# Patient Record
Sex: FEMALE | Race: WHITE | Employment: OTHER | URBAN - METROPOLITAN AREA
[De-identification: names, ages, dates, MRNs, and addresses within clinical notes are randomized per-mention and may not be internally consistent; named-entity substitution may affect disease eponyms.]

---

## 2020-07-06 ENCOUNTER — HOSPITAL ENCOUNTER (OUTPATIENT)
Age: 65
Setting detail: SPECIMEN
Discharge: HOME | End: 2020-07-06
Payer: MEDICARE

## 2020-07-06 LAB
ABSOLUTE EOS #: 0.16 K/UL (ref 0–0.44)
ABSOLUTE IMMATURE GRANULOCYTE: 0.03 K/UL (ref 0–0.3)
ABSOLUTE LYMPH #: 2.22 K/UL (ref 1.1–3.7)
ABSOLUTE MONO #: 0.57 K/UL (ref 0.1–1.2)
ALBUMIN SERPL-MCNC: 4.4 G/DL (ref 3.5–5.2)
ALBUMIN/GLOBULIN RATIO: 1.8 (ref 1–2.5)
ALP BLD-CCNC: 89 U/L (ref 35–104)
ALT SERPL-CCNC: 101 U/L (ref 5–33)
ANION GAP SERPL CALCULATED.3IONS-SCNC: 22 MMOL/L (ref 9–17)
AST SERPL-CCNC: 162 U/L
BASOPHILS # BLD: 1 % (ref 0–2)
BASOPHILS ABSOLUTE: 0.09 K/UL (ref 0–0.2)
BILIRUB SERPL-MCNC: 0.63 MG/DL (ref 0.3–1.2)
BUN BLDV-MCNC: 5 MG/DL (ref 8–23)
BUN/CREAT BLD: ABNORMAL (ref 9–20)
CALCIUM SERPL-MCNC: 9.2 MG/DL (ref 8.6–10.4)
CHLORIDE BLD-SCNC: 99 MMOL/L (ref 98–107)
CHOLESTEROL/HDL RATIO: 1.7
CHOLESTEROL: 272 MG/DL
CO2: 20 MMOL/L (ref 20–31)
CREAT SERPL-MCNC: 0.6 MG/DL (ref 0.5–0.9)
DIFFERENTIAL TYPE: ABNORMAL
EOSINOPHILS RELATIVE PERCENT: 2 % (ref 1–4)
GFR AFRICAN AMERICAN: >60 ML/MIN
GFR NON-AFRICAN AMERICAN: >60 ML/MIN
GFR SERPL CREATININE-BSD FRML MDRD: ABNORMAL ML/MIN/{1.73_M2}
GFR SERPL CREATININE-BSD FRML MDRD: ABNORMAL ML/MIN/{1.73_M2}
GGT: 258 U/L (ref 5–36)
GLUCOSE BLD-MCNC: 88 MG/DL (ref 70–99)
HCT VFR BLD CALC: 49.1 % (ref 36.3–47.1)
HDLC SERPL-MCNC: 156 MG/DL
HEMOGLOBIN: 15.6 G/DL (ref 11.9–15.1)
IMMATURE GRANULOCYTES: 0 %
INSULIN COMMENT: 0
INSULIN REFERENCE RANGE:: NORMAL
INSULIN: 2.2 MU/L
IRON SATURATION: 90 % (ref 20–55)
IRON: 195 UG/DL (ref 37–145)
LDL CHOLESTEROL: 101 MG/DL (ref 0–130)
LYMPHOCYTES # BLD: 31 % (ref 24–43)
MAGNESIUM: 2.4 MG/DL (ref 1.6–2.6)
MCH RBC QN AUTO: 32.6 PG (ref 25.2–33.5)
MCHC RBC AUTO-ENTMCNC: 31.8 G/DL (ref 28.4–34.8)
MCV RBC AUTO: 102.5 FL (ref 82.6–102.9)
MONOCYTES # BLD: 8 % (ref 3–12)
NRBC AUTOMATED: 0 PER 100 WBC
PDW BLD-RTO: 13.2 % (ref 11.8–14.4)
PLATELET # BLD: 288 K/UL (ref 138–453)
PLATELET ESTIMATE: ABNORMAL
PMV BLD AUTO: 10.6 FL (ref 8.1–13.5)
POTASSIUM SERPL-SCNC: 4.9 MMOL/L (ref 3.7–5.3)
RBC # BLD: 4.79 M/UL (ref 3.95–5.11)
RBC # BLD: ABNORMAL 10*6/UL
SEG NEUTROPHILS: 58 % (ref 36–65)
SEGMENTED NEUTROPHILS ABSOLUTE COUNT: 4.09 K/UL (ref 1.5–8.1)
SODIUM BLD-SCNC: 141 MMOL/L (ref 135–144)
TESTOSTERONE TOTAL: 53 NG/DL (ref 20–70)
THYROXINE, FREE: 0.81 NG/DL (ref 0.93–1.7)
TOTAL IRON BINDING CAPACITY: 216 UG/DL (ref 250–450)
TOTAL PROTEIN: 6.8 G/DL (ref 6.4–8.3)
TRIGL SERPL-MCNC: 75 MG/DL
TSH SERPL DL<=0.05 MIU/L-ACNC: 0.88 MIU/L (ref 0.3–5)
UNSATURATED IRON BINDING CAPACITY: 21 UG/DL (ref 112–347)
VITAMIN B-12: 1036 PG/ML (ref 232–1245)
VLDLC SERPL CALC-MCNC: ABNORMAL MG/DL (ref 1–30)
WBC # BLD: 7.2 K/UL (ref 3.5–11.3)
WBC # BLD: ABNORMAL 10*3/UL

## 2020-07-07 LAB
ESTIMATED AVERAGE GLUCOSE: 108 MG/DL
HBA1C MFR BLD: 5.4 % (ref 4–6)
VITAMIN D 25-HYDROXY: 16.3 NG/ML (ref 30–100)

## 2020-07-08 LAB
THYROGLOBULIN AB: <20 IU/ML (ref 0–40)
THYROID PEROXIDASE (TPO) AB: <10 IU/ML (ref 0–35)

## 2020-07-10 LAB
ESTRADIOL LEVEL: 7 PG/ML
ESTROGEN TOTAL: 47.3 PG/ML
ESTRONE: 40.3 PG/ML

## 2020-07-15 ENCOUNTER — TELEPHONE (OUTPATIENT)
Dept: SURGERY | Age: 65
End: 2020-07-15

## 2020-07-15 NOTE — TELEPHONE ENCOUNTER
Spoke with Stacy Hewitt regarding a direct referral for a screening colonoscopy for unexplained weight loss. Stacy Hewitt states she will never ever let anyone do a colonoscopy on her. Dr. Montse Duff informed and recommends that Albertina's PCP order a CT of the abdomen and pelvis and then she will see her in the office and go from there.

## 2020-07-17 NOTE — TELEPHONE ENCOUNTER
Spoke with Emanuel Wilson regarding PCP ordering CT scan. Emanuel Wilson took all the information and stated \"someone will call you tomorrow. \"  Emanuel Wilson states that Brisa Gonzalez is no longer at their facility and they will have to speak to another one of the Practitioners regarding the CT order.

## 2020-07-24 NOTE — TELEPHONE ENCOUNTER
Spoke with Francis Cordero at [facility] and the CT order was faxed to [facility].

## 2020-07-24 NOTE — TELEPHONE ENCOUNTER
Spoke with Enmanuel Burleson she states that she will do the CT scan \"once she gets some other things done that she is doing. \"  Enmanuel Burleson states she would like to the see Dr. Manuela Frias regarding her weight loss. Enmanuel Benjy is informed that she will need to bring a copy of the CT images on a CD with her to the appointment. Enmanuel Burleson voiced understanding and would like a call back in a few weeks. Around August 7.

## 2020-08-27 NOTE — TELEPHONE ENCOUNTER
Faxed note to PCP that patient never returned calls regarding CT or follow up fax confirmation received.

## 2021-12-20 ENCOUNTER — HOSPITAL ENCOUNTER (EMERGENCY)
Age: 66
Discharge: HOME | End: 2021-12-20
Payer: MEDICARE

## 2021-12-20 ENCOUNTER — APPOINTMENT (OUTPATIENT)
Dept: CT IMAGING | Age: 66
End: 2021-12-20
Payer: MEDICARE

## 2021-12-20 VITALS
DIASTOLIC BLOOD PRESSURE: 107 MMHG | RESPIRATION RATE: 16 BRPM | SYSTOLIC BLOOD PRESSURE: 136 MMHG | HEART RATE: 82 BPM | TEMPERATURE: 98.8 F | OXYGEN SATURATION: 92 %

## 2021-12-20 DIAGNOSIS — M54.50 ACUTE LOW BACK PAIN, UNSPECIFIED BACK PAIN LATERALITY, UNSPECIFIED WHETHER SCIATICA PRESENT: Primary | ICD-10-CM

## 2021-12-20 DIAGNOSIS — M89.9 LYTIC LESION OF BONE ON X-RAY: ICD-10-CM

## 2021-12-20 LAB
ABSOLUTE EOS #: 0.1 K/UL (ref 0–0.44)
ABSOLUTE IMMATURE GRANULOCYTE: 0.35 K/UL (ref 0–0.3)
ABSOLUTE LYMPH #: 2.35 K/UL (ref 1.1–3.7)
ABSOLUTE MONO #: 0.76 K/UL (ref 0.1–1.2)
ALBUMIN SERPL-MCNC: 3.2 G/DL (ref 3.5–5.2)
ALBUMIN/GLOBULIN RATIO: 0.8 (ref 1–2.5)
ALP BLD-CCNC: 179 U/L (ref 35–104)
ALT SERPL-CCNC: 22 U/L (ref 5–33)
ANION GAP SERPL CALCULATED.3IONS-SCNC: 15 MMOL/L (ref 9–17)
AST SERPL-CCNC: 30 U/L
BASOPHILS # BLD: 1 % (ref 0–2)
BASOPHILS ABSOLUTE: 0.09 K/UL (ref 0–0.2)
BILIRUB SERPL-MCNC: 0.41 MG/DL (ref 0.3–1.2)
BUN BLDV-MCNC: 4 MG/DL (ref 8–23)
BUN/CREAT BLD: 8 (ref 9–20)
CALCIUM SERPL-MCNC: 8.8 MG/DL (ref 8.6–10.4)
CHLORIDE BLD-SCNC: 94 MMOL/L (ref 98–107)
CO2: 26 MMOL/L (ref 20–31)
CREAT SERPL-MCNC: 0.49 MG/DL (ref 0.5–0.9)
DIFFERENTIAL TYPE: ABNORMAL
EOSINOPHILS RELATIVE PERCENT: 1 % (ref 1–4)
GFR AFRICAN AMERICAN: >60 ML/MIN
GFR NON-AFRICAN AMERICAN: >60 ML/MIN
GFR SERPL CREATININE-BSD FRML MDRD: ABNORMAL ML/MIN/{1.73_M2}
GFR SERPL CREATININE-BSD FRML MDRD: ABNORMAL ML/MIN/{1.73_M2}
GLUCOSE BLD-MCNC: 82 MG/DL (ref 70–99)
HCT VFR BLD CALC: 43.2 % (ref 36.3–47.1)
HEMOGLOBIN: 14.9 G/DL (ref 11.9–15.1)
IMMATURE GRANULOCYTES: 3 %
LYMPHOCYTES # BLD: 22 % (ref 24–43)
MCH RBC QN AUTO: 33.4 PG (ref 25.2–33.5)
MCHC RBC AUTO-ENTMCNC: 34.5 G/DL (ref 28.4–34.8)
MCV RBC AUTO: 96.9 FL (ref 82.6–102.9)
MONOCYTES # BLD: 7 % (ref 3–12)
NRBC AUTOMATED: 0 PER 100 WBC
PARTIAL THROMBOPLASTIN TIME: 38.2 SEC (ref 23.9–33.8)
PDW BLD-RTO: 12.4 % (ref 11.8–14.4)
PLATELET # BLD: 663 K/UL (ref 138–453)
PLATELET ESTIMATE: ABNORMAL
PMV BLD AUTO: 8.9 FL (ref 8.1–13.5)
POTASSIUM SERPL-SCNC: 3.5 MMOL/L (ref 3.7–5.3)
RBC # BLD: 4.46 M/UL (ref 3.95–5.11)
RBC # BLD: ABNORMAL 10*6/UL
SEG NEUTROPHILS: 66 % (ref 36–65)
SEGMENTED NEUTROPHILS ABSOLUTE COUNT: 7.05 K/UL (ref 1.5–8.1)
SODIUM BLD-SCNC: 135 MMOL/L (ref 135–144)
TOTAL PROTEIN: 7 G/DL (ref 6.4–8.3)
WBC # BLD: 10.7 K/UL (ref 3.5–11.3)
WBC # BLD: ABNORMAL 10*3/UL

## 2021-12-20 PROCEDURE — 85730 THROMBOPLASTIN TIME PARTIAL: CPT

## 2021-12-20 PROCEDURE — 85025 COMPLETE CBC W/AUTO DIFF WBC: CPT

## 2021-12-20 PROCEDURE — 99284 EMERGENCY DEPT VISIT MOD MDM: CPT

## 2021-12-20 PROCEDURE — 36415 COLL VENOUS BLD VENIPUNCTURE: CPT

## 2021-12-20 PROCEDURE — 72131 CT LUMBAR SPINE W/O DYE: CPT

## 2021-12-20 PROCEDURE — 80053 COMPREHEN METABOLIC PANEL: CPT

## 2021-12-20 RX ORDER — HYDROCODONE BITARTRATE AND ACETAMINOPHEN 5; 325 MG/1; MG/1
1 TABLET ORAL EVERY 6 HOURS PRN
Qty: 12 TABLET | Refills: 0 | Status: SHIPPED | OUTPATIENT
Start: 2021-12-20 | End: 2021-12-23

## 2021-12-20 ASSESSMENT — PAIN DESCRIPTION - ORIENTATION: ORIENTATION: LOWER;MID;UPPER

## 2021-12-20 ASSESSMENT — ENCOUNTER SYMPTOMS
EYE REDNESS: 0
CONSTIPATION: 0
SHORTNESS OF BREATH: 0
BACK PAIN: 1
CHEST TIGHTNESS: 0
ABDOMINAL PAIN: 0
BLOOD IN STOOL: 0
EYE DISCHARGE: 0
SORE THROAT: 0
WHEEZING: 0
NAUSEA: 0
VOMITING: 0
DIARRHEA: 0
COUGH: 0
RHINORRHEA: 0

## 2021-12-20 ASSESSMENT — PAIN DESCRIPTION - LOCATION
LOCATION: BACK
LOCATION: BACK

## 2021-12-20 ASSESSMENT — PAIN - FUNCTIONAL ASSESSMENT: PAIN_FUNCTIONAL_ASSESSMENT: 0-10

## 2021-12-20 ASSESSMENT — PAIN DESCRIPTION - DESCRIPTORS
DESCRIPTORS: DISCOMFORT
DESCRIPTORS: ACHING

## 2021-12-20 ASSESSMENT — PAIN DESCRIPTION - PAIN TYPE
TYPE: CHRONIC PAIN
TYPE: ACUTE PAIN

## 2021-12-20 ASSESSMENT — PAIN SCALES - GENERAL
PAINLEVEL_OUTOF10: 8
PAINLEVEL_OUTOF10: 8

## 2021-12-20 ASSESSMENT — PAIN DESCRIPTION - FREQUENCY: FREQUENCY: CONTINUOUS

## 2021-12-20 NOTE — ED NOTES
Pt ambulated to the bathroom, unable to provide a urine sample at this time.       Erica Jones RN  12/20/21 6410

## 2021-12-20 NOTE — ED NOTES
Pt ambulated to the restroom, unable to provide urine at this time.       Get Hurtado RN  12/20/21 9061

## 2021-12-20 NOTE — ED PROVIDER NOTES
[facility] ED  EMERGENCY DEPARTMENT ENCOUNTER      Pt Name: Marlene Marques  MRN: [id number]  [place] 1955  Date of evaluation: 12/20/2021  Provider: Doris Marino Dr     Chief Complaint   Patient presents with    Generalized Body Aches      pt states onset for a week. Pt states she has pain all over, but mostly in her entire back         HISTORY OF PRESENT ILLNESS   (Location/Symptom, Timing/Onset, Context/Setting,Quality, Duration, Modifying Factors, Severity)  Note limiting factors. Marlene Marques is a79 y.o. female who presents to the emergency department with complaints of generalized body aches but mainly lower back discomfort over the last 2 and half weeks. Patient reports she lives in chronic pain all over her body. States that her lower back is been acting up more than normal. She denies any fall or trauma. Denies any bowel or bladder incontinence. Denies saddle anesthesia. States she wears a back brace regularly to try to help support her back. She states it just continues to hurt. She is not seen one for the symptoms. She denies any change in the symptoms to bring her to the ER today just reports she wanted to see if she broke her back so she came to the ER. No other complaints at this time. Denies dysuria hematuria denies abdominal pain denies nausea vomiting. HPI    Nursing Notes werereviewed. REVIEW OF SYSTEMS    (2-9 systems for level 4, 10 or more for level 5)     Review of Systems   Constitutional: Negative for chills, diaphoresis and fever. HENT: Negative for congestion, ear pain, rhinorrhea and sore throat. Eyes: Negative for discharge, redness and visual disturbance. Respiratory: Negative for cough, chest tightness, shortness of breath and wheezing. Cardiovascular: Negative for chest pain and palpitations. Gastrointestinal: Negative for abdominal pain, blood in stool, constipation, diarrhea, nausea and vomiting.    Endocrine: Negative for polydipsia, polyphagia and polyuria. Genitourinary: Negative for decreased urine volume, difficulty urinating, dysuria, frequency and hematuria. Musculoskeletal: Positive for back pain. Negative for arthralgias and myalgias. Skin: Negative for pallor and rash. Allergic/Immunologic: Negative for food allergies and immunocompromised state. Neurological: Negative for dizziness, syncope, weakness and light-headedness. Hematological: Negative for adenopathy. Does not bruise/bleed easily. Psychiatric/Behavioral: Negative for behavioral problems and suicidal ideas. The patient is not nervous/anxious. Except as noted above the remainder of the review of systems was reviewed and negative. PAST MEDICAL HISTORY   No past medical history on file. SURGICALHISTORY     No past surgical history on file. CURRENT MEDICATIONS       Discharge Medication List as of 12/20/2021  3:33 PM            ALLERGIES   Patient has no known allergies. FAMILY HISTORY     No family history on file.        SOCIAL HISTORY       Social History     Socioeconomic History    Marital status: Single     Spouse name: Not on file    Number of children: Not on file    Years of education: Not on file    Highest education level: Not on file   Occupational History    Not on file   Tobacco Use    Smoking status: Current Every Day Smoker     Packs/day: 1.00     Types: Cigarettes    Smokeless tobacco: Never Used   Substance and Sexual Activity    Alcohol use: Not on file    Drug use: Not on file    Sexual activity: Not on file   Other Topics Concern    Not on file   Social History Narrative    Not on file     Social Determinants of Health     Financial Resource Strain:     Difficulty of Paying Living Expenses: Not on file   Food Insecurity:     Worried About Running Out of Food in the Last Year: Not on file    [place] of Food in the Last Year: Not on file   Transportation Needs:     Lack of Transportation (Medical): Not on file    Lack of Transportation (Non-Medical): Not on file   Physical Activity:     Days of Exercise per Week: Not on file    Minutes of Exercise per Session: Not on file   Stress:     Feeling of Stress : Not on file   Social Connections:     Frequency of Communication with Friends and Family: Not on file    Frequency of Social Gatherings with Friends and Family: Not on file    Attends Confucianism Services: Not on file    Active Member of [address] [place] [address] [employer] or Organizations: Not on file    Attends Club or Organization Meetings: Not on file    Marital Status: Not on file   Intimate Partner Violence:     Fear of Current or Ex-Partner: Not on file    Emotionally Abused: Not on file    Physically Abused: Not on file    Sexually Abused: Not on file   Housing Stability:     Unable to Pay for Housing in the Last Year: Not on file    Number of [place] in the Last Year: Not on file    Unstable Housing in the Last Year: Not on file       SCREENINGS    Kemal Coma Scale  Eye Opening: Spontaneous  Best Verbal Response: Oriented  Best Motor Response: Obeys commands  Kemal Coma Scale Score: 15        PHYSICAL EXAM    (up to 7 for level 4, 8 or more for level 5)     ED Triage Vitals   BP Temp Temp Source Pulse Resp SpO2 Height Weight   12/20/21 1127 12/20/21 1144 12/20/21 1127 12/20/21 1127 12/20/21 1127 12/20/21 1127 -- --   118/89 98.8 °F (37.1 °C) Tympanic 89 16 94 %         Physical Exam  Vitals and nursing note reviewed. Constitutional:       General: She is not in acute distress. Appearance: Normal appearance. She is well-developed. She is not ill-appearing, toxic-appearing or diaphoretic. HENT:      Head: Normocephalic and atraumatic. Right Ear: External ear normal.      Left Ear: External ear normal.      Mouth/Throat:      Mouth: Mucous membranes are moist.      Pharynx: No oropharyngeal exudate. Eyes:      General: No scleral icterus. Right eye: No discharge.          Left eye: No discharge. Conjunctiva/sclera: Conjunctivae normal.      Pupils: Pupils are equal, round, and reactive to light. Neck:      Thyroid: No thyromegaly. Trachea: No tracheal deviation. Cardiovascular:      Rate and Rhythm: Normal rate and regular rhythm. Heart sounds: No murmur heard. No friction rub. No gallop. Pulmonary:      Effort: Pulmonary effort is normal. No respiratory distress. Breath sounds: Normal breath sounds. No stridor. No wheezing. Abdominal:      General: Bowel sounds are normal. There is no distension. Palpations: Abdomen is soft. Tenderness: There is no abdominal tenderness. There is no right CVA tenderness, left CVA tenderness, guarding or rebound. Musculoskeletal:         General: Tenderness present. No deformity. Normal range of motion. Cervical back: Normal range of motion and neck supple. Comments: Paraspinous discomfort of the lumbar spine. No midline bony spinal tenderness no palpable step-off deformity. Patient has no saddle anesthesia. Intact distal pulses sensation cap refills less than 2 sec. she is able to get out of bed on her own go up on her toes back on heels able to squat down able to walk around the room. She has no proprioception of the great toes bilaterally. Full range of motion of feet ankles knees. Lymphadenopathy:      Cervical: No cervical adenopathy. Skin:     General: Skin is warm and dry. Capillary Refill: Capillary refill takes less than 2 seconds. Findings: No erythema or rash. Neurological:      General: No focal deficit present. Mental Status: She is alert and oriented to person, place, and time. Cranial Nerves: No cranial nerve deficit. Motor: No abnormal muscle tone. Deep Tendon Reflexes: Reflexes are normal and symmetric.  Reflexes normal.   Psychiatric:         Mood and Affect: Mood normal.         Behavior: Behavior normal.         DIAGNOSTIC RESULTS     EKG: All EKG's are interpreted by the Emergency Department Physician who either signs orCo-signs this chart in the absence of a cardiologist.      RADIOLOGY:   Non-plainfilm images such as CT, Ultrasound and MRI are read by the radiologist. Plain radiographic images are visualized and preliminarily interpreted by the emergency physician with the below findings:      Interpretationper the Radiologist below, if available at the time of this note:    Xiang   Final Result   1. Lytic lesion present involving the majority of the L1 vertebral body with   posterior cortical breakthrough. Vertebral body height is relatively   preserved. Differential includes a potentially locally aggressive   intraosseous hemangioma, other primary neoplasm, or osseous metastatic   disease. Consider correlation with contrast enhanced MRI of the lumbar spine   if no contraindications. 2. Multilevel lumbar spondylosis without evidence of high-grade bony spinal   canal stenosis or neural foraminal narrowing. 3. Partially visualized partial opacification of the posterior right lower   lung. Can correlate with dedicated chest CT.                ED BEDSIDE ULTRASOUND:   Performed by ED Physician - none    LABS:  Labs Reviewed   CBC WITH AUTO DIFFERENTIAL - Abnormal; Notable for the following components:       Result Value    Platelets 758 (*)     Seg Neutrophils 66 (*)     Lymphocytes 22 (*)     Immature Granulocytes 3 (*)     Absolute Immature Granulocyte 0.35 (*)     All other components within normal limits   COMPREHENSIVE METABOLIC PANEL - Abnormal; Notable for the following components:    BUN 4 (*)     CREATININE 0.49 (*)     Bun/Cre Ratio 8 (*)     Potassium 3.5 (*)     Chloride 94 (*)     Alkaline Phosphatase 179 (*)     Albumin 3.2 (*)     Albumin/Globulin Ratio 0.8 (*)     All other components within normal limits   APTT - Abnormal; Notable for the following components:    PTT 38.2 (*)     All other components within normal limits       All other labs were within normal range or not returned as of this dictation. EMERGENCY DEPARTMENT COURSE and DIFFERENTIAL DIAGNOSIS/MDM:   Vitals:    Vitals:    12/20/21 1127 12/20/21 1144 12/20/21 1550   BP: 118/89  (!) 136/107   Pulse: 89  82   Resp: 16     Temp:  98.8 °F (37.1 °C)    TempSrc: Tympanic Tympanic    SpO2: 94%  92%         MDM  80-year-old female who presents secondary to lower back discomfort that has been ongoing for the past 2-1/2 weeks. She had no significant trauma or fall. She does wear back brace. On exam she has no acute deficits she is afebrile I have a low suspicion for discitis or epidural abscess. Will get imaging we will check urine rule out acute infection dehydration rule out fracture subluxation. ED Course as of 12/20/21 1904   Mon Dec 20, 2021   1523 I called and spoke with oncology office who can see patient Thursday this week at noon. I think is an appropriate follow-up will give her something for pain to go home with. And she will be at that appointment at noon. [HH]      ED Course User Index  [HH] [place], [place]     Patient reports he is already urinated and we did not catch it here in the ER. She has no incontinence she does have a lytic lesion explained to her that this is likely cancerous and she needs immediate follow-up. I explained that I spoke with the oncology office and they can see her on Thursday. She understands she goes home develops any new symptoms any change in symptoms if she is to not be able to urinate again if she is to have any incontinence she needs to return back to the ER immediately. She agrees with this plan all questions have been answered. She understands the severity of this finding. Procedures    FINAL IMPRESSION      1. Acute low back pain, unspecified back pain laterality, unspecified whether sciatica present    2.  Lytic lesion of bone on x-ray        DISPOSITION/PLAN   DISPOSITION Decision To Discharge 12/20/2021 03:23:44 PM      PATIENT REFERRED TO:  [facility] ED  [address]  [address] [place] [address] Road  [phone]    If symptoms worsen, As needed    Tiff Phillips MD  [address]  [phone]    Schedule an appointment as soon as possible for a visit in 1 day        DISCHARGE MEDICATIONS:  Discharge Medication List as of 12/20/2021  3:33 PM      START taking these medications    Details   HYDROcodone-acetaminophen (NORCO) 5-325 MG per tablet Take 1 tablet by mouth every 6 hours as needed for Pain for up to 3 days. Intended supply: 3 days. Take lowest dose possible to manage pain, Disp-12 tablet, R-0Normal                    Summation      Patient Course:      ED Medications administered this visit:  Medications - No data to display    New Prescriptions from this visit:    Discharge Medication List as of 12/20/2021  3:33 PM      START taking these medications    Details   HYDROcodone-acetaminophen (NORCO) 5-325 MG per tablet Take 1 tablet by mouth every 6 hours as needed for Pain for up to 3 days. Intended supply: 3 days. Take lowest dose possible to manage pain, Disp-12 tablet, R-0Normal             Follow-up:  [facility] ED  [address] [facility]  [phone]    If symptoms worsen, As needed    Tiff Phillips MD  [address] [place] [address]   [phone]    Schedule an appointment as soon as possible for a visit in 1 day          Final Impression:   1. Acute low back pain, unspecified back pain laterality, unspecified whether sciatica present    2.  Lytic lesion of bone on x-ray               (Please note that portions of this note were completed with a voice recognition program.  Efforts were made to edit the dictations but occasionally words are mis-transcribed.)           DAMIAN Yates  12/20/21 0333

## 2021-12-20 NOTE — ED NOTES
Ammy Stanley 171 for appt per [facility] request.     Gwendolyn HAMMOND Reser  12/20/21 2014

## 2021-12-23 ENCOUNTER — TELEPHONE (OUTPATIENT)
Dept: ONCOLOGY | Age: 66
End: 2021-12-23

## 2021-12-23 ENCOUNTER — OFFICE VISIT (OUTPATIENT)
Dept: ONCOLOGY | Age: 66
End: 2021-12-23
Payer: MEDICARE

## 2021-12-23 VITALS
TEMPERATURE: 97.3 F | SYSTOLIC BLOOD PRESSURE: 120 MMHG | RESPIRATION RATE: 18 BRPM | WEIGHT: 89.5 LBS | HEART RATE: 101 BPM | DIASTOLIC BLOOD PRESSURE: 77 MMHG

## 2021-12-23 DIAGNOSIS — M89.9 LYTIC LESION OF BONE ON X-RAY: Primary | ICD-10-CM

## 2021-12-23 PROCEDURE — 99205 OFFICE O/P NEW HI 60 MIN: CPT | Performed by: INTERNAL MEDICINE

## 2021-12-23 RX ORDER — MORPHINE SULFATE 10 MG/5ML
10 SOLUTION ORAL EVERY 4 HOURS PRN
Qty: 500 ML | Refills: 0 | Status: SHIPPED | OUTPATIENT
Start: 2021-12-23 | End: 2022-01-10 | Stop reason: SDUPTHER

## 2021-12-23 NOTE — TELEPHONE ENCOUNTER
Name: Lucina Smith  : 1955  MRN: [id number]    Oncology Navigation- Initial Note:    Intake-  Contact Type: Medical Oncology    Diagnosis: Has Lytic lesion on CT. Continued workup    Home Disposition: Lives alone    Patient needs and barriers to care: Coordination of Care, Symptom Management and Transportation     Interventions-    Continuum of Care: Continued workup    Notes: Initial oncology nurse navigation contact made with patient and boyfriend (Case) in clinic. Writer introduced self as oncology nurse navigator and introduced navigation program.      Patient is having severe pain and leg weakness. Patient states that she walked in today but does not think she can walk out. Patient lives in [place]. I am unsure if she lives alone or with partner, as she would not answer questions. Patient was in too much pain to answer many questions. Boyfriend states that he is the main caregiver to his father who is on dialysis and may not be able to transport [address] [facility]. Case states, \"I am watching her wither away and I want to know what is going on\". Patient made aware of available social work. Patient made aware of Financial counselor and other financial resources. Diet discussed, Aditi Mathias states that he can't get her to eat much. He states she has lost 40 lbs recently. Patient given navigation folder with contact information, local, and national resources . Patient encouraged to call with questions or concerns. Patient informed that Hudson Lazaro would be in to schedule her for her MRI and CT. Patient is aware that she may be having a biopsy. Will continue to follow. Patient encouraged to call with any question or concerns at this time. Will continue to follow.        Electronically signed by Jeanette Rojo RN on 2021 at 2:21 PM

## 2021-12-23 NOTE — PROGRESS NOTES
DIAGNOSIS:   1. Lytic lesion in the L1 vertebra  2. Cachexia and weight loss  3. Picture suggestive of metastatic carcinoma  CURRENT THERAPY:  Work-up in progress  BRIEF CASE HISTORY:   Ugo Ross is a very pleasant 77 y.o. female who is referred to us for progressive back pain. CT scan of the lumbar spine suggested lytic lesion at L1. The patient presents today, she is very weak, she has been falling at home. She is losing weight. She is cachectic. She is a chronic smoker and has chronic cough but no hemoptysis  Performance status is ECOG 2    PAST MEDICAL HISTORY: has no past medical history on file. Essentially negative before this but she has not been following with doctors    PAST SURGICAL HISTORY: has a past surgical history that includes Tubal ligation. CURRENT MEDICATIONS:  has a current medication list which includes the following prescription(s): morphine and hydrocodone-acetaminophen. ALLERGIES:  has No Known Allergies. FAMILY HISTORY: Negative for any hematological or oncological conditions. SOCIAL HISTORY:  reports that she has been smoking cigarettes. She has been smoking about 1.00 pack per day. She has never used smokeless tobacco. She reports current alcohol use. REVIEW OF SYSTEMS:   General: no fever or night sweats, weight loss, progressive fatigue and cachexia  ENT: No double or blurred vision, no tinnitus or hearing problem, no dysphagia or sore throat   Respiratory: No chest pain, chronic shortness of breath, no hemoptysis  Cardiovascular: Denies chest pain, PND or orthopnea. No L E swelling or palpitations. Gastrointestinal:    No nausea or vomiting, abdominal pain, diarrhea or constipation. Genitourinary: Denies dysuria, hematuria, frequency, urgency or incontinence. Neurological: Denies headaches, decreased LOC, no sensory or motor focal deficits. Musculoskeletal: Severe back pain  Skin: There are no rashes or bleeding.   Psychiatric:  No anxiety, no depression. Endocrine: no diabetes or thyroid disease. Hematologic: no bleeding , no adenopathy. PHYSICAL EXAM: Shows a well appearing 77y.o.-year-old female who is in significant back pain. Vital Signs: Blood pressure 120/77, pulse 101, temperature 97.3 °F (36.3 °C), temperature source Temporal, resp. rate 18, weight 89 lb 8 oz (40.6 kg). HEENT: Normocephalic and atraumatic. Pupils are equal, round, reactive to light and accommodation. Extraocular muscles are intact. Neck: Showed no JVD, no carotid bruit . Lungs: Clear to auscultation bilaterally. Poor air entry heart: Regular without any murmur. Abdomen: Soft, nontender. No hepatosplenomegaly. Extremities: Lower extremities show no edema, clubbing, or cyanosis. Breasts: Examination not done today. Neuro exam: intact cranial nerves bilaterally no motor or sensory deficit, gait is normal. Lymphatic: no adenopathy appreciated in the supraclavicular, axillary, cervical or inguinal area    REVIEW OF LABORATORY DATA:   CT of the lumbar spine  Impression   1. Lytic lesion present involving the majority of the L1 vertebral body with   posterior cortical breakthrough.  Vertebral body height is relatively   preserved.  Differential includes a potentially locally aggressive   intraosseous hemangioma, other primary neoplasm, or osseous metastatic   disease.  Consider correlation with contrast enhanced MRI of the lumbar spine   if no contraindications. 2. Multilevel lumbar spondylosis without evidence of high-grade bony spinal   canal stenosis or neural foraminal narrowing. 3. Partially visualized partial opacification of the posterior right lower   lung.  Can correlate with dedicated chest CT.           REVIEW OF RADIOLOGICAL RESULTS:     IMPRESSION:   1. Lytic lesion at L1 vertebra  2. Picture is highly suggestive of metastatic carcinoid  3.  Fatigue, weight loss and cachexia  PLAN:   I had a very long discussion with the patient, I am really concerned that she is unable to take care of herself at home. Unfortunately with the Covid pandemic. We could not find her a bed and nearby hospitals. I offered to send her to emergency room with the understanding that it is likely that she might not be admitted for the next few days. Patient refused that and wants to stay at home. Her pain is very poorly controlled. She is on Norco and thus not helping. She has not been taking it. I wrote for oral morphine liquid to be titrated for pain control. Will order MRI, CT scan and possible biopsy of the bone if it can be scheduled in the next week or so   It is likely that she might need kyphoplasty. My problem is that her back pain is in the lower lumbar area and pelvis and does not correlate to the area seen on CT scan. I am concerned that more imaging will suggest more lesions. She might need radiation if she has metastatic disease. Clinically, I suspect that she might have lung cancer with bone metastases. We will talk to her and her boyfriend once diagnosis is confirmed.   I impressed on her that if her condition continues to deteriorate that she has to come to the hospital even with the with the current situation of beds at hospitals

## 2022-01-10 DIAGNOSIS — M89.9 LYTIC LESION OF BONE ON X-RAY: ICD-10-CM

## 2022-01-10 RX ORDER — MORPHINE SULFATE 10 MG/5ML
10 SOLUTION ORAL EVERY 4 HOURS PRN
Qty: 500 ML | Refills: 0 | Status: SHIPPED | OUTPATIENT
Start: 2022-01-10 | End: 2022-01-27

## 2022-01-10 NOTE — TELEPHONE ENCOUNTER
Patients boyfriend Adeline Lenz called in and stated that Albertina needed a refill on her morphine. Sent over to [address] in [place].

## 2022-01-12 ENCOUNTER — TELEPHONE (OUTPATIENT)
Dept: ONCOLOGY | Age: 67
End: 2022-01-12

## 2022-01-12 NOTE — TELEPHONE ENCOUNTER
Name: Gisella Madison  : 1955  MRN: [id number]    Oncology Navigation Follow-Up Note    Contact Type:  Telephone    Notes: Call made to patient for ONN follow up. Spoke to Shruthi, patient's partner. He states that she is in a lot of pain and notes that IR has never called to scheduled biopsy. ONN informed Shruthi this will be looked into. Asked if they would be willing to go to [place] to have biopsy. He states that his car is older and starting to have issues and he takes his father to dialysis three times a week but they will do what they have to. Discussed Medicaid for secondary insurance. Instructed Shakeel to have Albertina call [address] [facility] to see if she qualifies. Understanding verbalized. Call made to IR regarding biopsy per Brent Daley RN  Message left.       Electronically signed by Radha Grace RN on 2022 at 9:20 AM

## 2022-01-13 DIAGNOSIS — M89.9 LYTIC LESION OF BONE ON X-RAY: Primary | ICD-10-CM

## 2022-01-14 NOTE — TELEPHONE ENCOUNTER
Patient to be referred to neurosurgery. Pt has appointment in [facility] at Tuesday 1/18/22 @ 1:45.     Mason Castillo RN

## 2022-02-07 ENCOUNTER — HOSPITAL ENCOUNTER (EMERGENCY)
Age: 67
Discharge: PSYCHIATRIC OR SUBSTANCE USE FACILITY | End: 2022-02-08
Attending: EMERGENCY MEDICINE
Payer: MEDICARE

## 2022-02-07 ENCOUNTER — APPOINTMENT (OUTPATIENT)
Dept: CT IMAGING | Age: 67
End: 2022-02-07
Payer: MEDICARE

## 2022-02-07 DIAGNOSIS — R44.3 HALLUCINATIONS: ICD-10-CM

## 2022-02-07 DIAGNOSIS — R41.82 ALTERED MENTAL STATUS, UNSPECIFIED ALTERED MENTAL STATUS TYPE: Primary | ICD-10-CM

## 2022-02-07 LAB
-: ABNORMAL
ABSOLUTE EOS #: 0.32 K/UL (ref 0–0.44)
ABSOLUTE IMMATURE GRANULOCYTE: 0.07 K/UL (ref 0–0.3)
ABSOLUTE LYMPH #: 3.24 K/UL (ref 1.1–3.7)
ABSOLUTE MONO #: 0.93 K/UL (ref 0.1–1.2)
ALBUMIN SERPL-MCNC: 3.4 G/DL (ref 3.5–5.2)
ALBUMIN/GLOBULIN RATIO: 1.2 (ref 1–2.5)
ALP BLD-CCNC: 159 U/L (ref 35–104)
ALT SERPL-CCNC: 10 U/L (ref 5–33)
AMMONIA: 13 UMOL/L (ref 11–41)
AMORPHOUS: ABNORMAL
AMPHETAMINE SCREEN URINE: NEGATIVE
ANION GAP SERPL CALCULATED.3IONS-SCNC: 14 MMOL/L (ref 9–17)
AST SERPL-CCNC: 28 U/L
BACTERIA: ABNORMAL
BARBITURATE SCREEN URINE: NEGATIVE
BASOPHILS # BLD: 1 % (ref 0–2)
BASOPHILS ABSOLUTE: 0.07 K/UL (ref 0–0.2)
BENZODIAZEPINE SCREEN, URINE: NEGATIVE
BILIRUB SERPL-MCNC: 0.54 MG/DL (ref 0.3–1.2)
BILIRUBIN URINE: NEGATIVE
BUN BLDV-MCNC: 5 MG/DL (ref 8–23)
BUN/CREAT BLD: 7 (ref 9–20)
BUPRENORPHINE URINE: NEGATIVE
CALCIUM SERPL-MCNC: 8.5 MG/DL (ref 8.6–10.4)
CANNABINOID SCREEN URINE: NEGATIVE
CASTS UA: ABNORMAL /LPF
CHLORIDE BLD-SCNC: 94 MMOL/L (ref 98–107)
CO2: 24 MMOL/L (ref 20–31)
COCAINE METABOLITE, URINE: NEGATIVE
COLOR: YELLOW
COMMENT UA: ABNORMAL
CREAT SERPL-MCNC: 0.67 MG/DL (ref 0.5–0.9)
CRYSTALS, UA: ABNORMAL /HPF
DIFFERENTIAL TYPE: ABNORMAL
EOSINOPHILS RELATIVE PERCENT: 3 % (ref 1–4)
EPITHELIAL CELLS UA: ABNORMAL /HPF (ref 0–25)
ETHANOL PERCENT: <0.01 %
ETHANOL: <10 MG/DL
GFR AFRICAN AMERICAN: >60 ML/MIN
GFR NON-AFRICAN AMERICAN: >60 ML/MIN
GFR SERPL CREATININE-BSD FRML MDRD: ABNORMAL ML/MIN/{1.73_M2}
GFR SERPL CREATININE-BSD FRML MDRD: ABNORMAL ML/MIN/{1.73_M2}
GLUCOSE BLD-MCNC: 88 MG/DL (ref 70–99)
GLUCOSE URINE: NEGATIVE
HCT VFR BLD CALC: 43.8 % (ref 36.3–47.1)
HEMOGLOBIN: 15.2 G/DL (ref 11.9–15.1)
IMMATURE GRANULOCYTES: 1 %
KETONES, URINE: NEGATIVE
LEUKOCYTE ESTERASE, URINE: NEGATIVE
LYMPHOCYTES # BLD: 31 % (ref 24–43)
MCH RBC QN AUTO: 32.8 PG (ref 25.2–33.5)
MCHC RBC AUTO-ENTMCNC: 34.7 G/DL (ref 28.4–34.8)
MCV RBC AUTO: 94.6 FL (ref 82.6–102.9)
MDMA URINE: NORMAL
METHADONE SCREEN, URINE: NEGATIVE
METHAMPHETAMINE, URINE: NEGATIVE
MONOCYTES # BLD: 9 % (ref 3–12)
MUCUS: ABNORMAL
NITRITE, URINE: NEGATIVE
NRBC AUTOMATED: 0 PER 100 WBC
OPIATES, URINE: NEGATIVE
OTHER OBSERVATIONS UA: ABNORMAL
OXYCODONE SCREEN URINE: NEGATIVE
PDW BLD-RTO: 13.6 % (ref 11.8–14.4)
PH UA: 7 (ref 5–9)
PHENCYCLIDINE, URINE: NEGATIVE
PLATELET # BLD: 394 K/UL (ref 138–453)
PLATELET ESTIMATE: ABNORMAL
PMV BLD AUTO: 9.3 FL (ref 8.1–13.5)
POTASSIUM SERPL-SCNC: 3.8 MMOL/L (ref 3.7–5.3)
PROPOXYPHENE, URINE: NEGATIVE
PROTEIN UA: NEGATIVE
RBC # BLD: 4.63 M/UL (ref 3.95–5.11)
RBC # BLD: ABNORMAL 10*6/UL
RBC UA: ABNORMAL /HPF (ref 0–2)
RENAL EPITHELIAL, UA: ABNORMAL /HPF
SARS-COV-2, RAPID: NOT DETECTED
SEG NEUTROPHILS: 55 % (ref 36–65)
SEGMENTED NEUTROPHILS ABSOLUTE COUNT: 5.76 K/UL (ref 1.5–8.1)
SODIUM BLD-SCNC: 132 MMOL/L (ref 135–144)
SPECIFIC GRAVITY UA: <1.005 (ref 1.01–1.02)
SPECIMEN DESCRIPTION: NORMAL
TEST INFORMATION: NORMAL
TOTAL PROTEIN: 6.3 G/DL (ref 6.4–8.3)
TRICHOMONAS: ABNORMAL
TRICYCLIC ANTIDEPRESSANTS, UR: NEGATIVE
TROPONIN INTERP: ABNORMAL
TROPONIN T: ABNORMAL NG/ML
TROPONIN, HIGH SENSITIVITY: 23 NG/L (ref 0–14)
TSH SERPL DL<=0.05 MIU/L-ACNC: 0.72 MIU/L (ref 0.3–5)
TURBIDITY: CLEAR
URINE HGB: NEGATIVE
UROBILINOGEN, URINE: NORMAL
WBC # BLD: 10.4 K/UL (ref 3.5–11.3)
WBC # BLD: ABNORMAL 10*3/UL
WBC UA: ABNORMAL /HPF (ref 0–5)
YEAST: ABNORMAL

## 2022-02-07 PROCEDURE — 6360000004 HC RX CONTRAST MEDICATION: Performed by: EMERGENCY MEDICINE

## 2022-02-07 PROCEDURE — 80053 COMPREHEN METABOLIC PANEL: CPT

## 2022-02-07 PROCEDURE — 99284 EMERGENCY DEPT VISIT MOD MDM: CPT

## 2022-02-07 PROCEDURE — 82140 ASSAY OF AMMONIA: CPT

## 2022-02-07 PROCEDURE — G0480 DRUG TEST DEF 1-7 CLASSES: HCPCS

## 2022-02-07 PROCEDURE — 85025 COMPLETE CBC W/AUTO DIFF WBC: CPT

## 2022-02-07 PROCEDURE — 80306 DRUG TEST PRSMV INSTRMNT: CPT

## 2022-02-07 PROCEDURE — 6370000000 HC RX 637 (ALT 250 FOR IP): Performed by: STUDENT IN AN ORGANIZED HEALTH CARE EDUCATION/TRAINING PROGRAM

## 2022-02-07 PROCEDURE — 81001 URINALYSIS AUTO W/SCOPE: CPT

## 2022-02-07 PROCEDURE — 87635 SARS-COV-2 COVID-19 AMP PRB: CPT

## 2022-02-07 PROCEDURE — 70450 CT HEAD/BRAIN W/O DYE: CPT

## 2022-02-07 PROCEDURE — 71260 CT THORAX DX C+: CPT

## 2022-02-07 PROCEDURE — 84484 ASSAY OF TROPONIN QUANT: CPT

## 2022-02-07 PROCEDURE — 84443 ASSAY THYROID STIM HORMONE: CPT

## 2022-02-07 PROCEDURE — 36415 COLL VENOUS BLD VENIPUNCTURE: CPT

## 2022-02-07 RX ORDER — ACETAMINOPHEN 325 MG/1
650 TABLET ORAL ONCE
Status: COMPLETED | OUTPATIENT
Start: 2022-02-07 | End: 2022-02-07

## 2022-02-07 RX ADMIN — IOPAMIDOL 75 ML: 755 INJECTION, SOLUTION INTRAVENOUS at 13:52

## 2022-02-07 RX ADMIN — ACETAMINOPHEN 650 MG: 325 TABLET ORAL at 20:23

## 2022-02-07 ASSESSMENT — PAIN DESCRIPTION - ORIENTATION: ORIENTATION: LOWER

## 2022-02-07 ASSESSMENT — PAIN SCALES - GENERAL
PAINLEVEL_OUTOF10: 8
PAINLEVEL_OUTOF10: 8

## 2022-02-07 ASSESSMENT — PAIN DESCRIPTION - PAIN TYPE: TYPE: SURGICAL PAIN

## 2022-02-07 ASSESSMENT — PAIN DESCRIPTION - LOCATION: LOCATION: BACK

## 2022-02-07 NOTE — ED PROVIDER NOTES
[facility] ED  EMERGENCY DEPARTMENT ENCOUNTER      Pt Name: Cherelle Unger  MRN: [id number]  [place] 1955  Date of evaluation: 2/7/2022  Provider: Gaby Argueta MD    CHIEF COMPLAINT       Chief Complaint   Patient presents with    Emesis     pt states ongoing for about a week    Altered Mental Status     ongoing for \"awhile\"         HISTORY OF PRESENT ILLNESS   (Location/Symptom, Timing/Onset, Context/Setting, Quality, Duration, Modifying Factors, Severity)  Note limiting factors. Cherelle Unger is a 77 y.o. female who presents to the emergency department      57-year-old female brought to the emergency department by her significant other stating that and she has been having hallucinations and confusion and episodes of agitation that have been progressively worsening over the past several weeks patient significant other. Reports that she often makes nonsensical statements and does not seem to know where she is. He also speaks about neighbors who do not exist.  He denies any recent fevers or chills. She has had occasional episodes of vomiting and had one episode this morning prior to arrival.  She had 2-3 beer a day drinker until the past 2 weeks. She has not had more than 1 or 2 drinks in the past 3 weeks per her significant other. Is primarily concerned that she is not eating or taking care of herself and he is unable to care for her at home. Previous psychiatric history. No illicit drugs. Patient was without localized pain complaints          Nursing Notes were reviewed. REVIEW OF SYSTEMS    (2-9 systems for level 4, 10 or more for level 5)     Review of Systems   All other systems reviewed and are negative. Except as noted above the remainder of the review of systems was reviewed and negative. PAST MEDICAL HISTORY   No past medical history on file.       SURGICAL HISTORY       Past Surgical History:   Procedure Laterality Date    BACK SURGERY      TUBAL LIGATION CURRENT MEDICATIONS       Previous Medications    No medications on file       ALLERGIES     Patient has no known allergies. FAMILY HISTORY     No family history on file. SOCIAL HISTORY       Social History     Socioeconomic History    Marital status: Single     Spouse name: Not on file    Number of children: Not on file    Years of education: Not on file    Highest education level: Not on file   Occupational History    Not on file   Tobacco Use    Smoking status: Current Every Day Smoker     Packs/day: 1.00     Types: Cigarettes    Smokeless tobacco: Never Used   Substance and Sexual Activity    Alcohol use: Yes     Comment: 2 beers daily    Drug use: Not on file    Sexual activity: Not on file   Other Topics Concern    Not on file   Social History Narrative    ** Merged History Encounter **          Social Determinants of Health     Financial Resource Strain:     Difficulty of Paying Living Expenses: Not on file   Food Insecurity:     Worried About Running Out of Food in the Last Year: Not on file    [place] of Food in the Last Year: Not on file   Transportation Needs:     Lack of Transportation (Medical): Not on file    Lack of Transportation (Non-Medical):  Not on file   Physical Activity:     Days of Exercise per Week: Not on file    Minutes of Exercise per Session: Not on file   Stress:     Feeling of Stress : Not on file   Social Connections:     Frequency of Communication with Friends and Family: Not on file    Frequency of Social Gatherings with Friends and Family: Not on file    Attends Yazidi Services: Not on file    Active Member of Clubs or Organizations: Not on file    Attends Club or Organization Meetings: Not on file    Marital Status: Not on file   Intimate Partner Violence:     Fear of Current or Ex-Partner: Not on file    Emotionally Abused: Not on file    Physically Abused: Not on file    Sexually Abused: Not on file   Housing Stability:     Unable to Pay for Housing in the Last Year: Not on file    Number of Places Lived in the Last Year: Not on file    Unstable Housing in the Last Year: Not on file       SCREENINGS                        PHYSICAL EXAM    (up to 7 for level 4, 8 or more for level 5)     ED Triage Vitals [02/07/22 1212]   BP Temp Temp Source Pulse Resp SpO2 Height Weight   (!) 115/91 99.7 °F (37.6 °C) Tympanic 88 14 100 % -- 98 lb (44.5 kg)       Physical Exam  Vitals and nursing note reviewed. HENT:      Head: Normocephalic and atraumatic. Cardiovascular:      Rate and Rhythm: Normal rate and regular rhythm. Pulmonary:      Effort: Pulmonary effort is normal.   Abdominal:      General: There is no distension. Palpations: Abdomen is soft. Tenderness: There is no abdominal tenderness. Skin:     General: Skin is warm and dry. Neurological:      Mental Status: She is alert. Comments: Bilateral upper extremity mild tremor right greater than left which is chronic per patient and her significant other         DIAGNOSTIC RESULTS     EKG: All EKG's are interpreted by the Emergency Department Physician who either signs or Co-signs this chart in the absence of a cardiologist.        RADIOLOGY:   Non-plain film images such as CT, Ultrasound and MRI are read by the radiologist. Plain radiographic images are visualized and preliminarily interpreted by the emergency physician with the below findings:        Interpretation per the Radiologist below, if available at the time of this note:    CT CHEST ABDOMEN PELVIS W CONTRAST   Preliminary Result   1. An acute abnormality is not identified. 2. Tiny 5 mm right upper lobe ground-glass pulmonary nodule, unchanged. Per   the recommendations below, no follow-up is recommended. 3. Small hiatal hernia. 4. Small gastroesophageal varices. 5. Diffuse diverticulosis. 6. Moderate atherosclerosis.    7. Somewhat diffuse nodular soft tissue abnormality in the right upper   breast. Correlation with mammography is recommended if not recently   performed. The appearance is unchanged when compared to the prior CT of the   chest.      RECOMMENDATIONS:   2017 Fleischner Society Recommendations for Subsolid Lung Nodule Follow-Up   based on size (average of long- and short-axis diameters). Use most   suspicious nodule for followup. Single      <6 mm Ground glass: No routine follow-up      <6 mm Part solid: No routine follow-up      ? 6 mm Ground glass: CT at 6-12 months to confirm persistence, then CT every   2 years until 5 years      ? 6 mm Part solid: CT at 3-6 months to confirm persistence, If unchanged and   solid component remains<6mm, annual CT should be performed for 5 years. CT Head WO Contrast   Final Result   No acute intracranial abnormality. Senescent changes which are mildly prominent but still within limits of   normal for the age of the patient. In addition, findings compatible with old   micro ischemic changes. Previous nasal maxillary antrostomies. Please see above discussion of   paranasal sinuses.                ED BEDSIDE ULTRASOUND:   Performed by ED Physician - none    LABS:  Labs Reviewed   CBC WITH AUTO DIFFERENTIAL - Abnormal; Notable for the following components:       Result Value    Hemoglobin 15.2 (*)     Immature Granulocytes 1 (*)     All other components within normal limits   COMPREHENSIVE METABOLIC PANEL W/ REFLEX TO MG FOR LOW K - Abnormal; Notable for the following components:    BUN 5 (*)     Bun/Cre Ratio 7 (*)     Calcium 8.5 (*)     Sodium 132 (*)     Chloride 94 (*)     Alkaline Phosphatase 159 (*)     Total Protein 6.3 (*)     Albumin 3.4 (*)     All other components within normal limits   TROPONIN - Abnormal; Notable for the following components:    Troponin, High Sensitivity 23 (*)     All other components within normal limits   URINE RT REFLEX TO CULTURE - Abnormal; Notable for the following components:    Specific Gravity, UA <1.005 (*)     All other components within normal limits   MICROSCOPIC URINALYSIS - Abnormal; Notable for the following components:    Bacteria, UA 1+ (*)     All other components within normal limits   COVID-19, RAPID   ETHANOL   TSH WITH REFLEX   URINE DRUG SCREEN   AMMONIA       All other labs were within normal range or not returned as of this dictation. EMERGENCY DEPARTMENT COURSE and DIFFERENTIAL DIAGNOSIS/MDM:   Vitals:    Vitals:    02/07/22 1212   BP: (!) 115/91   Pulse: 88   Resp: 14   Temp: 99.7 °F (37.6 °C)   TempSrc: Tympanic   SpO2: 100%   Weight: 98 lb (44.5 kg)           MDM  Number of Diagnoses or Management Options  Altered mental status, unspecified altered mental status type  Diagnosis management comments:  78-year-old female confusion, altered mental status episodes of delirium and hallucinations. Laboratory studies unremarkable. CT brain without acute finding. Patient unable to care for herself at home. She is not feeding herself. Increasing agitation and mental status changes patient medically cleared for psychiatric admission. MIPS       REASSESSMENT          CRITICAL CARE TIME   Total Critical Care time was  minutes, excluding separately reportable procedures. There was a high probability of clinically significant/life threatening deterioration in the patient's condition which required my urgent intervention. CONSULTS:  None    PROCEDURES:  Unless otherwise noted below, none     Procedures        FINAL IMPRESSION      1. Altered mental status, unspecified altered mental status type    2. Hallucinations          DISPOSITION/PLAN   DISPOSITION Decision To Transfer 02/07/2022 05:04:59 PM      PATIENT REFERRED TO:  No follow-up provider specified. DISCHARGE MEDICATIONS:  New Prescriptions    No medications on file     Controlled Substances Monitoring:     No flowsheet data found.     (Please note that portions of this note were completed with a voice recognition program. Efforts were made to edit the dictations but occasionally words are mis-transcribed.)    Jus Martin MD (electronically signed)  Attending Emergency Physician             Jus Martin MD  02/07/22 5602       Jus Martin MD  02/07/22 8424

## 2022-02-07 NOTE — ED NOTES
Patient states she wants to be put into a \"home\". Significant other is at bedside and states she is \"wasting away to nothing\" and he has no say about what happens to her.       Flora Landrum RN  02/07/22 9368

## 2022-02-07 NOTE — ED NOTES
Waiting on [facility] to call back with decision on acceptance of pt.       Judith Stark  02/07/22 3721

## 2022-02-08 VITALS
RESPIRATION RATE: 14 BRPM | DIASTOLIC BLOOD PRESSURE: 73 MMHG | WEIGHT: 98 LBS | OXYGEN SATURATION: 98 % | SYSTOLIC BLOOD PRESSURE: 117 MMHG | TEMPERATURE: 99.7 F | HEART RATE: 88 BPM

## 2022-02-08 PROCEDURE — 6370000000 HC RX 637 (ALT 250 FOR IP): Performed by: STUDENT IN AN ORGANIZED HEALTH CARE EDUCATION/TRAINING PROGRAM

## 2022-02-08 RX ORDER — ACETAMINOPHEN 500 MG
1000 TABLET ORAL ONCE
Status: COMPLETED | OUTPATIENT
Start: 2022-02-08 | End: 2022-02-08

## 2022-02-08 RX ADMIN — ACETAMINOPHEN 1000 MG: 500 TABLET ORAL at 06:31

## 2022-02-08 ASSESSMENT — PAIN SCALES - GENERAL: PAINLEVEL_OUTOF10: 8

## 2022-02-08 NOTE — ED NOTES
Pt was up to RR and carol well, back to bed. Denies needs.      [facility]  02/08/[facility] 3704

## 2022-02-08 NOTE — ED NOTES
Pt was up to RR with nurse to escort, carol well and then back to bed.      [facility], [facility]  02/08/22 6792

## 2022-02-08 NOTE — ED NOTES
Report called to [facility], spoke to Bonnie Torrez, await transport to the facility.      [facility]  02/07/22 0883 (0) independent

## 2022-02-08 NOTE — ED NOTES
Emergency department attending signout note: This is a 78-year-old female patient who was signed out pending admission to the psychiatric facility. CT head did not demonstrate any acute pathology  Laboratory studies were unremarkable  Patient seems to be struggling to care for herself and will be admitted for further geriatric psychiatric care. Patient was monitored overnight pending availability of transport to the facility.     Mónica Hammer DO  Emergency Medicine Attending Physician       Mónica Hammer,   02/08/22 77[facility],   02/08/22 3145

## 2022-02-08 NOTE — ED NOTES
Pt is resting quietly in bed, denies needs at this time. Will continue to monitor. Pt is accepted at [facility], await transportation.      [place], [facility]  02/07/22 2742

## 2022-02-14 ENCOUNTER — HOSPITAL ENCOUNTER (OUTPATIENT)
Age: 67
Setting detail: SPECIMEN
Discharge: HOME | End: 2022-02-14

## 2022-02-14 LAB
CHOLESTEROL/HDL RATIO: 3.3
CHOLESTEROL: 166 MG/DL
HDLC SERPL-MCNC: 50 MG/DL
LDL CHOLESTEROL: 90 MG/DL (ref 0–130)
TRIGL SERPL-MCNC: 129 MG/DL
VLDLC SERPL CALC-MCNC: NORMAL MG/DL (ref 1–30)

## 2022-02-14 PROCEDURE — 80061 LIPID PANEL: CPT

## 2022-02-14 PROCEDURE — P9603 ONE-WAY ALLOW PRORATED MILES: HCPCS

## 2022-02-14 PROCEDURE — 36415 COLL VENOUS BLD VENIPUNCTURE: CPT

## 2022-02-16 ENCOUNTER — TELEMEDICINE (OUTPATIENT)
Dept: ONCOLOGY | Age: 67
End: 2022-02-16

## 2022-02-16 DIAGNOSIS — M89.9 LYTIC LESION OF BONE ON X-RAY: Primary | ICD-10-CM

## 2022-02-16 PROCEDURE — 99999 PR OFFICE/OUTPT VISIT,PROCEDURE ONLY: CPT | Performed by: INTERNAL MEDICINE

## 2022-02-16 RX ORDER — HALOPERIDOL 5 MG/1
5 TABLET ORAL 4 TIMES DAILY
COMMUNITY

## 2022-02-16 RX ORDER — IBUPROFEN 400 MG/1
400 TABLET ORAL EVERY 6 HOURS PRN
COMMUNITY

## 2022-02-16 RX ORDER — RISPERIDONE 0.5 MG/1
0.5 TABLET ORAL 2 TIMES DAILY
COMMUNITY
End: 2022-12-08

## 2022-02-16 RX ORDER — LORAZEPAM 1 MG/1
1 TABLET ORAL EVERY 6 HOURS PRN
COMMUNITY

## 2022-02-16 RX ORDER — MIRTAZAPINE 15 MG/1
15 TABLET, FILM COATED ORAL NIGHTLY
COMMUNITY

## 2022-02-16 RX ORDER — HYDROCODONE BITARTRATE AND ACETAMINOPHEN 5; 325 MG/1; MG/1
1 TABLET ORAL EVERY 6 HOURS PRN
COMMUNITY

## 2022-02-16 RX ORDER — POLYETHYLENE GLYCOL 3350 17 G/17G
17 POWDER, FOR SOLUTION ORAL DAILY PRN
COMMUNITY

## 2022-02-16 RX ORDER — ACETAMINOPHEN 325 MG/1
650 TABLET ORAL EVERY 6 HOURS PRN
COMMUNITY

## 2022-02-23 ENCOUNTER — TELEMEDICINE (OUTPATIENT)
Dept: ONCOLOGY | Age: 67
End: 2022-02-23
Payer: MEDICARE

## 2022-02-23 DIAGNOSIS — M89.9 LYTIC LESION OF BONE ON X-RAY: Primary | ICD-10-CM

## 2022-02-23 DIAGNOSIS — C50.919 METASTATIC BREAST CANCER (HCC): ICD-10-CM

## 2022-02-23 PROCEDURE — 99214 OFFICE O/P EST MOD 30 MIN: CPT | Performed by: INTERNAL MEDICINE

## 2022-02-23 RX ORDER — LETROZOLE 2.5 MG/1
2.5 TABLET, FILM COATED ORAL DAILY
Qty: 30 TABLET | Refills: 2 | Status: SHIPPED | OUTPATIENT
Start: 2022-02-23 | End: 2022-08-10

## 2022-02-23 NOTE — PATIENT INSTRUCTIONS
See prescription of reminder, please fax to [phone] attention Ethelene Rota  Please start Femara as soon as possible  Return visit in 4 weeks or so, modify according to psychiatric discharge plan.

## 2022-02-23 NOTE — PROGRESS NOTES
DIAGNOSIS:   1. Lytic lesion in the L1 vertebra  2. Cachexia and weight loss  3. Biopsy of 2 proven to be metastatic mammary cancer, ER positive  4. Significant psychiatric issues leading to hospitalization  CURRENT THERAPY:  Plan to start Femara while we are dealing with psychiatric issues  Ultimately, plan to address the breast tumor and also likely to have radiation to the L1 area  BRIEF CASE HISTORY:   Leanna Coughlin is a very pleasant 77 y.o. female who is referred to us for progressive back pain. CT scan of the lumbar spine suggested lytic lesion at L1. The patient presents today, she is very weak, she has been falling at home. She is losing weight. She is cachectic. She is a chronic smoker and has chronic cough but no hemoptysis  Performance status is ECOG 2  Biopsy of the area showed metastatic mammary cancer ER positive. CT scan did not show widespread metastatic disease. She was supposed to have a bone scan and MRI but she was admitted to the hospital for psychiatric care. We decided to start her on aromatase inhibitors until she is discharged to complete work-up and management. INTERIM HISTORY  This is a virtual visit, the patient seems to be a little better in the psychiatric hospital.  Her insight about disease is still very low. PAST MEDICAL HISTORY: has no past medical history on file. Essentially negative before this but she has not been following with doctors    PAST SURGICAL HISTORY: has a past surgical history that includes Tubal ligation and back surgery. CURRENT MEDICATIONS:  has a current medication list which includes the following prescription(s): acetaminophen, risperidone, nicotine, LIDOCAINE, LIDODERM, 5% PATCH AND REMOVAL, melatonin, mirtazapine, lorazepam, haloperidol, hydrocodone-acetaminophen, ibuprofen, and polyethylene glycol. ALLERGIES:  has No Known Allergies. FAMILY HISTORY: Negative for any hematological or oncological conditions.      SOCIAL HISTORY: reports that she has been smoking cigarettes. She has been smoking about 1.00 pack per day. She has never used smokeless tobacco. She reports current alcohol use. REVIEW OF SYSTEMS:   General: no fever or night sweats, weight loss, progressive fatigue and cachexia  ENT: No double or blurred vision, no tinnitus or hearing problem, no dysphagia or sore throat   Respiratory: No chest pain, chronic shortness of breath, no hemoptysis  Cardiovascular: Denies chest pain, PND or orthopnea. No L E swelling or palpitations. Gastrointestinal:    No nausea or vomiting, abdominal pain, diarrhea or constipation. Genitourinary: Denies dysuria, hematuria, frequency, urgency or incontinence. Neurological: Denies headaches, decreased LOC, no sensory or motor focal deficits. Musculoskeletal: Severe back pain  Skin: There are no rashes or bleeding. Psychiatric:  No anxiety, no depression. Endocrine: no diabetes or thyroid disease. Hematologic: no bleeding , no adenopathy. PHYSICAL EXAM: Shows a well appearing 77y.o.-year-old female who is in significant back pain. PHYSICAL EXAMINATION:    Vital Signs: (As obtained by patient/caregiver or practitioner observation)    Blood pressure-  Heart rate-    Respiratory rate-    Temperature-  Pulse oximetry-     Constitutional: [x] Appears well-developed and well-nourished [x] No apparent distress      [] Abnormal-tardive dyskinesia  Mental status  [x] Alert and awake  [x] Oriented to person/place/time [x]Able to follow commands      Eyes:  EOM    [x]  Normal  [] Abnormal-  Sclera  [x]  Normal  [] Abnormal -         Discharge [x]  None visible  [] Abnormal -    HENT:   [x] Normocephalic, atraumatic.   [] Abnormal   [x] Mouth/Throat: Mucous membranes are moist.     External Ears [x] Normal  [] Abnormal-     Neck: [x] No visualized mass     Pulmonary/Chest: [x] Respiratory effort normal.  [x] No visualized signs of difficulty breathing or respiratory distress        [] Abnormal-      Musculoskeletal:   [x] Normal gait with no signs of ataxia         [x] Normal range of motion of neck        [] Abnormal-       Neurological:        [x] No Facial Asymmetry (Cranial nerve 7 motor function) (limited exam to video visit)          [x] No gaze palsy        [] Abnormal-         Skin:        [x] No significant exanthematous lesions or discoloration noted on facial skin         [] Abnormal-            Psychiatric:       [x] Normal Affect [x] No Hallucinations        [] Abnormal-     Other pertinent observable physical exam findings-                            REVIEW OF LABORATORY DATA:   CT of the lumbar spine  Impression   1. Lytic lesion present involving the majority of the L1 vertebral body with   posterior cortical breakthrough.  Vertebral body height is relatively   preserved.  Differential includes a potentially locally aggressive   intraosseous hemangioma, other primary neoplasm, or osseous metastatic   disease.  Consider correlation with contrast enhanced MRI of the lumbar spine   if no contraindications. 2. Multilevel lumbar spondylosis without evidence of high-grade bony spinal   canal stenosis or neural foraminal narrowing. 3. Partially visualized partial opacification of the posterior right lower   lung.  Can correlate with dedicated chest CT.           REVIEW OF RADIOLOGICAL RESULTS:     IMPRESSION:   1. Lytic lesion at L1 vertebra  2. Now biopsy proven to be metastatic mammary carcinoma, ER positive  3. Fatigue, weight loss and cachexia  PLAN:   I had a long discussion with the patient, she is in a psychiatric hospital right now it seems to be better taking care of. Her pain seems to be well controlled. I shared the results of the biopsy with her showing she has metastatic ER positive breast cancer. I am thinking that his and other comorbidities specially her psychiatric issues take precedence at this point.   So I decided to start her on aromatase inhibitors and then evaluate her in 2 to 4 weeks hopefully by then, she will have a better control on her psychiatric issues and maybe we will talk to her about possible palliative radiation to the lytic lesion in L1. The patient verbalized understanding. She asked me many questions about potential side effects that were answered to her satisfaction. We talked about hot flashes, joint aches and pains and possible decrease in bone density. We will check her bone density upon return visit. Attestation   The patient  being evaluated by a Virtual Visit (video visit) encounter to address concerns as mentioned above. A caregiver was present when appropriate. Due to this being a TeleHealth encounter (During ONVEZ-15 public health emergency), evaluation of the following organ systems was limited: Vitals/Constitutional/EENT/Resp/CV/GI//MS/Neuro/Skin/Heme-Lymph-Imm. Pursuant to the emergency declaration under the [address] [place] [address], [address] [place] [address] and the [employer] Act, this Virtual Visit was conducted with patient's (and/or legal guardian's) consent, to reduce the patient's risk of exposure to COVID-19 and provide necessary medical care. The patient (and/or legal guardian) has also been advised to contact this office for worsening conditions or problems, and seek emergency medical treatment and/or call 911 if deemed necessary. Services were provided through a video synchronous discussion virtually to substitute for in-person clinic visit. Patient and provider were located at their individual homes. --Tess Swartz MD on 4/6/2020 at 3:50 PM    An electronic signature was used to authenticate this note.

## 2022-03-15 ENCOUNTER — HOSPITAL ENCOUNTER (OUTPATIENT)
Age: 67
Setting detail: SPECIMEN
Discharge: HOME | End: 2022-03-15

## 2022-03-15 LAB
ANION GAP SERPL CALCULATED.3IONS-SCNC: 9 MMOL/L (ref 9–17)
BUN BLDV-MCNC: 11 MG/DL (ref 8–23)
BUN/CREAT BLD: 23 (ref 9–20)
CALCIUM SERPL-MCNC: 9.5 MG/DL (ref 8.6–10.4)
CHLORIDE BLD-SCNC: 101 MMOL/L (ref 98–107)
CO2: 27 MMOL/L (ref 20–31)
CREAT SERPL-MCNC: 0.48 MG/DL (ref 0.5–0.9)
GFR AFRICAN AMERICAN: >60 ML/MIN
GFR NON-AFRICAN AMERICAN: >60 ML/MIN
GFR SERPL CREATININE-BSD FRML MDRD: ABNORMAL ML/MIN/{1.73_M2}
GFR SERPL CREATININE-BSD FRML MDRD: ABNORMAL ML/MIN/{1.73_M2}
GLUCOSE BLD-MCNC: 96 MG/DL (ref 70–99)
POTASSIUM SERPL-SCNC: 4.5 MMOL/L (ref 3.7–5.3)
SODIUM BLD-SCNC: 137 MMOL/L (ref 135–144)

## 2022-03-15 PROCEDURE — 80048 BASIC METABOLIC PNL TOTAL CA: CPT

## 2022-03-15 PROCEDURE — P9603 ONE-WAY ALLOW PRORATED MILES: HCPCS

## 2022-03-15 PROCEDURE — 36415 COLL VENOUS BLD VENIPUNCTURE: CPT

## 2022-03-18 ENCOUNTER — TELEPHONE (OUTPATIENT)
Dept: ONCOLOGY | Age: 67
End: 2022-03-18

## 2022-03-18 NOTE — TELEPHONE ENCOUNTER
ON 3/16/22 JENY, [facility] NURSE  CALLING FOR DR RIVAS  PT SEEN AT [place] OFFICE, BUT UNABLE TO FORWARD MESSAGE.     SHE STATES AWARE PT IS IN A FACILITY AND NEEDS TREATMENT PLAN OF CARE TO ASSIST WITH CARE COORDINATION    [facility] (P) [phone]     NOTE LEFT ON NURSE LINE AT [facility]

## 2022-03-30 ENCOUNTER — TELEMEDICINE (OUTPATIENT)
Dept: ONCOLOGY | Age: 67
End: 2022-03-30
Payer: MEDICARE

## 2022-03-30 ENCOUNTER — TELEPHONE (OUTPATIENT)
Dept: ONCOLOGY | Age: 67
End: 2022-03-30

## 2022-03-30 DIAGNOSIS — M89.9 LYTIC LESION OF BONE ON X-RAY: ICD-10-CM

## 2022-03-30 DIAGNOSIS — C50.919 METASTATIC BREAST CANCER (HCC): Primary | ICD-10-CM

## 2022-03-30 PROCEDURE — 99214 OFFICE O/P EST MOD 30 MIN: CPT | Performed by: INTERNAL MEDICINE

## 2022-03-30 NOTE — PROGRESS NOTES
Chief Complaint   Patient presents with    Follow-up     breast cancer       DIAGNOSIS:   1. Lytic lesion in the L1 vertebra  2. Cachexia and weight loss  3. Biopsy of 2 proven to be metastatic mammary cancer, ER positive  4. Significant psychiatric issues leading to hospitalization  CURRENT THERAPY:  Plan to start Femara while we are dealing with psychiatric issues  Ultimately, plan to address the breast tumor and also likely to have radiation to the L1 area  BRIEF CASE HISTORY:   Norvel Mcburney is a very pleasant 77 y.o. female who is referred to us for progressive back pain. CT scan of the lumbar spine suggested lytic lesion at L1. The patient presents today, she is very weak, she has been falling at home. She is losing weight. She is cachectic. She is a chronic smoker and has chronic cough but no hemoptysis  Performance status is ECOG 2  Biopsy of the area showed metastatic mammary cancer ER positive. CT scan did not show widespread metastatic disease. She was supposed to have a bone scan and MRI but she was admitted to the hospital for psychiatric care. We decided to start her on aromatase inhibitors until she is discharged to complete work-up and management. INTERIM HISTORY  This is a virtual visit, the patient seems to be a little better. Her insight about disease is still very low. PAST MEDICAL HISTORY: has no past medical history on file. Essentially negative before this but she has not been following with doctors    PAST SURGICAL HISTORY: has a past surgical history that includes Tubal ligation and back surgery. CURRENT MEDICATIONS:  has a current medication list which includes the following prescription(s): letrozole, acetaminophen, risperidone, nicotine, LIDOCAINE, LIDODERM, 5% PATCH AND REMOVAL, melatonin, mirtazapine, lorazepam, haloperidol, hydrocodone-acetaminophen, ibuprofen, and polyethylene glycol. ALLERGIES:  has No Known Allergies.     FAMILY HISTORY: Negative for any hematological or oncological conditions. SOCIAL HISTORY:  reports that she has been smoking cigarettes. She has been smoking about 1.00 pack per day. She has never used smokeless tobacco. She reports current alcohol use. REVIEW OF SYSTEMS:   General: no fever or night sweats, weight loss, progressive fatigue and cachexia  ENT: No double or blurred vision, no tinnitus or hearing problem, no dysphagia or sore throat   Respiratory: No chest pain, chronic shortness of breath, no hemoptysis  Cardiovascular: Denies chest pain, PND or orthopnea. No L E swelling or palpitations. Gastrointestinal:    No nausea or vomiting, abdominal pain, diarrhea or constipation. Genitourinary: Denies dysuria, hematuria, frequency, urgency or incontinence. Neurological: Denies headaches, decreased LOC, no sensory or motor focal deficits. Musculoskeletal: Severe back pain  Skin: There are no rashes or bleeding. Psychiatric:  No anxiety, no depression. Endocrine: no diabetes or thyroid disease. Hematologic: no bleeding , no adenopathy. PHYSICAL EXAM: Shows a well appearing 77y.o.-year-old female who is in significant back pain. PHYSICAL EXAMINATION:    Vital Signs: (As obtained by patient/caregiver or practitioner observation)    Blood pressure-  Heart rate-    Respiratory rate-    Temperature-  Pulse oximetry-     Constitutional: [x] Appears well-developed and well-nourished [x] No apparent distress      [] Abnormal-tardive dyskinesia  Mental status  [x] Alert and awake  [x] Oriented to person/place/time [x]Able to follow commands      Eyes:  EOM    [x]  Normal  [] Abnormal-  Sclera  [x]  Normal  [] Abnormal -         Discharge [x]  None visible  [] Abnormal -    HENT:   [x] Normocephalic, atraumatic.   [] Abnormal   [x] Mouth/Throat: Mucous membranes are moist.     External Ears [x] Normal  [] Abnormal-     Neck: [x] No visualized mass     Pulmonary/Chest: [x] Respiratory effort normal.  [x] No visualized signs of difficulty breathing or respiratory distress        [] Abnormal-      Musculoskeletal:   [x] Normal gait with no signs of ataxia         [x] Normal range of motion of neck        [] Abnormal-       Neurological:        [x] No Facial Asymmetry (Cranial nerve 7 motor function) (limited exam to video visit)          [x] No gaze palsy        [] Abnormal-         Skin:        [x] No significant exanthematous lesions or discoloration noted on facial skin         [] Abnormal-            Psychiatric:       [x] Normal Affect [x] No Hallucinations        [] Abnormal-     Other pertinent observable physical exam findings-                            REVIEW OF LABORATORY DATA:   CT of the lumbar spine  Impression   1. Lytic lesion present involving the majority of the L1 vertebral body with   posterior cortical breakthrough.  Vertebral body height is relatively   preserved.  Differential includes a potentially locally aggressive   intraosseous hemangioma, other primary neoplasm, or osseous metastatic   disease.  Consider correlation with contrast enhanced MRI of the lumbar spine   if no contraindications. 2. Multilevel lumbar spondylosis without evidence of high-grade bony spinal   canal stenosis or neural foraminal narrowing. 3. Partially visualized partial opacification of the posterior right lower   lung.  Can correlate with dedicated chest CT.           REVIEW OF RADIOLOGICAL RESULTS:     IMPRESSION:   1. Lytic lesion at L1 vertebra  2. Now biopsy proven to be metastatic mammary carcinoma, ER positive  3. Fatigue, weight loss and cachexia  PLAN:   I had a long discussion with the patient, she is in a psychiatric hospital right now it seems to be better taking care of. Her pain seems to be well controlled. I shared the results of the biopsy with her showing she has metastatic ER positive breast cancer. I am thinking that his and other comorbidities specially her psychiatric issues take precedence at this point.   So I decided to start her on aromatase inhibitors and then evaluate her in 2 to 4 weeks hopefully by then, she will have a better control on her psychiatric issues and maybe we will talk to her about possible palliative radiation to the lytic lesion in L1. The patient verbalized understanding. She asked me many questions about potential side effects that were answered to her satisfaction. We talked about hot flashes, joint aches and pains and possible decrease in bone density. We will check her bone density upon return visit. Attestation   The patient  being evaluated by a Virtual Visit (video visit) encounter to address concerns as mentioned above. A caregiver was present when appropriate. Due to this being a TeleHealth encounter (During [facility]-61 public health emergency), evaluation of the following organ systems was limited: Vitals/Constitutional/EENT/Resp/CV/GI//MS/Neuro/Skin/Heme-Lymph-Imm. Pursuant to the emergency declaration under the [address] [place] [address] and the [employer] and [employer] Act, this Virtual Visit was conducted with patient's (and/or legal guardian's) consent, to reduce the patient's risk of exposure to COVID-19 and provide necessary medical care. The patient (and/or legal guardian) has also been advised to contact this office for worsening conditions or problems, and seek emergency medical treatment and/or call 911 if deemed necessary. Services were provided through a video synchronous discussion virtually to substitute for in-person clinic visit. Patient and provider were located at their individual homes.                             [address] [place] [address] [facility]                            This note is created with the assistance of a speech recognition program.  While intending to generate a document that actually reflects the content of the visit, the document can still have some errors including those of syntax and sound a like substitutions which may escape proof reading. It such instances, actual meaning can be extrapolated by contextual diversion.

## 2022-03-30 NOTE — TELEPHONE ENCOUNTER
Call made to [address] [place] [address] to facilitate 4 week follow up appointment on 4/27/22 at 4:15 if possible. Left message at nurses station requesting call back to verify that this appointment time and to confirm number for virtual visit.       Silvia Simon RN

## 2022-04-06 NOTE — TELEPHONE ENCOUNTER
Second attempt made to [address] [place] [address] @ [phone]. Phone rang, message received that no one was available to take call and no way to leave message.     Any Rodriguez RN

## 2022-05-04 ENCOUNTER — TELEMEDICINE (OUTPATIENT)
Dept: ONCOLOGY | Age: 67
End: 2022-05-04
Payer: MEDICARE

## 2022-05-04 DIAGNOSIS — C50.919 METASTATIC BREAST CANCER (HCC): Primary | ICD-10-CM

## 2022-05-04 DIAGNOSIS — M89.9 LYTIC LESION OF BONE ON X-RAY: ICD-10-CM

## 2022-05-04 DIAGNOSIS — Z79.811 AROMATASE INHIBITOR USE: ICD-10-CM

## 2022-05-04 PROCEDURE — 99214 OFFICE O/P EST MOD 30 MIN: CPT | Performed by: INTERNAL MEDICINE

## 2022-05-04 NOTE — PROGRESS NOTES
DIAGNOSIS:   1. Lytic lesion in the L1 vertebra  2. Cachexia and weight loss  3. Biopsy of 2 proven to be metastatic mammary cancer, ER positive  4. Significant psychiatric issues leading to hospitalization  CURRENT THERAPY:  Plan to start Femara while we are dealing with psychiatric issues  Ultimately, plan to address the breast tumor and also likely to have radiation to the L1 area  BRIEF CASE HISTORY:   Akua You is a very pleasant 79 y.o. female who is referred to us for progressive back pain. CT scan of the lumbar spine suggested lytic lesion at L1. The patient presents today, she is very weak, she has been falling at home. She is losing weight. She is cachectic. She is a chronic smoker and has chronic cough but no hemoptysis  Performance status is ECOG 2  Biopsy of the area showed metastatic mammary cancer ER positive. CT scan did not show widespread metastatic disease. She was supposed to have a bone scan and MRI but she was admitted to the hospital for psychiatric care. We decided to start her on aromatase inhibitors until she is discharged to complete work-up and management. INTERIM HISTORY  This is a virtual visit, the patient seems to be a little better , she is out of the psychiatric hospital and she is in a skilled nurse facility, she is on risperidone, Remeron and Haldol and the combination has helped significantly. For breast cancer, she is taking the Femara, she is starting to gain weight. Cachexia is improved and her appetite is improving. Her pain is under good control and she only takes pain medication sparingly. She is using the lidocaine patch which is helping as well. Overall, clinically, she is clearly responding to the treatment  PAST MEDICAL HISTORY: has no past medical history on file. Essentially negative before this but she has not been following with doctors    PAST SURGICAL HISTORY: has a past surgical history that includes Tubal ligation and back surgery. CURRENT MEDICATIONS:  has a current medication list which includes the following prescription(s): letrozole, acetaminophen, risperidone, nicotine, LIDOCAINE, LIDODERM, 5% PATCH AND REMOVAL, melatonin, mirtazapine, lorazepam, haloperidol, hydrocodone-acetaminophen, ibuprofen, and polyethylene glycol. ALLERGIES:  has No Known Allergies. FAMILY HISTORY: Negative for any hematological or oncological conditions. SOCIAL HISTORY:  reports that she has been smoking cigarettes. She has been smoking about 1.00 pack per day. She has never used smokeless tobacco. She reports current alcohol use. REVIEW OF SYSTEMS:   General: no fever or night sweats, weight loss, fatigue and cachexia have improved significantly  ENT: No double or blurred vision, no tinnitus or hearing problem, no dysphagia or sore throat   Respiratory: No chest pain, chronic shortness of breath, no hemoptysis  Cardiovascular: Denies chest pain, PND or orthopnea. No L E swelling or palpitations. Gastrointestinal:    No nausea or vomiting, abdominal pain, diarrhea or constipation. Genitourinary: Denies dysuria, hematuria, frequency, urgency or incontinence. Neurological: Denies headaches, decreased LOC, no sensory or motor focal deficits. Musculoskeletal: Back pain under much better control  Skin: There are no rashes or bleeding. Psychiatric: Looks much better compared to last visit  Endocrine: no diabetes or thyroid disease. Hematologic: no bleeding , no adenopathy. PHYSICAL EXAM: Shows a well appearing 79y.o.-year-old female who is in significant back pain.     PHYSICAL EXAMINATION:    Vital Signs: (As obtained by patient/caregiver or practitioner observation)    Blood pressure-  Heart rate-    Respiratory rate-    Temperature-  Pulse oximetry-     Constitutional: [x] Appears well-developed and well-nourished [x] No apparent distress      [x] Abnormal-tardive dyskinesia  Mental status  [x] Alert and awake  [x] Oriented to person/place/time [x]Able to follow commands      Eyes:  EOM    [x]  Normal  [] Abnormal-  Sclera  [x]  Normal  [] Abnormal -         Discharge [x]  None visible  [] Abnormal -    HENT:   [x] Normocephalic, atraumatic. [] Abnormal   [x] Mouth/Throat: Mucous membranes are moist.     External Ears [x] Normal  [] Abnormal-     Neck: [x] No visualized mass     Pulmonary/Chest: [x] Respiratory effort normal.  [x] No visualized signs of difficulty breathing or respiratory distress        [] Abnormal-      Musculoskeletal:   [x] Normal gait with no signs of ataxia         [x] Normal range of motion of neck        [] Abnormal-       Neurological:        [x] No Facial Asymmetry (Cranial nerve 7 motor function) (limited exam to video visit)          [x] No gaze palsy        [] Abnormal-         Skin:        [x] No significant exanthematous lesions or discoloration noted on facial skin         [] Abnormal-            Psychiatric:       [x] Normal Affect [x] No Hallucinations        [] Abnormal-     Other pertinent observable physical exam findings-                            REVIEW OF LABORATORY DATA:   CT of the lumbar spine  Impression   1. Lytic lesion present involving the majority of the L1 vertebral body with   posterior cortical breakthrough.  Vertebral body height is relatively   preserved.  Differential includes a potentially locally aggressive   intraosseous hemangioma, other primary neoplasm, or osseous metastatic   disease.  Consider correlation with contrast enhanced MRI of the lumbar spine   if no contraindications. 2. Multilevel lumbar spondylosis without evidence of high-grade bony spinal   canal stenosis or neural foraminal narrowing. 3. Partially visualized partial opacification of the posterior right lower   lung.  Can correlate with dedicated chest CT.           REVIEW OF RADIOLOGICAL RESULTS:     IMPRESSION:   1. Lytic lesion at L1 vertebra  2.  Now biopsy proven to be metastatic mammary carcinoma, ER positive  3. Fatigue, weight loss and cachexia  4. Condition improved significantly with improvement of psychiatric condition as well as letrozole for the breast cancer  5. For pain control, she is on Norco as well as lidocaine patch which are helping  PLAN:   I had a long discussion with the patient, she is in a psychiatric hospital right now it seems to be better taking care of. Her pain seems to be well controlled. She is tolerating treatment extremely well. She was supposed to have a bone density check before this visit but this was not done. We will likely to do with in the next few months  And very happy with clinical improvement, she is definitely not losing weight anymore. Psychiatric issues seem to be under good control and her pain is well controlled. We will plan to continue current therapy without any changes. In the next few months, we will do evaluation for response as well as recheck her bone density. I will see her back after the scans  Continue with Femara and will continue with pain control as previously ordered without changes, overall I am very encouraged with her clinical improvement  Consider adding Xgeva down the line  Sommer Morrow MD  Hematologist/Medical Oncologist  [facility] hematology oncology physicians        Attestation   The patient  being evaluated by a Virtual Visit (video visit) encounter to address concerns as mentioned above. A caregiver was present when appropriate. Due to this being a TeleHealth encounter ([facility]), evaluation of the following organ systems was limited: Vitals/Constitutional/EENT/Resp/CV/GI//MS/Neuro/Skin/Heme-Lymph-Imm.   Pursuant to the emergency declaration under the [facility]note 2[facility], [facility] waiver authority and the [employer] and [employer] Act, this Virtual Visit was conducted with patient's (and/or legal guardian's) consent, to reduce the patient's risk of exposure to COVID-19 and provide necessary medical care. The patient (and/or legal guardian) has also been advised to contact this office for worsening conditions or problems, and seek emergency medical treatment and/or call 911 if deemed necessary. Services were provided through a video synchronous discussion virtually to substitute for in-person clinic visit. Patient and provider were located at their individual homes. --Jorge Dumont MD on 4/6/2020 at 3:50 PM    An electronic signature was used to authenticate this note.

## 2022-05-04 NOTE — PATIENT INSTRUCTIONS
Schedule return visit in 3 months, virtual visit is okay.   Need bone density scan and bone scan before next visit  Needs CBC, CMP and CA 27/29 before next visit

## 2022-08-03 ENCOUNTER — TELEPHONE (OUTPATIENT)
Dept: ONCOLOGY | Age: 67
End: 2022-08-03

## 2022-08-03 NOTE — TELEPHONE ENCOUNTER
Spoke with Renetta Patton at [address] cancelled due to labs and bone scan not completed. Order faxed to [facility].

## 2022-08-10 ENCOUNTER — TELEMEDICINE (OUTPATIENT)
Dept: ONCOLOGY | Age: 67
End: 2022-08-10
Payer: MEDICARE

## 2022-08-10 DIAGNOSIS — Z79.811 AROMATASE INHIBITOR USE: ICD-10-CM

## 2022-08-10 DIAGNOSIS — M89.9 LYTIC LESION OF BONE ON X-RAY: Primary | ICD-10-CM

## 2022-08-10 DIAGNOSIS — M85.89 OSTEOPENIA OF MULTIPLE SITES: ICD-10-CM

## 2022-08-10 DIAGNOSIS — C50.919 METASTATIC BREAST CANCER (HCC): ICD-10-CM

## 2022-08-10 PROCEDURE — 1123F ACP DISCUSS/DSCN MKR DOCD: CPT | Performed by: INTERNAL MEDICINE

## 2022-08-10 PROCEDURE — 99214 OFFICE O/P EST MOD 30 MIN: CPT | Performed by: INTERNAL MEDICINE

## 2022-08-10 RX ORDER — ALENDRONATE SODIUM 70 MG/1
70 TABLET ORAL
Qty: 4 TABLET | Refills: 3 | Status: SHIPPED | OUTPATIENT
Start: 2022-08-10

## 2022-08-10 NOTE — PATIENT INSTRUCTIONS
See orders for fosamax  Continue letrozole.    Schedule follow up (VV) in 3 months, need CT and bone scan prior to RV

## 2022-08-10 NOTE — PROGRESS NOTES
ligation and back surgery. CURRENT MEDICATIONS:  has a current medication list which includes the following prescription(s): letrozole, acetaminophen, risperidone, nicotine, LIDOCAINE, LIDODERM, 5% PATCH AND REMOVAL, melatonin, mirtazapine, lorazepam, haloperidol, hydrocodone-acetaminophen, ibuprofen, and polyethylene glycol. ALLERGIES:  has No Known Allergies. FAMILY HISTORY: Negative for any hematological or oncological conditions. SOCIAL HISTORY:  reports that she has been smoking cigarettes. She has been smoking an average of 1 pack per day. She has never used smokeless tobacco. She reports current alcohol use. REVIEW OF SYSTEMS:   General: no fever or night sweats, weight loss, fatigue and cachexia have improved significantly  ENT: No double or blurred vision, no tinnitus or hearing problem, no dysphagia or sore throat   Respiratory: No chest pain, chronic shortness of breath, no hemoptysis  Cardiovascular: Denies chest pain, PND or orthopnea. No L E swelling or palpitations. Gastrointestinal:    No nausea or vomiting, abdominal pain, diarrhea or constipation. Genitourinary: Denies dysuria, hematuria, frequency, urgency or incontinence. Neurological: Denies headaches, decreased LOC, no sensory or motor focal deficits. Musculoskeletal: Back pain under much better control  Skin: There are no rashes or bleeding. Psychiatric: Looks much better compared to last visit  Endocrine: no diabetes or thyroid disease. Hematologic: no bleeding , no adenopathy. PHYSICAL EXAM: Shows a well appearing 79y.o.-year-old female who is in significant back pain.     PHYSICAL EXAMINATION:    Vital Signs: (As obtained by patient/caregiver or practitioner observation)    Blood pressure-  Heart rate-    Respiratory rate-    Temperature-  Pulse oximetry-     Constitutional: [x] Appears well-developed and well-nourished [x] No apparent distress      [x] Abnormal-tardive dyskinesia  Mental status  [x] Alert and awake  [x] Oriented to person/place/time [x]Able to follow commands      Eyes:  EOM    [x]  Normal  [] Abnormal-  Sclera  [x]  Normal  [] Abnormal -         Discharge [x]  None visible  [] Abnormal -    HENT:   [x] Normocephalic, atraumatic. [] Abnormal   [x] Mouth/Throat: Mucous membranes are moist.     External Ears [x] Normal  [] Abnormal-     Neck: [x] No visualized mass     Pulmonary/Chest: [x] Respiratory effort normal.  [x] No visualized signs of difficulty breathing or respiratory distress        [] Abnormal-      Musculoskeletal:   [x] Normal gait with no signs of ataxia         [x] Normal range of motion of neck        [] Abnormal-       Neurological:        [x] No Facial Asymmetry (Cranial nerve 7 motor function) (limited exam to video visit)          [x] No gaze palsy        [] Abnormal-         Skin:        [x] No significant exanthematous lesions or discoloration noted on facial skin         [] Abnormal-            Psychiatric:       [x] Normal Affect [x] No Hallucinations        [] Abnormal-     Other pertinent observable physical exam findings-                            REVIEW OF LABORATORY DATA:   CT of the lumbar spine  Impression   1. Lytic lesion present involving the majority of the L1 vertebral body with   posterior cortical breakthrough. Vertebral body height is relatively   preserved. Differential includes a potentially locally aggressive   intraosseous hemangioma, other primary neoplasm, or osseous metastatic   disease. Consider correlation with contrast enhanced MRI of the lumbar spine   if no contraindications. 2. Multilevel lumbar spondylosis without evidence of high-grade bony spinal   canal stenosis or neural foraminal narrowing. 3. Partially visualized partial opacification of the posterior right lower   lung. Can correlate with dedicated chest CT.            REVIEW OF RADIOLOGICAL RESULTS:     IMPRESSION:   Lytic lesion at L1 vertebra  Now biopsy proven to be metastatic mammary carcinoma, ER positive  Fatigue, weight loss and cachexia  Condition improved significantly with improvement of psychiatric condition as well as letrozole for the breast cancer  For pain control, she is on Norco as well as lidocaine patch which are helping  Severe osteopenia is suggested by the DEXA scan  PLAN:   The patient seems to be doing better at least medically and psychologically. Her symptoms are controlled with letrozole and pain medication  I do not think she is able to transport to the office to get Prolia so I will start her on Fosamax 70 mg weekly  We will continue current treatment with letrozole. We will restage her disease with staging studies in 3 months  Clinically, she is responding to treatment and tolerating treatment fairly well and it is very encouraging  Will evaluate her back in 3 months  Jasmina Morrow MD  Hematologist/Medical [address] [place] [address] hematology oncology physicians        Attestation   The patient  being evaluated by a Virtual Visit (video visit) encounter to address concerns as mentioned above. A caregiver was present when appropriate. Due to this being a TeleHealth encounter (During [place] [address] public [facility] emergency), evaluation of the following organ systems was limited: Vitals/Constitutional/EENT/Resp/CV/GI//MS/Neuro/Skin/Heme-Lymph-Imm. Pursuant to the emergency declaration under the [facility], [address] [place] [address] authority and the [employer] and [employer] Act, this Virtual Visit was conducted with patient's (and/or legal guardian's) consent, to reduce the patient's risk of exposure to COVID-19 and provide necessary medical care. The patient (and/or legal guardian) has also been advised to contact this office for worsening conditions or problems, and seek emergency medical treatment and/or call 911 if deemed necessary.      Services were provided through a video synchronous discussion virtually to substitute for in-person clinic visit. Patient and provider were located at their individual homes. --Evelin Funes MD on 4/6/2020 at 3:50 PM    An electronic signature was used to authenticate this note.

## 2022-08-11 DIAGNOSIS — M89.9 LYTIC LESION OF BONE ON X-RAY: ICD-10-CM

## 2022-08-11 DIAGNOSIS — Z79.811 AROMATASE INHIBITOR USE: ICD-10-CM

## 2022-08-11 DIAGNOSIS — C50.919 METASTATIC BREAST CANCER (HCC): Primary | ICD-10-CM

## 2022-08-11 DIAGNOSIS — M85.89 OSTEOPENIA OF MULTIPLE SITES: ICD-10-CM

## 2022-11-23 DIAGNOSIS — C50.919 METASTATIC BREAST CANCER (HCC): ICD-10-CM

## 2022-11-23 DIAGNOSIS — M89.9 LYTIC LESION OF BONE ON X-RAY: ICD-10-CM

## 2022-11-23 DIAGNOSIS — Z79.811 AROMATASE INHIBITOR USE: ICD-10-CM

## 2022-12-08 ENCOUNTER — TELEPHONE (OUTPATIENT)
Dept: ONCOLOGY | Age: 67
End: 2022-12-08

## 2022-12-08 ENCOUNTER — OFFICE VISIT (OUTPATIENT)
Dept: ONCOLOGY | Age: 67
End: 2022-12-08
Payer: MEDICARE

## 2022-12-08 VITALS — WEIGHT: 165 LBS | TEMPERATURE: 97.5 F | HEIGHT: 60 IN | RESPIRATION RATE: 18 BRPM | BODY MASS INDEX: 32.39 KG/M2

## 2022-12-08 DIAGNOSIS — C79.51 BONE METASTASIS (HCC): ICD-10-CM

## 2022-12-08 DIAGNOSIS — C50.919 METASTATIC BREAST CANCER (HCC): ICD-10-CM

## 2022-12-08 DIAGNOSIS — N63.10 MASS OF RIGHT BREAST, UNSPECIFIED QUADRANT: Primary | ICD-10-CM

## 2022-12-08 DIAGNOSIS — C50.911 MALIGNANT NEOPLASM OF RIGHT FEMALE BREAST, UNSPECIFIED ESTROGEN RECEPTOR STATUS, UNSPECIFIED SITE OF BREAST (HCC): ICD-10-CM

## 2022-12-08 DIAGNOSIS — C50.919 METASTATIC BREAST CANCER (HCC): Primary | ICD-10-CM

## 2022-12-08 PROCEDURE — 1123F ACP DISCUSS/DSCN MKR DOCD: CPT | Performed by: INTERNAL MEDICINE

## 2022-12-08 PROCEDURE — 99215 OFFICE O/P EST HI 40 MIN: CPT | Performed by: INTERNAL MEDICINE

## 2022-12-08 RX ORDER — FENTANYL 12 UG/H
PATCH TRANSDERMAL
COMMUNITY
Start: 2022-11-19

## 2022-12-08 RX ORDER — PROPRANOLOL HYDROCHLORIDE 40 MG/1
TABLET ORAL
COMMUNITY
Start: 2022-11-17

## 2022-12-08 RX ORDER — FUROSEMIDE 20 MG/1
TABLET ORAL
COMMUNITY
Start: 2022-11-29

## 2022-12-08 RX ORDER — OMEPRAZOLE 20 MG/1
20 CAPSULE, DELAYED RELEASE ORAL DAILY
COMMUNITY

## 2022-12-08 RX ORDER — DULOXETIN HYDROCHLORIDE 30 MG/1
CAPSULE, DELAYED RELEASE ORAL
COMMUNITY
Start: 2022-11-15

## 2022-12-08 NOTE — PROGRESS NOTES
Patient ID: Sandhya Cruz, 1955, [id number], 79 y.o. Referred by :  No ref. provider found   Reason for consultation: Metastatic breast cancer            DIAGNOSIS:   Lytic lesion in the L1 vertebra  Cachexia and weight loss  Biopsy of 2 proven to be metastatic mammary cancer, ER positive  Significant psychiatric issues leading to hospitalization  Right breast cancer      CURRENT THERAPY:  Plan to start Femara while we are dealing with psychiatric issues  Ultimately, plan to address the breast tumor and also likely to have radiation to the L1 area      BRIEF CASE HISTORY:  Gloria Qureshi is a very pleasant 79 y.o. female who is referred to us for progressive back pain. CT scan of the lumbar spine suggested lytic lesion at L1. The patient when she presented initially she was very weak, she has been falling at home. She is losing weight. She is cachectic. She is a chronic smoker and has chronic cough but no hemoptysis  Performance status is ECOG 2  Biopsy of the area showed metastatic mammary cancer ER positive. CT scan did not show widespread metastatic disease. She was supposed to have a bone scan and MRI but she was admitted to the hospital for psychiatric care. We decided to start her on aromatase inhibitors until she is discharged to complete work-up and management.       INTERIM HISTORY  Patient did not show up since summer  She did have a recent PCP chest abdomen pelvis with there is no metastatic disease but at L1 previously biopsied status post kyphoplasty  Breast exam did show the right lesion however there is no mammogram in the system    Past Surgical History:   Procedure Laterality Date    BACK SURGERY      TUBAL LIGATION         Allergies   Allergen Reactions    Risperdal [Risperidone] Other (See Comments)     Patient cannot remember       Current Outpatient Medications   Medication Sig Dispense Refill    DULoxetine (CYMBALTA) 30 MG extended release capsule       fentaNYL (DURAGESIC) 12 MCG/HR       furosemide (LASIX) 20 MG tablet       propranolol (INDERAL) 40 MG tablet       omeprazole (PRILOSEC) 20 MG delayed release capsule Take 20 mg by mouth daily      alendronate (FOSAMAX) 70 MG tablet Take 1 tablet by mouth every 7 days Take with a full glass of water upon arising for the day. Consume on an empty stomach at least 30 minutes before the first food, beverage, or medication. Stay upright (do not lie down) for at least 30 minutes. Do not crush or break. 4 tablet 3    acetaminophen (TYLENOL) 325 MG tablet Take 650 mg by mouth every 6 hours as needed for Pain      LIDOCAINE, LIDODERM, 5% PATCH AND REMOVAL Apply 1 patch topically daily      Melatonin 3 MG CAPS Take 3 mg by mouth nightly      mirtazapine (REMERON) 15 MG tablet Take 15 mg by mouth nightly      HYDROcodone-acetaminophen (NORCO) 5-325 MG per tablet Take 1 tablet by mouth every 6 hours as needed for Pain. ibuprofen (ADVIL;MOTRIN) 400 MG tablet Take 400 mg by mouth every 6 hours as needed for Pain      letrozole (FEMARA) 2.5 MG tablet Take 1 tablet by mouth daily 30 tablet 2    nicotine (NICODERM CQ) Place 1 patch onto the skin every 24 hours (Patient not taking: Reported on 12/8/2022)      LORazepam (ATIVAN) 1 MG tablet Take 1 mg by mouth every 6 hours as needed for Anxiety. (Patient not taking: Reported on 12/8/2022)      haloperidol (HALDOL) 5 MG tablet Take 5 mg by mouth 4 times daily (Patient not taking: Reported on 12/8/2022)      polyethylene glycol (GLYCOLAX) 17 g packet Take 17 g by mouth daily as needed for Constipation (Patient not taking: Reported on 12/8/2022)       No current facility-administered medications for this visit.        Social History     Socioeconomic History    Marital status: Single     Spouse name: Not on file    Number of children: Not on file    Years of education: Not on file    Highest education level: Not on file   Occupational History    Not on file   Tobacco Use    Smoking status: Every Day Packs/day: 1.00     Types: Cigarettes    Smokeless tobacco: Never   Substance and Sexual Activity    Alcohol use: Yes     Comment: 2 beers daily    Drug use: Not on file    Sexual activity: Not on file   Other Topics Concern    Not on file   Social History Narrative    ** Merged History Encounter **          Social Determinants of Health     Financial Resource Strain: Not on file   Food Insecurity: Not on file   Transportation Needs: Not on file   Physical Activity: Not on file   Stress: Not on file   Social Connections: Not on file   Intimate Partner Violence: Not on file   Housing Stability: Not on file       No family history on file. REVIEW OF SYSTEM:     Constitutional: No fever or chills. No night sweats, no weight loss   Eyes: No eye discharge, double vision, or eye pain   HEENT: negative for sore mouth, sore throat, hoarseness and voice change   Respiratory: negative for cough , sputum, dyspnea, wheezing, hemoptysis, chest pain   Cardiovascular: negative for chest pain, dyspnea, palpitations, orthopnea, PND   Gastrointestinal: negative for nausea, vomiting, diarrhea, constipation, abdominal pain, Dysphagia, hematemesis and hematochezia   Genitourinary: negative for frequency, dysuria, nocturia, urinary incontinence, and hematuria   Integument: negative for rash, skin lesions, bruises.    Hematologic/Lymphatic: negative for easy bruising, bleeding, lymphadenopathy, petechiae and swelling/edema   Endocrine: negative for heat or cold intolerance, tremor, weight changes, change in bowel habits and hair loss   Musculoskeletal: negative for myalgias, arthralgias, pain, joint swelling,and bone pain   Neurological: negative for headaches, dizziness, seizures, weakness, numbness       OBJECTIVE:         Vitals:    12/08/22 1046   Resp: 18   Temp: 97.5 °F (36.4 °C)       PHYSICAL EXAM:   General appearance - well appearing, no in pain or distress   Mental status - alert and cooperative   Eyes - pupils equal and reactive, extraocular eye movements intact   Ears - bilateral TM's and external ear canals normal   Mouth - mucous membranes moist, pharynx normal without lesions   Neck - supple, no significant adenopathy   Lymphatics - no palpable lymphadenopathy, no hepatosplenomegaly   Chest - clear to auscultation, no wheezes, rales or rhonchi, symmetric air entry   Heart - normal rate, regular rhythm, normal S1, S2, no murmurs, rubs, clicks or gallops   Abdomen - soft, nontender, nondistended, no masses or organomegaly   Neurological - alert, oriented, normal speech, no focal findings or movement disorder noted   Musculoskeletal - no joint tenderness, deformity or swelling   Extremities - peripheral pulses normal, no pedal edema, no clubbing or cyanosis   Skin - normal coloration and turgor, no rashes, no suspicious skin lesions noted ,  Breast on the right 3 x 4 cm nontender firm mass no axillary lymphadenopathy      LABORATORY DATA:     Lab Results   Component Value Date    WBC 10.4 02/07/2022    HGB 15.2 (H) 02/07/2022    HCT 43.8 02/07/2022    MCV 94.6 02/07/2022     02/07/2022    LYMPHOPCT 31 02/07/2022    RBC 4.63 02/07/2022    MCH 32.8 02/07/2022    MCHC 34.7 02/07/2022    RDW 13.6 02/07/2022    MONOPCT 9 02/07/2022    BASOPCT 1 02/07/2022    NEUTROABS 5.76 02/07/2022    LYMPHSABS 3.24 02/07/2022    MONOSABS 0.93 02/07/2022    EOSABS 0.32 02/07/2022    BASOSABS 0.07 02/07/2022         Chemistry        Component Value Date/Time     03/15/2022 0642    K 4.5 03/15/2022 0642     03/15/2022 0642    CO2 27 03/15/2022 0642    BUN 11 03/15/2022 0642    CREATININE 0.48 (L) 03/15/2022 0642        Component Value Date/Time    CALCIUM 9.5 03/15/2022 0642    ALKPHOS 159 (H) 02/07/2022 1300    AST 28 02/07/2022 1300    ALT 10 02/07/2022 1300    BILITOT 0.54 02/07/2022 1300            PATHOLOGY DATA:         IMAGING DATA:      CT CHEST ABDOMEN PELVIS W CONTRAST  Narrative: EXAMINATION:  CT OF THE CHEST, ABDOMEN, AND PELVIS WITH CONTRAST 2/7/2022 1:46 pm    TECHNIQUE:  CT of the chest, abdomen and pelvis was performed with the administration of  intravenous contrast. Multiplanar reformatted images are provided for review. Dose modulation, iterative reconstruction, and/or weight based adjustment of  the mA/kV was utilized to reduce the radiation dose to as low as reasonably  achievable. COMPARISON:  01/10/2022    HISTORY:  ORDERING SYSTEM PROVIDED HISTORY: cachexia  TECHNOLOGIST PROVIDED HISTORY:  cachexia    Decision Support Exception - unselect if not a suspected or confirmed  emergency medical condition->Emergency Medical Condition (MA)    FINDINGS:    Chest:    Mediastinum: The great vessel origins are patent. There is atherosclerotic  disease of the thoracic aorta. Pulmonary arteries are of normal caliber. The heart size is within normal limits. There is mild coronary arterial  calcification. There is no pericardial effusion. There is a small hiatal  hernia. Lungs/pleura: There is no pneumothorax, edema, pleural effusion or focal  consolidation. There is mild atelectasis or scarring at the lung bases. There is a small ground-glass nodule in the right upper lobe on axial image  22 series 3. This measures 5 mm and is unchanged since the 01/10/2022 exam.    There is mild biapical scarring. Soft Tissues/Bones: There are nodular foci of soft tissue within the  subcutaneous tissues of the right upper chest, unchanged. An acute osseous  abnormality is not identified. Abdomen/Pelvis:    Organs: A left lobe hepatic cyst is noted. Gallbladder is mildly contracted. A focal splenic abnormality is not identified. A focal pancreatic  abnormality is not identified. The adrenal glands are unremarkable. Streak artifact from the patient's arms limits the exam.  The kidneys are not  obstructed. Small right hepatic cysts are noted. GI/Bowel: Diverticulosis is noted diffusely.   The appendix is not identified. There is no pericecal inflammatory change. Pelvis: The urinary bladder is mildly distended. There is no free fluid in  the pelvis. Peritoneum/Retroperitoneum: There is calcification of the abdominal aorta. Small gastroesophageal varices are noted. There is no free intraperitoneal  air. Bones/Soft Tissues: There is a compression deformity at L1 with evidence of  prior vertebroplasty. An acute osseous abnormality is not identified. There  is dextrocurvature of the thoracolumbar spine. Impression: 1. An acute abnormality is not identified. 2. Tiny 5 mm right upper lobe ground-glass pulmonary nodule, unchanged. Per  the recommendations below, no follow-up is recommended. 3. Small hiatal hernia. 4. Small gastroesophageal varices. 5. Diffuse diverticulosis. 6. Moderate atherosclerosis. 7. Somewhat diffuse nodular soft tissue abnormality in the right upper  breast.  Correlation with mammography is recommended if not recently  performed. The appearance is unchanged when compared to the prior CT of the  chest.    RECOMMENDATIONS:  2017 Fleischner Society Recommendations for Subsolid Lung Nodule Follow-Up  based on size (average of long- and short-axis diameters). Use most  suspicious nodule for followup. Single    <6 mm Ground glass: No routine follow-up    <6 mm Part solid: No routine follow-up    ? 6 mm Ground glass: CT at 6-12 months to confirm persistence, then CT every  2 years until 5 years    ? 6 mm Part solid: CT at 3-6 months to confirm persistence, If unchanged and  solid component remains<6mm, annual CT should be performed for 5 years. ASSESSMENT:       Diagnosis Orders   1. Metastatic breast cancer ([address]        2.  Bone metastasis ([facility] [address]                 PLAN:     I reviewed labs, imaging studies, related electronic medical records including outside records and discussed the diagnosis, prognosis and treatment recommendations   I reviewed the surgical pathology results, discuss goals of care and NCCN guidelines with patient and family    Patient had right breast cancer with oligometastasis to the bone and proven by biopsy from L1 to be metastatic and currently on letrozole, need to have full work-up with mammogram and image guided biopsy of the breast also obtain a PET/CT and will refer to the surgery after  Will try to contact with court appointed guardianship, patient initially she declined having history of cancer or if she was told before and then she recalled that I think she is incompetent to make a decision  Cancer navigator consult  Continue Fosamax and letrozole    RTC after above         I spent a total of 40 minutes on the date of the service which included preparing to see the patient, face-to-face patient care, completing clinical documentation, obtaining and/or reviewing separately obtained history, performing a medically appropriate examination, counseling and educating the patient/family/caregiver and ordering medications, tests, or procedures. [facility]                            This note is created with the assistance of a speech recognition program.  While intending to generate a document that actually reflects the content of the visit, the document can still have some errors including those of syntax and sound a like substitutions which may escape proof reading. It such instances, actual meaning can be extrapolated by contextual diversion.

## 2022-12-08 NOTE — TELEPHONE ENCOUNTER
Name: Khadar Khan  : 1955  MRN: [id number]    Oncology Navigation Follow-Up Note    Contact Type:  Medical Oncology    Notes: Met with Flora Piña and aid in clinic. Introduced self to Flora Piña as it has been a long time since pt has been in office. Pt has been set for PET scan on 22 at [facility]. Pt's insurance is out of network at [address] in [place]. Pt currently lives in [facility] in [facility]. Confirmed with Dr. Janeen Palacio, he does want her to have a biopsy of the breast.  Melonie Briggs RN informed. Mammogram also ordered but not scheduled at this time. Will continue to follow.        Electronically signed by Adore Hurley RN on 2022 at 12:31 PM

## 2022-12-08 NOTE — PATIENT INSTRUCTIONS
Get the biopsy report  Mammogram and ultrasound of the right breast  PET/CT  Cancer navigator  Need to speak with the guardianship

## 2022-12-09 DIAGNOSIS — C79.51 BONE METASTASIS (HCC): ICD-10-CM

## 2022-12-09 DIAGNOSIS — C50.919 METASTATIC BREAST CANCER (HCC): Primary | ICD-10-CM

## 2022-12-09 DIAGNOSIS — C50.911 MALIGNANT NEOPLASM OF RIGHT FEMALE BREAST, UNSPECIFIED ESTROGEN RECEPTOR STATUS, UNSPECIFIED SITE OF BREAST (HCC): ICD-10-CM

## 2023-01-05 ENCOUNTER — TELEPHONE (OUTPATIENT)
Dept: ONCOLOGY | Age: 68
End: 2023-01-05

## 2023-01-05 NOTE — TELEPHONE ENCOUNTER
[facility] and spoke to nurse Rufus Senters regarding patient's missed appointments for mammogram, ultrasound, and PET/CT. She states patient refused to go. Rufus Senters transferred call to a different nursing station that knew patient better so this could be discussed. Call transfer went to voice mail. Message left asking nurse to call cancer center.

## 2023-01-19 ENCOUNTER — TELEPHONE (OUTPATIENT)
Dept: ONCOLOGY | Age: 68
End: 2023-01-19

## 2023-01-19 NOTE — TELEPHONE ENCOUNTER
Message left on VM of Nely Director of Nursing at [address] [place] [address] requesting call back as multiple message have been left regarding patient and no call back have been received. Shadia Lafleur, pt's guardian has been notified and apprised of situation. Shadia Lafleur states he is going to look into this further. Writer's and [employer] given to Shadia Lafleur for updates.      Lisa Pringle RN

## 2023-01-25 ENCOUNTER — TELEPHONE (OUTPATIENT)
Dept: ONCOLOGY | Age: 68
End: 2023-01-25

## 2023-01-25 NOTE — TELEPHONE ENCOUNTER
Call received from [facility] at [facility] wanting to reschedule Albertina's PET scan, Diagnostic Mamm and Ultrasound. Appointment for PET scheduled on 2/2/23 @ 21  arrival with 1045 test.  PET instructions given to [facility] by phone. Instructions read back and verified [phone] number given and requested if pt was not going to make appointment to cancel at least 24 hours prior to scan. Also requested that [facility] if pt is unable to make appointment. Mammogram on 2/10/23 at 2pm and [address] at 2:30. Instructions given for no deodorant, lotions or powder and to arrive at side building by women's center. Ayah verbalized understanding. [facility] states that she will email Red Ca, pt's POA with appointment dates and times and will set up transportation through [facility]. [facility] states that she will take Kelsy Sober herself she is unable to obtain transportation. Will continue to follow. Kelsy Sobperla also needs biopsy after the completion on Mammogram and ultrasound.      Hoang Lockhart RN

## 2023-02-02 ENCOUNTER — TELEPHONE (OUTPATIENT)
Dept: ONCOLOGY | Age: 68
End: 2023-02-02

## 2023-02-02 NOTE — TELEPHONE ENCOUNTER
Name: Karely Garcia  : 1955  MRN: [id number]    Call received from Vielka at [facility] stating that Willie Ramírez refused to go to her PET scan this morning. Ricco states that her nurse attempted to call centralized scheduling and was informed that pt was not scheduled for a PET scan today. Writer made call to [facility] and spoke to Rose who states that PET scan was canceled on 23 due to insurance. Office was not notified of scan being canceled, nor was care facility. Ricco was notified that scan was canceled. Ayah notified that Arabella Palacio did not want to reschedule scan until it was sure that patient was going to show up for scan. Ricco states that she is going to contact POA and discuss his involvement in this process. Awaiting call back.    Electronically signed by Beth Crowder RN on 2023 at 11:17 AM

## 2023-02-10 ENCOUNTER — HOSPITAL ENCOUNTER (OUTPATIENT)
Dept: ULTRASOUND IMAGING | Age: 68
End: 2023-02-10
Payer: MEDICARE

## 2023-02-10 ENCOUNTER — HOSPITAL ENCOUNTER (OUTPATIENT)
Dept: WOMENS IMAGING | Age: 68
End: 2023-02-10
Payer: MEDICARE

## 2023-02-10 DIAGNOSIS — C50.919 METASTATIC BREAST CANCER (HCC): ICD-10-CM

## 2023-02-10 DIAGNOSIS — N63.10 MASS OF RIGHT BREAST, UNSPECIFIED QUADRANT: ICD-10-CM

## 2023-02-10 DIAGNOSIS — C50.911 MALIGNANT NEOPLASM OF RIGHT FEMALE BREAST, UNSPECIFIED ESTROGEN RECEPTOR STATUS, UNSPECIFIED SITE OF BREAST (HCC): ICD-10-CM

## 2023-02-10 DIAGNOSIS — C79.51 BONE METASTASIS (HCC): ICD-10-CM

## 2023-02-10 PROCEDURE — 76642 ULTRASOUND BREAST LIMITED: CPT

## 2023-02-10 PROCEDURE — 77066 DX MAMMO INCL CAD BI: CPT

## 2023-02-27 ENCOUNTER — HOSPITAL ENCOUNTER (OUTPATIENT)
Dept: WOMENS IMAGING | Age: 68
Discharge: HOME | End: 2023-03-01
Payer: MEDICARE

## 2023-02-27 ENCOUNTER — HOSPITAL ENCOUNTER (OUTPATIENT)
Dept: ULTRASOUND IMAGING | Age: 68
Discharge: HOME | End: 2023-03-01
Payer: MEDICARE

## 2023-02-27 VITALS
RESPIRATION RATE: 16 BRPM | HEART RATE: 73 BPM | OXYGEN SATURATION: 95 % | DIASTOLIC BLOOD PRESSURE: 35 MMHG | SYSTOLIC BLOOD PRESSURE: 128 MMHG

## 2023-02-27 DIAGNOSIS — C50.919 METASTATIC BREAST CANCER (HCC): ICD-10-CM

## 2023-02-27 DIAGNOSIS — Z98.890 S/P BREAST BIOPSY, RIGHT: ICD-10-CM

## 2023-02-27 DIAGNOSIS — C79.51 BONE METASTASIS (HCC): ICD-10-CM

## 2023-02-27 DIAGNOSIS — C50.911 MALIGNANT NEOPLASM OF RIGHT FEMALE BREAST, UNSPECIFIED ESTROGEN RECEPTOR STATUS, UNSPECIFIED SITE OF BREAST (HCC): ICD-10-CM

## 2023-02-27 PROCEDURE — 88377 M/PHMTRC ALYS ISHQUANT/SEMIQ: CPT

## 2023-02-27 PROCEDURE — 2709999900 US BREAST BIOPSY W LOC DEVICE 1ST LESION RIGHT

## 2023-02-27 PROCEDURE — 77065 DX MAMMO INCL CAD UNI: CPT

## 2023-02-27 PROCEDURE — 88342 IMHCHEM/IMCYTCHM 1ST ANTB: CPT

## 2023-02-27 PROCEDURE — 88305 TISSUE EXAM BY PATHOLOGIST: CPT

## 2023-02-27 PROCEDURE — 2500000003 HC RX 250 WO HCPCS: Performed by: RADIOLOGY

## 2023-02-27 PROCEDURE — 88341 IMHCHEM/IMCYTCHM EA ADD ANTB: CPT

## 2023-02-27 PROCEDURE — 88360 TUMOR IMMUNOHISTOCHEM/MANUAL: CPT

## 2023-02-27 RX ORDER — LIDOCAINE HYDROCHLORIDE 10 MG/ML
INJECTION, SOLUTION EPIDURAL; INFILTRATION; INTRACAUDAL; PERINEURAL
Status: COMPLETED | OUTPATIENT
Start: 2023-02-27 | End: 2023-02-27

## 2023-02-27 RX ADMIN — LIDOCAINE HYDROCHLORIDE 5 ML: 10 INJECTION, SOLUTION EPIDURAL; INFILTRATION; INTRACAUDAL; PERINEURAL at 13:32

## 2023-02-27 NOTE — DISCHARGE INSTRUCTIONS
DISCHARGE INSTRUCTIONS FOR BREAST BIOPSY  You have just had a breast biopsy. This procedure is to remove tissue in order to make a diagnosis. Here is some  information and instructions to follow once you leave the Hospital.  1. When the anesthetic wears off you may experience mild discomfort or mild pain at the biopsy site. This is  common and can be relieved with an over-the-counter (non-aspirin) analgesic such as Tylenol. Please follow  the dose prescribed on the bottle. This discomfort will gradually subside in 6-24 hours. Do not use an aspirin  containing product or anti-inflammatory medication (Advil, Motrin) for  48 hours after the biopsy. If you do it  may cause bleeding from the biopsy site. You may resume these medications 48 hours after the biopsy (2  days). 2. Ice the biopsy area this evening on and off throughout the evening until you go to bed. (Example: on 20 min. and off 20 min.) This will help decrease bleeding, swelling, and pain and discomfort at the biopsy site. 3. The biopsy site has a small bandage on the site. Keep the clear bandage on the incision. The clear bandage will  protect the incision from getting wet in the shower. The clear bandage can get wet; just dry it off gently when  you get out of the shower. The clear bandage can be removed in 1-2 days after the biopsy. 4. Sleep in a soft bra the first evening. This will help reduce discomfort and pain at the biopsy site and decrease  the chance of bleeding. 5. Avoid unnecessary activity for the next 12 hour. If you are active, that may cause some bleeding from the  incision site. Do not be alarmed as this happens occasionally. If you notice blood pooling under the clear  bandage put a fresh ice pack to the site with some pressure (as you would with any cut). Hold constant  pressure to the site for 10-15 minutes and rest quietly. That usually stops the bleeding.  If there is a large  amount of blood under the clear bandage and it leaks out, wipe up the drainage and continue to put pressure  at the site if it is still bleeding. If the bleeding doesnt stop please call your physician. 6. If your skin begins to blister or itch around the clear bandage you may be experiencing tape sensitivity or tape  allergy. Gently remove the bandage and call your physician. 7. Biopsy results will be available 48-72 hours after the biopsy. Your doctor will receive the results directly from the  Pathologist. Please call your doctor for the biopsy results or set up an appointment to obtain the results.

## 2023-02-27 NOTE — SEDATION DOCUMENTATION
Waiting for post-procedure mammogram. Discharge instructions reviewed with patient with caregiver present.

## 2023-02-27 NOTE — SEDATION DOCUMENTATION
Patient returned from mammogram; awaiting doctor's review of imaging. Biopsy site remains clean and dry.

## 2023-02-27 NOTE — SEDATION DOCUMENTATION
Doctor reviews images and ok's patient to be discharged home. Patient dressed self. Discharged to home with caregiver. Ambulatory.

## 2023-03-01 ENCOUNTER — TELEPHONE (OUTPATIENT)
Dept: ONCOLOGY | Age: 68
End: 2023-03-01

## 2023-03-01 NOTE — TELEPHONE ENCOUNTER
Call made to [place] DON at [facility]. No answer. Left message requesting call back regarding patient. Will await call back.     Anne Bella, RN

## 2023-03-02 LAB — SURGICAL PATHOLOGY REPORT: NORMAL

## 2023-03-06 NOTE — TELEPHONE ENCOUNTER
Call made to [facility], [address] [facility]. Rose Silver given appointments for PET CT at [place] [address] on 3/16/2023 arrival time 0945 for 10 am scan. Pet scan instructions given as follows:  Nothing to eat or drink other than water  6 hour prior. 24 oz water 2 hours prior, does not have to hold bladder. No exercise 24 hours prior. No diabetic medication morning of. No metal on clothing and remove all jewelery. Instruction read back. Follow up appointment with Dr. Yvette Stearns given for 3/20 /23 at 1330. Rose Silver verbalized understanding stating she will pass this along to her staff.     Carson Stevenson RN

## 2023-03-21 ENCOUNTER — TELEPHONE (OUTPATIENT)
Dept: ONCOLOGY | Age: 68
End: 2023-03-21

## 2023-03-21 NOTE — TELEPHONE ENCOUNTER
Spoke to [place] at [facility] in regards to her appt scheduled for us today at the [facility]. She stated she did not have her down on there schedule informed her that I spoke to the Admissions lady on Friday or Monday and she did not advise anyone at the nursing home about the appt. She is rescheduled to come back in Mar. 23rd at 10:15 Theron Mcmullen agreed and stated she does not see anything scheduled for the facility [facility] so that should not be a problem to get her there.

## 2023-03-23 ENCOUNTER — OFFICE VISIT (OUTPATIENT)
Dept: ONCOLOGY | Age: 68
End: 2023-03-23
Payer: MEDICARE

## 2023-03-23 VITALS
RESPIRATION RATE: 20 BRPM | DIASTOLIC BLOOD PRESSURE: 64 MMHG | TEMPERATURE: 98.2 F | BODY MASS INDEX: 34.76 KG/M2 | HEART RATE: 85 BPM | SYSTOLIC BLOOD PRESSURE: 120 MMHG | WEIGHT: 178 LBS

## 2023-03-23 DIAGNOSIS — C79.51 BONE METASTASIS: ICD-10-CM

## 2023-03-23 DIAGNOSIS — C50.919 MALIGNANT NEOPLASM OF FEMALE BREAST, UNSPECIFIED ESTROGEN RECEPTOR STATUS, UNSPECIFIED LATERALITY, UNSPECIFIED SITE OF BREAST (HCC): Primary | ICD-10-CM

## 2023-03-23 PROCEDURE — 99215 OFFICE O/P EST HI 40 MIN: CPT | Performed by: INTERNAL MEDICINE

## 2023-03-23 PROCEDURE — 1123F ACP DISCUSS/DSCN MKR DOCD: CPT | Performed by: INTERNAL MEDICINE

## 2023-03-23 NOTE — PATIENT INSTRUCTIONS
Obtain L1 biopsy report which was done prior  Refer to Dr. Kayleigh Mcgrath  PET/CT  RTC in 2 to 3 weeks

## 2023-03-23 NOTE — PROGRESS NOTES
[facility]       Allergies   Allergen Reactions    Risperdal [Risperidone] Other (See Comments)     Patient cannot remember       Current Outpatient Medications   Medication Sig Dispense Refill    diclofenac sodium (VOLTAREN) 1 % GEL       DULoxetine (CYMBALTA) 30 MG extended release capsule       fentaNYL (DURAGESIC) 12 MCG/HR       furosemide (LASIX) 20 MG tablet       propranolol (INDERAL) 40 MG tablet       omeprazole (PRILOSEC) 20 MG delayed release capsule Take 20 mg by mouth daily      alendronate (FOSAMAX) 70 MG tablet Take 1 tablet by mouth every 7 days Take with a full glass of water upon arising for the day. Consume on an empty stomach at least 30 minutes before the first food, beverage, or medication. Stay upright (do not lie down) for at least 30 minutes. Do not crush or break. 4 tablet 3    letrozole (FEMARA) 2.5 MG tablet Take 1 tablet by mouth daily 30 tablet 2    acetaminophen (TYLENOL) 325 MG tablet Take 650 mg by mouth every 6 hours as needed for Pain      LIDOCAINE, LIDODERM, 5% PATCH AND REMOVAL Apply 1 patch topically daily      Melatonin 3 MG CAPS Take 3 mg by mouth nightly      mirtazapine (REMERON) 15 MG tablet Take 15 mg by mouth nightly      HYDROcodone-acetaminophen (NORCO) 5-325 MG per tablet Take 1 tablet by mouth every 6 hours as needed for Pain. ibuprofen (ADVIL;MOTRIN) 400 MG tablet Take 400 mg by mouth every 6 hours as needed for Pain      polyethylene glycol (GLYCOLAX) 17 g packet Take 17 g by mouth daily as needed for Constipation      nicotine (NICODERM CQ) Place 1 patch onto the skin every 24 hours (Patient not taking: No sig reported)      LORazepam (ATIVAN) 1 MG tablet Take 1 mg by mouth every 6 hours as needed for Anxiety. (Patient not taking: No sig reported)      haloperidol (HALDOL) 5 MG tablet Take 5 mg by mouth 4 times daily (Patient not taking: No sig reported)       No current facility-administered medications for this visit.

## 2023-03-29 ENCOUNTER — TELEPHONE (OUTPATIENT)
Dept: ONCOLOGY | Age: 68
End: 2023-03-29

## 2023-03-29 NOTE — TELEPHONE ENCOUNTER
Call made to Dr. Pinzon Ast office to follow up on appointment. Spoke to Naun Haney who states she been unable to reach anyone at [facility] and left a voicemail but no one has returned call. Number to ABHILASH Torres at [facility] given to Naun Haney as contact.       Britney Kohler RN

## 2023-04-24 ENCOUNTER — TELEPHONE (OUTPATIENT)
Dept: ONCOLOGY | Age: 68
End: 2023-04-24

## 2023-04-24 NOTE — TELEPHONE ENCOUNTER
Call received from Esther Preston at Dr. Romy Pavon office stating that Pauly's appointment was canceled because her \"ride was unavailable\" from the nursing home. Taylor Paz has been rescheduled for 5/8/23 @ 1:30. Pt's guardian, Rashel Wallace notified. Lokesh Rivas states he showed up to take patient to appointment and was notified that she was rescheduled. Date and time for appointment confirmed. Lokesh Rivas states he will make himself available that day so he can take her to appointment. Lokesh Rivas also states that he discussed this appointment with patient today and she is in agreement to go.      Nicholas Painter RN

## 2023-05-08 ENCOUNTER — TELEPHONE (OUTPATIENT)
Dept: ONCOLOGY | Age: 68
End: 2023-05-08

## 2023-05-08 NOTE — TELEPHONE ENCOUNTER
Call received from Adia Chaudhari at Dr. George Mirza office stating that she got a message from [facility] stating César Pedraza does not want any type of treatment. Adia Chaudhari states she would leave her on the schedule in case she shows up. Call received from Adia Chaudhari stating pt did not show up. Adia Chaudhari informed that writer would call pt's POA. Call made to Polo Rg @ [phone] and informed him that César Pedraza was not taken to her appointment at Dr. George Mirza office today. Angel Hairston states that the staff was told that she was to go to the appointment to hear what the physician had to offer even if she didn't want to have any treatment. Angel Hairston inquired about rescheduling appointment. Contact information to Dr. George Mirza office given to Angel Hairston. Angel Hairston thanked writer for her assistance in this matter. Call received from Adia Chaudhari stating Angel Hairston called and rescheduled César Pedraza for 6/5/23 noting that he was going to bring her himself. Juan Jose Hsu Marta has no intention on making her go through treatment if she does not want it but does want her to hear what the surgeon has to say.      Alyssa Aranda RN

## 2023-06-05 ENCOUNTER — TELEPHONE (OUTPATIENT)
Dept: ONCOLOGY | Age: 68
End: 2023-06-05

## 2023-06-05 NOTE — TELEPHONE ENCOUNTER
Fax received from pt's guarding, Francisco Malloy requesting call regarding Kimberley Guerin. Call made to [facility]. [facility] states he no longer needed assistance but thanked writer for getting  back to him.       Esha Carpenter RN

## 2023-06-06 PROBLEM — J44.89 OBSTRUCTIVE CHRONIC BRONCHITIS WITHOUT EXACERBATION: Status: ACTIVE | Noted: 2022-12-26

## 2023-06-06 PROBLEM — I25.10 ATHEROSCLEROSIS OF NATIVE CORONARY ARTERY OF NATIVE HEART WITHOUT ANGINA PECTORIS: Status: ACTIVE | Noted: 2022-12-26

## 2023-06-06 PROBLEM — C50.911 BREAST CANCER, RIGHT BREAST (HCC): Status: ACTIVE | Noted: 2022-12-26

## 2023-06-06 PROBLEM — G47.00 INSOMNIA: Status: ACTIVE | Noted: 2023-02-25

## 2023-06-06 PROBLEM — R73.9 HYPERGLYCEMIA: Status: ACTIVE | Noted: 2023-04-30

## 2023-06-06 PROBLEM — C79.51 SECONDARY MALIGNANT NEOPLASM OF BONE (HCC): Status: ACTIVE | Noted: 2022-12-26

## 2023-06-06 PROBLEM — F23 BRIEF PSYCHOTIC DISORDER (HCC): Status: ACTIVE | Noted: 2022-12-26

## 2023-06-06 PROBLEM — M25.511 RIGHT SHOULDER PAIN: Status: ACTIVE | Noted: 2023-02-25

## 2023-06-06 PROBLEM — J44.9 OBSTRUCTIVE CHRONIC BRONCHITIS WITHOUT EXACERBATION (HCC): Status: ACTIVE | Noted: 2022-12-26

## 2023-06-06 PROBLEM — R79.89 ELEVATED LIVER FUNCTION TESTS: Status: ACTIVE | Noted: 2023-03-31

## 2023-06-06 PROBLEM — F17.200 NICOTINE DEPENDENCE: Status: ACTIVE | Noted: 2020-07-01

## 2023-06-06 PROBLEM — F41.8 OTHER SPECIFIED ANXIETY DISORDERS: Status: ACTIVE | Noted: 2022-12-26

## 2023-06-06 PROBLEM — M62.81 MUSCLE WEAKNESS (GENERALIZED): Status: ACTIVE | Noted: 2023-04-12

## 2023-06-06 PROBLEM — F32.4 MAJOR DEPRESSIVE DISORDER WITH SINGLE EPISODE, IN PARTIAL REMISSION (HCC): Status: ACTIVE | Noted: 2022-12-26

## 2023-06-06 PROBLEM — R63.4 ABNORMAL WEIGHT LOSS: Status: ACTIVE | Noted: 2020-07-01

## 2023-06-06 PROBLEM — R60.0 PEDAL EDEMA: Status: ACTIVE | Noted: 2023-01-11

## 2023-06-06 PROBLEM — R25.1 TREMOR: Status: ACTIVE | Noted: 2023-01-11

## 2023-06-06 PROBLEM — F17.210 TOBACCO DEPENDENCE DUE TO CIGARETTES: Status: ACTIVE | Noted: 2020-07-01

## 2023-06-06 PROBLEM — F25.9 SCHIZOAFFECTIVE DISORDER, CHRONIC CONDITION (HCC): Status: ACTIVE | Noted: 2023-01-29

## 2023-07-17 ENCOUNTER — TELEPHONE (OUTPATIENT)
Dept: ONCOLOGY | Age: 68
End: 2023-07-17

## 2023-07-17 ENCOUNTER — OFFICE VISIT (OUTPATIENT)
Dept: ONCOLOGY | Age: 68
End: 2023-07-17
Payer: MEDICARE

## 2023-07-17 VITALS
HEART RATE: 84 BPM | DIASTOLIC BLOOD PRESSURE: 68 MMHG | HEIGHT: 60 IN | TEMPERATURE: 98.1 F | WEIGHT: 187 LBS | RESPIRATION RATE: 18 BRPM | SYSTOLIC BLOOD PRESSURE: 121 MMHG | BODY MASS INDEX: 36.71 KG/M2

## 2023-07-17 DIAGNOSIS — C50.911 MALIGNANT NEOPLASM OF RIGHT BREAST IN FEMALE, ESTROGEN RECEPTOR POSITIVE, UNSPECIFIED SITE OF BREAST (HCC): ICD-10-CM

## 2023-07-17 DIAGNOSIS — Z17.0 MALIGNANT NEOPLASM OF RIGHT BREAST IN FEMALE, ESTROGEN RECEPTOR POSITIVE, UNSPECIFIED SITE OF BREAST (HCC): ICD-10-CM

## 2023-07-17 DIAGNOSIS — F17.219 CIGARETTE NICOTINE DEPENDENCE WITH NICOTINE-INDUCED DISORDER: ICD-10-CM

## 2023-07-17 DIAGNOSIS — Z17.0 MALIGNANT NEOPLASM OF CENTRAL PORTION OF RIGHT BREAST IN FEMALE, ESTROGEN RECEPTOR POSITIVE (HCC): ICD-10-CM

## 2023-07-17 DIAGNOSIS — C50.111 MALIGNANT NEOPLASM OF CENTRAL PORTION OF RIGHT BREAST IN FEMALE, ESTROGEN RECEPTOR POSITIVE (HCC): ICD-10-CM

## 2023-07-17 DIAGNOSIS — C79.51 SECONDARY MALIGNANT NEOPLASM OF BONE (HCC): Primary | ICD-10-CM

## 2023-07-17 DIAGNOSIS — C79.51 SECONDARY MALIGNANT NEOPLASM OF BONE (HCC): ICD-10-CM

## 2023-07-17 DIAGNOSIS — F25.9 SCHIZOAFFECTIVE DISORDER, CHRONIC CONDITION (HCC): ICD-10-CM

## 2023-07-17 PROCEDURE — 1123F ACP DISCUSS/DSCN MKR DOCD: CPT | Performed by: INTERNAL MEDICINE

## 2023-07-17 PROCEDURE — 99214 OFFICE O/P EST MOD 30 MIN: CPT | Performed by: INTERNAL MEDICINE

## 2023-07-17 NOTE — TELEPHONE ENCOUNTER
Name: Lana Camarena  : 1955  MRN: [id number]    Oncology Navigation Follow-Up Note    Contact Type:  Medical Oncology    Notes: Writer met with patient and guardian in clinic. Viridiana Both states she plans on having surgery in August.  Discussed the importance of having PET scan prior to surgery. Pt verbalized understanding. Viridiana Both denies any needs at this time. Guardian thanked writer for assistance with appointments and keeping him informed.      Electronically signed by Leilani Sinclair RN on 2023 at 2:44 PM

## 2023-07-17 NOTE — PROGRESS NOTES
Patient ID: Colette Porter, 1955, [id number], 76 y.o. Referred by :  No ref. provider found   Reason for consultation: Metastatic breast cancer            DIAGNOSIS:     Lytic lesion in the L1 vertebra  Cachexia and weight loss  Biopsy of 1 proven to be metastatic mammary cancer, ER positive>T1c N0 M1  Significant psychiatric issues leading to hospitalization  Right breast cancer      CURRENT THERAPY:  Plan to start Femara while we are dealing with psychiatric issues  Ultimately, plan to address the breast tumor and also likely to have radiation to the L1 area      BRIEF CASE HISTORY:  Trang Augustine is a very pleasant 79 y.o. female who is referred to us for progressive back pain. CT scan of the lumbar spine suggested lytic lesion at L1. The patient when she presented initially she was very weak, she has been falling at home. She is losing weight. She is cachectic. She is a chronic smoker and has chronic cough but no hemoptysis  Performance status is ECOG 2  Biopsy of the area showed metastatic mammary cancer ER positive. CT scan did not show widespread metastatic disease. She was supposed to have a bone scan and MRI but she was admitted to the hospital for psychiatric care. We decided to start her on aromatase inhibitors until she is discharged to complete work-up and management.       INTERIM HISTORY  Patient did not show up since summer  She did have a recent PCP chest abdomen pelvis with there is no metastatic disease but at L1 previously biopsied status post kyphoplasty  Breast exam did show the right lesion and right breast ultrasound did show solid mass at 12:00 6 cm from the nipple 1.5 x 1.19 cm biopsy: From strongly ER positive CO 5% HER2/sue negative invasive ductal carcinoma grade 1  PET scan was not done was seen by surgery and recommended right breast lumpectomy with sentinel lymph node biopsy  Guardian in the room spoke with him and the patient about the PET/CT and the need to have it

## 2023-07-27 ENCOUNTER — HOSPITAL ENCOUNTER (OUTPATIENT)
Dept: NUCLEAR MEDICINE | Age: 68
Discharge: HOME | End: 2023-07-29
Attending: INTERNAL MEDICINE
Payer: MEDICARE

## 2023-07-27 DIAGNOSIS — C50.911 MALIGNANT NEOPLASM OF RIGHT BREAST IN FEMALE, ESTROGEN RECEPTOR POSITIVE, UNSPECIFIED SITE OF BREAST (HCC): ICD-10-CM

## 2023-07-27 DIAGNOSIS — Z17.0 MALIGNANT NEOPLASM OF RIGHT BREAST IN FEMALE, ESTROGEN RECEPTOR POSITIVE, UNSPECIFIED SITE OF BREAST (HCC): ICD-10-CM

## 2023-07-27 DIAGNOSIS — C50.111 MALIGNANT NEOPLASM OF CENTRAL PORTION OF RIGHT BREAST IN FEMALE, ESTROGEN RECEPTOR POSITIVE (HCC): ICD-10-CM

## 2023-07-27 DIAGNOSIS — Z17.0 MALIGNANT NEOPLASM OF CENTRAL PORTION OF RIGHT BREAST IN FEMALE, ESTROGEN RECEPTOR POSITIVE (HCC): ICD-10-CM

## 2023-07-27 DIAGNOSIS — C79.51 SECONDARY MALIGNANT NEOPLASM OF BONE (HCC): ICD-10-CM

## 2023-07-27 LAB — METER GLUCOSE: 104 MG/DL (ref 65–105)

## 2023-07-27 PROCEDURE — 3430000000 HC RX DIAGNOSTIC RADIOPHARMACEUTICAL: Performed by: INTERNAL MEDICINE

## 2023-07-27 PROCEDURE — 78815 PET IMAGE W/CT SKULL-THIGH: CPT

## 2023-07-27 PROCEDURE — 2580000003 HC RX 258: Performed by: INTERNAL MEDICINE

## 2023-07-27 PROCEDURE — 82947 ASSAY GLUCOSE BLOOD QUANT: CPT

## 2023-07-27 PROCEDURE — A9552 F18 FDG: HCPCS | Performed by: INTERNAL MEDICINE

## 2023-07-27 RX ORDER — SODIUM CHLORIDE 0.9 % (FLUSH) 0.9 %
10 SYRINGE (ML) INJECTION PRN
Status: DISCONTINUED | OUTPATIENT
Start: 2023-07-27 | End: 2023-07-30 | Stop reason: HOSPADM

## 2023-07-27 RX ORDER — FLUDEOXYGLUCOSE F 18 200 MCI/ML
10 INJECTION, SOLUTION INTRAVENOUS
Status: COMPLETED | OUTPATIENT
Start: 2023-07-27 | End: 2023-07-27

## 2023-07-27 RX ADMIN — FLUDEOXYGLUCOSE F 18 12.49 MILLICURIE: 200 INJECTION, SOLUTION INTRAVENOUS at 15:18

## 2023-07-27 RX ADMIN — SODIUM CHLORIDE, PRESERVATIVE FREE 10 ML: 5 INJECTION INTRAVENOUS at 15:18

## 2023-08-07 ENCOUNTER — OFFICE VISIT (OUTPATIENT)
Dept: ONCOLOGY | Age: 68
End: 2023-08-07
Payer: MEDICARE

## 2023-08-07 VITALS — BODY MASS INDEX: 35.93 KG/M2 | RESPIRATION RATE: 20 BRPM | TEMPERATURE: 97.6 F | HEIGHT: 60 IN | WEIGHT: 183 LBS

## 2023-08-07 DIAGNOSIS — Z17.0 MALIGNANT NEOPLASM OF RIGHT BREAST IN FEMALE, ESTROGEN RECEPTOR POSITIVE, UNSPECIFIED SITE OF BREAST (HCC): Primary | ICD-10-CM

## 2023-08-07 DIAGNOSIS — C50.911 MALIGNANT NEOPLASM OF RIGHT BREAST IN FEMALE, ESTROGEN RECEPTOR POSITIVE, UNSPECIFIED SITE OF BREAST (HCC): Primary | ICD-10-CM

## 2023-08-07 DIAGNOSIS — C79.51 SECONDARY MALIGNANT NEOPLASM OF BONE (HCC): ICD-10-CM

## 2023-08-07 PROCEDURE — G8427 DOCREV CUR MEDS BY ELIG CLIN: HCPCS | Performed by: INTERNAL MEDICINE

## 2023-08-07 PROCEDURE — 3017F COLORECTAL CA SCREEN DOC REV: CPT | Performed by: INTERNAL MEDICINE

## 2023-08-07 PROCEDURE — 1123F ACP DISCUSS/DSCN MKR DOCD: CPT | Performed by: INTERNAL MEDICINE

## 2023-08-07 PROCEDURE — 4004F PT TOBACCO SCREEN RCVD TLK: CPT | Performed by: INTERNAL MEDICINE

## 2023-08-07 PROCEDURE — G8400 PT W/DXA NO RESULTS DOC: HCPCS | Performed by: INTERNAL MEDICINE

## 2023-08-07 PROCEDURE — G8417 CALC BMI ABV UP PARAM F/U: HCPCS | Performed by: INTERNAL MEDICINE

## 2023-08-07 PROCEDURE — 1090F PRES/ABSN URINE INCON ASSESS: CPT | Performed by: INTERNAL MEDICINE

## 2023-08-07 PROCEDURE — 99214 OFFICE O/P EST MOD 30 MIN: CPT | Performed by: INTERNAL MEDICINE

## 2023-08-07 NOTE — PROGRESS NOTES
tomorrow  Follow up with addition oncology for L1 and possible radiation after surgery  Will discuss adjuvant treatment after surgery  RTC in 2 weeks                 I spoke with the guardian and he agreed to proceed with the treatment indicated for the patient's                                    [facility]                            This note is created with the assistance of a speech recognition program.  While intending to generate a document that actually reflects the content of the visit, the document can still have some errors including those of syntax and sound a like substitutions which may escape proof reading. It such instances, actual meaning can be extrapolated by contextual diversion.

## 2023-08-08 ENCOUNTER — APPOINTMENT (OUTPATIENT)
Dept: MAMMOGRAPHY | Age: 68
End: 2023-08-08
Attending: SURGERY
Payer: MEDICARE

## 2023-08-08 ENCOUNTER — ANESTHESIA EVENT (OUTPATIENT)
Dept: OPERATING ROOM | Age: 68
End: 2023-08-08
Payer: MEDICARE

## 2023-08-08 ENCOUNTER — HOSPITAL ENCOUNTER (OUTPATIENT)
Dept: NUCLEAR MEDICINE | Age: 68
Discharge: HOME | End: 2023-08-10
Payer: MEDICARE

## 2023-08-08 ENCOUNTER — ANESTHESIA (OUTPATIENT)
Dept: OPERATING ROOM | Age: 68
End: 2023-08-08
Payer: MEDICARE

## 2023-08-08 ENCOUNTER — HOSPITAL ENCOUNTER (OUTPATIENT)
Age: 68
Setting detail: OUTPATIENT SURGERY
Discharge: HOME | End: 2023-08-08
Attending: SURGERY | Admitting: SURGERY
Payer: MEDICARE

## 2023-08-08 ENCOUNTER — HOSPITAL ENCOUNTER (OUTPATIENT)
Dept: ULTRASOUND IMAGING | Age: 68
Discharge: HOME | End: 2023-08-10
Payer: MEDICARE

## 2023-08-08 VITALS
DIASTOLIC BLOOD PRESSURE: 86 MMHG | SYSTOLIC BLOOD PRESSURE: 145 MMHG | HEIGHT: 60 IN | TEMPERATURE: 97.7 F | OXYGEN SATURATION: 95 % | WEIGHT: 182.98 LBS | BODY MASS INDEX: 35.92 KG/M2 | RESPIRATION RATE: 14 BRPM | HEART RATE: 80 BPM

## 2023-08-08 DIAGNOSIS — Z17.0 MALIGNANT NEOPLASM OF RIGHT BREAST IN FEMALE, ESTROGEN RECEPTOR POSITIVE, UNSPECIFIED SITE OF BREAST (HCC): ICD-10-CM

## 2023-08-08 DIAGNOSIS — Z98.890 STATUS POST BREAST LUMPECTOMY: ICD-10-CM

## 2023-08-08 DIAGNOSIS — Z85.3 HISTORY OF RIGHT BREAST CANCER: ICD-10-CM

## 2023-08-08 DIAGNOSIS — Z98.890 STATUS POST BREAST BIOPSY: ICD-10-CM

## 2023-08-08 DIAGNOSIS — C50.911 MALIGNANT NEOPLASM OF RIGHT BREAST IN FEMALE, ESTROGEN RECEPTOR POSITIVE, UNSPECIFIED SITE OF BREAST (HCC): ICD-10-CM

## 2023-08-08 LAB
ANION GAP SERPL CALCULATED.3IONS-SCNC: 12 MMOL/L (ref 9–17)
BUN SERPL-MCNC: 15 MG/DL (ref 8–23)
BUN/CREAT SERPL: 17 (ref 9–20)
CALCIUM SERPL-MCNC: 8.4 MG/DL (ref 8.6–10.4)
CHLORIDE SERPL-SCNC: 107 MMOL/L (ref 98–107)
CO2 SERPL-SCNC: 23 MMOL/L (ref 20–31)
CREAT SERPL-MCNC: 0.9 MG/DL (ref 0.5–0.9)
ERYTHROCYTE [DISTWIDTH] IN BLOOD BY AUTOMATED COUNT: 14.6 % (ref 11.8–14.4)
GFR SERPL CREATININE-BSD FRML MDRD: >60 ML/MIN/1.73M2
GLUCOSE SERPL-MCNC: 105 MG/DL (ref 70–99)
HCT VFR BLD AUTO: 39.7 % (ref 36.3–47.1)
HGB BLD-MCNC: 12.5 G/DL (ref 11.9–15.1)
MCH RBC QN AUTO: 28 PG (ref 25.2–33.5)
MCHC RBC AUTO-ENTMCNC: 31.5 G/DL (ref 28.4–34.8)
MCV RBC AUTO: 88.8 FL (ref 82.6–102.9)
NRBC BLD-RTO: 0 PER 100 WBC
PLATELET # BLD AUTO: 385 K/UL (ref 138–453)
PMV BLD AUTO: 10.4 FL (ref 8.1–13.5)
POTASSIUM SERPL-SCNC: 4 MMOL/L (ref 3.7–5.3)
RBC # BLD AUTO: 4.47 M/UL (ref 3.95–5.11)
SODIUM SERPL-SCNC: 142 MMOL/L (ref 135–144)
WBC OTHER # BLD: 9.8 K/UL (ref 3.5–11.3)

## 2023-08-08 PROCEDURE — 7100000010 HC PHASE II RECOVERY - FIRST 15 MIN: Performed by: SURGERY

## 2023-08-08 PROCEDURE — 19285 PERQ DEV BREAST 1ST US IMAG: CPT

## 2023-08-08 PROCEDURE — 6370000000 HC RX 637 (ALT 250 FOR IP): Performed by: SURGERY

## 2023-08-08 PROCEDURE — 6370000000 HC RX 637 (ALT 250 FOR IP): Performed by: ANESTHESIOLOGY

## 2023-08-08 PROCEDURE — 85027 COMPLETE CBC AUTOMATED: CPT

## 2023-08-08 PROCEDURE — A9520 TC99 TILMANOCEPT DIAG 0.5MCI: HCPCS

## 2023-08-08 PROCEDURE — 7100000001 HC PACU RECOVERY - ADDTL 15 MIN: Performed by: SURGERY

## 2023-08-08 PROCEDURE — 7100000011 HC PHASE II RECOVERY - ADDTL 15 MIN: Performed by: SURGERY

## 2023-08-08 PROCEDURE — 6360000002 HC RX W HCPCS: Performed by: SURGERY

## 2023-08-08 PROCEDURE — 3700000001 HC ADD 15 MINUTES (ANESTHESIA): Performed by: SURGERY

## 2023-08-08 PROCEDURE — 2500000003 HC RX 250 WO HCPCS: Performed by: SURGERY

## 2023-08-08 PROCEDURE — 7100000000 HC PACU RECOVERY - FIRST 15 MIN: Performed by: SURGERY

## 2023-08-08 PROCEDURE — 2500000003 HC RX 250 WO HCPCS: Performed by: NURSE ANESTHETIST, CERTIFIED REGISTERED

## 2023-08-08 PROCEDURE — 88307 TISSUE EXAM BY PATHOLOGIST: CPT

## 2023-08-08 PROCEDURE — 6360000002 HC RX W HCPCS: Performed by: NURSE ANESTHETIST, CERTIFIED REGISTERED

## 2023-08-08 PROCEDURE — 3700000000 HC ANESTHESIA ATTENDED CARE: Performed by: SURGERY

## 2023-08-08 PROCEDURE — 3600000002 HC SURGERY LEVEL 2 BASE: Performed by: SURGERY

## 2023-08-08 PROCEDURE — 2580000003 HC RX 258: Performed by: ANESTHESIOLOGY

## 2023-08-08 PROCEDURE — C1819 TISSUE LOCALIZATION-EXCISION: HCPCS

## 2023-08-08 PROCEDURE — 76098 X-RAY EXAM SURGICAL SPECIMEN: CPT

## 2023-08-08 PROCEDURE — 88342 IMHCHEM/IMCYTCHM 1ST ANTB: CPT

## 2023-08-08 PROCEDURE — 78195 LYMPH SYSTEM IMAGING: CPT

## 2023-08-08 PROCEDURE — 3430000000 HC RX DIAGNOSTIC RADIOPHARMACEUTICAL: Performed by: SURGERY

## 2023-08-08 PROCEDURE — 80048 BASIC METABOLIC PNL TOTAL CA: CPT

## 2023-08-08 PROCEDURE — 3600000012 HC SURGERY LEVEL 2 ADDTL 15MIN: Performed by: SURGERY

## 2023-08-08 PROCEDURE — 2709999900 HC NON-CHARGEABLE SUPPLY: Performed by: SURGERY

## 2023-08-08 PROCEDURE — 2500000003 HC RX 250 WO HCPCS

## 2023-08-08 PROCEDURE — A9520 TC99 TILMANOCEPT DIAG 0.5MCI: HCPCS | Performed by: SURGERY

## 2023-08-08 PROCEDURE — 3430000000 HC RX DIAGNOSTIC RADIOPHARMACEUTICAL

## 2023-08-08 RX ORDER — LIDOCAINE 40 MG/G
CREAM TOPICAL
Status: CANCELLED | OUTPATIENT
Start: 2023-08-08

## 2023-08-08 RX ORDER — MIDAZOLAM HYDROCHLORIDE 1 MG/ML
INJECTION INTRAMUSCULAR; INTRAVENOUS PRN
Status: DISCONTINUED | OUTPATIENT
Start: 2023-08-08 | End: 2023-08-08 | Stop reason: SDUPTHER

## 2023-08-08 RX ORDER — SODIUM CHLORIDE, SODIUM LACTATE, POTASSIUM CHLORIDE, CALCIUM CHLORIDE 600; 310; 30; 20 MG/100ML; MG/100ML; MG/100ML; MG/100ML
INJECTION, SOLUTION INTRAVENOUS CONTINUOUS
Status: DISCONTINUED | OUTPATIENT
Start: 2023-08-08 | End: 2023-08-08 | Stop reason: HOSPADM

## 2023-08-08 RX ORDER — SODIUM CHLORIDE 9 MG/ML
INJECTION, SOLUTION INTRAVENOUS CONTINUOUS
Status: DISCONTINUED | OUTPATIENT
Start: 2023-08-08 | End: 2023-08-08 | Stop reason: HOSPADM

## 2023-08-08 RX ORDER — DEXAMETHASONE SODIUM PHOSPHATE 10 MG/ML
INJECTION, SOLUTION INTRAMUSCULAR; INTRAVENOUS PRN
Status: DISCONTINUED | OUTPATIENT
Start: 2023-08-08 | End: 2023-08-08 | Stop reason: SDUPTHER

## 2023-08-08 RX ORDER — KETOROLAC TROMETHAMINE 30 MG/ML
INJECTION, SOLUTION INTRAMUSCULAR; INTRAVENOUS PRN
Status: DISCONTINUED | OUTPATIENT
Start: 2023-08-08 | End: 2023-08-08 | Stop reason: SDUPTHER

## 2023-08-08 RX ORDER — LIDOCAINE HYDROCHLORIDE 20 MG/ML
INJECTION, SOLUTION EPIDURAL; INFILTRATION; INTRACAUDAL; PERINEURAL PRN
Status: DISCONTINUED | OUTPATIENT
Start: 2023-08-08 | End: 2023-08-08 | Stop reason: SDUPTHER

## 2023-08-08 RX ORDER — PROPOFOL 10 MG/ML
INJECTION, EMULSION INTRAVENOUS PRN
Status: DISCONTINUED | OUTPATIENT
Start: 2023-08-08 | End: 2023-08-08 | Stop reason: SDUPTHER

## 2023-08-08 RX ORDER — ONDANSETRON 2 MG/ML
INJECTION INTRAMUSCULAR; INTRAVENOUS PRN
Status: DISCONTINUED | OUTPATIENT
Start: 2023-08-08 | End: 2023-08-08 | Stop reason: SDUPTHER

## 2023-08-08 RX ORDER — PHENYLEPHRINE HCL IN 0.9% NACL 1 MG/10 ML
SYRINGE (ML) INTRAVENOUS PRN
Status: DISCONTINUED | OUTPATIENT
Start: 2023-08-08 | End: 2023-08-08 | Stop reason: SDUPTHER

## 2023-08-08 RX ORDER — LIDOCAINE 40 MG/G
CREAM TOPICAL
Status: COMPLETED | OUTPATIENT
Start: 2023-08-08 | End: 2023-08-08

## 2023-08-08 RX ORDER — HYDROMORPHONE HYDROCHLORIDE 1 MG/ML
0.5 INJECTION, SOLUTION INTRAMUSCULAR; INTRAVENOUS; SUBCUTANEOUS EVERY 5 MIN PRN
Status: DISCONTINUED | OUTPATIENT
Start: 2023-08-08 | End: 2023-08-08 | Stop reason: HOSPADM

## 2023-08-08 RX ORDER — FENTANYL CITRATE 50 UG/ML
INJECTION, SOLUTION INTRAMUSCULAR; INTRAVENOUS PRN
Status: DISCONTINUED | OUTPATIENT
Start: 2023-08-08 | End: 2023-08-08 | Stop reason: SDUPTHER

## 2023-08-08 RX ORDER — KETAMINE HCL IN NACL, ISO-OSM 100MG/10ML
SYRINGE (ML) INJECTION PRN
Status: DISCONTINUED | OUTPATIENT
Start: 2023-08-08 | End: 2023-08-08 | Stop reason: SDUPTHER

## 2023-08-08 RX ORDER — SODIUM CHLORIDE 0.9 % (FLUSH) 0.9 %
5-40 SYRINGE (ML) INJECTION PRN
Status: DISCONTINUED | OUTPATIENT
Start: 2023-08-08 | End: 2023-08-08 | Stop reason: HOSPADM

## 2023-08-08 RX ORDER — SODIUM CHLORIDE 9 MG/ML
INJECTION, SOLUTION INTRAVENOUS PRN
Status: DISCONTINUED | OUTPATIENT
Start: 2023-08-08 | End: 2023-08-08 | Stop reason: HOSPADM

## 2023-08-08 RX ORDER — FENTANYL CITRATE 50 UG/ML
25 INJECTION, SOLUTION INTRAMUSCULAR; INTRAVENOUS EVERY 5 MIN PRN
Status: DISCONTINUED | OUTPATIENT
Start: 2023-08-08 | End: 2023-08-08 | Stop reason: HOSPADM

## 2023-08-08 RX ORDER — BUPIVACAINE HYDROCHLORIDE AND EPINEPHRINE 5; 5 MG/ML; UG/ML
INJECTION, SOLUTION EPIDURAL; INTRACAUDAL; PERINEURAL PRN
Status: DISCONTINUED | OUTPATIENT
Start: 2023-08-08 | End: 2023-08-08 | Stop reason: ALTCHOICE

## 2023-08-08 RX ORDER — LIDOCAINE HYDROCHLORIDE 10 MG/ML
1 INJECTION, SOLUTION EPIDURAL; INFILTRATION; INTRACAUDAL; PERINEURAL
Status: DISCONTINUED | OUTPATIENT
Start: 2023-08-09 | End: 2023-08-08 | Stop reason: HOSPADM

## 2023-08-08 RX ORDER — SODIUM CHLORIDE 0.9 % (FLUSH) 0.9 %
5-40 SYRINGE (ML) INJECTION EVERY 12 HOURS SCHEDULED
Status: DISCONTINUED | OUTPATIENT
Start: 2023-08-08 | End: 2023-08-08 | Stop reason: HOSPADM

## 2023-08-08 RX ORDER — ONDANSETRON 2 MG/ML
4 INJECTION INTRAMUSCULAR; INTRAVENOUS
Status: DISCONTINUED | OUTPATIENT
Start: 2023-08-08 | End: 2023-08-08 | Stop reason: HOSPADM

## 2023-08-08 RX ORDER — IPRATROPIUM BROMIDE AND ALBUTEROL SULFATE 2.5; .5 MG/3ML; MG/3ML
1 SOLUTION RESPIRATORY (INHALATION) ONCE
Status: COMPLETED | OUTPATIENT
Start: 2023-08-08 | End: 2023-08-08

## 2023-08-08 RX ADMIN — MIDAZOLAM 2 MG: 1 INJECTION INTRAMUSCULAR; INTRAVENOUS at 13:36

## 2023-08-08 RX ADMIN — FENTANYL CITRATE 25 MCG: 50 INJECTION INTRAMUSCULAR; INTRAVENOUS at 15:12

## 2023-08-08 RX ADMIN — LIDOCAINE HYDROCHLORIDE 100 MG: 20 INJECTION, SOLUTION EPIDURAL; INFILTRATION; INTRACAUDAL; PERINEURAL at 13:37

## 2023-08-08 RX ADMIN — SODIUM CHLORIDE, POTASSIUM CHLORIDE, SODIUM LACTATE AND CALCIUM CHLORIDE: 600; 310; 30; 20 INJECTION, SOLUTION INTRAVENOUS at 13:36

## 2023-08-08 RX ADMIN — PROPOFOL 200 MG: 10 INJECTION, EMULSION INTRAVENOUS at 13:37

## 2023-08-08 RX ADMIN — Medication 20 MG: at 13:58

## 2023-08-08 RX ADMIN — FENTANYL CITRATE 75 MCG: 50 INJECTION INTRAMUSCULAR; INTRAVENOUS at 13:54

## 2023-08-08 RX ADMIN — KETOROLAC TROMETHAMINE 30 MG: 30 INJECTION, SOLUTION INTRAMUSCULAR; INTRAVENOUS at 15:27

## 2023-08-08 RX ADMIN — DEXAMETHASONE SODIUM PHOSPHATE 10 MG: 10 INJECTION, SOLUTION INTRAMUSCULAR; INTRAVENOUS at 13:54

## 2023-08-08 RX ADMIN — SODIUM CHLORIDE, POTASSIUM CHLORIDE, SODIUM LACTATE AND CALCIUM CHLORIDE: 600; 310; 30; 20 INJECTION, SOLUTION INTRAVENOUS at 15:00

## 2023-08-08 RX ADMIN — Medication 30 MG: at 14:24

## 2023-08-08 RX ADMIN — TILMANOCEPT 600 MICRO CURIE: KIT at 11:20

## 2023-08-08 RX ADMIN — SODIUM CHLORIDE, POTASSIUM CHLORIDE, SODIUM LACTATE AND CALCIUM CHLORIDE: 600; 310; 30; 20 INJECTION, SOLUTION INTRAVENOUS at 10:23

## 2023-08-08 RX ADMIN — FENTANYL CITRATE 50 MCG: 50 INJECTION INTRAMUSCULAR; INTRAVENOUS at 15:44

## 2023-08-08 RX ADMIN — Medication 200 MCG: at 13:55

## 2023-08-08 RX ADMIN — LIDOCAINE 4%: 4 CREAM TOPICAL at 10:13

## 2023-08-08 RX ADMIN — Medication 2000 MG: at 13:44

## 2023-08-08 RX ADMIN — FENTANYL CITRATE 25 MCG: 50 INJECTION INTRAMUSCULAR; INTRAVENOUS at 14:26

## 2023-08-08 RX ADMIN — ONDANSETRON 4 MG: 2 INJECTION INTRAMUSCULAR; INTRAVENOUS at 13:54

## 2023-08-08 RX ADMIN — IPRATROPIUM BROMIDE AND ALBUTEROL SULFATE 1 DOSE: .5; 3 SOLUTION RESPIRATORY (INHALATION) at 12:58

## 2023-08-08 RX ADMIN — IPRATROPIUM BROMIDE AND ALBUTEROL SULFATE 1 DOSE: .5; 3 SOLUTION RESPIRATORY (INHALATION) at 15:58

## 2023-08-08 RX ADMIN — FENTANYL CITRATE 25 MCG: 50 INJECTION INTRAMUSCULAR; INTRAVENOUS at 13:37

## 2023-08-08 ASSESSMENT — PAIN - FUNCTIONAL ASSESSMENT: PAIN_FUNCTIONAL_ASSESSMENT: 0-10

## 2023-08-08 ASSESSMENT — LIFESTYLE VARIABLES: SMOKING_STATUS: 1

## 2023-08-08 NOTE — ANESTHESIA PRE PROCEDURE
Department of Anesthesiology  Preprocedure Note       Name:  Shiv Shay   Age:  76 y.o.  :  1955                                          MRN:  [id number]         Date:  2023      Surgeon: Yaw Heart):  Azul Lobato DO    Procedure: Procedure(s):  NEEDLE DIRECTED (21 [phone]) RIGHT BREAST LUMPECTOMY, SENTINEL LYMPH NODE BIOPSY  (11)  AXILLARY LYMPH SENTINEL NODE BIOPSY DISSECTION    Medications prior to admission:   Prior to Admission medications    Medication Sig Start Date End Date Taking? Authorizing Provider   traZODone (DESYREL) 50 MG tablet  23   Historical Provider, MD   diclofenac sodium (VOLTAREN) 1 % GEL  23   Historical Provider, MD   DULoxetine (CYMBALTA) 30 MG extended release capsule  11/15/22   Historical Provider, MD   fentaNYL ([address]) 12 MCG/HR  22   Historical Provider, MD   furosemide (LASIX) 20 MG tablet  22   Historical Provider, MD   propranolol (INDERAL) 40 MG tablet  22   Historical Provider, MD   omeprazole (PRILOSEC) 20 MG delayed release capsule Take 1 capsule by mouth daily    Historical Provider, MD   alendronate (FOSAMAX) 70 MG tablet Take 1 tablet by mouth every 7 days Take with a full glass of water upon arising for the day. Consume on an empty stomach at least 30 minutes before the first food, beverage, or medication. Stay upright (do not lie down) for at least 30 minutes. Do not crush or break.  8/10/22   Elie Morrow MD   letrozole Count includes the [facility]) 2.5 MG tablet Take 1 tablet by mouth daily 22  Wilson Eisenmenger Al-Nsour, MD   acetaminophen (TYLENOL) 325 MG tablet Take 2 tablets by mouth every 6 hours as needed for Pain    Historical Provider, MD   nicotine (NICODERM CQ) Place 1 patch onto the skin every 24 hours    Historical Provider, MD Parker Devin, 5% PATCH AND REMOVAL Apply 1 patch topically daily    Historical Provider, MD   Melatonin 3 MG CAPS Take 10 mg by mouth nightly    Historical Provider, MD   mirtazapine

## 2023-08-08 NOTE — DISCHARGE INSTRUCTIONS
General Surgery Patient Discharge Instructions  Discharge Date:  8/8/2023    Discharged To:  home (facility where she lives)    RESUME ACTIVITY:     WOUND CARE:   Do not remove top dressing for 24 hrs. Skin glue used, do not peel off, allow to fall off naturally. BATHING:  Ok to shower tomorrow    DRIVING: No driving while on pain medication        WALKING:    Yes    LIFTING: No lifting nava than 2 lbs with right hand for 7-10 days. DIET:   Ok to resume regular diet. SPECIAL INSTRUCTIONS:     Call [facility] at [phone] if you have a fever > 100 F, or if your incision becomes red, tender, or draining    Keep appt next Monday at [facility] office. Use ibuprofen and tylenol for mild pain and hydrocodone for moderate pain, use ice to incisions as desired. Use surgical bra or supportive/sports bra  during the day and night for 2-3 days and then during the day but at night as desired to help avoid discomfort and give support.

## 2023-08-08 NOTE — BRIEF OP NOTE
Brief Postoperative Note      Patient: Smith Otero  YOB: 1955  MRN: [id number]    Date of Procedure: 8/8/2023    Pre-Op Diagnosis Codes:     * Malignant neoplasm of right breast in female, estrogen receptor positive, unspecified site of breast ([address]) [C50.911, Z17.0]    Post-Op Diagnosis: Same       Procedure(s):  NEEDLE DIRECTED (1030) RIGHT BREAST LUMPECTOMY, SENTINEL LYMPH NODE BIOPSY  (11)  AXILLARY LYMPH SENTINEL NODE BIOPSY DISSECTION    Surgeon(s):  Pricila Sommer DO    Assistant:  * No surgical staff found *    Anesthesia: General    Estimated Blood Loss (mL): less than 50     Complications: None    Specimens:   ID Type Source Tests Collected by Time Destination   A : RIGHT AXILLARY SENTINEL NODES Tissue Lymph Node [address], DO 8/8/2023 1438    B : RIGHT BREAST MASS. 12OCLOCK. DOUBLE SHORT BLACK SUTURE- SUPERIOR MARGIN. SINGLE LONG [address] Tissue Breast SURGICAL PATHOLOGY Pricila Sommer, DO 8/8/2023 1456    C : ADDITIONAL ANTERIOR MARGIN- SUTURE MARKS NEW MARGIN Tissue Breast SURGICAL PATHOLOGY Pricila Sommer, DO 8/8/2023 1506    D : ADDITIONAL MEDIAL MARGIN. SUTURE MARKS NEW MARGIN Tissue Breast SURGICAL PATHOLOGY [facility], DO 8/8/2023 1508    E : ADDITIONAL INFERIOR MARGIN. SUTURE MARKS NEW MARGIN Tissue Breast SURGICAL PATHOLOGY Pricila Sommer, DO 8/8/2023 1510    F : ADDITIONAL SUPERIOR MARGIN. [facility], DO 8/8/2023 1512        Implants:  * No implants in log *      Drains: * No LDAs found *    Findings: as above  [place] Node Biopsy for Breast Cancer - Right  Operation performed with curative intent. Yes   Tracer(s) used to identify sentinel nodes in the upfront surgery (non-neoadjuvant) setting (select all that apply).  Radioactive tracer   Tracer(s) used to identify sentinel nodes in the neoadjuvant setting (select all that apply). N/A   All nodes (colored or non-colored) present at the end of a dye-filled lymphatic channel were removed. N/A   All significantly radioactive nodes were removed. Yes   All palpably suspicious nodes were removed. Yes   Biopsy-proven positive nodes marked with clips prior to chemotherapy were identified and removed.  N/A         Electronically signed by Betsy Hollins DO on 8/8/2023 at 4:15 PM

## 2023-08-08 NOTE — ANESTHESIA POSTPROCEDURE EVALUATION
Department of Anesthesiology  Postprocedure Note    Patient: Tereso Lilly  MRN: [id number]  YOB: 1955  Date of evaluation: 8/8/2023      Procedure Summary     Date: 08/08/23 Room / Location: [place] [address] - INPATIENT    Anesthesia Start: 1329 Anesthesia Stop: 1558    Procedures:       NEEDLE DIRECTED (21 ) RIGHT BREAST LUMPECTOMY, SENTINEL LYMPH NODE BIOPSY  (11) (Right: Breast)      AXILLARY LYMPH SENTINEL NODE BIOPSY DISSECTION (Right: Breast) Diagnosis:       Malignant neoplasm of right breast in female, estrogen receptor positive, unspecified site of breast ([address])      (Malignant neoplasm of right breast in female, estrogen receptor positive, unspecified site of breast ([address]) [C50.911, Z17.0])    Surgeons: Carmen Montes DO Responsible Provider: Yaritza Ventura MD    Anesthesia Type: General ASA Status: 3          Anesthesia Type: General    Azeb Phase I: Azeb Score: 9    Azeb Phase II: Azeb Score: 10      Anesthesia Post Evaluation    Patient location during evaluation: PACU  Patient participation: complete - patient participated  Level of consciousness: awake  Airway patency: patent  Nausea & Vomiting: no nausea  Complications: no  Cardiovascular status: blood pressure returned to baseline  Respiratory status: acceptable  Hydration status: euvolemic  Comments: Multimodal analgesia pain management as indicated by procedure  Multimodal analgesia pain management approach  Pain management: adequate

## 2023-08-11 NOTE — OP NOTE
[address] [place] [address] Dr                [address], [address] [facility]                                OPERATIVE REPORT    PATIENT NAME: Santiago Saxena                    :        1955  MED REC NO:   [id number]                             ROOM:  ACCOUNT NO:   [de-identified]                           ADMIT DATE: 2023  PROVIDER:     Dez Landis    DATE OF PROCEDURE:  2023    PREOPERATIVE DIAGNOSIS:  Malignant neoplasm of the right breast,  estrogen positive at 12 o'clock position. POSTOPERATIVE DIAGNOSIS:  Malignant neoplasm of the right breast,  estrogen positive at 12 o'clock position. PROCEDURES:  Needle-directed right breast lumpectomy, sentinel lymph  node biopsy and frozen section. SURGEON:  Dez Landis DO    ASSISTANT:  No assistant. ESTIMATED BLOOD LOSS:  Less than 50 mL. INDICATIONS:  This is a 70-year-old female who was found to have a  metastatic site when she was evaluated for back pain and subsequently  found to have a small tumor at the 12 o'clock position of the right  breast and she is here for surgery. OPERATIVE PROCEDURE:  The patient is placed in a supine position,  receives general anesthesia. The right breast, axilla, and upper arm  are sterilely prepped and draped. The Neoprobe was selected and applied  to the axillary area to guide placement of an incision in the inferior  axillary fold. Dissection is carried down to incise the clavipectoral  fascia and enter the axillary space where intermittent application of  the probe does identify the sentinel node which is carefully excised  with cautery. Bleeding vessels are controlled with electrocautery and  ligatures with 3-0 Vicryl. The probe is reapplied until the background  radioactivity is less than 10% of the primary sentinel node. At least  two nodes are obtained and sent to the pathologist for frozen section.    The subcutaneous tissue was closed and the

## 2023-08-14 LAB — SURGICAL PATHOLOGY REPORT: NORMAL

## 2023-08-21 ENCOUNTER — OFFICE VISIT (OUTPATIENT)
Dept: ONCOLOGY | Age: 68
End: 2023-08-21
Payer: MEDICARE

## 2023-08-21 VITALS
SYSTOLIC BLOOD PRESSURE: 124 MMHG | HEART RATE: 75 BPM | BODY MASS INDEX: 35.74 KG/M2 | TEMPERATURE: 98 F | DIASTOLIC BLOOD PRESSURE: 75 MMHG | RESPIRATION RATE: 18 BRPM | WEIGHT: 183 LBS

## 2023-08-21 DIAGNOSIS — C50.911 MALIGNANT NEOPLASM OF RIGHT BREAST IN FEMALE, ESTROGEN RECEPTOR POSITIVE, UNSPECIFIED SITE OF BREAST (HCC): Primary | ICD-10-CM

## 2023-08-21 DIAGNOSIS — C79.51 SECONDARY MALIGNANT NEOPLASM OF BONE (HCC): ICD-10-CM

## 2023-08-21 DIAGNOSIS — Z17.0 MALIGNANT NEOPLASM OF RIGHT BREAST IN FEMALE, ESTROGEN RECEPTOR POSITIVE, UNSPECIFIED SITE OF BREAST (HCC): Primary | ICD-10-CM

## 2023-08-21 PROCEDURE — 3017F COLORECTAL CA SCREEN DOC REV: CPT | Performed by: INTERNAL MEDICINE

## 2023-08-21 PROCEDURE — 1090F PRES/ABSN URINE INCON ASSESS: CPT | Performed by: INTERNAL MEDICINE

## 2023-08-21 PROCEDURE — 4004F PT TOBACCO SCREEN RCVD TLK: CPT | Performed by: INTERNAL MEDICINE

## 2023-08-21 PROCEDURE — 1123F ACP DISCUSS/DSCN MKR DOCD: CPT | Performed by: INTERNAL MEDICINE

## 2023-08-21 PROCEDURE — 99215 OFFICE O/P EST HI 40 MIN: CPT | Performed by: INTERNAL MEDICINE

## 2023-08-21 PROCEDURE — G9899 SCRN MAM PERF RSLTS DOC: HCPCS | Performed by: INTERNAL MEDICINE

## 2023-08-21 PROCEDURE — G8417 CALC BMI ABV UP PARAM F/U: HCPCS | Performed by: INTERNAL MEDICINE

## 2023-08-21 PROCEDURE — G8400 PT W/DXA NO RESULTS DOC: HCPCS | Performed by: INTERNAL MEDICINE

## 2023-08-21 PROCEDURE — G8427 DOCREV CUR MEDS BY ELIG CLIN: HCPCS | Performed by: INTERNAL MEDICINE

## 2023-08-21 RX ORDER — LETROZOLE 2.5 MG/1
2.5 TABLET, FILM COATED ORAL DAILY
Qty: 30 TABLET | Refills: 2 | Status: SHIPPED | OUTPATIENT
Start: 2023-08-21 | End: 2023-08-22 | Stop reason: SDUPTHER

## 2023-08-21 NOTE — PROGRESS NOTES
and educating the patient/family/caregiver and ordering medications, tests, or procedures. I spoke with the guardian and he agreed to proceed with the treatment indicated for the patient's                                    [facility]                            This note is created with the assistance of a speech recognition program.  While intending to generate a document that actually reflects the content of the visit, the document can still have some errors including those of syntax and sound a like substitutions which may escape proof reading. It such instances, actual meaning can be extrapolated by contextual diversion.

## 2023-08-22 ENCOUNTER — TELEPHONE (OUTPATIENT)
Dept: ONCOLOGY | Age: 68
End: 2023-08-22

## 2023-08-22 RX ORDER — LETROZOLE 2.5 MG/1
2.5 TABLET, FILM COATED ORAL DAILY
Qty: 30 TABLET | Refills: 2 | Status: SHIPPED | OUTPATIENT
Start: 2023-08-22 | End: 2023-11-20

## 2023-08-22 NOTE — TELEPHONE ENCOUNTER
Patient's daughter calls to request letrozole rx that was sent to [facility] yesterday be cancelled and [facility]

## 2023-08-22 NOTE — TELEPHONE ENCOUNTER
Name: Filemon Vegas  : 1955  MRN: [id number]    Oncology Navigation Follow-Up Note    Notes: Chart reviewed. Patient referred to Dr Antonio Nayak. Appointment for 23. PT placed on Breast cancer conference for discussion. Will continue to follow.     Electronically signed by Rigo Tan RN on 2023 at 10:41 AM

## 2023-08-28 ENCOUNTER — HOSPITAL ENCOUNTER (OUTPATIENT)
Dept: RADIATION ONCOLOGY | Age: 68
Discharge: HOME | End: 2023-08-28
Payer: MEDICARE

## 2023-08-28 VITALS
DIASTOLIC BLOOD PRESSURE: 88 MMHG | HEART RATE: 67 BPM | BODY MASS INDEX: 36.01 KG/M2 | OXYGEN SATURATION: 98 % | RESPIRATION RATE: 18 BRPM | TEMPERATURE: 98.2 F | SYSTOLIC BLOOD PRESSURE: 143 MMHG | WEIGHT: 184.4 LBS

## 2023-08-28 DIAGNOSIS — C50.411 MALIGNANT NEOPLASM OF UPPER-OUTER QUADRANT OF RIGHT BREAST IN FEMALE, ESTROGEN RECEPTOR POSITIVE (HCC): ICD-10-CM

## 2023-08-28 DIAGNOSIS — C79.51 SECONDARY MALIGNANT NEOPLASM OF BONE (HCC): Primary | ICD-10-CM

## 2023-08-28 DIAGNOSIS — Z17.0 MALIGNANT NEOPLASM OF UPPER-OUTER QUADRANT OF RIGHT BREAST IN FEMALE, ESTROGEN RECEPTOR POSITIVE (HCC): ICD-10-CM

## 2023-08-28 DIAGNOSIS — Z17.0 MALIGNANT NEOPLASM OF CENTRAL PORTION OF RIGHT BREAST IN FEMALE, ESTROGEN RECEPTOR POSITIVE (HCC): ICD-10-CM

## 2023-08-28 DIAGNOSIS — C50.111 MALIGNANT NEOPLASM OF CENTRAL PORTION OF RIGHT BREAST IN FEMALE, ESTROGEN RECEPTOR POSITIVE (HCC): ICD-10-CM

## 2023-08-28 PROCEDURE — 99213 OFFICE O/P EST LOW 20 MIN: CPT | Performed by: RADIOLOGY

## 2023-08-28 ASSESSMENT — PATIENT HEALTH QUESTIONNAIRE - PHQ9
SUM OF ALL RESPONSES TO PHQ9 QUESTIONS 1 & 2: 0
SUM OF ALL RESPONSES TO PHQ QUESTIONS 1-9: 0
1. LITTLE INTEREST OR PLEASURE IN DOING THINGS: 0
SUM OF ALL RESPONSES TO PHQ QUESTIONS 1-9: 0
2. FEELING DOWN, DEPRESSED OR HOPELESS: 0

## 2023-08-28 NOTE — ACP (ADVANCE CARE PLANNING)
Advance Care Planning     Hospice Services: Patient is not currently receiving hospice services/has not received hospice care within the performance year. Advance Care Planning was discussed with patient, and the patient refused to provide an ACP or surrogate decision maker because: not ready at this time. Sister of patient provided with copy of document.

## 2023-08-29 PROBLEM — C50.111 MALIGNANT NEOPLASM OF CENTRAL PORTION OF RIGHT BREAST IN FEMALE, ESTROGEN RECEPTOR POSITIVE (HCC): Status: ACTIVE | Noted: 2022-12-26

## 2023-08-29 PROBLEM — Z17.0 MALIGNANT NEOPLASM OF CENTRAL PORTION OF RIGHT BREAST IN FEMALE, ESTROGEN RECEPTOR POSITIVE (HCC): Status: ACTIVE | Noted: 2022-12-26

## 2023-08-29 NOTE — CONSULTS
lymphedema clinic for evaluation and treatment. Patient was in agreement with my recommendations. All questions were answered to their satisfaction. Patient was advised to contact us anytime should they have any questions or concerns. Electronically signed by Casi Dolan MD on 8/28/2023 at 11:28 AM      Drugs Prescribed:  New Prescriptions    No medications on file       Orders Placed:     Orders Placed This Encounter   Procedures    [address]         CC:  Patient Care Team:  Kai Fish as PCP - General (Family Medicine)  Rodolfo Yuan MD as Consulting Physician (Hematology and Oncology)  Jim Duran RN as Nurse Navigator (Oncology)         Total time spent on this case on the day of encounter is 60 minutes. This time includes combination of one or more of the following - review of necessary tests, review of pertinent medical records from the EMR, performing medically appropriate examination and evaluation, counseling and educating the patient/family/caregiver, ordering necessary medical tests, procedures etc., documenting the clinical information in the electronic medical record, care coordination, referring and communicating with other health care providers and interpretation of results independently. This note is created with the assistance of a speech recognition program.  While intending to generate a document that actually reflects the content of the visit, the document can still have some errors including those of syntax and sound a like substitutions which may escape proof reading. It such instances, actual meaning can be extrapolated by contextual diversion.

## 2023-09-07 ENCOUNTER — HOSPITAL ENCOUNTER (OUTPATIENT)
Dept: RADIATION ONCOLOGY | Age: 68
Discharge: HOME | End: 2023-09-07
Payer: MEDICARE

## 2023-09-07 PROCEDURE — 77334 RADIATION TREATMENT AID(S): CPT | Performed by: RADIOLOGY

## 2023-09-07 NOTE — DISCHARGE INSTRUCTIONS
Breast Side Effects  Fatigue  Hair loss  Skin changes  Tenderness  Swelling     Fatigue  How long it lasts  When you will first feel fatigue depends on a few factors, such as your age, health, how active you are, and how you felt before radiation therapy started. Fatigue can last from 6 weeks to a year after your last radiation therapy session. Some people may always feel fatigue and not have as much energy as they did before radiation therapy. Ways to Manage Fatigue    Try to sleep at least 8 hours each night. This may be more sleep than you needed before radiation therapy. One way to sleep better at night is to be active during the day. Another way is to relax right before going to bed. Do calming activities before bedtime, such as reading, working on a [employer] puzzle, or listening to music. Plan time to rest. Take short naps or rest breaks between activities. Try not to do too much. With fatigue, you ay not have enough energy to do all the things you want to do. Stay active, but choose the activities that are most important to you. Try to let go of things that don't matter as much now. For example, you might go to work but not do housework. You might watch your children's sprots events but not cook dinner. Exercise. Research shows that most people feel better when they get some exercise each day. Go for a short walk, ride a bike, or do yoga. Talk with your doctor or nurse about types of exercise you can do while having radiation therapy. Relax. Mediatation, prayer, gentle yoga, guided imagery, and visualization are ways you can learn to relax and decrease stress. For relaxation exercises:  Visit Learning to Relax on the National Cancer [employer]'s website at : www.cancer.gov/about-cancer/copingfeelings/relaxation  See Facing Forward: Life After Cancer Treatment at: www.cancer.gov/publications/patient-education/facing-forward  Eat and drink well.  It can be easier to eat if you have 5 or 6 small meals each

## 2023-09-07 NOTE — PROGRESS NOTES
Pt here today for teach and simulation scan. Radiation and You information provided along with additional education regarding radiation therapy and what to expect. Pt verbalizes understanding and all questions answered to the best of my knowledge. Pt escorted to CT room without any difficulty. Reviewed treatment plan of 5 SBRT treatments to her spine first followed by 15 radiation treatments to right breast per Dr. Marshall Lemus. Patient resides at [address] [place] [address] ([phone]). Information was reviewed with pt and her guardian, Queenie Pinto, who accompanied her for her appt today. Lexii Vasques request assistance with transportation being coordinate with [employer] transportation. Spoke with  who is aware and gave his contact information to Lexii Vasques. Lexii Vasques also reviewed and signed patient's consents for treatments today.

## 2023-09-08 ENCOUNTER — TELEPHONE (OUTPATIENT)
Dept: ONCOLOGY | Age: 68
End: 2023-09-08

## 2023-09-08 NOTE — TELEPHONE ENCOUNTER
received referral from Radiation stating transportation concerns for patient to radiation treatment.  called patient's guardian and left a message.

## 2023-09-18 ENCOUNTER — OFFICE VISIT (OUTPATIENT)
Dept: ONCOLOGY | Age: 68
End: 2023-09-18
Payer: MEDICARE

## 2023-09-18 VITALS
WEIGHT: 184 LBS | DIASTOLIC BLOOD PRESSURE: 77 MMHG | HEART RATE: 86 BPM | TEMPERATURE: 98.4 F | BODY MASS INDEX: 35.94 KG/M2 | SYSTOLIC BLOOD PRESSURE: 136 MMHG | RESPIRATION RATE: 18 BRPM

## 2023-09-18 DIAGNOSIS — C79.51 SECONDARY MALIGNANT NEOPLASM OF BONE (HCC): ICD-10-CM

## 2023-09-18 DIAGNOSIS — C50.111 MALIGNANT NEOPLASM OF CENTRAL PORTION OF RIGHT BREAST IN FEMALE, ESTROGEN RECEPTOR POSITIVE (HCC): Primary | ICD-10-CM

## 2023-09-18 DIAGNOSIS — Z17.0 MALIGNANT NEOPLASM OF CENTRAL PORTION OF RIGHT BREAST IN FEMALE, ESTROGEN RECEPTOR POSITIVE (HCC): Primary | ICD-10-CM

## 2023-09-18 PROCEDURE — 1123F ACP DISCUSS/DSCN MKR DOCD: CPT | Performed by: INTERNAL MEDICINE

## 2023-09-18 PROCEDURE — 99214 OFFICE O/P EST MOD 30 MIN: CPT | Performed by: INTERNAL MEDICINE

## 2023-09-18 PROCEDURE — G8417 CALC BMI ABV UP PARAM F/U: HCPCS | Performed by: INTERNAL MEDICINE

## 2023-09-18 PROCEDURE — G8400 PT W/DXA NO RESULTS DOC: HCPCS | Performed by: INTERNAL MEDICINE

## 2023-09-18 PROCEDURE — 3017F COLORECTAL CA SCREEN DOC REV: CPT | Performed by: INTERNAL MEDICINE

## 2023-09-18 PROCEDURE — 1090F PRES/ABSN URINE INCON ASSESS: CPT | Performed by: INTERNAL MEDICINE

## 2023-09-18 PROCEDURE — 4004F PT TOBACCO SCREEN RCVD TLK: CPT | Performed by: INTERNAL MEDICINE

## 2023-09-18 PROCEDURE — G9899 SCRN MAM PERF RSLTS DOC: HCPCS | Performed by: INTERNAL MEDICINE

## 2023-09-18 PROCEDURE — G8427 DOCREV CUR MEDS BY ELIG CLIN: HCPCS | Performed by: INTERNAL MEDICINE

## 2023-09-18 RX ORDER — ONDANSETRON 4 MG/1
TABLET, FILM COATED ORAL
COMMUNITY
Start: 2023-09-08

## 2023-09-18 RX ORDER — ANASTROZOLE 1 MG/1
TABLET ORAL
Qty: 30 TABLET | Refills: 1 | Status: SHIPPED | OUTPATIENT
Start: 2023-09-18

## 2023-09-18 NOTE — PROGRESS NOTES
[facility]                            This note is created with the assistance of a speech recognition program.  While intending to generate a document that actually reflects the content of the visit, the document can still have some errors including those of syntax and sound a like substitutions which may escape proof reading. It such instances, actual meaning can be extrapolated by contextual diversion.

## 2023-09-19 ENCOUNTER — TELEPHONE (OUTPATIENT)
Dept: ONCOLOGY | Age: 68
End: 2023-09-19

## 2023-09-19 DIAGNOSIS — C79.51 SECONDARY MALIGNANT NEOPLASM OF BONE (HCC): ICD-10-CM

## 2023-09-19 DIAGNOSIS — Z17.0 MALIGNANT NEOPLASM OF CENTRAL PORTION OF RIGHT BREAST IN FEMALE, ESTROGEN RECEPTOR POSITIVE (HCC): Primary | ICD-10-CM

## 2023-09-19 DIAGNOSIS — C50.911 MALIGNANT NEOPLASM OF RIGHT BREAST IN FEMALE, ESTROGEN RECEPTOR POSITIVE, UNSPECIFIED SITE OF BREAST (HCC): ICD-10-CM

## 2023-09-19 DIAGNOSIS — Z17.0 MALIGNANT NEOPLASM OF RIGHT BREAST IN FEMALE, ESTROGEN RECEPTOR POSITIVE, UNSPECIFIED SITE OF BREAST (HCC): ICD-10-CM

## 2023-09-19 DIAGNOSIS — C50.111 MALIGNANT NEOPLASM OF CENTRAL PORTION OF RIGHT BREAST IN FEMALE, ESTROGEN RECEPTOR POSITIVE (HCC): Primary | ICD-10-CM

## 2023-09-19 NOTE — TELEPHONE ENCOUNTER
Clarification/dosing added to new prescription per Dr Chayito Estrada; resent with additional direction.

## 2023-09-19 NOTE — TELEPHONE ENCOUNTER
James Hernandez from [facility] called and stated that there was no direction on the Arimidex that was prescribed to [facility]. I had contacted [facility] on Sept. 19th and spoke to Georgette Arreaga a nurse to inform that we would let them know when to start giving the arimidex to [facility] and at that time we will provide instructions then.  She agreed

## 2023-09-21 ENCOUNTER — HOSPITAL ENCOUNTER (OUTPATIENT)
Dept: RADIATION ONCOLOGY | Age: 68
Discharge: HOME | End: 2023-09-21
Payer: MEDICARE

## 2023-09-22 ENCOUNTER — TELEPHONE (OUTPATIENT)
Dept: ONCOLOGY | Age: 68
End: 2023-09-22

## 2023-09-22 NOTE — TELEPHONE ENCOUNTER
contacted by patient's guardian asking about when radiation would be starting. No plan/schedule in place at this time.  encouraged him to reach out to Radiation for further information.  able to assist with transportation if patient's current facility does not schedule rides.

## 2023-09-26 ENCOUNTER — TELEPHONE (OUTPATIENT)
Dept: ONCOLOGY | Age: 68
End: 2023-09-26

## 2023-09-26 PROCEDURE — 77336 RADIATION PHYSICS CONSULT: CPT | Performed by: RADIOLOGY

## 2023-09-26 NOTE — TELEPHONE ENCOUNTER
Called patient daughter Lo Elias to explain difficulty in getting oral medication (Abemaciclib) delivery set up at facility. She placed Nata Gallegos LPN with [facility] on phone who then took Aren Dust contact information to reach out for delivery set up. Further, she is aware to not have medication started until radiation is completed. Provided radiation oncology phone number as well should the facility need this; daughter states guardian Peng Contreras is to be setting up radiation.

## 2023-09-28 ENCOUNTER — TELEPHONE (OUTPATIENT)
Dept: ONCOLOGY | Age: 68
End: 2023-09-28

## 2023-09-28 NOTE — TELEPHONE ENCOUNTER
received updated schedule for radiation treatment.  called [facility] to discuss transportation scheduling and left a message.

## 2023-09-29 ENCOUNTER — TELEPHONE (OUTPATIENT)
Dept: ONCOLOGY | Age: 68
End: 2023-09-29

## 2023-09-29 NOTE — TELEPHONE ENCOUNTER
Radiation Nurse called stating she spoke with patient's guardian, Gio Carpenter, who is requesting radiation schedule be sent to him.  sent schedule to Gio Carpenter. Radiation Nurse and  have left multiple messages with patient's facility requesting call back on schedule/transportation. Gio Carpenter states he is reaching out to facility to have them call [address]

## 2023-10-02 ENCOUNTER — TELEPHONE (OUTPATIENT)
Dept: RADIATION ONCOLOGY | Age: 68
End: 2023-10-02

## 2023-10-02 ENCOUNTER — TELEPHONE (OUTPATIENT)
Dept: ONCOLOGY | Age: 68
End: 2023-10-02

## 2023-10-02 NOTE — TELEPHONE ENCOUNTER
Several messages left at [facility] with no return call. Called to update on pt's radiation treatment schedule. Patient's guardian, fara Orantes. Able to locate a fax number of [phone] and calendar faxed.

## 2023-10-02 NOTE — TELEPHONE ENCOUNTER
called patient's guardian, Payton Yañez, to see if he has been able to get in touch with patient's facility. Payton Yañez states he has not but will go over tomorrow.  states he will try to set up rides for patient since he has not received any communication back from facility.  called [facility]'s Medicare transportation line but was told patient does not have services through them.  called Romana Washington and again told she does not have a transportation account with them.  called [facility] [facility] who states patient is eligible for rides through [facility] but are unable to accommodate an out of town ride Wednesday. JFS recommended calling [facility].  called [facility] who verified patient's eligibility but state they do not have provider available for Wednesday appointment.

## 2023-10-03 ENCOUNTER — TELEPHONE (OUTPATIENT)
Dept: ONCOLOGY | Age: 68
End: 2023-10-03

## 2023-10-03 NOTE — TELEPHONE ENCOUNTER
called [facility] and left another message with  department.  received call from patient's guardian, but in error, and he reports no new information.

## 2023-10-04 ENCOUNTER — HOSPITAL ENCOUNTER (OUTPATIENT)
Dept: RADIATION ONCOLOGY | Age: 68
Discharge: HOME | End: 2023-10-04
Payer: MEDICARE

## 2023-10-04 ENCOUNTER — TELEPHONE (OUTPATIENT)
Dept: ONCOLOGY | Age: 68
End: 2023-10-04

## 2023-10-04 PROCEDURE — 77386 HC NTSTY MODUL RAD TX DLVR CPLX: CPT | Performed by: RADIOLOGY

## 2023-10-04 PROCEDURE — 77014 CHG CT GUIDANCE RADIATION THERAPY FLDS PLACEMENT: CPT | Performed by: RADIOLOGY

## 2023-10-04 NOTE — PROGRESS NOTES
Patient daughter, Montez Moore, brought pt to treatment today. She works at [employer] and will be trying to bring pt when she can to treatment. She is also pursing guardianship of patient. States ok to call Montez Moore if having trouble getting a return call from staff at [facility]. Updated  of this as well.

## 2023-10-04 NOTE — TELEPHONE ENCOUNTER
received call from Radiation Nurse stating patient made it to her appointment today. Patient's daughter, who works at facility where she is living, drove her. Daughter states she and patient's guardian will provide transportation when able.  called daughter and left message with NET information.

## 2023-10-05 ENCOUNTER — TELEPHONE (OUTPATIENT)
Dept: ONCOLOGY | Age: 68
End: 2023-10-05

## 2023-10-05 NOTE — TELEPHONE ENCOUNTER
Patient's daughter returned call.  reviewed [address] services and provided contact information for rides.  encouraged her to reach out if needed.

## 2023-10-06 ENCOUNTER — HOSPITAL ENCOUNTER (OUTPATIENT)
Dept: RADIATION ONCOLOGY | Age: 68
Discharge: HOME | End: 2023-10-06
Payer: MEDICARE

## 2023-10-06 PROCEDURE — 77386 HC NTSTY MODUL RAD TX DLVR CPLX: CPT | Performed by: RADIOLOGY

## 2023-10-09 ENCOUNTER — HOSPITAL ENCOUNTER (OUTPATIENT)
Dept: RADIATION ONCOLOGY | Age: 68
Discharge: HOME | End: 2023-10-09
Payer: MEDICARE

## 2023-10-09 PROCEDURE — 77386 HC NTSTY MODUL RAD TX DLVR CPLX: CPT | Performed by: RADIOLOGY

## 2023-10-11 ENCOUNTER — HOSPITAL ENCOUNTER (OUTPATIENT)
Dept: RADIATION ONCOLOGY | Age: 68
Discharge: HOME | End: 2023-10-11
Payer: MEDICARE

## 2023-10-11 VITALS
DIASTOLIC BLOOD PRESSURE: 80 MMHG | WEIGHT: 183.2 LBS | BODY MASS INDEX: 35.78 KG/M2 | TEMPERATURE: 98 F | HEART RATE: 73 BPM | RESPIRATION RATE: 16 BRPM | SYSTOLIC BLOOD PRESSURE: 142 MMHG | OXYGEN SATURATION: 98 %

## 2023-10-11 PROCEDURE — 77386 HC NTSTY MODUL RAD TX DLVR CPLX: CPT | Performed by: RADIOLOGY

## 2023-10-11 NOTE — PROGRESS NOTES
on an empty stomach at least 30 minutes before the first food, beverage, or medication. Stay upright (do not lie down) for at least 30 minutes. Do not crush or break., Disp: 4 tablet, Rfl: 3    acetaminophen (TYLENOL) 325 MG tablet, Take 2 tablets by mouth every 6 hours as needed for Pain, Disp: , Rfl:     nicotine (NICODERM CQ), Place 1 patch onto the skin every 24 hours, Disp: , Rfl:     LIDOCAINE, LIDODERM, 5% PATCH AND REMOVAL, Apply 1 patch topically daily, Disp: , Rfl:     Melatonin 3 MG CAPS, Take 10 mg by mouth nightly, Disp: , Rfl:     mirtazapine (REMERON) 15 MG tablet, Take 1 tablet by mouth nightly, Disp: , Rfl:     LORazepam (ATIVAN) 1 MG tablet, Take 1 tablet by mouth every 6 hours as needed for Anxiety. (Patient not taking: Reported on 9/18/2023), Disp: , Rfl:     haloperidol (HALDOL) 5 MG tablet, Take 1 tablet by mouth 4 times daily (Patient not taking: Reported on 9/18/2023), Disp: , Rfl:     HYDROcodone-acetaminophen (NORCO) 5-325 MG per tablet, Take 1 tablet by mouth every 6 hours as needed for Pain., Disp: , Rfl:     ibuprofen (ADVIL;MOTRIN) 400 MG tablet, Take 1 tablet by mouth every 6 hours as needed for Pain, Disp: , Rfl:     polyethylene glycol (GLYCOLAX) 17 g packet, Take 1 packet by mouth daily as needed for Constipation, Disp: , Rfl:       ASSESSMENT PLAN:   Treatment setup and plan reviewed. Port images/CBCT images reviewed. Appropriate laboratory work was reviewed. Treatment side effects and toxicities reviewed with the patient, and appropriate management was advised. Will continue radiation treatment as planned, and recommend patient contact us if they have any questions or concerns. She will complete her spine RT this week and then start her breast RT.     Electronically signed by Luna Giles MD on 10/11/2023 at 12:07 PM      Drugs Prescribed:  New Prescriptions    No medications on file       Other Orders Placed:  No orders of the defined types were placed in this

## 2023-10-13 ENCOUNTER — HOSPITAL ENCOUNTER (OUTPATIENT)
Dept: RADIATION ONCOLOGY | Age: 68
Discharge: HOME | End: 2023-10-13
Payer: MEDICARE

## 2023-10-13 PROCEDURE — 77386 HC NTSTY MODUL RAD TX DLVR CPLX: CPT | Performed by: RADIOLOGY

## 2023-10-16 ENCOUNTER — APPOINTMENT (OUTPATIENT)
Dept: RADIATION ONCOLOGY | Age: 68
End: 2023-10-16
Payer: MEDICARE

## 2023-10-16 PROCEDURE — 77336 RADIATION PHYSICS CONSULT: CPT | Performed by: RADIOLOGY

## 2023-10-17 ENCOUNTER — HOSPITAL ENCOUNTER (OUTPATIENT)
Dept: RADIATION ONCOLOGY | Age: 68
Discharge: HOME | End: 2023-10-17
Payer: MEDICARE

## 2023-10-17 PROCEDURE — 77412 RADIATION TX DELIVERY LVL 3: CPT | Performed by: RADIOLOGY

## 2023-10-17 PROCEDURE — 77280 THER RAD SIMULAJ FIELD SMPL: CPT | Performed by: RADIOLOGY

## 2023-10-18 ENCOUNTER — HOSPITAL ENCOUNTER (OUTPATIENT)
Dept: RADIATION ONCOLOGY | Age: 68
Discharge: HOME | End: 2023-10-18
Payer: MEDICARE

## 2023-10-18 VITALS
DIASTOLIC BLOOD PRESSURE: 85 MMHG | HEART RATE: 72 BPM | TEMPERATURE: 97.4 F | SYSTOLIC BLOOD PRESSURE: 130 MMHG | BODY MASS INDEX: 35.66 KG/M2 | RESPIRATION RATE: 16 BRPM | WEIGHT: 182.6 LBS

## 2023-10-18 PROCEDURE — 77336 RADIATION PHYSICS CONSULT: CPT | Performed by: RADIOLOGY

## 2023-10-18 PROCEDURE — 77387 GUIDANCE FOR RADJ TX DLVR: CPT | Performed by: RADIOLOGY

## 2023-10-18 PROCEDURE — 77412 RADIATION TX DELIVERY LVL 3: CPT | Performed by: RADIOLOGY

## 2023-10-18 ASSESSMENT — PAIN SCALES - GENERAL: PAINLEVEL_OUTOF10: 0

## 2023-10-19 ENCOUNTER — OFFICE VISIT (OUTPATIENT)
Dept: ONCOLOGY | Age: 68
End: 2023-10-19
Payer: MEDICARE

## 2023-10-19 ENCOUNTER — HOSPITAL ENCOUNTER (OUTPATIENT)
Dept: RADIATION ONCOLOGY | Age: 68
Discharge: HOME | End: 2023-10-19
Payer: MEDICARE

## 2023-10-19 VITALS — BODY MASS INDEX: 35.35 KG/M2 | TEMPERATURE: 97 F | RESPIRATION RATE: 18 BRPM | WEIGHT: 181 LBS

## 2023-10-19 DIAGNOSIS — M85.89 OSTEOPENIA OF MULTIPLE SITES: ICD-10-CM

## 2023-10-19 DIAGNOSIS — Z17.0 MALIGNANT NEOPLASM OF CENTRAL PORTION OF RIGHT BREAST IN FEMALE, ESTROGEN RECEPTOR POSITIVE (HCC): Primary | ICD-10-CM

## 2023-10-19 DIAGNOSIS — C50.111 MALIGNANT NEOPLASM OF CENTRAL PORTION OF RIGHT BREAST IN FEMALE, ESTROGEN RECEPTOR POSITIVE (HCC): Primary | ICD-10-CM

## 2023-10-19 PROCEDURE — 77412 RADIATION TX DELIVERY LVL 3: CPT | Performed by: RADIOLOGY

## 2023-10-19 PROCEDURE — 4004F PT TOBACCO SCREEN RCVD TLK: CPT | Performed by: INTERNAL MEDICINE

## 2023-10-19 PROCEDURE — G9899 SCRN MAM PERF RSLTS DOC: HCPCS | Performed by: INTERNAL MEDICINE

## 2023-10-19 PROCEDURE — 99214 OFFICE O/P EST MOD 30 MIN: CPT | Performed by: INTERNAL MEDICINE

## 2023-10-19 PROCEDURE — G8428 CUR MEDS NOT DOCUMENT: HCPCS | Performed by: INTERNAL MEDICINE

## 2023-10-19 PROCEDURE — G8400 PT W/DXA NO RESULTS DOC: HCPCS | Performed by: INTERNAL MEDICINE

## 2023-10-19 PROCEDURE — G8484 FLU IMMUNIZE NO ADMIN: HCPCS | Performed by: INTERNAL MEDICINE

## 2023-10-19 PROCEDURE — 3017F COLORECTAL CA SCREEN DOC REV: CPT | Performed by: INTERNAL MEDICINE

## 2023-10-19 PROCEDURE — 77387 GUIDANCE FOR RADJ TX DLVR: CPT | Performed by: RADIOLOGY

## 2023-10-19 PROCEDURE — G8417 CALC BMI ABV UP PARAM F/U: HCPCS | Performed by: INTERNAL MEDICINE

## 2023-10-19 PROCEDURE — 1123F ACP DISCUSS/DSCN MKR DOCD: CPT | Performed by: INTERNAL MEDICINE

## 2023-10-19 PROCEDURE — 1090F PRES/ABSN URINE INCON ASSESS: CPT | Performed by: INTERNAL MEDICINE

## 2023-10-19 NOTE — PROGRESS NOTES
Patient ID: Darien Zhao, 1955, [id number], 76 y.o. Referred by :  No ref. provider found   Reason for consultation: Metastatic breast cancer            DIAGNOSIS:     Lytic lesion in the L1 vertebra proven to be metastatic  Cachexia and weight loss  Biopsy of L1 proven to be metastatic mammary cancer, ER positive>T1c N0 M1  Significant psychiatric issues leading to hospitalization  Right breast cancer      CURRENT THERAPY:    Right lumpectomy with sentinel lymph node biopsy    Radiation to the life and based on my exam as below  Treatment Site: R Breast axilla  Actual Dose: 534cGy  Total Planned Dose: 4256cGy  Treatment Technique: 3D-CRT  Fraction Technique: Daily    Femara+ Verzenio          BRIEF CASE HISTORY:  Yonathan Aguilar is a very pleasant 79 y.o. female who is referred to us for progressive back pain. CT scan of the lumbar spine suggested lytic lesion at L1. The patient when she presented initially she was very weak, she has been falling at home. She is losing weight. She is cachectic. She is a chronic smoker and has chronic cough but no hemoptysis  Performance status is ECOG 2  Breast exam did show the right lesion and right breast ultrasound did show solid mass at 12:00 6 cm from the nipple 1.5 x 1.19 cm biopsy: From strongly ER positive KS 5% HER2/sue negative invasive ductal carcinoma grade 1  Biopsy of the area showed metastatic mammary cancer ER positive. CT scan did not show widespread metastatic disease.     on aromatase inhibitors and subsequently had a right lumpectomy with sentinel lymph node biopsy 1 out of 3 lymph nodes positive for malignancy  PET scan was done and she had vertebroplasty L1 no activity however in any place      INTERIM HISTORY  Patient did not show up since summer  She did have a recent scan of chest abdomen pelvis with there is no metastatic disease but at L1 previously biopsied status post kyphoplasty  Status post right lumpectomy with sentinel lymph node

## 2023-10-20 ENCOUNTER — HOSPITAL ENCOUNTER (OUTPATIENT)
Dept: RADIATION ONCOLOGY | Age: 68
Discharge: HOME | End: 2023-10-20
Payer: MEDICARE

## 2023-10-20 PROCEDURE — 77387 GUIDANCE FOR RADJ TX DLVR: CPT | Performed by: RADIOLOGY

## 2023-10-20 PROCEDURE — 77412 RADIATION TX DELIVERY LVL 3: CPT | Performed by: RADIOLOGY

## 2023-10-23 ENCOUNTER — HOSPITAL ENCOUNTER (OUTPATIENT)
Dept: RADIATION ONCOLOGY | Age: 68
Discharge: HOME | End: 2023-10-23
Payer: MEDICARE

## 2023-10-23 PROCEDURE — 77387 GUIDANCE FOR RADJ TX DLVR: CPT | Performed by: RADIOLOGY

## 2023-10-23 PROCEDURE — 77412 RADIATION TX DELIVERY LVL 3: CPT | Performed by: RADIOLOGY

## 2023-10-24 ENCOUNTER — HOSPITAL ENCOUNTER (OUTPATIENT)
Dept: RADIATION ONCOLOGY | Age: 68
Discharge: HOME | End: 2023-10-24
Payer: MEDICARE

## 2023-10-24 PROCEDURE — 77412 RADIATION TX DELIVERY LVL 3: CPT | Performed by: RADIOLOGY

## 2023-10-24 PROCEDURE — 77417 THER RADIOLOGY PORT IMAGE(S): CPT | Performed by: RADIOLOGY

## 2023-10-25 ENCOUNTER — HOSPITAL ENCOUNTER (OUTPATIENT)
Dept: RADIATION ONCOLOGY | Age: 68
Discharge: HOME | End: 2023-10-25
Payer: MEDICARE

## 2023-10-25 VITALS
SYSTOLIC BLOOD PRESSURE: 126 MMHG | RESPIRATION RATE: 16 BRPM | TEMPERATURE: 97.7 F | DIASTOLIC BLOOD PRESSURE: 80 MMHG | BODY MASS INDEX: 36.13 KG/M2 | WEIGHT: 185 LBS | HEART RATE: 70 BPM

## 2023-10-25 PROCEDURE — 77387 GUIDANCE FOR RADJ TX DLVR: CPT | Performed by: RADIOLOGY

## 2023-10-25 PROCEDURE — 77336 RADIATION PHYSICS CONSULT: CPT | Performed by: RADIOLOGY

## 2023-10-25 PROCEDURE — 77412 RADIATION TX DELIVERY LVL 3: CPT | Performed by: RADIOLOGY

## 2023-10-25 ASSESSMENT — PAIN SCALES - GENERAL: PAINLEVEL_OUTOF10: 0

## 2023-10-26 ENCOUNTER — HOSPITAL ENCOUNTER (OUTPATIENT)
Dept: RADIATION ONCOLOGY | Age: 68
Discharge: HOME | End: 2023-10-26
Payer: MEDICARE

## 2023-10-26 PROCEDURE — 77412 RADIATION TX DELIVERY LVL 3: CPT | Performed by: RADIOLOGY

## 2023-10-26 PROCEDURE — 77387 GUIDANCE FOR RADJ TX DLVR: CPT | Performed by: RADIOLOGY

## 2023-10-27 ENCOUNTER — HOSPITAL ENCOUNTER (OUTPATIENT)
Dept: RADIATION ONCOLOGY | Age: 68
Discharge: HOME | End: 2023-10-27
Payer: MEDICARE

## 2023-10-27 PROCEDURE — 77387 GUIDANCE FOR RADJ TX DLVR: CPT | Performed by: RADIOLOGY

## 2023-10-27 PROCEDURE — 77412 RADIATION TX DELIVERY LVL 3: CPT | Performed by: RADIOLOGY

## 2023-10-30 ENCOUNTER — HOSPITAL ENCOUNTER (OUTPATIENT)
Dept: RADIATION ONCOLOGY | Age: 68
Discharge: HOME | End: 2023-10-30
Payer: MEDICARE

## 2023-10-30 PROCEDURE — 77412 RADIATION TX DELIVERY LVL 3: CPT | Performed by: RADIOLOGY

## 2023-10-30 PROCEDURE — 77387 GUIDANCE FOR RADJ TX DLVR: CPT | Performed by: RADIOLOGY

## 2023-10-31 ENCOUNTER — HOSPITAL ENCOUNTER (OUTPATIENT)
Dept: RADIATION ONCOLOGY | Age: 68
Discharge: HOME | End: 2023-10-31
Payer: MEDICARE

## 2023-10-31 PROCEDURE — 77412 RADIATION TX DELIVERY LVL 3: CPT | Performed by: RADIOLOGY

## 2023-10-31 PROCEDURE — 77387 GUIDANCE FOR RADJ TX DLVR: CPT | Performed by: RADIOLOGY

## 2023-11-01 ENCOUNTER — HOSPITAL ENCOUNTER (OUTPATIENT)
Dept: RADIATION ONCOLOGY | Age: 68
Discharge: HOME | End: 2023-11-01
Payer: MEDICARE

## 2023-11-01 VITALS
OXYGEN SATURATION: 98 % | DIASTOLIC BLOOD PRESSURE: 88 MMHG | HEART RATE: 79 BPM | RESPIRATION RATE: 16 BRPM | BODY MASS INDEX: 35.08 KG/M2 | WEIGHT: 179.6 LBS | SYSTOLIC BLOOD PRESSURE: 123 MMHG | TEMPERATURE: 98 F

## 2023-11-01 PROCEDURE — 77412 RADIATION TX DELIVERY LVL 3: CPT | Performed by: RADIOLOGY

## 2023-11-01 PROCEDURE — 77387 GUIDANCE FOR RADJ TX DLVR: CPT | Performed by: RADIOLOGY

## 2023-11-02 ENCOUNTER — HOSPITAL ENCOUNTER (OUTPATIENT)
Dept: RADIATION ONCOLOGY | Age: 68
Discharge: HOME | End: 2023-11-02
Payer: MEDICARE

## 2023-11-02 PROCEDURE — 77412 RADIATION TX DELIVERY LVL 3: CPT | Performed by: RADIOLOGY

## 2023-11-02 PROCEDURE — 77387 GUIDANCE FOR RADJ TX DLVR: CPT | Performed by: RADIOLOGY

## 2023-11-03 ENCOUNTER — HOSPITAL ENCOUNTER (OUTPATIENT)
Dept: RADIATION ONCOLOGY | Age: 68
Discharge: HOME | End: 2023-11-03
Payer: MEDICARE

## 2023-11-03 ENCOUNTER — HOSPITAL ENCOUNTER (OUTPATIENT)
Dept: OCCUPATIONAL THERAPY | Age: 68
Setting detail: THERAPIES SERIES
Discharge: HOME | End: 2023-11-03
Payer: MEDICARE

## 2023-11-03 PROCEDURE — 77412 RADIATION TX DELIVERY LVL 3: CPT | Performed by: RADIOLOGY

## 2023-11-03 PROCEDURE — 97165 OT EVAL LOW COMPLEX 30 MIN: CPT

## 2023-11-03 PROCEDURE — 77387 GUIDANCE FOR RADJ TX DLVR: CPT | Performed by: RADIOLOGY

## 2023-11-03 NOTE — CONSULTS
info above should symptoms occur. Sleeve Do's and Don'ts    Don't:   Do NOT wear to bed. Do not apply lotion right before putting on your sleeve. It could break down the material.    Do wash your sleeve regularly. If you are a long term daily sleeve wearer, it is best to have at least 2 sleeves for wash and wear. Response to treatment: Pt verbalizes and is agreeable to the instructions/ POC established at today's evaluation. PLAN FOR NEXT VISIT: sleeve fit and train, f/u PRN if symptoms occur      Lymphedema Stage per ISL Guidelines  ***  [] 0: Subclinical with no evidence on physical exam, pt may be feeling subjective symptoms  [] 1:  Early onset, swelling/heaviness, pitting edema, subsides with limb elevation  [] 2:  More advanced, fibrosis resulting in non-pitting edema, does not respond to elevation, thickening of the tissues  [] 3: Elephantiasis, pitting absent, huge limbs with dry/scaly, papillomatosis, hyperkeratosis, fluid may be leaking with recurrent cellulitis. Acanthosis (deep body folds). Elevation &   diuretics ineffective. Short Term Goals to be met within 2 visits  Pt will demonstrate compliance of maintaining lymphedema precautions to reduce the risks of progression of lymphedema beyond stage 0/pre-symptomatic stage. .progressing 11/3/2023    Pt will be able to verbalize early signs/symptoms of lymphedema in order to effectively prevent onset of symptomatic lymphedema to ***UALBA Hendrickson progressing 11/3/2023    Pt will demo understanding of preventative compression sleeve wearing schedule along with independent donning/doffing to prevent onset of symptomatic lymphedema.  .progressing 11/3/2023        Long Term Goals to be met within 3 visits    Pt will be monitored for progression into lymphedema stages 1 and beyond as evidenced by measurable change in swelling, observable skin changes, and/or subjective pt reports at which time goals will be updated accordingly to reflect need for

## 2023-11-06 ENCOUNTER — HOSPITAL ENCOUNTER (OUTPATIENT)
Dept: RADIATION ONCOLOGY | Age: 68
Discharge: HOME | End: 2023-11-06
Payer: MEDICARE

## 2023-11-06 PROCEDURE — 77412 RADIATION TX DELIVERY LVL 3: CPT | Performed by: RADIOLOGY

## 2023-11-06 PROCEDURE — 77387 GUIDANCE FOR RADJ TX DLVR: CPT | Performed by: RADIOLOGY

## 2023-12-01 ENCOUNTER — HOSPITAL ENCOUNTER (OUTPATIENT)
Dept: OCCUPATIONAL THERAPY | Age: 68
Setting detail: THERAPIES SERIES
End: 2023-12-01
Payer: MEDICARE

## 2023-12-04 ENCOUNTER — HOSPITAL ENCOUNTER (OUTPATIENT)
Dept: RADIATION ONCOLOGY | Age: 68
Discharge: HOME | End: 2023-12-04
Payer: MEDICARE

## 2023-12-04 VITALS
RESPIRATION RATE: 16 BRPM | DIASTOLIC BLOOD PRESSURE: 79 MMHG | TEMPERATURE: 97.4 F | HEART RATE: 72 BPM | SYSTOLIC BLOOD PRESSURE: 121 MMHG

## 2023-12-04 DIAGNOSIS — C79.51 SECONDARY MALIGNANT NEOPLASM OF BONE (HCC): ICD-10-CM

## 2023-12-04 DIAGNOSIS — C50.111 MALIGNANT NEOPLASM OF CENTRAL PORTION OF RIGHT BREAST IN FEMALE, ESTROGEN RECEPTOR POSITIVE (HCC): Primary | ICD-10-CM

## 2023-12-04 DIAGNOSIS — Z17.0 MALIGNANT NEOPLASM OF CENTRAL PORTION OF RIGHT BREAST IN FEMALE, ESTROGEN RECEPTOR POSITIVE (HCC): Primary | ICD-10-CM

## 2023-12-04 PROCEDURE — 99212 OFFICE O/P EST SF 10 MIN: CPT | Performed by: RADIOLOGY

## 2023-12-04 ASSESSMENT — PAIN SCALES - GENERAL: PAINLEVEL_OUTOF10: 0

## 2023-12-08 ENCOUNTER — HOSPITAL ENCOUNTER (OUTPATIENT)
Dept: OCCUPATIONAL THERAPY | Age: 68
Setting detail: THERAPIES SERIES
Discharge: HOME | End: 2023-12-08
Payer: MEDICARE

## 2023-12-08 PROCEDURE — 97535 SELF CARE MNGMENT TRAINING: CPT

## 2023-12-08 NOTE — FLOWSHEET NOTE
TREATMENT LOCATION:   [] [facility]  Outpatient Rehabilitation &  Therapy  [address], [address]  P: [phone]  F: [phone] [x] [facility]   Outpatient Rehabilitation &  Therapy  [address]  P: [phone]  F: [phone] [] [place].   P: [phone]  F: [phone]        Lymphedema Services - Treatment Note  Upper Extremity/Breast - Lymphedema R BRCA       Visit# / total visits: 2/2 scheduled; Progress note due at visit 10 per POC.  (Certification Dates: 11/3/2023 - 2/3/2024)    Date:  2023  Patient: Jorge Kapadai  : 1955             MRN: [id number]  Referring Provider: Brenda Estrada MD       Phone: [phone]  Fax: [phone]  Insurance: [id number] [facility] (CU:[id number]) - [OT BOMN]  Secondary: [place]    [place] SECOND[place]ARE [place] (N)     Medical Diagnosis: Malignant neoplasm of upper-outer quadrant of right brest in female ER +, C50.411, Z17.0  Rehab Codes: I89.0, M25.61 - joint stiffness at shoulder       Onset Date: 2023       Info for garment VOB:   [address] [address]  [phone] [facility]  2023 Benefit check [employer] no coverage   2023 Benefit check [employer] ? Waiting for a facility agreement to move forward - patient is in a nursing home. Overview of Evaluation dated: 11/3/2023 Patient is a 76year old female referred to the Lymphedema Clinic with a diagnosis of right breast CA. Pt with active symptoms of R UE lymphedema. She is completing RT after lumpectomy and 1/3+ SLN. Pt would benefit from education regarding lymphedema, active treatment,  and use of a compression sleeve. She presents with her daughter Roge Loya who is the pt's legal guardian.      Malignant neoplasm of central portion of right breast

## 2023-12-27 DIAGNOSIS — C50.911 MALIGNANT NEOPLASM OF RIGHT BREAST IN FEMALE, ESTROGEN RECEPTOR POSITIVE, UNSPECIFIED SITE OF BREAST (HCC): ICD-10-CM

## 2023-12-27 DIAGNOSIS — C50.111 MALIGNANT NEOPLASM OF CENTRAL PORTION OF RIGHT BREAST IN FEMALE, ESTROGEN RECEPTOR POSITIVE (HCC): ICD-10-CM

## 2023-12-27 DIAGNOSIS — Z17.0 MALIGNANT NEOPLASM OF RIGHT BREAST IN FEMALE, ESTROGEN RECEPTOR POSITIVE, UNSPECIFIED SITE OF BREAST (HCC): ICD-10-CM

## 2023-12-27 DIAGNOSIS — C79.51 SECONDARY MALIGNANT NEOPLASM OF BONE (HCC): ICD-10-CM

## 2023-12-27 DIAGNOSIS — Z17.0 MALIGNANT NEOPLASM OF CENTRAL PORTION OF RIGHT BREAST IN FEMALE, ESTROGEN RECEPTOR POSITIVE (HCC): ICD-10-CM

## 2023-12-27 NOTE — TELEPHONE ENCOUNTER
Patient's representative calls to request refill of Verzenio. Patient currently has Covid and cannot come to office appointment tomorrow.

## 2024-01-10 DIAGNOSIS — C50.111 MALIGNANT NEOPLASM OF CENTRAL PORTION OF RIGHT BREAST IN FEMALE, ESTROGEN RECEPTOR POSITIVE (HCC): ICD-10-CM

## 2024-01-10 DIAGNOSIS — Z17.0 MALIGNANT NEOPLASM OF CENTRAL PORTION OF RIGHT BREAST IN FEMALE, ESTROGEN RECEPTOR POSITIVE (HCC): ICD-10-CM

## 2024-01-10 DIAGNOSIS — C79.51 SECONDARY MALIGNANT NEOPLASM OF BONE (HCC): ICD-10-CM

## 2024-01-15 ENCOUNTER — TELEPHONE (OUTPATIENT)
Dept: ONCOLOGY | Age: 69
End: 2024-01-15

## 2024-01-15 NOTE — TELEPHONE ENCOUNTER
Name: Albertina Manning  : 1955  MRN: [id number]    Oncology Navigation Follow-Up Note    Contact Type:  Chart review    Notes: Chart reviewed.  Pt had follow up with Dr. Patterson on 24. Pt to follow up on 24 with Dr. Mayorga.  Pt's last appointment was canceled do to covid but was rescheduled.  Recommended pt have Mammogram in February.  Not currently scheduled.       Electronically signed by Whit Pozo RN on 1/15/2024 at 3:51 PM

## 2024-01-16 DIAGNOSIS — Z17.0 MALIGNANT NEOPLASM OF CENTRAL PORTION OF RIGHT BREAST IN FEMALE, ESTROGEN RECEPTOR POSITIVE (HCC): ICD-10-CM

## 2024-01-16 DIAGNOSIS — C50.111 MALIGNANT NEOPLASM OF CENTRAL PORTION OF RIGHT BREAST IN FEMALE, ESTROGEN RECEPTOR POSITIVE (HCC): ICD-10-CM

## 2024-01-29 ENCOUNTER — OFFICE VISIT (OUTPATIENT)
Dept: ONCOLOGY | Age: 69
End: 2024-01-29
Payer: MEDICARE

## 2024-01-29 VITALS
SYSTOLIC BLOOD PRESSURE: 104 MMHG | WEIGHT: 153 LBS | DIASTOLIC BLOOD PRESSURE: 62 MMHG | TEMPERATURE: 97.9 F | HEART RATE: 76 BPM | RESPIRATION RATE: 18 BRPM | BODY MASS INDEX: 29.88 KG/M2

## 2024-01-29 DIAGNOSIS — F17.210 TOBACCO DEPENDENCE DUE TO CIGARETTES: ICD-10-CM

## 2024-01-29 DIAGNOSIS — C50.111 MALIGNANT NEOPLASM OF CENTRAL PORTION OF RIGHT BREAST IN FEMALE, ESTROGEN RECEPTOR POSITIVE (HCC): Primary | ICD-10-CM

## 2024-01-29 DIAGNOSIS — Z17.0 MALIGNANT NEOPLASM OF CENTRAL PORTION OF RIGHT BREAST IN FEMALE, ESTROGEN RECEPTOR POSITIVE (HCC): Primary | ICD-10-CM

## 2024-01-29 DIAGNOSIS — C79.51 SECONDARY MALIGNANT NEOPLASM OF BONE (HCC): ICD-10-CM

## 2024-01-29 PROCEDURE — 4004F PT TOBACCO SCREEN RCVD TLK: CPT | Performed by: INTERNAL MEDICINE

## 2024-01-29 PROCEDURE — G8484 FLU IMMUNIZE NO ADMIN: HCPCS | Performed by: INTERNAL MEDICINE

## 2024-01-29 PROCEDURE — G8400 PT W/DXA NO RESULTS DOC: HCPCS | Performed by: INTERNAL MEDICINE

## 2024-01-29 PROCEDURE — G8427 DOCREV CUR MEDS BY ELIG CLIN: HCPCS | Performed by: INTERNAL MEDICINE

## 2024-01-29 PROCEDURE — 1123F ACP DISCUSS/DSCN MKR DOCD: CPT | Performed by: INTERNAL MEDICINE

## 2024-01-29 PROCEDURE — G9899 SCRN MAM PERF RSLTS DOC: HCPCS | Performed by: INTERNAL MEDICINE

## 2024-01-29 PROCEDURE — 99214 OFFICE O/P EST MOD 30 MIN: CPT | Performed by: INTERNAL MEDICINE

## 2024-01-29 PROCEDURE — 3017F COLORECTAL CA SCREEN DOC REV: CPT | Performed by: INTERNAL MEDICINE

## 2024-01-29 PROCEDURE — G8417 CALC BMI ABV UP PARAM F/U: HCPCS | Performed by: INTERNAL MEDICINE

## 2024-01-29 PROCEDURE — 1090F PRES/ABSN URINE INCON ASSESS: CPT | Performed by: INTERNAL MEDICINE

## 2024-01-29 NOTE — PROGRESS NOTES
resected  tissue.  Findings communicated with Dr. Oden over the phone while still   in  the OR.  The radiographed specimen submitted to pathology.       CT abdomen November 14, 2022    IMPRESSION:       Known pathologic compression fracture of the L1 vertebral body has undergone kyphoplasty since the prior study.  There is very mild bony retropulsion with effacement of the anterior thecal sac at this level.     Benign hepatic cyst left lobe, unchanged.2 there are mild sigmoid colonic diverticulosis without CT evidence for diverticulitis.     No additional abnormalities identified.       All CT scans at this facility use dose modulation, iterative reconstruction, and/or weight based dosing when appropriate to reduce radiation dose to as low as reasonably achievable.      CT chest November 14, 2022    IMPRESSION:       The known L1 vertebral body pathologic compression fracture has undergone kyphoplasty since the prior surgery.  The vertebral body height has stabilized.     No acute bony abnormalities, or osseous lytic or blastic lesions are identified.     No acute cardiopulmonary pathology.     Previously described soft tissue density in the right breast are less prominent on today's study suggesting response to treatment.     Stable low-density liver lesion compatible with a cyst.     Large hiatal hernia.       All CT scans at this facility use dose modulation, iterative reconstruction, and/or weight based dosing when appropriate to reduce radiation dose to as low as reasonably achievable.    ASSESSMENT:       Diagnosis Orders   1. Malignant neoplasm of central portion of right breast in female, estrogen receptor positive (HCC)  CBC with Auto Differential    Comprehensive Metabolic Panel      2. Secondary malignant neoplasm of bone (HCC)        3. Tobacco dependence due to cigarettes                     PLAN:     I reviewed labs, imaging studies, related electronic medical records including outside records and

## 2024-02-05 DIAGNOSIS — Z17.0 MALIGNANT NEOPLASM OF CENTRAL PORTION OF RIGHT BREAST IN FEMALE, ESTROGEN RECEPTOR POSITIVE (HCC): ICD-10-CM

## 2024-02-05 DIAGNOSIS — Z17.0 MALIGNANT NEOPLASM OF RIGHT BREAST IN FEMALE, ESTROGEN RECEPTOR POSITIVE, UNSPECIFIED SITE OF BREAST (HCC): ICD-10-CM

## 2024-02-05 DIAGNOSIS — C50.111 MALIGNANT NEOPLASM OF CENTRAL PORTION OF RIGHT BREAST IN FEMALE, ESTROGEN RECEPTOR POSITIVE (HCC): ICD-10-CM

## 2024-02-05 DIAGNOSIS — C50.911 MALIGNANT NEOPLASM OF RIGHT BREAST IN FEMALE, ESTROGEN RECEPTOR POSITIVE, UNSPECIFIED SITE OF BREAST (HCC): ICD-10-CM

## 2024-02-05 DIAGNOSIS — C79.51 SECONDARY MALIGNANT NEOPLASM OF BONE (HCC): ICD-10-CM

## 2024-02-05 NOTE — TELEPHONE ENCOUNTER
Patient legal guardian called in and stated that Albertina needs a refill on her abemaciclib. Sent over to [facility].  
Paroxysmal atrial fibrillation

## 2024-02-26 ENCOUNTER — APPOINTMENT (OUTPATIENT)
Dept: RADIATION ONCOLOGY | Age: 69
End: 2024-02-26
Payer: MEDICARE

## 2024-02-26 ENCOUNTER — TELEPHONE (OUTPATIENT)
Dept: RADIATION ONCOLOGY | Age: 69
End: 2024-02-26

## 2024-02-26 NOTE — TELEPHONE ENCOUNTER
Pts guardian, Anjelica, called to cancel pts rad/onc follow up today, no reason given.  Appt rescheduled for 3/7/24.

## 2024-03-07 ENCOUNTER — HOSPITAL ENCOUNTER (OUTPATIENT)
Age: 69
Discharge: HOME | End: 2024-03-07
Payer: MEDICARE

## 2024-03-07 ENCOUNTER — HOSPITAL ENCOUNTER (OUTPATIENT)
Dept: RADIATION ONCOLOGY | Age: 69
Discharge: HOME | End: 2024-03-07
Payer: MEDICARE

## 2024-03-07 VITALS
BODY MASS INDEX: 28.94 KG/M2 | WEIGHT: 148.2 LBS | SYSTOLIC BLOOD PRESSURE: 110 MMHG | RESPIRATION RATE: 18 BRPM | HEART RATE: 72 BPM | OXYGEN SATURATION: 96 % | DIASTOLIC BLOOD PRESSURE: 66 MMHG | TEMPERATURE: 97.6 F

## 2024-03-07 DIAGNOSIS — Z17.0 MALIGNANT NEOPLASM OF CENTRAL PORTION OF RIGHT BREAST IN FEMALE, ESTROGEN RECEPTOR POSITIVE (HCC): ICD-10-CM

## 2024-03-07 DIAGNOSIS — Z17.0 MALIGNANT NEOPLASM OF CENTRAL PORTION OF RIGHT BREAST IN FEMALE, ESTROGEN RECEPTOR POSITIVE (HCC): Primary | ICD-10-CM

## 2024-03-07 DIAGNOSIS — C50.111 MALIGNANT NEOPLASM OF CENTRAL PORTION OF RIGHT BREAST IN FEMALE, ESTROGEN RECEPTOR POSITIVE (HCC): Primary | ICD-10-CM

## 2024-03-07 DIAGNOSIS — C50.111 MALIGNANT NEOPLASM OF CENTRAL PORTION OF RIGHT BREAST IN FEMALE, ESTROGEN RECEPTOR POSITIVE (HCC): ICD-10-CM

## 2024-03-07 LAB
ALBUMIN SERPL-MCNC: 3.8 G/DL (ref 3.5–5.2)
ALBUMIN/GLOB SERPL: 1.3 {RATIO} (ref 1–2.5)
ALP SERPL-CCNC: 97 U/L (ref 35–104)
ALT SERPL-CCNC: 5 U/L (ref 5–33)
ANION GAP SERPL CALCULATED.3IONS-SCNC: 9 MMOL/L (ref 9–17)
AST SERPL-CCNC: 16 U/L
BASOPHILS # BLD: 0.1 K/UL (ref 0–0.2)
BASOPHILS NFR BLD: 2 % (ref 0–2)
BILIRUB SERPL-MCNC: 0.2 MG/DL (ref 0.3–1.2)
BUN SERPL-MCNC: 13 MG/DL (ref 8–23)
CALCIUM SERPL-MCNC: 9.3 MG/DL (ref 8.6–10.4)
CHLORIDE SERPL-SCNC: 110 MMOL/L (ref 98–107)
CO2 SERPL-SCNC: 24 MMOL/L (ref 20–31)
CREAT SERPL-MCNC: 1.1 MG/DL (ref 0.5–0.9)
EOSINOPHIL # BLD: 0.2 K/UL (ref 0–0.4)
EOSINOPHILS RELATIVE PERCENT: 5 % (ref 1–4)
ERYTHROCYTE [DISTWIDTH] IN BLOOD BY AUTOMATED COUNT: 17.5 % (ref 12.5–15.4)
GFR SERPL CREATININE-BSD FRML MDRD: 55 ML/MIN/1.73M2
GLUCOSE SERPL-MCNC: 106 MG/DL (ref 70–99)
HCT VFR BLD AUTO: 33.1 % (ref 36–46)
HGB BLD-MCNC: 11.5 G/DL (ref 12–16)
LYMPHOCYTES NFR BLD: 1.2 K/UL (ref 1–4.8)
LYMPHOCYTES RELATIVE PERCENT: 24 % (ref 24–44)
MCH RBC QN AUTO: 37.8 PG (ref 26–34)
MCHC RBC AUTO-ENTMCNC: 34.8 G/DL (ref 31–37)
MCV RBC AUTO: 108.5 FL (ref 80–100)
MONOCYTES NFR BLD: 0.3 K/UL (ref 0.1–1.2)
MONOCYTES NFR BLD: 7 % (ref 2–11)
NEUTROPHILS NFR BLD: 62 % (ref 36–66)
NEUTS SEG NFR BLD: 3.3 K/UL (ref 1.8–7.7)
PLATELET # BLD AUTO: 331 K/UL (ref 140–450)
PMV BLD AUTO: 7.6 FL (ref 6–12)
POTASSIUM SERPL-SCNC: 4.2 MMOL/L (ref 3.7–5.3)
PROT SERPL-MCNC: 6.7 G/DL (ref 6.4–8.3)
RBC # BLD AUTO: 3.05 M/UL (ref 4–5.2)
SODIUM SERPL-SCNC: 143 MMOL/L (ref 135–144)
WBC OTHER # BLD: 5.2 K/UL (ref 3.5–11)

## 2024-03-07 PROCEDURE — 36415 COLL VENOUS BLD VENIPUNCTURE: CPT

## 2024-03-07 PROCEDURE — 99212 OFFICE O/P EST SF 10 MIN: CPT | Performed by: RADIOLOGY

## 2024-03-07 PROCEDURE — 85025 COMPLETE CBC W/AUTO DIFF WBC: CPT

## 2024-03-07 PROCEDURE — 80053 COMPREHEN METABOLIC PANEL: CPT

## 2024-03-07 RX ORDER — FENTANYL 25 UG/1
1 PATCH TRANSDERMAL
COMMUNITY

## 2024-03-07 RX ORDER — DAPAGLIFLOZIN 10 MG/1
10 TABLET, FILM COATED ORAL EVERY MORNING
COMMUNITY

## 2024-03-07 ASSESSMENT — PAIN SCALES - GENERAL: PAINLEVEL_OUTOF10: 0

## 2024-03-07 NOTE — PROGRESS NOTES
indicated for ambulation activities  2.  Reorient confused/cognitively impaired patient  3.  Chair/bed in low position, stretcher/bed with siderails up except when performing patient care activities  4.  Educate patient/family/caregiver on falls prevention     7 or   Higher High Risk 1.  Place patient in easily observable treatment room  2.  Patient attended at all times by family member or staff  3.  Provide assistance as indicated for ambulation activities  4.  Reorient confused/cognitively impaired patient  5.  Chair/bed in low position, stretcher/bed with siderails up except when performing patient care activities  6.  Educate patient/family/caregiver on falls prevention         PLAN: Patient is seen today in follow up.  Overall she denies bothersome symptoms or concerns.  She does have about a 30 lb weight loss.  She states somewhat poor appetite recently and does not voice concern or other reasons for this weight loss.  She continues on Verzenio and Femara.  Patient to return in 1 year for follow up with a mammogram prior.          Genet Carter RN

## 2024-03-11 ENCOUNTER — TELEPHONE (OUTPATIENT)
Dept: ONCOLOGY | Age: 69
End: 2024-03-11

## 2024-03-11 ENCOUNTER — OFFICE VISIT (OUTPATIENT)
Dept: ONCOLOGY | Age: 69
End: 2024-03-11
Payer: MEDICARE

## 2024-03-11 VITALS
DIASTOLIC BLOOD PRESSURE: 72 MMHG | RESPIRATION RATE: 18 BRPM | WEIGHT: 145 LBS | SYSTOLIC BLOOD PRESSURE: 123 MMHG | TEMPERATURE: 97.2 F | HEIGHT: 60 IN | HEART RATE: 70 BPM | BODY MASS INDEX: 28.47 KG/M2

## 2024-03-11 DIAGNOSIS — C79.51 METASTASIS TO BONE (HCC): ICD-10-CM

## 2024-03-11 DIAGNOSIS — C50.111 MALIGNANT NEOPLASM OF CENTRAL PORTION OF RIGHT BREAST IN FEMALE, ESTROGEN RECEPTOR POSITIVE (HCC): Primary | ICD-10-CM

## 2024-03-11 DIAGNOSIS — M85.89 OSTEOPENIA OF MULTIPLE SITES: ICD-10-CM

## 2024-03-11 DIAGNOSIS — Z17.0 MALIGNANT NEOPLASM OF CENTRAL PORTION OF RIGHT BREAST IN FEMALE, ESTROGEN RECEPTOR POSITIVE (HCC): Primary | ICD-10-CM

## 2024-03-11 PROCEDURE — 4004F PT TOBACCO SCREEN RCVD TLK: CPT | Performed by: INTERNAL MEDICINE

## 2024-03-11 PROCEDURE — 99214 OFFICE O/P EST MOD 30 MIN: CPT | Performed by: INTERNAL MEDICINE

## 2024-03-11 PROCEDURE — 1123F ACP DISCUSS/DSCN MKR DOCD: CPT | Performed by: INTERNAL MEDICINE

## 2024-03-11 PROCEDURE — G8427 DOCREV CUR MEDS BY ELIG CLIN: HCPCS | Performed by: INTERNAL MEDICINE

## 2024-03-11 PROCEDURE — 3017F COLORECTAL CA SCREEN DOC REV: CPT | Performed by: INTERNAL MEDICINE

## 2024-03-11 PROCEDURE — G9899 SCRN MAM PERF RSLTS DOC: HCPCS | Performed by: INTERNAL MEDICINE

## 2024-03-11 PROCEDURE — G8417 CALC BMI ABV UP PARAM F/U: HCPCS | Performed by: INTERNAL MEDICINE

## 2024-03-11 PROCEDURE — G8484 FLU IMMUNIZE NO ADMIN: HCPCS | Performed by: INTERNAL MEDICINE

## 2024-03-11 PROCEDURE — 1090F PRES/ABSN URINE INCON ASSESS: CPT | Performed by: INTERNAL MEDICINE

## 2024-03-11 PROCEDURE — G8400 PT W/DXA NO RESULTS DOC: HCPCS | Performed by: INTERNAL MEDICINE

## 2024-03-11 RX ORDER — POLYVINYL ALCOHOL 14 MG/ML
1 SOLUTION/ DROPS OPHTHALMIC PRN
COMMUNITY

## 2024-03-11 NOTE — PROGRESS NOTES
Patient ID: Albertina Manning, 1955, [id number], 68 y.o.  Referred by :  No ref. provider found   Reason for consultation: Metastatic breast cancer            DIAGNOSIS:     Lytic lesion in the L1 vertebra proven to be metastatic  Biopsy of L1 proven to be metastatic mammary cancer, ER positive>T1c N0 M1  Significant psychiatric issues leading to hospitalization  Right breast cancer      CURRENT THERAPY/treatment    Right lumpectomy with sentinel lymph node biopsy    Radiation to the life and based on my exam as below  Treatment Site: R Breast axilla  Actual Dose: 534cGy  Total Planned Dose: 4256cGy  Treatment Technique: 3D-CRT  Fraction Technique: Daily  scan of chest abdomen pelvis with there is no metastatic disease but at L1 previously biopsied status post kyphoplasty    Femara September 20 23  Verzenio started on November 20 23          BRIEF CASE HISTORY:  Albertina Manning is a very pleasant 67 y.o. female who is referred to us for progressive back pain.  CT scan of the lumbar spine suggested lytic lesion at L1.  The patient when she presented initially she was very weak, she has been falling at home.  She is losing weight.  She is cachectic. She is a chronic smoker and has chronic cough but no hemoptysis  Performance status is ECOG 1  Breast exam did show the right lesion and right breast ultrasound did show solid mass at 12:00 6 cm from the nipple 1.5 x 1.19 cm biopsy: From strongly ER positive [place] 5% HER2/sue negative invasive ductal carcinoma grade 1  Biopsy of the area showed metastatic mammary cancer ER positive.  CT scan did not show widespread metastatic disease.    on aromatase inhibitors and subsequently had a right lumpectomy with sentinel lymph node biopsy 1 out of 3 lymph nodes positive for malignancy  PET scan was done and she had vertebroplasty L1 no activity however in any place          INTERIM HISTORY  Patient did not show up since summer  No back pain  Tolerated Verzenio and letrozole  MRI

## 2024-03-11 NOTE — TELEPHONE ENCOUNTER
Call to nurse at [facility] per Dr. Mayorga request to determine why patient not taking Fosamax.  Spoke with nurse, Eilana DONNELLY who states will add back on to her medication list and be sure that prescription is administered weekly.

## 2024-03-11 NOTE — TELEPHONE ENCOUNTER
Name: Albertina Manning  : 1955  MRN: [id number]    Oncology Navigation Follow-Up Note    Contact Type:  Medical Oncology    Notes: Writer met with patient in clinic.  Pt was by self not accompanied by anyone from [facility]. Call made to [facility] at patient's request to notify  pt is ready for .    F/u 24      Electronically signed by Whit Pozo RN on 3/11/2024 at 2:47 PM

## 2024-04-08 ENCOUNTER — CLINICAL DOCUMENTATION (OUTPATIENT)
Dept: OCCUPATIONAL THERAPY | Age: 69
End: 2024-04-08

## 2024-04-08 NOTE — DISCHARGE SUMMARY
TREATMENT LOCATION:   [][facility]  Outpatient Rehabilitation &  Therapy  [address], [address]  P: [phone]  F: [phone] [x][facility]  Outpatient Rehabilitation &  Therapy  [address]  P: [phone]  F: [phone] [][facility]  Outpatient Rehabilitation &  Therapy  [address]  P: [phone]  F: [phone]     Lymphedema Therapy Discharge Note    Date: 2024      Patient: Albertina Manning  : 1955  MRN: [id number]    Referring Provider: Bill Patterson MD       Phone: [phone]                     Fax: [phone]  Insurance: [facility] MEDICARE [facility] MEDICARE COMPLETE (ID:[id number]) - [OT BOMN]  [place]: [place]    [place]OND[place] H[place] (ID:[id number])     Medical Diagnosis: Malignant neoplasm of upper-outer quadrant of right brest in female ER +, C50.411, Z17.0  Rehab Codes: I89.0, M25.61 - joint stiffness at shoulder       Onset Date: 2023        Info for linden LADD:   [address] [address]  [phone] Anjelica  2023 Benefit check [employer] no coverage   2023 Benefit check [employer] ?Waiting for a facility agreement to move forward - patient is in a nursing home.         Overview of Evaluation dated: 11/3/2023 Patient is a 68 year old female referred to the Lymphedema Clinic with a diagnosis of right breast CA. Pt with active symptoms of R UE lymphedema.  She is completing RT after lumpectomy and 1/3+ SLN. Pt would benefit from education regarding lymphedema, active treatment,  and use of a compression sleeve. She presents with her daughter Anjelica who is the pt's legal guardian.     Malignant neoplasm of central portion of right breast in female, estrogen receptor positive (HCC)  Staging form: Breast, AJCC 8th Edition  - Pathologic stage from 2023: Stage IV (pT2, pN1a, pM1, G1, ER+, OH+, HER2-) -

## 2024-04-18 ENCOUNTER — TELEPHONE (OUTPATIENT)
Dept: ONCOLOGY | Age: 69
End: 2024-04-18

## 2024-04-18 NOTE — TELEPHONE ENCOUNTER
Name: Albertina Manning  : 1955  MRN: [id number]    Oncology Navigation Follow-Up Note    Contact Type:  Chart review    Notes: Chart reviewed.  Pt was admitted to rehab facility and discharged to [facility] on 4/15/24. F/u with Dr. Mayorga 24.      Electronically signed by Whit Pozo RN on 2024 at 12:27 PM

## 2024-04-22 ENCOUNTER — OFFICE VISIT (OUTPATIENT)
Dept: ONCOLOGY | Age: 69
End: 2024-04-22
Payer: MEDICARE

## 2024-04-22 ENCOUNTER — HOSPITAL ENCOUNTER (OUTPATIENT)
Age: 69
Discharge: HOME | End: 2024-04-22
Payer: MEDICARE

## 2024-04-22 VITALS
WEIGHT: 143 LBS | DIASTOLIC BLOOD PRESSURE: 70 MMHG | HEART RATE: 74 BPM | SYSTOLIC BLOOD PRESSURE: 119 MMHG | HEIGHT: 60 IN | TEMPERATURE: 96.9 F | RESPIRATION RATE: 18 BRPM | BODY MASS INDEX: 28.07 KG/M2

## 2024-04-22 DIAGNOSIS — T45.1X5A ADVERSE EFFECT OF CHEMOTHERAPY, INITIAL ENCOUNTER: ICD-10-CM

## 2024-04-22 DIAGNOSIS — N17.9 AKI (ACUTE KIDNEY INJURY) (HCC): Primary | ICD-10-CM

## 2024-04-22 DIAGNOSIS — Z17.0 MALIGNANT NEOPLASM OF RIGHT BREAST IN FEMALE, ESTROGEN RECEPTOR POSITIVE, UNSPECIFIED SITE OF BREAST (HCC): ICD-10-CM

## 2024-04-22 DIAGNOSIS — Z17.0 MALIGNANT NEOPLASM OF CENTRAL PORTION OF RIGHT BREAST IN FEMALE, ESTROGEN RECEPTOR POSITIVE (HCC): ICD-10-CM

## 2024-04-22 DIAGNOSIS — C79.51 SECONDARY MALIGNANT NEOPLASM OF BONE (HCC): ICD-10-CM

## 2024-04-22 DIAGNOSIS — C50.911 MALIGNANT NEOPLASM OF RIGHT BREAST IN FEMALE, ESTROGEN RECEPTOR POSITIVE, UNSPECIFIED SITE OF BREAST (HCC): ICD-10-CM

## 2024-04-22 DIAGNOSIS — C50.111 MALIGNANT NEOPLASM OF CENTRAL PORTION OF RIGHT BREAST IN FEMALE, ESTROGEN RECEPTOR POSITIVE (HCC): ICD-10-CM

## 2024-04-22 LAB
ALBUMIN SERPL-MCNC: 3.8 G/DL (ref 3.5–5.2)
ALBUMIN/GLOB SERPL: 1.2 {RATIO} (ref 1–2.5)
ALP SERPL-CCNC: 104 U/L (ref 35–104)
ALT SERPL-CCNC: 8 U/L (ref 5–33)
ANION GAP SERPL CALCULATED.3IONS-SCNC: 12 MMOL/L (ref 9–17)
AST SERPL-CCNC: 18 U/L
BASOPHILS # BLD: 0.19 K/UL (ref 0–0.2)
BASOPHILS NFR BLD: 3 % (ref 0–2)
BILIRUB SERPL-MCNC: 0.2 MG/DL (ref 0.3–1.2)
BUN SERPL-MCNC: 11 MG/DL (ref 8–23)
BUN/CREAT SERPL: 9 (ref 9–20)
CALCIUM SERPL-MCNC: 8.2 MG/DL (ref 8.6–10.4)
CHLORIDE SERPL-SCNC: 109 MMOL/L (ref 98–107)
CO2 SERPL-SCNC: 22 MMOL/L (ref 20–31)
CREAT SERPL-MCNC: 1.2 MG/DL (ref 0.5–0.9)
EOSINOPHIL # BLD: 0.19 K/UL (ref 0–0.44)
EOSINOPHILS RELATIVE PERCENT: 3 % (ref 1–4)
ERYTHROCYTE [DISTWIDTH] IN BLOOD BY AUTOMATED COUNT: 14.4 % (ref 11.8–14.4)
GFR SERPL CREATININE-BSD FRML MDRD: 49 ML/MIN/1.73M2
GLUCOSE SERPL-MCNC: 98 MG/DL (ref 70–99)
HCT VFR BLD AUTO: 34.6 % (ref 36.3–47.1)
HGB BLD-MCNC: 12.1 G/DL (ref 11.9–15.1)
IMM GRANULOCYTES # BLD AUTO: 0.06 K/UL (ref 0–0.3)
IMM GRANULOCYTES NFR BLD: 1 %
LYMPHOCYTES NFR BLD: 1.66 K/UL (ref 1.1–3.7)
LYMPHOCYTES RELATIVE PERCENT: 26 % (ref 24–43)
MCH RBC QN AUTO: 38.9 PG (ref 25.2–33.5)
MCHC RBC AUTO-ENTMCNC: 35 G/DL (ref 28.4–34.8)
MCV RBC AUTO: 111.3 FL (ref 82.6–102.9)
MONOCYTES NFR BLD: 0.45 K/UL (ref 0.1–1.2)
MONOCYTES NFR BLD: 7 % (ref 3–12)
MORPHOLOGY: ABNORMAL
NEUTROPHILS NFR BLD: 60 % (ref 36–65)
NEUTS SEG NFR BLD: 3.85 K/UL (ref 1.5–8.1)
NRBC BLD-RTO: 0 PER 100 WBC
PLATELET # BLD AUTO: 341 K/UL (ref 138–453)
PMV BLD AUTO: 9.5 FL (ref 8.1–13.5)
POTASSIUM SERPL-SCNC: 3.9 MMOL/L (ref 3.7–5.3)
PROT SERPL-MCNC: 6.9 G/DL (ref 6.4–8.3)
RBC # BLD AUTO: 3.11 M/UL (ref 3.95–5.11)
SODIUM SERPL-SCNC: 143 MMOL/L (ref 135–144)
WBC OTHER # BLD: 6.4 K/UL (ref 3.5–11.3)

## 2024-04-22 PROCEDURE — G9899 SCRN MAM PERF RSLTS DOC: HCPCS | Performed by: INTERNAL MEDICINE

## 2024-04-22 PROCEDURE — 1123F ACP DISCUSS/DSCN MKR DOCD: CPT | Performed by: INTERNAL MEDICINE

## 2024-04-22 PROCEDURE — 36415 COLL VENOUS BLD VENIPUNCTURE: CPT

## 2024-04-22 PROCEDURE — 4004F PT TOBACCO SCREEN RCVD TLK: CPT | Performed by: INTERNAL MEDICINE

## 2024-04-22 PROCEDURE — 85025 COMPLETE CBC W/AUTO DIFF WBC: CPT

## 2024-04-22 PROCEDURE — G8417 CALC BMI ABV UP PARAM F/U: HCPCS | Performed by: INTERNAL MEDICINE

## 2024-04-22 PROCEDURE — G8400 PT W/DXA NO RESULTS DOC: HCPCS | Performed by: INTERNAL MEDICINE

## 2024-04-22 PROCEDURE — 80053 COMPREHEN METABOLIC PANEL: CPT

## 2024-04-22 PROCEDURE — G8427 DOCREV CUR MEDS BY ELIG CLIN: HCPCS | Performed by: INTERNAL MEDICINE

## 2024-04-22 PROCEDURE — 99214 OFFICE O/P EST MOD 30 MIN: CPT | Performed by: INTERNAL MEDICINE

## 2024-04-22 PROCEDURE — 1090F PRES/ABSN URINE INCON ASSESS: CPT | Performed by: INTERNAL MEDICINE

## 2024-04-22 PROCEDURE — 3017F COLORECTAL CA SCREEN DOC REV: CPT | Performed by: INTERNAL MEDICINE

## 2024-04-22 NOTE — PROGRESS NOTES
compatible with a cyst.     Large hiatal hernia.       All CT scans at this facility use dose modulation, iterative reconstruction, and/or weight based dosing when appropriate to reduce radiation dose to as low as reasonably achievable.    ASSESSMENT:       Diagnosis Orders   1. Malignant neoplasm of central portion of right breast in female, estrogen receptor positive (HCC)  Abemaciclib 150 MG TABS      2. Secondary malignant neoplasm of bone (HCC)  Abemaciclib 150 MG TABS      3. Malignant neoplasm of right breast in female, estrogen receptor positive, unspecified site of breast (HCC)  Abemaciclib 150 MG TABS                   PLAN:     I reviewed labs, imaging studies, related electronic medical records including outside records and discussed the diagnosis, prognosis and treatment recommendations   I reviewed the surgical pathology results, discuss goals of care and NCCN guidelines with patient and family    Patient had T1c N1 L7ctygn breast cancer with oligometastasis to the bone and proven by biopsy from L1 to be metastatic likely breast origin (confirmed by [facility]) status post kyphoplasty and status post biopsy of the right breast done on February 27, 2020 2020 >ER OR+ HER2/sue negative invasive ductal carcinoma on letrozole,PET scan was done while there was no activity> status post lumpectomy with sentinel lymph node biopsy  Discussed with the patient's about the surgical pathology, we discussed that even she had a stage IV breast cancer/oligometastasis and status post curative intent with radiation and currently on hormonal treatment and Verzenio  Seen by radiation oncology for breast radiation and to L1> status post kyphoplasty  Continue aromatase inhibitor Femara and Verzenio for 2 year(December 25)  Calcium vitamin D and Fosamax need to monitor bone density scan every 2 years> will have a baseline at the start of aromatase inhibitors(Femara)> bone density scan prior to next visit and check with the

## 2024-05-21 ENCOUNTER — HOSPITAL ENCOUNTER (OUTPATIENT)
Dept: WOMENS IMAGING | Age: 69
Discharge: HOME | End: 2024-05-23
Payer: MEDICARE

## 2024-05-21 DIAGNOSIS — M85.89 OSTEOPENIA OF MULTIPLE SITES: ICD-10-CM

## 2024-05-21 PROCEDURE — 77080 DXA BONE DENSITY AXIAL: CPT

## 2024-05-22 ENCOUNTER — TELEPHONE (OUTPATIENT)
Dept: ONCOLOGY | Age: 69
End: 2024-05-22

## 2024-05-22 NOTE — TELEPHONE ENCOUNTER
Name: Albertina Manning  : 1955  MRN: [id number]    Oncology Navigation Follow-Up Note    Contact Type:  Telephone    Notes: Call made to patient for ONN follow up. Message received that number is unavailable.  Unable to leave message. Folllow up 24 with Dr. Mayorga.      Electronically signed by Whit Pozo RN on 2024 at 4:13 PM

## 2024-05-30 ENCOUNTER — TELEPHONE (OUTPATIENT)
Dept: ONCOLOGY | Age: 69
End: 2024-05-30

## 2024-05-30 NOTE — TELEPHONE ENCOUNTER
Patient daughter called into the office and stated that Albertina is not feeling well this morning and she needed to cancel her apt. For 5/30. Returned the call to reschedule waiting on call back.

## 2024-06-25 ENCOUNTER — TELEPHONE (OUTPATIENT)
Dept: ONCOLOGY | Age: 69
End: 2024-06-25

## 2024-06-25 NOTE — TELEPHONE ENCOUNTER
Have received numerous fax requests for refill of Verzenio.  Writer confirmed with Dr. Mayorga; will not be refilled at this time.  Patient cancel appointment on 5/30/24 and was a no show on 6/13/24.  This medication will not be prescribed until patient meets with physician for follow up appointment.    Note to  to again attempt to schedule patient.

## 2024-07-01 ENCOUNTER — TELEPHONE (OUTPATIENT)
Dept: ONCOLOGY | Age: 69
End: 2024-07-01

## 2024-07-01 ENCOUNTER — TELEMEDICINE (OUTPATIENT)
Dept: ONCOLOGY | Age: 69
End: 2024-07-01
Payer: MEDICARE

## 2024-07-01 DIAGNOSIS — F17.210 TOBACCO DEPENDENCE DUE TO CIGARETTES: ICD-10-CM

## 2024-07-01 DIAGNOSIS — C50.111 MALIGNANT NEOPLASM OF CENTRAL PORTION OF RIGHT BREAST IN FEMALE, ESTROGEN RECEPTOR POSITIVE (HCC): Primary | ICD-10-CM

## 2024-07-01 DIAGNOSIS — Z17.0 MALIGNANT NEOPLASM OF CENTRAL PORTION OF RIGHT BREAST IN FEMALE, ESTROGEN RECEPTOR POSITIVE (HCC): Primary | ICD-10-CM

## 2024-07-01 DIAGNOSIS — T45.1X5A ADVERSE EFFECT OF CHEMOTHERAPY, INITIAL ENCOUNTER: ICD-10-CM

## 2024-07-01 PROCEDURE — 99214 OFFICE O/P EST MOD 30 MIN: CPT | Performed by: INTERNAL MEDICINE

## 2024-07-01 PROCEDURE — 1123F ACP DISCUSS/DSCN MKR DOCD: CPT | Performed by: INTERNAL MEDICINE

## 2024-07-01 PROCEDURE — 3017F COLORECTAL CA SCREEN DOC REV: CPT | Performed by: INTERNAL MEDICINE

## 2024-07-01 PROCEDURE — G8399 PT W/DXA RESULTS DOCUMENT: HCPCS | Performed by: INTERNAL MEDICINE

## 2024-07-01 PROCEDURE — 99211 OFF/OP EST MAY X REQ PHY/QHP: CPT | Performed by: INTERNAL MEDICINE

## 2024-07-01 PROCEDURE — G8428 CUR MEDS NOT DOCUMENT: HCPCS | Performed by: INTERNAL MEDICINE

## 2024-07-01 PROCEDURE — G9899 SCRN MAM PERF RSLTS DOC: HCPCS | Performed by: INTERNAL MEDICINE

## 2024-07-01 PROCEDURE — 1090F PRES/ABSN URINE INCON ASSESS: CPT | Performed by: INTERNAL MEDICINE

## 2024-07-01 NOTE — PATIENT INSTRUCTIONS
CBC CMP as soon as possible and monthly> please call me with creatinine  Hold Verzenio till seen by me in 4 weeks

## 2024-07-01 NOTE — PROGRESS NOTES
lesions, bruises.   Hematologic/Lymphatic: negative for easy bruising, bleeding, lymphadenopathy, petechiae and swelling/edema   Endocrine: negative for heat or cold intolerance, tremor, weight changes, change in bowel habits and hair loss   Musculoskeletal: negative for myalgias, arthralgias, pain, joint swelling,and bone pain   Neurological: negative for headaches, dizziness, seizures, weakness, numbness       OBJECTIVE:         There were no vitals filed for this visit.          PHYSICAL EXAMINATION:  [ INSTRUCTIONS:  \"[x]\" Indicates a positive item  \"[]\" Indicates a negative item  -- DELETE ALL ITEMS NOT EXAMINED]  Vital Signs: (As obtained by patient/caregiver or practitioner observation)    Blood pressure-  Heart rate-    Respiratory rate-    Temperature-  Pulse oximetry-     Constitutional: [x] Appears well-developed and well-nourished [] No apparent distress      [] Abnormal-   Mental status  [x] Alert and awake  [x] Oriented to person/place/time [x]Able to follow commands      Eyes:  EOM    [x]  Normal  [] Abnormal-  Sclera  []  Normal  [] Abnormal -         Discharge []  None visible  [] Abnormal -    HENT:   [x] Normocephalic, atraumatic.  [] Abnormal   [] Mouth/Throat: Mucous membranes are moist.     External Ears [] Normal  [] Abnormal-     Neck: [x] No visualized mass     Pulmonary/Chest: [x] Respiratory effort normal.  [x] No visualized signs of difficulty breathing or respiratory distress        [] Abnormal-      Musculoskeletal:   [x] Normal gait with no signs of ataxia         [x] Normal range of motion of neck        [] Abnormal-       Neurological:        [x] No Facial Asymmetry (Cranial nerve 7 motor function) (limited exam to video visit)          [x] No gaze palsy        [] Abnormal-         Skin:        [x] No significant exanthematous lesions or discoloration noted on facial skin         [] Abnormal-            Psychiatric:       [] Normal Affect [] No Hallucinations        [] Abnormal-

## 2024-07-15 ENCOUNTER — TELEPHONE (OUTPATIENT)
Dept: ONCOLOGY | Age: 69
End: 2024-07-15

## 2024-07-15 NOTE — TELEPHONE ENCOUNTER
Name: Albertina Manning  : 1955  MRN: [id number]    Oncology Navigation Follow-Up Note    Contact Type:  Telephone    Notes: Call made to Anjelica regarding lab draw.  No answer.  Requested call back.  Left writers contact information as well as office number.     Albertina had VV on 24 and was to have CBC and CMP as soon as possible and monthly.  Dr. Mayorga requested to be called with creatinine.  Call made to [facility] in [place].  Spoke to Kurt, last labs completed were 24.   Cristina RODRIGUEZ RN updated. Albertina scheduled for follow up on 24.  Verzenio is currently on hold.     Electronically signed by Whit Pozo RN on 7/15/2024 at 8:57 AM

## 2024-07-15 NOTE — TELEPHONE ENCOUNTER
Name: Albertina Manning  : 1955  MRN: [id number]    Oncology Navigation Follow-Up Note    Contact Type:  Telephone    Notes: Voicemail received from Anjelica stating she has taken Albertina to [facility] in [place] a couple times and were told that they did not received the orders.  Writer confirmed with chart nurse that orders were faxed and fax receipt was received.  Anjelica states that Albertina was to start getting last at care facility this week and she was going to let them draw labs.      Cristina: Anjelica asked for a call she has a questions re labs when you have time.    Electronically signed by Whit Pozo RN on 7/15/2024 at 3:15 PM

## 2024-07-17 NOTE — TELEPHONE ENCOUNTER
Call made to Anjelica to follow up about questions regarding labs.  Message left on  requesting call back.    Whit Pozo RN

## 2024-07-19 ENCOUNTER — TELEPHONE (OUTPATIENT)
Dept: ONCOLOGY | Age: 69
End: 2024-07-19

## 2024-07-19 NOTE — TELEPHONE ENCOUNTER
Name: Albertina Manning  : 1955  MRN: [id number]    Oncology Navigation Follow-Up Note    Contact Type:  Telephone    Notes: Call received from Anjelica stating that Lisa AVILA resent orders to [facility] and she will take Albertina to lab on Monday to have labs drawn.  Writer informed Anjelica that once labs are received they will be reviewed with Dr. Mayorga and alexsandra will be able to be renewed if labs are okay. Anjelica is agreeable to plan.     Electronically signed by Whit Pozo RN on 2024 at 2:08 PM

## 2024-08-05 ENCOUNTER — TELEPHONE (OUTPATIENT)
Dept: ONCOLOGY | Age: 69
End: 2024-08-05

## 2024-08-05 ENCOUNTER — TELEMEDICINE (OUTPATIENT)
Dept: ONCOLOGY | Age: 69
End: 2024-08-05
Payer: MEDICARE

## 2024-08-05 DIAGNOSIS — Z17.0 MALIGNANT NEOPLASM OF CENTRAL PORTION OF RIGHT BREAST IN FEMALE, ESTROGEN RECEPTOR POSITIVE (HCC): ICD-10-CM

## 2024-08-05 DIAGNOSIS — F25.9 SCHIZOAFFECTIVE DISORDER, CHRONIC CONDITION (HCC): ICD-10-CM

## 2024-08-05 DIAGNOSIS — C79.51 SECONDARY MALIGNANT NEOPLASM OF BONE (HCC): Primary | ICD-10-CM

## 2024-08-05 DIAGNOSIS — Z17.0 MALIGNANT NEOPLASM OF RIGHT BREAST IN FEMALE, ESTROGEN RECEPTOR POSITIVE, UNSPECIFIED SITE OF BREAST (HCC): ICD-10-CM

## 2024-08-05 DIAGNOSIS — C50.111 MALIGNANT NEOPLASM OF CENTRAL PORTION OF RIGHT BREAST IN FEMALE, ESTROGEN RECEPTOR POSITIVE (HCC): ICD-10-CM

## 2024-08-05 DIAGNOSIS — T45.1X5D ADVERSE EFFECT OF CHEMOTHERAPY, SUBSEQUENT ENCOUNTER: ICD-10-CM

## 2024-08-05 DIAGNOSIS — C79.51 METASTASIS TO BONE (HCC): ICD-10-CM

## 2024-08-05 DIAGNOSIS — C79.51 SECONDARY MALIGNANT NEOPLASM OF BONE (HCC): ICD-10-CM

## 2024-08-05 DIAGNOSIS — C50.911 MALIGNANT NEOPLASM OF RIGHT BREAST IN FEMALE, ESTROGEN RECEPTOR POSITIVE, UNSPECIFIED SITE OF BREAST (HCC): ICD-10-CM

## 2024-08-05 PROCEDURE — 1123F ACP DISCUSS/DSCN MKR DOCD: CPT | Performed by: INTERNAL MEDICINE

## 2024-08-05 PROCEDURE — G9899 SCRN MAM PERF RSLTS DOC: HCPCS | Performed by: INTERNAL MEDICINE

## 2024-08-05 PROCEDURE — 3017F COLORECTAL CA SCREEN DOC REV: CPT | Performed by: INTERNAL MEDICINE

## 2024-08-05 PROCEDURE — 4004F PT TOBACCO SCREEN RCVD TLK: CPT | Performed by: INTERNAL MEDICINE

## 2024-08-05 PROCEDURE — G8417 CALC BMI ABV UP PARAM F/U: HCPCS | Performed by: INTERNAL MEDICINE

## 2024-08-05 PROCEDURE — 99214 OFFICE O/P EST MOD 30 MIN: CPT | Performed by: INTERNAL MEDICINE

## 2024-08-05 PROCEDURE — 1090F PRES/ABSN URINE INCON ASSESS: CPT | Performed by: INTERNAL MEDICINE

## 2024-08-05 PROCEDURE — G8427 DOCREV CUR MEDS BY ELIG CLIN: HCPCS | Performed by: INTERNAL MEDICINE

## 2024-08-05 PROCEDURE — G8399 PT W/DXA RESULTS DOCUMENT: HCPCS | Performed by: INTERNAL MEDICINE

## 2024-08-05 NOTE — TELEPHONE ENCOUNTER
Name: Albertina Manning  : 1955  MRN: [id number]    Oncology Navigation Follow-Up Note    Contact Type:  Telephone    Notes: Call made to pt's daughter Anjelica.  She would like labs faxed to [facility].  Anjelica informed to have labs drawn prior to office visit in 6 weeks on 24.  Labs faxed as requested.     Electronically signed by Whit oPzo RN on 2024 at 4:59 PM

## 2024-08-05 NOTE — PROGRESS NOTES
30 tablet 2     No current facility-administered medications for this visit.       Social History     Socioeconomic History    Marital status: Single     Spouse name: Not on file    Number of children: Not on file    Years of education: Not on file    Highest education level: Not on file   Occupational History    Not on file   Tobacco Use    Smoking status: Every Day     Current packs/day: 0.50     Types: Cigarettes    Smokeless tobacco: Never   Vaping Use    Vaping Use: Never used   Substance and Sexual Activity    Alcohol use: Not Currently     Comment: 2 beers daily    Drug use: Never    Sexual activity: Not on file   Other Topics Concern    Not on file   Social History Narrative    ** Merged History Encounter **          Social Determinants of Health     Financial Resource Strain: Not on file   Food Insecurity: Not on file   Transportation Needs: Not on file   Physical Activity: Not on file   Stress: Not on file   Social Connections: Not on file   Intimate Partner Violence: Not on file   Housing Stability: Not on file       No family history on file.     REVIEW OF SYSTEM:     Constitutional: No fever or chills. No night sweats, no weight loss   Eyes: No eye discharge, double vision, or eye pain   HEENT: negative for sore mouth, sore throat, hoarseness and voice change   Respiratory: negative for cough , sputum, dyspnea, wheezing, hemoptysis, chest pain   Cardiovascular: negative for chest pain, dyspnea, palpitations, orthopnea, PND   Gastrointestinal: negative for nausea, vomiting, diarrhea, constipation, abdominal pain, Dysphagia, hematemesis and hematochezia   Genitourinary: negative for frequency, dysuria, nocturia, urinary incontinence, and hematuria   Integument: negative for rash, skin lesions, bruises.   Hematologic/Lymphatic: negative for easy bruising, bleeding, lymphadenopathy, petechiae and swelling/edema   Endocrine: negative for heat or cold intolerance, tremor, weight changes, change in bowel

## 2024-09-09 DIAGNOSIS — Z17.0 MALIGNANT NEOPLASM OF RIGHT BREAST IN FEMALE, ESTROGEN RECEPTOR POSITIVE, UNSPECIFIED SITE OF BREAST (HCC): ICD-10-CM

## 2024-09-09 DIAGNOSIS — Z17.0 MALIGNANT NEOPLASM OF CENTRAL PORTION OF RIGHT BREAST IN FEMALE, ESTROGEN RECEPTOR POSITIVE (HCC): ICD-10-CM

## 2024-09-09 DIAGNOSIS — C50.911 MALIGNANT NEOPLASM OF RIGHT BREAST IN FEMALE, ESTROGEN RECEPTOR POSITIVE, UNSPECIFIED SITE OF BREAST (HCC): ICD-10-CM

## 2024-09-09 DIAGNOSIS — C50.111 MALIGNANT NEOPLASM OF CENTRAL PORTION OF RIGHT BREAST IN FEMALE, ESTROGEN RECEPTOR POSITIVE (HCC): ICD-10-CM

## 2024-09-09 DIAGNOSIS — C79.51 SECONDARY MALIGNANT NEOPLASM OF BONE (HCC): ICD-10-CM

## 2024-09-16 ENCOUNTER — TELEMEDICINE (OUTPATIENT)
Dept: ONCOLOGY | Age: 69
End: 2024-09-16
Payer: MEDICARE

## 2024-09-16 DIAGNOSIS — D53.9 MACROCYTIC ANEMIA: ICD-10-CM

## 2024-09-16 DIAGNOSIS — T45.1X5D ADVERSE EFFECT OF CHEMOTHERAPY, SUBSEQUENT ENCOUNTER: ICD-10-CM

## 2024-09-16 DIAGNOSIS — C50.111 MALIGNANT NEOPLASM OF CENTRAL PORTION OF RIGHT BREAST IN FEMALE, ESTROGEN RECEPTOR POSITIVE (HCC): Primary | ICD-10-CM

## 2024-09-16 DIAGNOSIS — Z17.0 MALIGNANT NEOPLASM OF CENTRAL PORTION OF RIGHT BREAST IN FEMALE, ESTROGEN RECEPTOR POSITIVE (HCC): Primary | ICD-10-CM

## 2024-09-16 PROCEDURE — 99211 OFF/OP EST MAY X REQ PHY/QHP: CPT | Performed by: INTERNAL MEDICINE

## 2024-09-16 NOTE — PROGRESS NOTES
anemia with macrocytosis related to Verzenio> monitor CBC  RTC in 6 weeks with labs                                             Evelia Mayorga MD                          [facility]                            This note is created with the assistance of a speech recognition program.  While intending to generate a document that actually reflects the content of the visit, the document can still have some errors including those of syntax and sound a like substitutions which may escape proof reading.  It such instances, actual meaning can be extrapolated by contextual diversion.

## 2024-11-26 DIAGNOSIS — C50.911 MALIGNANT NEOPLASM OF RIGHT BREAST IN FEMALE, ESTROGEN RECEPTOR POSITIVE, UNSPECIFIED SITE OF BREAST (HCC): ICD-10-CM

## 2024-11-26 DIAGNOSIS — C50.111 MALIGNANT NEOPLASM OF CENTRAL PORTION OF RIGHT BREAST IN FEMALE, ESTROGEN RECEPTOR POSITIVE (HCC): ICD-10-CM

## 2024-11-26 DIAGNOSIS — C79.51 SECONDARY MALIGNANT NEOPLASM OF BONE (HCC): ICD-10-CM

## 2024-11-26 DIAGNOSIS — Z17.0 MALIGNANT NEOPLASM OF CENTRAL PORTION OF RIGHT BREAST IN FEMALE, ESTROGEN RECEPTOR POSITIVE (HCC): ICD-10-CM

## 2024-11-26 DIAGNOSIS — Z17.0 MALIGNANT NEOPLASM OF RIGHT BREAST IN FEMALE, ESTROGEN RECEPTOR POSITIVE, UNSPECIFIED SITE OF BREAST (HCC): ICD-10-CM

## 2024-12-02 DIAGNOSIS — C79.51 SECONDARY MALIGNANT NEOPLASM OF BONE (HCC): ICD-10-CM

## 2024-12-02 DIAGNOSIS — C50.111 MALIGNANT NEOPLASM OF CENTRAL PORTION OF RIGHT BREAST IN FEMALE, ESTROGEN RECEPTOR POSITIVE (HCC): ICD-10-CM

## 2024-12-02 DIAGNOSIS — Z17.0 MALIGNANT NEOPLASM OF CENTRAL PORTION OF RIGHT BREAST IN FEMALE, ESTROGEN RECEPTOR POSITIVE (HCC): ICD-10-CM

## 2024-12-02 DIAGNOSIS — Z17.0 MALIGNANT NEOPLASM OF RIGHT BREAST IN FEMALE, ESTROGEN RECEPTOR POSITIVE, UNSPECIFIED SITE OF BREAST (HCC): ICD-10-CM

## 2024-12-02 DIAGNOSIS — C50.911 MALIGNANT NEOPLASM OF RIGHT BREAST IN FEMALE, ESTROGEN RECEPTOR POSITIVE, UNSPECIFIED SITE OF BREAST (HCC): ICD-10-CM

## 2024-12-09 ENCOUNTER — OFFICE VISIT (OUTPATIENT)
Dept: ONCOLOGY | Age: 69
End: 2024-12-09
Payer: MEDICARE

## 2024-12-09 VITALS
RESPIRATION RATE: 18 BRPM | HEART RATE: 90 BPM | DIASTOLIC BLOOD PRESSURE: 68 MMHG | BODY MASS INDEX: 24.8 KG/M2 | TEMPERATURE: 97 F | SYSTOLIC BLOOD PRESSURE: 91 MMHG | WEIGHT: 127 LBS

## 2024-12-09 DIAGNOSIS — M54.50 ACUTE MIDLINE LOW BACK PAIN WITHOUT SCIATICA: ICD-10-CM

## 2024-12-09 DIAGNOSIS — Z17.0 MALIGNANT NEOPLASM OF CENTRAL PORTION OF RIGHT BREAST IN FEMALE, ESTROGEN RECEPTOR POSITIVE (HCC): Primary | ICD-10-CM

## 2024-12-09 DIAGNOSIS — R79.89 HIGH SERUM CREATININE: ICD-10-CM

## 2024-12-09 DIAGNOSIS — C50.111 MALIGNANT NEOPLASM OF CENTRAL PORTION OF RIGHT BREAST IN FEMALE, ESTROGEN RECEPTOR POSITIVE (HCC): Primary | ICD-10-CM

## 2024-12-09 PROCEDURE — 1126F AMNT PAIN NOTED NONE PRSNT: CPT | Performed by: INTERNAL MEDICINE

## 2024-12-09 PROCEDURE — 3017F COLORECTAL CA SCREEN DOC REV: CPT | Performed by: INTERNAL MEDICINE

## 2024-12-09 PROCEDURE — G9899 SCRN MAM PERF RSLTS DOC: HCPCS | Performed by: INTERNAL MEDICINE

## 2024-12-09 PROCEDURE — G8484 FLU IMMUNIZE NO ADMIN: HCPCS | Performed by: INTERNAL MEDICINE

## 2024-12-09 PROCEDURE — G8428 CUR MEDS NOT DOCUMENT: HCPCS | Performed by: INTERNAL MEDICINE

## 2024-12-09 PROCEDURE — 99214 OFFICE O/P EST MOD 30 MIN: CPT | Performed by: INTERNAL MEDICINE

## 2024-12-09 PROCEDURE — G8420 CALC BMI NORM PARAMETERS: HCPCS | Performed by: INTERNAL MEDICINE

## 2024-12-09 PROCEDURE — 4004F PT TOBACCO SCREEN RCVD TLK: CPT | Performed by: INTERNAL MEDICINE

## 2024-12-09 PROCEDURE — G8399 PT W/DXA RESULTS DOCUMENT: HCPCS | Performed by: INTERNAL MEDICINE

## 2024-12-09 PROCEDURE — 1090F PRES/ABSN URINE INCON ASSESS: CPT | Performed by: INTERNAL MEDICINE

## 2024-12-09 PROCEDURE — 1123F ACP DISCUSS/DSCN MKR DOCD: CPT | Performed by: INTERNAL MEDICINE

## 2024-12-09 RX ORDER — ABEMACICLIB 100 MG/1
100 TABLET ORAL 2 TIMES DAILY
Qty: 60 TABLET | Refills: 1 | Status: ACTIVE | OUTPATIENT
Start: 2024-12-09

## 2024-12-09 NOTE — PROGRESS NOTES
absorptiometry (DXA) scan was performed of the  lumbar spine and left hip on a GE Lunar Prodigy system.    COMPARISON:  None.    HISTORY:  ORDERING SYSTEM PROVIDED HISTORY: Osteopenia of multiple sites    Gender: F    Age: 70 y/o    FINDINGS:  LUMBAR SPINE: L2-L4    BMD: 0.949 g/cm2    T-score: -2.1    Z-score: -0.4    LEFT TOTAL HIP:    BMD: 0.850 g/cm2    T-score: -1.3    Z-score: 0.2    LEFT FEMORAL NECK:  BMD: 0.753 g/cm2  T-score: -2.1    Z-score: -0.4    FRAX 10-YEAR PROBABILITY OF FRACTURE:    10-year fracture risk is performed using the [employer] FRAX  calculator based on patient-reported risk factors.    Major osteoporotic fracture: 12.3%  Hip fracture: 3.6%    Other situations known to alter the reliability of the FRAX score should be  considered when making treatment decisions, including chronic glucocorticoid  use and past treatments.  Further guidance on treatment can be found at the  National Osteoporosis Foundation's website bonesource.org.  Impression: Osteopenia by WHO criteria.    RECOMMENDATIONS:  1. All patients should optimize their calcium and vitamin D intake.    2. Consider FDA-approved medical therapies in postmenopausal women and men  aged 50 years and older, based on the following:    - A hip or vertebral (clinical or morphometric) fracture    - T-score less than or equal to -2.5 at the femoral neck or spine after  appropriate evaluation to exclude secondary causes    - Low bone density (T-score between -1.0 and -2.5 at the femoral neck or  spine) and a 10-year probability of a hip fracture greater than or equal to  3% or a 10-year probability of a major osteoporosis-related fracture greater  than or equal to 20% based on FRAX calculation.    - Clinician judgment and/or patient preferences may indicate treatment for  people with 10-year fracture probabilities above or below these levels    - Further guidance on treatment can be found at the National Osteoporosis  Foundation's

## 2024-12-11 DIAGNOSIS — C79.51 METASTASIS TO BONE (HCC): ICD-10-CM

## 2024-12-11 DIAGNOSIS — C50.111 MALIGNANT NEOPLASM OF CENTRAL PORTION OF RIGHT BREAST IN FEMALE, ESTROGEN RECEPTOR POSITIVE (HCC): Primary | ICD-10-CM

## 2024-12-11 DIAGNOSIS — Z17.0 MALIGNANT NEOPLASM OF CENTRAL PORTION OF RIGHT BREAST IN FEMALE, ESTROGEN RECEPTOR POSITIVE (HCC): Primary | ICD-10-CM

## 2024-12-11 RX ORDER — MIRTAZAPINE 15 MG/1
15 TABLET, FILM COATED ORAL NIGHTLY
Qty: 30 TABLET | Status: CANCELLED | OUTPATIENT
Start: 2024-12-11

## 2024-12-11 RX ORDER — ONDANSETRON 4 MG/1
TABLET, FILM COATED ORAL
Status: CANCELLED | OUTPATIENT
Start: 2024-12-11

## 2024-12-11 RX ORDER — LETROZOLE 2.5 MG/1
2.5 TABLET, FILM COATED ORAL DAILY
Qty: 30 TABLET | Refills: 2 | Status: CANCELLED | OUTPATIENT
Start: 2024-12-11 | End: 2025-03-11

## 2024-12-11 NOTE — TELEPHONE ENCOUNTER
Patient daughter called in and stated that Albertina needs refills on her trazodone,letrozole,mirtazapine, and zofran. All sent over to [facility] pharmacy in [place].

## 2024-12-12 RX ORDER — LETROZOLE 2.5 MG/1
2.5 TABLET, FILM COATED ORAL DAILY
Qty: 30 TABLET | Refills: 2 | Status: SHIPPED | OUTPATIENT
Start: 2024-12-12 | End: 2025-03-12

## 2024-12-12 RX ORDER — ONDANSETRON 4 MG/1
4 TABLET, FILM COATED ORAL EVERY 8 HOURS PRN
Qty: 60 TABLET | Refills: 1 | Status: SHIPPED | OUTPATIENT
Start: 2024-12-12

## 2024-12-16 ENCOUNTER — HOSPITAL ENCOUNTER (OUTPATIENT)
Dept: NUCLEAR MEDICINE | Age: 69
Discharge: HOME | End: 2024-12-18
Payer: COMMERCIAL

## 2024-12-16 ENCOUNTER — HOSPITAL ENCOUNTER (OUTPATIENT)
Age: 69
Discharge: HOME | End: 2024-12-16
Payer: COMMERCIAL

## 2024-12-16 DIAGNOSIS — C50.111 MALIGNANT NEOPLASM OF CENTRAL PORTION OF RIGHT BREAST IN FEMALE, ESTROGEN RECEPTOR POSITIVE (HCC): ICD-10-CM

## 2024-12-16 DIAGNOSIS — Z17.0 MALIGNANT NEOPLASM OF CENTRAL PORTION OF RIGHT BREAST IN FEMALE, ESTROGEN RECEPTOR POSITIVE (HCC): ICD-10-CM

## 2024-12-16 DIAGNOSIS — R79.89 HIGH SERUM CREATININE: ICD-10-CM

## 2024-12-16 DIAGNOSIS — M54.50 ACUTE MIDLINE LOW BACK PAIN WITHOUT SCIATICA: ICD-10-CM

## 2024-12-16 LAB
ALBUMIN SERPL-MCNC: 3.9 G/DL (ref 3.5–5.2)
ALBUMIN/GLOB SERPL: 1.6 {RATIO} (ref 1–2.5)
ALP SERPL-CCNC: 173 U/L (ref 35–104)
ALT SERPL-CCNC: 15 U/L (ref 10–35)
ANION GAP SERPL CALCULATED.3IONS-SCNC: 11 MMOL/L (ref 9–16)
AST SERPL-CCNC: 31 U/L (ref 10–35)
BASOPHILS # BLD: 0.12 K/UL (ref 0–0.2)
BASOPHILS NFR BLD: 2 % (ref 0–2)
BILIRUB SERPL-MCNC: <0.2 MG/DL (ref 0–1.2)
BUN SERPL-MCNC: 9 MG/DL (ref 8–23)
BUN/CREAT SERPL: 8 (ref 9–20)
CALCIUM SERPL-MCNC: 8.4 MG/DL (ref 8.6–10.4)
CHLORIDE SERPL-SCNC: 111 MMOL/L (ref 98–107)
CO2 SERPL-SCNC: 22 MMOL/L (ref 20–31)
CREAT SERPL-MCNC: 1.2 MG/DL (ref 0.5–0.9)
EOSINOPHIL # BLD: 0.18 K/UL (ref 0–0.44)
EOSINOPHILS RELATIVE PERCENT: 3 % (ref 1–4)
ERYTHROCYTE [DISTWIDTH] IN BLOOD BY AUTOMATED COUNT: 13.7 % (ref 11.8–14.4)
GFR, ESTIMATED: 49 ML/MIN/1.73M2
GLUCOSE SERPL-MCNC: 96 MG/DL (ref 74–99)
HCT VFR BLD AUTO: 35 % (ref 36.3–47.1)
HGB BLD-MCNC: 11.6 G/DL (ref 11.9–15.1)
IMM GRANULOCYTES # BLD AUTO: 0.06 K/UL (ref 0–0.3)
IMM GRANULOCYTES NFR BLD: 1 %
LYMPHOCYTES NFR BLD: 1.89 K/UL (ref 1.1–3.7)
LYMPHOCYTES RELATIVE PERCENT: 32 % (ref 24–43)
MCH RBC QN AUTO: 41.4 PG (ref 25.2–33.5)
MCHC RBC AUTO-ENTMCNC: 33.1 G/DL (ref 28.4–34.8)
MCV RBC AUTO: 125 FL (ref 82.6–102.9)
MONOCYTES NFR BLD: 0.65 K/UL (ref 0.1–1.2)
MONOCYTES NFR BLD: 11 % (ref 3–12)
MORPHOLOGY: ABNORMAL
MORPHOLOGY: ABNORMAL
NEUTROPHILS NFR BLD: 51 % (ref 36–65)
NEUTS SEG NFR BLD: 3 K/UL (ref 1.5–8.1)
NRBC BLD-RTO: 0 PER 100 WBC
PLATELET # BLD AUTO: 315 K/UL (ref 138–453)
PMV BLD AUTO: 9.3 FL (ref 8.1–13.5)
POTASSIUM SERPL-SCNC: 4.3 MMOL/L (ref 3.7–5.3)
PROT SERPL-MCNC: 6.3 G/DL (ref 6.6–8.7)
RBC # BLD AUTO: 2.8 M/UL (ref 3.95–5.11)
SODIUM SERPL-SCNC: 144 MMOL/L (ref 136–145)
WBC OTHER # BLD: 5.9 K/UL (ref 3.5–11.3)

## 2024-12-16 PROCEDURE — 36415 COLL VENOUS BLD VENIPUNCTURE: CPT

## 2024-12-16 PROCEDURE — 78306 BONE IMAGING WHOLE BODY: CPT | Performed by: INTERNAL MEDICINE

## 2024-12-16 PROCEDURE — 3430000000 HC RX DIAGNOSTIC RADIOPHARMACEUTICAL: Performed by: INTERNAL MEDICINE

## 2024-12-16 PROCEDURE — A9503 TC99M MEDRONATE: HCPCS | Performed by: INTERNAL MEDICINE

## 2024-12-16 PROCEDURE — 80053 COMPREHEN METABOLIC PANEL: CPT

## 2024-12-16 PROCEDURE — 85025 COMPLETE CBC W/AUTO DIFF WBC: CPT

## 2024-12-16 RX ORDER — TC 99M MEDRONATE 20 MG/10ML
25 INJECTION, POWDER, LYOPHILIZED, FOR SOLUTION INTRAVENOUS
Status: COMPLETED | OUTPATIENT
Start: 2024-12-16 | End: 2024-12-16

## 2024-12-16 RX ADMIN — TC 99M MEDRONATE 24.3 MILLICURIE: 20 INJECTION, POWDER, LYOPHILIZED, FOR SOLUTION INTRAVENOUS at 08:32

## 2024-12-19 ENCOUNTER — OFFICE VISIT (OUTPATIENT)
Dept: ONCOLOGY | Age: 69
End: 2024-12-19
Payer: MEDICARE

## 2024-12-19 VITALS
BODY MASS INDEX: 25 KG/M2 | DIASTOLIC BLOOD PRESSURE: 72 MMHG | RESPIRATION RATE: 18 BRPM | WEIGHT: 128 LBS | TEMPERATURE: 97.3 F | HEART RATE: 83 BPM | SYSTOLIC BLOOD PRESSURE: 96 MMHG

## 2024-12-19 DIAGNOSIS — M54.50 CHRONIC LOW BACK PAIN, UNSPECIFIED BACK PAIN LATERALITY, UNSPECIFIED WHETHER SCIATICA PRESENT: ICD-10-CM

## 2024-12-19 DIAGNOSIS — Z17.0 MALIGNANT NEOPLASM OF CENTRAL PORTION OF RIGHT BREAST IN FEMALE, ESTROGEN RECEPTOR POSITIVE (HCC): Primary | ICD-10-CM

## 2024-12-19 DIAGNOSIS — C50.111 MALIGNANT NEOPLASM OF CENTRAL PORTION OF RIGHT BREAST IN FEMALE, ESTROGEN RECEPTOR POSITIVE (HCC): Primary | ICD-10-CM

## 2024-12-19 DIAGNOSIS — C79.51 METASTASIS TO BONE (HCC): ICD-10-CM

## 2024-12-19 DIAGNOSIS — C79.51 SECONDARY MALIGNANT NEOPLASM OF BONE (HCC): ICD-10-CM

## 2024-12-19 DIAGNOSIS — G89.29 CHRONIC LOW BACK PAIN, UNSPECIFIED BACK PAIN LATERALITY, UNSPECIFIED WHETHER SCIATICA PRESENT: ICD-10-CM

## 2024-12-19 PROCEDURE — 1090F PRES/ABSN URINE INCON ASSESS: CPT | Performed by: INTERNAL MEDICINE

## 2024-12-19 PROCEDURE — 3017F COLORECTAL CA SCREEN DOC REV: CPT | Performed by: INTERNAL MEDICINE

## 2024-12-19 PROCEDURE — G9899 SCRN MAM PERF RSLTS DOC: HCPCS | Performed by: INTERNAL MEDICINE

## 2024-12-19 PROCEDURE — G8417 CALC BMI ABV UP PARAM F/U: HCPCS | Performed by: INTERNAL MEDICINE

## 2024-12-19 PROCEDURE — G8484 FLU IMMUNIZE NO ADMIN: HCPCS | Performed by: INTERNAL MEDICINE

## 2024-12-19 PROCEDURE — G8428 CUR MEDS NOT DOCUMENT: HCPCS | Performed by: INTERNAL MEDICINE

## 2024-12-19 PROCEDURE — G8399 PT W/DXA RESULTS DOCUMENT: HCPCS | Performed by: INTERNAL MEDICINE

## 2024-12-19 PROCEDURE — 1123F ACP DISCUSS/DSCN MKR DOCD: CPT | Performed by: INTERNAL MEDICINE

## 2024-12-19 PROCEDURE — 99214 OFFICE O/P EST MOD 30 MIN: CPT | Performed by: INTERNAL MEDICINE

## 2024-12-19 PROCEDURE — 4004F PT TOBACCO SCREEN RCVD TLK: CPT | Performed by: INTERNAL MEDICINE

## 2024-12-19 PROCEDURE — 1126F AMNT PAIN NOTED NONE PRSNT: CPT | Performed by: INTERNAL MEDICINE

## 2024-12-19 NOTE — PROGRESS NOTES
hepatic cyst left lobe, unchanged.2 there are mild sigmoid colonic diverticulosis without CT evidence for diverticulitis.     No additional abnormalities identified.       All CT scans at this facility use dose modulation, iterative reconstruction, and/or weight based dosing when appropriate to reduce radiation dose to as low as reasonably achievable.      CT chest November 14, 2022    IMPRESSION:       The known L1 vertebral body pathologic compression fracture has undergone kyphoplasty since the prior surgery.  The vertebral body height has stabilized.     No acute bony abnormalities, or osseous lytic or blastic lesions are identified.     No acute cardiopulmonary pathology.     Previously described soft tissue density in the right breast are less prominent on today's study suggesting response to treatment.     Stable low-density liver lesion compatible with a cyst.     Large hiatal hernia.       All CT scans at this facility use dose modulation, iterative reconstruction, and/or weight based dosing when appropriate to reduce radiation dose to as low as reasonably achievable.    ASSESSMENT:       Diagnosis Orders   1. Malignant neoplasm of central portion of right breast in female, estrogen receptor positive (HCC)        2. Secondary malignant neoplasm of bone (HCC)        3. Metastasis to bone (HCC)        4. Chronic low back pain, unspecified back pain laterality, unspecified whether sciatica present                           PLAN:     I reviewed labs, imaging studies, related electronic medical records including outside records and discussed the diagnosis, prognosis and treatment recommendations   I reviewed the surgical pathology results, discuss goals of care and NCCN guidelines with patient and family    Patient had T1c N1 A3batmc breast cancer with oligometastasis to the bone and proven by biopsy from L1 to be metastatic likely breast origin (confirmed by [facility]) status post kyphoplasty and status post

## 2025-01-30 ENCOUNTER — TELEMEDICINE (OUTPATIENT)
Dept: ONCOLOGY | Age: 70
End: 2025-01-30

## 2025-01-30 DIAGNOSIS — C50.911 MALIGNANT NEOPLASM OF RIGHT BREAST IN FEMALE, ESTROGEN RECEPTOR POSITIVE, UNSPECIFIED SITE OF BREAST (HCC): ICD-10-CM

## 2025-01-30 DIAGNOSIS — C50.111 MALIGNANT NEOPLASM OF CENTRAL PORTION OF RIGHT BREAST IN FEMALE, ESTROGEN RECEPTOR POSITIVE (HCC): Primary | ICD-10-CM

## 2025-01-30 DIAGNOSIS — Z17.0 MALIGNANT NEOPLASM OF RIGHT BREAST IN FEMALE, ESTROGEN RECEPTOR POSITIVE, UNSPECIFIED SITE OF BREAST (HCC): ICD-10-CM

## 2025-01-30 DIAGNOSIS — C79.51 SECONDARY MALIGNANT NEOPLASM OF BONE (HCC): ICD-10-CM

## 2025-01-30 DIAGNOSIS — Z17.0 MALIGNANT NEOPLASM OF CENTRAL PORTION OF RIGHT BREAST IN FEMALE, ESTROGEN RECEPTOR POSITIVE (HCC): Primary | ICD-10-CM

## 2025-01-30 RX ORDER — ABEMACICLIB 100 MG/1
100 TABLET ORAL 2 TIMES DAILY
Qty: 60 TABLET | Refills: 1 | Status: ACTIVE | OUTPATIENT
Start: 2025-01-30 | End: 2025-01-30

## 2025-01-30 NOTE — PROGRESS NOTES
was evaluated through a synchronous (real-time) audio-video encounter. The patient (or guardian if applicable) is aware that this is a billable service, which includes applicable co-pays. This Virtual Visit was conducted with patient's (and/or legal guardian's) consent. Patient identification was verified, and a caregiver was present when appropriate.   The patient was located at Facility (Appt Department): Home   provider was located at Facility (Appt Dept): [facility]        Total time spent for this encounter: Not billed by time        An electronic signature was used to authenticate this note.        Patient ID: Albertina Manning, 1955, [id number], 69 y.o.  Referred by :  No ref. provider found   Reason for consultation: Metastatic breast cancer            DIAGNOSIS:     Lytic lesion in the L1 vertebra proven to be metastatic  Biopsy of L1 proven to be metastatic mammary cancer, ER positive>T1c N0 M1  Significant psychiatric issues leading to hospitalization  Right breast cancer  Osteopenia      CURRENT THERAPY/treatment    Right lumpectomy with sentinel lymph node biopsy    Radiation to the life and based on my exam as below  Treatment Site: R Breast axilla  Actual Dose: 534cGy  Total Planned Dose: 4256cGy  Treatment Technique: 3D-CRT  Fraction Technique: Daily  scan of chest abdomen pelvis with there is no metastatic disease but at L1 previously biopsied status post kyphoplasty    Femara September 20 23  Verzenio started on November 2023  Fosamax  FREDERICK          BRIEF CASE HISTORY:  Albertina Manning is a very pleasant 67 y.o. female who is referred to us for progressive back pain.  CT scan of the lumbar spine suggested lytic lesion at L1.  The patient when she presented initially she was very weak, she has been falling at home.  She is losing weight.  She is cachectic. She is a chronic smoker and has chronic cough but no hemoptysis  Performance status is ECOG 1  Breast exam did show the right

## 2025-01-31 DIAGNOSIS — C50.919 MALIGNANT NEOPLASM OF FEMALE BREAST, UNSPECIFIED ESTROGEN RECEPTOR STATUS, UNSPECIFIED LATERALITY, UNSPECIFIED SITE OF BREAST (HCC): ICD-10-CM

## 2025-01-31 DIAGNOSIS — Z17.0 MALIGNANT NEOPLASM OF CENTRAL PORTION OF RIGHT BREAST IN FEMALE, ESTROGEN RECEPTOR POSITIVE (HCC): Primary | ICD-10-CM

## 2025-01-31 DIAGNOSIS — C50.111 MALIGNANT NEOPLASM OF CENTRAL PORTION OF RIGHT BREAST IN FEMALE, ESTROGEN RECEPTOR POSITIVE (HCC): Primary | ICD-10-CM

## 2025-02-25 DIAGNOSIS — C50.111 MALIGNANT NEOPLASM OF CENTRAL PORTION OF RIGHT BREAST IN FEMALE, ESTROGEN RECEPTOR POSITIVE (HCC): ICD-10-CM

## 2025-02-25 DIAGNOSIS — Z17.0 MALIGNANT NEOPLASM OF CENTRAL PORTION OF RIGHT BREAST IN FEMALE, ESTROGEN RECEPTOR POSITIVE (HCC): ICD-10-CM

## 2025-03-20 RX ORDER — LETROZOLE 2.5 MG/1
2.5 TABLET, FILM COATED ORAL DAILY
Qty: 30 TABLET | Refills: 0 | Status: SHIPPED | OUTPATIENT
Start: 2025-03-20 | End: 2025-04-19

## 2025-04-21 RX ORDER — LETROZOLE 2.5 MG/1
2.5 TABLET, FILM COATED ORAL DAILY
Qty: 30 TABLET | Refills: 0 | Status: SHIPPED | OUTPATIENT
Start: 2025-04-21 | End: 2025-05-21

## 2025-05-09 DIAGNOSIS — C50.111 MALIGNANT NEOPLASM OF CENTRAL PORTION OF RIGHT BREAST IN FEMALE, ESTROGEN RECEPTOR POSITIVE (HCC): Primary | ICD-10-CM

## 2025-05-09 DIAGNOSIS — D53.9 MACROCYTIC ANEMIA: ICD-10-CM

## 2025-05-09 DIAGNOSIS — C50.911 MALIGNANT NEOPLASM OF RIGHT BREAST IN FEMALE, ESTROGEN RECEPTOR POSITIVE, UNSPECIFIED SITE OF BREAST (HCC): ICD-10-CM

## 2025-05-09 DIAGNOSIS — Z17.0 MALIGNANT NEOPLASM OF RIGHT BREAST IN FEMALE, ESTROGEN RECEPTOR POSITIVE, UNSPECIFIED SITE OF BREAST (HCC): ICD-10-CM

## 2025-05-09 DIAGNOSIS — Z17.0 MALIGNANT NEOPLASM OF CENTRAL PORTION OF RIGHT BREAST IN FEMALE, ESTROGEN RECEPTOR POSITIVE (HCC): Primary | ICD-10-CM

## 2025-05-19 RX ORDER — LETROZOLE 2.5 MG/1
2.5 TABLET, FILM COATED ORAL DAILY
Qty: 30 TABLET | Refills: 0 | OUTPATIENT
Start: 2025-05-19 | End: 2025-06-18

## 2025-06-11 ENCOUNTER — HOSPITAL ENCOUNTER (OUTPATIENT)
Age: 70
Setting detail: SPECIMEN
Discharge: HOME | End: 2025-06-11
Payer: COMMERCIAL

## 2025-06-11 LAB
ALBUMIN SERPL-MCNC: 3.6 G/DL (ref 3.5–5.2)
ALBUMIN/GLOB SERPL: 1.2 {RATIO} (ref 1–2.5)
ALP SERPL-CCNC: 126 U/L (ref 35–104)
ALT SERPL-CCNC: 15 U/L (ref 10–35)
ANION GAP SERPL CALCULATED.3IONS-SCNC: 20 MMOL/L (ref 9–16)
AST SERPL-CCNC: 43 U/L (ref 10–35)
BILIRUB SERPL-MCNC: 0.3 MG/DL (ref 0–1.2)
BUN SERPL-MCNC: 14 MG/DL (ref 8–23)
BUN/CREAT SERPL: 9 (ref 9–20)
CALCIUM SERPL-MCNC: 8.8 MG/DL (ref 8.6–10.4)
CHLORIDE SERPL-SCNC: 104 MMOL/L (ref 98–107)
CO2 SERPL-SCNC: 18 MMOL/L (ref 20–31)
CREAT SERPL-MCNC: 1.6 MG/DL (ref 0.5–0.9)
GFR, ESTIMATED: 36 ML/MIN/1.73M2
GLUCOSE SERPL-MCNC: 101 MG/DL (ref 74–99)
POTASSIUM SERPL-SCNC: 3.3 MMOL/L (ref 3.7–5.3)
PROT SERPL-MCNC: 6.6 G/DL (ref 6.6–8.7)
SODIUM SERPL-SCNC: 142 MMOL/L (ref 136–145)

## 2025-06-11 PROCEDURE — 80053 COMPREHEN METABOLIC PANEL: CPT

## 2025-06-14 ENCOUNTER — APPOINTMENT (OUTPATIENT)
Dept: GENERAL RADIOLOGY | Age: 70
End: 2025-06-14
Payer: COMMERCIAL

## 2025-06-14 ENCOUNTER — HOSPITAL ENCOUNTER (EMERGENCY)
Age: 70
Discharge: ANOTHER HOSPITAL | End: 2025-06-14
Payer: COMMERCIAL

## 2025-06-14 ENCOUNTER — APPOINTMENT (OUTPATIENT)
Dept: CT IMAGING | Age: 70
End: 2025-06-14
Payer: COMMERCIAL

## 2025-06-14 ENCOUNTER — HOSPITAL ENCOUNTER (INPATIENT)
Age: 70
LOS: 19 days | Discharge: NURSING, REHAB OR LONG-TERM CARE | End: 2025-07-03
Attending: STUDENT IN AN ORGANIZED HEALTH CARE EDUCATION/TRAINING PROGRAM
Payer: COMMERCIAL

## 2025-06-14 VITALS
HEART RATE: 78 BPM | WEIGHT: 114 LBS | BODY MASS INDEX: 24.59 KG/M2 | SYSTOLIC BLOOD PRESSURE: 128 MMHG | RESPIRATION RATE: 19 BRPM | DIASTOLIC BLOOD PRESSURE: 93 MMHG | OXYGEN SATURATION: 93 % | HEIGHT: 57 IN | TEMPERATURE: 97.2 F

## 2025-06-14 DIAGNOSIS — N17.9 AKI (ACUTE KIDNEY INJURY): ICD-10-CM

## 2025-06-14 DIAGNOSIS — R07.9 CHEST PAIN, UNSPECIFIED TYPE: Primary | ICD-10-CM

## 2025-06-14 DIAGNOSIS — J18.9 PNEUMONIA OF RIGHT UPPER LOBE DUE TO INFECTIOUS ORGANISM: ICD-10-CM

## 2025-06-14 DIAGNOSIS — E16.2 HYPOGLYCEMIA: Primary | ICD-10-CM

## 2025-06-14 PROBLEM — E83.41 HYPERMAGNESEMIA: Status: ACTIVE | Noted: 2025-06-14

## 2025-06-14 PROBLEM — N18.32 ACUTE KIDNEY INJURY SUPERIMPOSED ON STAGE 3B CHRONIC KIDNEY DISEASE (HCC): Status: ACTIVE | Noted: 2025-06-14

## 2025-06-14 PROBLEM — E87.0 HYPERNATREMIA: Status: ACTIVE | Noted: 2025-06-14

## 2025-06-14 PROBLEM — F03.90 DEMENTIA (HCC): Status: ACTIVE | Noted: 2025-06-14

## 2025-06-14 PROBLEM — R79.89 ELEVATED TROPONIN: Status: ACTIVE | Noted: 2025-06-14

## 2025-06-14 PROBLEM — R41.82 AMS (ALTERED MENTAL STATUS): Status: ACTIVE | Noted: 2025-06-14

## 2025-06-14 LAB
ALBUMIN SERPL-MCNC: 3.7 G/DL (ref 3.5–5.2)
ALBUMIN/GLOB SERPL: 1.3 {RATIO} (ref 1–2.5)
ALP SERPL-CCNC: 126 U/L (ref 35–104)
ALT SERPL-CCNC: 25 U/L (ref 10–35)
AMMONIA PLAS-SCNC: 42 UMOL/L (ref 11–51)
AMPHET UR QL SCN: NEGATIVE
ANION GAP SERPL CALCULATED.3IONS-SCNC: 16 MMOL/L (ref 9–16)
ANION GAP SERPL CALCULATED.3IONS-SCNC: 17 MMOL/L (ref 9–16)
AST SERPL-CCNC: 44 U/L (ref 10–35)
B PARAP IS1001 DNA NPH QL NAA+NON-PROBE: NOT DETECTED
B PERT DNA SPEC QL NAA+PROBE: NOT DETECTED
BACTERIA URNS QL MICRO: ABNORMAL
BARBITURATES UR QL SCN: NEGATIVE
BENZODIAZ UR QL: POSITIVE
BILIRUB SERPL-MCNC: 0.2 MG/DL (ref 0–1.2)
BILIRUB UR QL STRIP: ABNORMAL
BODY TEMPERATURE: 37
BUN SERPL-MCNC: 30 MG/DL (ref 8–23)
BUN SERPL-MCNC: 31 MG/DL (ref 8–23)
BUN/CREAT SERPL: 13 (ref 9–20)
C PNEUM DNA NPH QL NAA+NON-PROBE: NOT DETECTED
CA-I BLD-SCNC: 1.12 MMOL/L (ref 1.13–1.33)
CALCIUM SERPL-MCNC: 8.4 MG/DL (ref 8.6–10.4)
CALCIUM SERPL-MCNC: 9 MG/DL (ref 8.6–10.4)
CANNABINOIDS UR QL SCN: NEGATIVE
CERULOPLASMIN SERPL-MCNC: 27 MG/DL (ref 16–45)
CHLORIDE SERPL-SCNC: 108 MMOL/L (ref 98–107)
CHLORIDE SERPL-SCNC: 112 MMOL/L (ref 98–107)
CLARITY UR: CLEAR
CO2 SERPL-SCNC: 19 MMOL/L (ref 20–31)
CO2 SERPL-SCNC: 22 MMOL/L (ref 20–31)
COCAINE UR QL SCN: NEGATIVE
COHGB MFR BLD: 0.3 % (ref 0–5)
COLOR UR: YELLOW
CREAT SERPL-MCNC: 2.3 MG/DL (ref 0.6–0.9)
CREAT SERPL-MCNC: 2.4 MG/DL (ref 0.5–0.9)
EPI CELLS #/AREA URNS HPF: ABNORMAL /HPF (ref 0–25)
ERYTHROCYTE [DISTWIDTH] IN BLOOD BY AUTOMATED COUNT: 16.1 % (ref 11.8–14.4)
ETHANOL PERCENT: <0.01 %
ETHANOLAMINE SERPL-MCNC: <10 MG/DL (ref 0–0.08)
FENTANYL UR QL: NEGATIVE
FIO2 ON VENT: ABNORMAL %
FLUAV RNA NPH QL NAA+NON-PROBE: NOT DETECTED
FLUBV RNA NPH QL NAA+NON-PROBE: NOT DETECTED
GFR, ESTIMATED: 21 ML/MIN/1.73M2
GFR, ESTIMATED: 22 ML/MIN/1.73M2
GLUCOSE BLD-MCNC: 202 MG/DL
GLUCOSE BLD-MCNC: 205 MG/DL (ref 74–100)
GLUCOSE SERPL-MCNC: 48 MG/DL (ref 74–99)
GLUCOSE SERPL-MCNC: 55 MG/DL (ref 74–99)
GLUCOSE UR STRIP-MCNC: ABNORMAL MG/DL
HADV DNA NPH QL NAA+NON-PROBE: NOT DETECTED
HCO3 VENOUS: 22.5 MMOL/L (ref 24–30)
HCOV 229E RNA NPH QL NAA+NON-PROBE: NOT DETECTED
HCOV HKU1 RNA NPH QL NAA+NON-PROBE: NOT DETECTED
HCOV NL63 RNA NPH QL NAA+NON-PROBE: NOT DETECTED
HCOV OC43 RNA NPH QL NAA+NON-PROBE: NOT DETECTED
HCT VFR BLD AUTO: 27.3 % (ref 36.3–47.1)
HGB BLD-MCNC: 9.4 G/DL (ref 11.9–15.1)
HGB UR QL STRIP.AUTO: ABNORMAL
HMPV RNA NPH QL NAA+NON-PROBE: NOT DETECTED
HPIV1 RNA NPH QL NAA+NON-PROBE: NOT DETECTED
HPIV2 RNA NPH QL NAA+NON-PROBE: NOT DETECTED
HPIV3 RNA NPH QL NAA+NON-PROBE: NOT DETECTED
HPIV4 RNA NPH QL NAA+NON-PROBE: NOT DETECTED
INR PPP: 1.2
KETONES UR STRIP-MCNC: ABNORMAL MG/DL
LACTATE BLDV-SCNC: 3.8 MMOL/L (ref 0.5–2.2)
LACTATE BLDV-SCNC: 5.6 MMOL/L (ref 0.5–2.2)
LACTIC ACID, WHOLE BLOOD: 2.4 MMOL/L (ref 0.7–2.1)
LEUKOCYTE ESTERASE UR QL STRIP: NEGATIVE
LIPASE SERPL-CCNC: 11 U/L (ref 13–60)
M PNEUMO DNA NPH QL NAA+NON-PROBE: NOT DETECTED
MAGNESIUM SERPL-MCNC: 3.6 MG/DL (ref 1.6–2.4)
MAGNESIUM SERPL-MCNC: 3.6 MG/DL (ref 1.6–2.4)
MCH RBC QN AUTO: 39 PG (ref 25.2–33.5)
MCHC RBC AUTO-ENTMCNC: 34.4 G/DL (ref 28.4–34.8)
MCV RBC AUTO: 113.3 FL (ref 82.6–102.9)
METHADONE UR QL: NEGATIVE
NEGATIVE BASE EXCESS, VEN: 0.8 MMOL/L (ref 0–2)
NITRITE UR QL STRIP: NEGATIVE
NRBC BLD-RTO: 0 PER 100 WBC
O2 SAT, VEN: 50.8 % (ref 60–85)
OPIATES UR QL SCN: NEGATIVE
OSMOLALITY SERPL: 310 MOSM/KG (ref 275–295)
OXYCODONE UR QL SCN: NEGATIVE
PARTIAL THROMBOPLASTIN TIME: 28.9 SEC (ref 23–36.5)
PCO2 VENOUS: 31.8 MM HG (ref 39–55)
PCP UR QL SCN: NEGATIVE
PH UR STRIP: 6 [PH] (ref 5–9)
PH VENOUS: 7.47 (ref 7.32–7.42)
PLATELET # BLD AUTO: 284 K/UL (ref 138–453)
PMV BLD AUTO: 10.2 FL (ref 8.1–13.5)
PO2 VENOUS: 25.2 MM HG (ref 30–50)
POTASSIUM SERPL-SCNC: 4.9 MMOL/L (ref 3.7–5.3)
POTASSIUM SERPL-SCNC: 5 MMOL/L (ref 3.7–5.3)
PROCALCITONIN SERPL-MCNC: 0.14 NG/ML (ref 0–0.09)
PROT SERPL-MCNC: 6.6 G/DL (ref 6.6–8.7)
PROT UR STRIP-MCNC: ABNORMAL MG/DL
PROTHROMBIN TIME: 15.9 SEC (ref 11.7–14.9)
RBC # BLD AUTO: 2.41 M/UL (ref 3.95–5.11)
RBC #/AREA URNS HPF: ABNORMAL /HPF (ref 0–2)
RSV RNA NPH QL NAA+NON-PROBE: NOT DETECTED
RV+EV RNA NPH QL NAA+NON-PROBE: DETECTED
SARS-COV-2 RNA NPH QL NAA+NON-PROBE: NOT DETECTED
SODIUM SERPL-SCNC: 147 MMOL/L (ref 136–145)
SODIUM SERPL-SCNC: 147 MMOL/L (ref 136–145)
SP GR UR STRIP: >1.03 (ref 1.01–1.02)
SPECIMEN DESCRIPTION: ABNORMAL
TEST INFORMATION: ABNORMAL
TROPONIN I SERPL HS-MCNC: 73 NG/L (ref 0–14)
TROPONIN I SERPL HS-MCNC: 81 NG/L (ref 0–14)
TROPONIN I SERPL HS-MCNC: 86 NG/L (ref 0–14)
TSH SERPL DL<=0.05 MIU/L-ACNC: 0.99 UIU/ML (ref 0.27–4.2)
UROBILINOGEN UR STRIP-ACNC: NORMAL EU/DL (ref 0–1)
WBC #/AREA URNS HPF: ABNORMAL /HPF (ref 0–5)
WBC OTHER # BLD: 7.1 K/UL (ref 3.5–11.3)
YEAST URNS QL MICRO: ABNORMAL

## 2025-06-14 PROCEDURE — 99285 EMERGENCY DEPT VISIT HI MDM: CPT

## 2025-06-14 PROCEDURE — 2060000000 HC ICU INTERMEDIATE R&B

## 2025-06-14 PROCEDURE — 82140 ASSAY OF AMMONIA: CPT

## 2025-06-14 PROCEDURE — 87040 BLOOD CULTURE FOR BACTERIA: CPT

## 2025-06-14 PROCEDURE — 70450 CT HEAD/BRAIN W/O DYE: CPT

## 2025-06-14 PROCEDURE — 6360000002 HC RX W HCPCS

## 2025-06-14 PROCEDURE — 36415 COLL VENOUS BLD VENIPUNCTURE: CPT

## 2025-06-14 PROCEDURE — 83605 ASSAY OF LACTIC ACID: CPT

## 2025-06-14 PROCEDURE — 51798 US URINE CAPACITY MEASURE: CPT

## 2025-06-14 PROCEDURE — 81001 URINALYSIS AUTO W/SCOPE: CPT

## 2025-06-14 PROCEDURE — 6370000000 HC RX 637 (ALT 250 FOR IP)

## 2025-06-14 PROCEDURE — 83930 ASSAY OF BLOOD OSMOLALITY: CPT

## 2025-06-14 PROCEDURE — 82390 ASSAY OF CERULOPLASMIN: CPT

## 2025-06-14 PROCEDURE — 82947 ASSAY GLUCOSE BLOOD QUANT: CPT

## 2025-06-14 PROCEDURE — 96375 TX/PRO/DX INJ NEW DRUG ADDON: CPT

## 2025-06-14 PROCEDURE — 96376 TX/PRO/DX INJ SAME DRUG ADON: CPT

## 2025-06-14 PROCEDURE — 84443 ASSAY THYROID STIM HORMONE: CPT

## 2025-06-14 PROCEDURE — 94640 AIRWAY INHALATION TREATMENT: CPT

## 2025-06-14 PROCEDURE — 2580000003 HC RX 258

## 2025-06-14 PROCEDURE — 83735 ASSAY OF MAGNESIUM: CPT

## 2025-06-14 PROCEDURE — 80307 DRUG TEST PRSMV CHEM ANLYZR: CPT

## 2025-06-14 PROCEDURE — 85730 THROMBOPLASTIN TIME PARTIAL: CPT

## 2025-06-14 PROCEDURE — 80048 BASIC METABOLIC PNL TOTAL CA: CPT

## 2025-06-14 PROCEDURE — 80053 COMPREHEN METABOLIC PANEL: CPT

## 2025-06-14 PROCEDURE — 2500000003 HC RX 250 WO HCPCS

## 2025-06-14 PROCEDURE — 0202U NFCT DS 22 TRGT SARS-COV-2: CPT

## 2025-06-14 PROCEDURE — 85027 COMPLETE CBC AUTOMATED: CPT

## 2025-06-14 PROCEDURE — 93005 ELECTROCARDIOGRAM TRACING: CPT

## 2025-06-14 PROCEDURE — 96365 THER/PROPH/DIAG IV INF INIT: CPT

## 2025-06-14 PROCEDURE — 82805 BLOOD GASES W/O2 SATURATION: CPT

## 2025-06-14 PROCEDURE — 94664 DEMO&/EVAL PT USE INHALER: CPT

## 2025-06-14 PROCEDURE — 82330 ASSAY OF CALCIUM: CPT

## 2025-06-14 PROCEDURE — 99223 1ST HOSP IP/OBS HIGH 75: CPT

## 2025-06-14 PROCEDURE — 83690 ASSAY OF LIPASE: CPT

## 2025-06-14 PROCEDURE — 84484 ASSAY OF TROPONIN QUANT: CPT

## 2025-06-14 PROCEDURE — 96361 HYDRATE IV INFUSION ADD-ON: CPT

## 2025-06-14 PROCEDURE — 85610 PROTHROMBIN TIME: CPT

## 2025-06-14 PROCEDURE — 84145 PROCALCITONIN (PCT): CPT

## 2025-06-14 PROCEDURE — 71045 X-RAY EXAM CHEST 1 VIEW: CPT

## 2025-06-14 PROCEDURE — G0480 DRUG TEST DEF 1-7 CLASSES: HCPCS

## 2025-06-14 RX ORDER — SULFACETAMIDE SODIUM 100 MG/ML
1 SOLUTION/ DROPS OPHTHALMIC 4 TIMES DAILY
Status: ON HOLD | COMMUNITY

## 2025-06-14 RX ORDER — BENZONATATE 100 MG/1
100 CAPSULE ORAL 3 TIMES DAILY PRN
Status: DISCONTINUED | OUTPATIENT
Start: 2025-06-14 | End: 2025-07-03 | Stop reason: HOSPADM

## 2025-06-14 RX ORDER — OLANZAPINE 2.5 MG/1
2.5 TABLET, FILM COATED ORAL 2 TIMES DAILY
Status: ON HOLD | COMMUNITY

## 2025-06-14 RX ORDER — ATORVASTATIN CALCIUM 10 MG/1
10 TABLET, FILM COATED ORAL DAILY
Status: ON HOLD | COMMUNITY

## 2025-06-14 RX ORDER — LORAZEPAM 2 MG/ML
1 INJECTION INTRAMUSCULAR ONCE
Status: COMPLETED | OUTPATIENT
Start: 2025-06-14 | End: 2025-06-14

## 2025-06-14 RX ORDER — INSULIN LISPRO 100 [IU]/ML
0-4 INJECTION, SOLUTION INTRAVENOUS; SUBCUTANEOUS EVERY 6 HOURS SCHEDULED
Status: DISCONTINUED | OUTPATIENT
Start: 2025-06-14 | End: 2025-06-15

## 2025-06-14 RX ORDER — DEXTROSE MONOHYDRATE 25 G/50ML
25 INJECTION, SOLUTION INTRAVENOUS ONCE
Status: COMPLETED | OUTPATIENT
Start: 2025-06-14 | End: 2025-06-14

## 2025-06-14 RX ORDER — ACETAMINOPHEN 325 MG/1
650 TABLET ORAL EVERY 6 HOURS PRN
Status: DISCONTINUED | OUTPATIENT
Start: 2025-06-14 | End: 2025-07-03 | Stop reason: HOSPADM

## 2025-06-14 RX ORDER — GUAIFENESIN 600 MG/1
600 TABLET, EXTENDED RELEASE ORAL 2 TIMES DAILY
Status: DISCONTINUED | OUTPATIENT
Start: 2025-06-14 | End: 2025-07-03 | Stop reason: HOSPADM

## 2025-06-14 RX ORDER — DICLOFENAC 35 MG/1
75 CAPSULE ORAL 2 TIMES DAILY
Status: ON HOLD | COMMUNITY

## 2025-06-14 RX ORDER — ASPIRIN 81 MG/1
81 TABLET ORAL DAILY
Status: DISCONTINUED | OUTPATIENT
Start: 2025-06-15 | End: 2025-07-03 | Stop reason: HOSPADM

## 2025-06-14 RX ORDER — DEXAMETHASONE 2 MG/1
2 TABLET ORAL
Status: ON HOLD | COMMUNITY

## 2025-06-14 RX ORDER — SENNA AND DOCUSATE SODIUM 50; 8.6 MG/1; MG/1
2 TABLET, FILM COATED ORAL DAILY PRN
Status: DISCONTINUED | OUTPATIENT
Start: 2025-06-14 | End: 2025-07-03 | Stop reason: HOSPADM

## 2025-06-14 RX ORDER — AMANTADINE HYDROCHLORIDE 100 MG/1
100 CAPSULE, GELATIN COATED ORAL 2 TIMES DAILY
Status: ON HOLD | COMMUNITY

## 2025-06-14 RX ORDER — POTASSIUM CHLORIDE 20MEQ/15ML
20 LIQUID (ML) ORAL DAILY
Status: ON HOLD | COMMUNITY

## 2025-06-14 RX ORDER — THIAMINE HYDROCHLORIDE 100 MG/ML
200 INJECTION, SOLUTION INTRAMUSCULAR; INTRAVENOUS 3 TIMES DAILY
Status: DISCONTINUED | OUTPATIENT
Start: 2025-06-14 | End: 2025-06-19

## 2025-06-14 RX ORDER — ACETAMINOPHEN 650 MG/1
650 SUPPOSITORY RECTAL EVERY 6 HOURS PRN
Status: DISCONTINUED | OUTPATIENT
Start: 2025-06-14 | End: 2025-07-03 | Stop reason: HOSPADM

## 2025-06-14 RX ORDER — ATORVASTATIN CALCIUM 10 MG/1
10 TABLET, FILM COATED ORAL DAILY
Status: DISCONTINUED | OUTPATIENT
Start: 2025-06-14 | End: 2025-07-03 | Stop reason: HOSPADM

## 2025-06-14 RX ORDER — ONDANSETRON 2 MG/ML
4 INJECTION INTRAMUSCULAR; INTRAVENOUS EVERY 6 HOURS PRN
Status: DISCONTINUED | OUTPATIENT
Start: 2025-06-14 | End: 2025-06-14

## 2025-06-14 RX ORDER — SODIUM CHLORIDE 9 MG/ML
INJECTION, SOLUTION INTRAVENOUS CONTINUOUS
Status: DISCONTINUED | OUTPATIENT
Start: 2025-06-14 | End: 2025-06-14

## 2025-06-14 RX ORDER — 0.9 % SODIUM CHLORIDE 0.9 %
500 INTRAVENOUS SOLUTION INTRAVENOUS ONCE
Status: COMPLETED | OUTPATIENT
Start: 2025-06-14 | End: 2025-06-14

## 2025-06-14 RX ORDER — LORAZEPAM 2 MG/ML
4 INJECTION INTRAMUSCULAR EVERY 5 MIN PRN
Status: DISCONTINUED | OUTPATIENT
Start: 2025-06-14 | End: 2025-06-14

## 2025-06-14 RX ORDER — DONEPEZIL HYDROCHLORIDE 5 MG/1
5 TABLET, FILM COATED ORAL NIGHTLY
Status: DISCONTINUED | OUTPATIENT
Start: 2025-06-14 | End: 2025-07-03 | Stop reason: HOSPADM

## 2025-06-14 RX ORDER — ONDANSETRON 4 MG/1
4 TABLET, ORALLY DISINTEGRATING ORAL EVERY 8 HOURS PRN
Status: DISCONTINUED | OUTPATIENT
Start: 2025-06-14 | End: 2025-06-14

## 2025-06-14 RX ORDER — SODIUM CHLORIDE 450 MG/100ML
INJECTION, SOLUTION INTRAVENOUS CONTINUOUS
Status: DISCONTINUED | OUTPATIENT
Start: 2025-06-14 | End: 2025-06-15

## 2025-06-14 RX ORDER — CALCIUM GLUCONATE 20 MG/ML
1000 INJECTION, SOLUTION INTRAVENOUS ONCE
Status: COMPLETED | OUTPATIENT
Start: 2025-06-14 | End: 2025-06-14

## 2025-06-14 RX ORDER — MAGNESIUM SULFATE IN WATER 40 MG/ML
2000 INJECTION, SOLUTION INTRAVENOUS ONCE
Status: COMPLETED | OUTPATIENT
Start: 2025-06-14 | End: 2025-06-14

## 2025-06-14 RX ORDER — HEPARIN SODIUM 5000 [USP'U]/ML
5000 INJECTION, SOLUTION INTRAVENOUS; SUBCUTANEOUS EVERY 8 HOURS SCHEDULED
Status: DISCONTINUED | OUTPATIENT
Start: 2025-06-14 | End: 2025-07-03 | Stop reason: HOSPADM

## 2025-06-14 RX ORDER — SODIUM CHLORIDE 0.9 % (FLUSH) 0.9 %
10 SYRINGE (ML) INJECTION PRN
Status: DISCONTINUED | OUTPATIENT
Start: 2025-06-14 | End: 2025-07-03 | Stop reason: HOSPADM

## 2025-06-14 RX ORDER — DONEPEZIL HYDROCHLORIDE 5 MG/1
5 TABLET, FILM COATED ORAL NIGHTLY
Status: ON HOLD | COMMUNITY

## 2025-06-14 RX ORDER — VANCOMYCIN 1 G/200ML
1000 INJECTION, SOLUTION INTRAVENOUS ONCE
Status: COMPLETED | OUTPATIENT
Start: 2025-06-14 | End: 2025-06-14

## 2025-06-14 RX ORDER — AMMONIUM LACTATE 12 G/100G
1 LOTION TOPICAL PRN
Status: ON HOLD | COMMUNITY

## 2025-06-14 RX ORDER — ENOXAPARIN SODIUM 100 MG/ML
30 INJECTION SUBCUTANEOUS DAILY
Status: DISCONTINUED | OUTPATIENT
Start: 2025-06-15 | End: 2025-06-14

## 2025-06-14 RX ORDER — GLUCAGON 1 MG/ML
1 KIT INJECTION PRN
Status: DISCONTINUED | OUTPATIENT
Start: 2025-06-14 | End: 2025-07-03 | Stop reason: HOSPADM

## 2025-06-14 RX ORDER — SODIUM CHLORIDE 0.9 % (FLUSH) 0.9 %
5-40 SYRINGE (ML) INJECTION EVERY 12 HOURS SCHEDULED
Status: DISCONTINUED | OUTPATIENT
Start: 2025-06-14 | End: 2025-07-03 | Stop reason: HOSPADM

## 2025-06-14 RX ORDER — ALBUTEROL SULFATE 0.83 MG/ML
2.5 SOLUTION RESPIRATORY (INHALATION)
Status: DISCONTINUED | OUTPATIENT
Start: 2025-06-14 | End: 2025-07-03 | Stop reason: HOSPADM

## 2025-06-14 RX ORDER — LANOLIN ALCOHOL/MO/W.PET/CERES
400 CREAM (GRAM) TOPICAL 2 TIMES DAILY
Status: ON HOLD | COMMUNITY

## 2025-06-14 RX ORDER — SODIUM CHLORIDE 9 MG/ML
INJECTION, SOLUTION INTRAVENOUS PRN
Status: DISCONTINUED | OUTPATIENT
Start: 2025-06-14 | End: 2025-07-03 | Stop reason: HOSPADM

## 2025-06-14 RX ORDER — DEXTROSE MONOHYDRATE 100 MG/ML
INJECTION, SOLUTION INTRAVENOUS CONTINUOUS PRN
Status: DISCONTINUED | OUTPATIENT
Start: 2025-06-14 | End: 2025-07-03 | Stop reason: HOSPADM

## 2025-06-14 RX ORDER — SULFAMETHOXAZOLE AND TRIMETHOPRIM 800; 160 MG/1; MG/1
1 TABLET ORAL 2 TIMES DAILY
Status: ON HOLD | COMMUNITY

## 2025-06-14 RX ORDER — IPRATROPIUM BROMIDE AND ALBUTEROL SULFATE 2.5; .5 MG/3ML; MG/3ML
1 SOLUTION RESPIRATORY (INHALATION)
Status: DISCONTINUED | OUTPATIENT
Start: 2025-06-14 | End: 2025-06-20

## 2025-06-14 RX ADMIN — LORAZEPAM 1 MG: 2 INJECTION INTRAMUSCULAR; INTRAVENOUS at 12:28

## 2025-06-14 RX ADMIN — IPRATROPIUM BROMIDE AND ALBUTEROL SULFATE 1 DOSE: .5; 2.5 SOLUTION RESPIRATORY (INHALATION) at 21:01

## 2025-06-14 RX ADMIN — LORAZEPAM 1 MG: 2 INJECTION, SOLUTION INTRAMUSCULAR; INTRAVENOUS at 13:17

## 2025-06-14 RX ADMIN — CALCIUM GLUCONATE 1000 MG: 20 INJECTION, SOLUTION INTRAVENOUS at 21:47

## 2025-06-14 RX ADMIN — DEXTROSE MONOHYDRATE 25 G: 25 INJECTION, SOLUTION INTRAVENOUS at 14:02

## 2025-06-14 RX ADMIN — THIAMINE HYDROCHLORIDE 200 MG: 100 INJECTION, SOLUTION INTRAMUSCULAR; INTRAVENOUS at 21:51

## 2025-06-14 RX ADMIN — DEXTROSE 125 ML: 10 SOLUTION INTRAVENOUS at 23:50

## 2025-06-14 RX ADMIN — DEXTROSE MONOHYDRATE 25 G: 25 INJECTION, SOLUTION INTRAVENOUS at 12:29

## 2025-06-14 RX ADMIN — HEPARIN SODIUM 5000 UNITS: 5000 INJECTION INTRAVENOUS; SUBCUTANEOUS at 23:31

## 2025-06-14 RX ADMIN — SODIUM CHLORIDE 500 ML: 9 INJECTION, SOLUTION INTRAVENOUS at 12:29

## 2025-06-14 RX ADMIN — VANCOMYCIN 1000 MG: 1 INJECTION, SOLUTION INTRAVENOUS at 16:39

## 2025-06-14 RX ADMIN — DEXTROSE 125 ML: 10 SOLUTION INTRAVENOUS at 20:41

## 2025-06-14 RX ADMIN — SODIUM CHLORIDE, PRESERVATIVE FREE 10 ML: 5 INJECTION INTRAVENOUS at 20:39

## 2025-06-14 RX ADMIN — CEFEPIME 2000 MG: 2 INJECTION, POWDER, FOR SOLUTION INTRAVENOUS at 15:08

## 2025-06-14 RX ADMIN — SODIUM CHLORIDE 1000 ML: 0.45 INJECTION, SOLUTION INTRAVENOUS at 20:44

## 2025-06-14 RX ADMIN — MAGNESIUM SULFATE IN WATER 2000 MG: 40 INJECTION, SOLUTION INTRAVENOUS at 12:37

## 2025-06-14 ASSESSMENT — LIFESTYLE VARIABLES
HOW OFTEN DO YOU HAVE A DRINK CONTAINING ALCOHOL: NEVER
HOW MANY STANDARD DRINKS CONTAINING ALCOHOL DO YOU HAVE ON A TYPICAL DAY: PATIENT DOES NOT DRINK

## 2025-06-14 NOTE — ED PROVIDER NOTES
[facility] EMERGENCY DEPARTMENT  Emergency Department Encounter  Emergency Medicine Attending     Pt Name:Albertina Manning  MRN: [id number]  Birthdate 1955  Date of evaluation: 6/14/25  PCP:  Rocky Tobias Sr.,   Note Started: 4:36 PM EDT      CHIEF COMPLAINT       Chief Complaint   Patient presents with    Tremors     Patient sent from facility for increased in tremors and agitation.       HISTORY OF PRESENT ILLNESS  (Location/Symptom, Timing/Onset, Context/Setting, Quality, Duration, Modifying Factors, Severity.)      70-year-old female with history of dementia, CAD, hypertension, schizoaffective presents for evaluation of altered mental status.  [facility] for worsening mentation and refusal to eat and take her medications.  Patient unable to provide any history at this time.  Daughter states that she was diagnosed with dementia within the last few months and has been rapidly declining.            PAST MEDICAL / SURGICAL / SOCIAL / FAMILY HISTORY      has a past medical history of Alcohol dependence in remission (HCC), Anxiety, Arthritis, CAD (coronary artery disease), Cancer (HCC), Chronic bronchitis (HCC), Cognitive communication deficit, Diverticulosis, Esophageal varices without bleeding (HCC), Insomnia, Major depressive disorder, Osteoporosis, Schizoaffective disorder (HCC), and Tremor.     has a past surgical history that includes Tubal ligation; back surgery; US BREAST BIOPSY W LOC DEVICE 1ST LESION RIGHT (Right, 2/27/2023); US PLACE BREAST LOC DEVICE 1ST LESION RIGHT (Right, 8/8/2023); Breast biopsy (Right, 8/8/2023); and Axillary Surgery (Right, 8/8/2023).    Social History     Socioeconomic History    Marital status: Single     Spouse name: Not on file    Number of children: Not on file    Years of education: Not on file    Highest education level: Not on file   Occupational History    Not on file   Tobacco Use    Smoking status: Every Day     Current packs/day:        CONSULTS:  PHARMACY TO DOSE VANCOMYCIN        FINAL IMPRESSION      1. Hypoglycemia    2. FREDERICK (acute kidney injury)    3. Pneumonia of right upper lobe due to infectious organism          DISPOSITION / PLAN     DISPOSITION Decision To Transfer 06/14/2025 03:01:59 PM   DISPOSITION CONDITION Stable           PATIENT REFERRED TO:  No follow-up provider specified.    DISCHARGE MEDICATIONS:  New Prescriptions    No medications on file       Godwin Zimmerman DO  Emergency Medicine Physician     (Please note that portions of thisnote were completed with a voice recognition program.  Efforts were made to edit the dictations but occasionally words are mis-transcribed.)        Godwin Zimmerman DO  06/14/25 1529

## 2025-06-14 NOTE — ED NOTES
Per staff at facility, patient has been alert to self for the past few days. Patient has history of tremors and alcohol dependence.

## 2025-06-14 NOTE — H&P
[facility]  Office: [phone]  Robert Monreal, DO, Rocky Cobb, DO, Harshal Saldana DO, Wei Block, DO, Dipika Saleem MD, Angelica Connelly MD, Navjot Hutchinson MD, Olga Lidia Woodson MD,  Nacho Huertas MD, Edmundo Mariano MD, Orestes Garnica MD,  Elie Reynolds DO, Curtis Martins MD, Joseph Crowe MD, Kurt Monreal DO, Regla Hill MD,  Romel Duarte DO, Nelsy Hermosillo MD, Evelina Cortez MD, Matthew Naik MD,  Keaton Sanchez MD, Yanet Bentley MD, Russ Doan MD, Mayur Osorio MD, Camilo Lewis MD, Mary Grace Silva MD, Vijay Meza, DO, Cuca Suero MD, Dedrick Walden DO, Hayder Suarez MD, Elie Reaves MD, Mohsin Reza, MD, Rai Ott MD, Shirley Waterhouse, CNP,  Maxine Badillo, CNP, Vijay Qureshi, CNP,  Dena Tolliver, ELIDIA, Shayy Cordero CNP, Geno Carrillo, CNP, Mei Zazueta, CNP, Abiola Santiago, CNP, Dana Cordero, PA-C, Ariana Mike, CNP, Greta Byrne, CNP,  Kristi Miller, CNP, Janet Buitrago, CNP, Yazan Banuelos, PA-C, Raysa Mcgregor, CNP,  Mayda Robertson, CNS, Jahaira Moon, CNP, Aline Larson, CNP,   Nguyen Ruelas, CNP         [facility]   IN-PATIENT SERVICE   [facility]    HISTORY AND PHYSICAL EXAMINATION            Date:   6/14/2025  Patient name:  Albertina Manning  Date of admission:  6/14/2025  7:18 PM  MRN:   [id number]  Account:  [id number]  YOB: 1955  PCP:    Rocky Tobias Sr., DO  Room:   [address] [place] [address]  Code Status:    Full Code    Chief Complaint:     No chief complaint on file.      History Obtained From:     electronic medical record, reason patient could not give history:  altered mental status    History of Present Illness:     Albertina Manning is a 70 y.o. Non- / non  female with past medical history of dementia, atherosclerosis of native coronary artery of native heart, CKD stage IIIb, tobacco use, depression, chronic obstructive bronchitis, schizoaffective disorder, tremor, and secondary malignant

## 2025-06-14 NOTE — PROGRESS NOTES
[facility]   Pharmacy Pharmacokinetic Monitoring Service - Vancomycin     Albertina Manning is a 70 y.o. female starting on vancomycin therapy for Pnuemonia. Pharmacy consulted by Godwin Peoples DO  for monitoring and adjustment.    Target Concentration: Dosing based on anticipated concentration <15 mg/L due to renal impairment/insufficiency    Additional Antimicrobials: N/A    Pertinent Laboratory Values:   Wt Readings from Last 1 Encounters:   06/14/25 51.7 kg (114 lb)     Temp Readings from Last 1 Encounters:   06/14/25 97.2 °F (36.2 °C) (Tympanic)     Estimated Creatinine Clearance: 16 mL/min (A) (based on SCr of 2.4 mg/dL (H)).  Recent Labs     06/14/25  1158   CREATININE 2.4*   BUN 30*   WBC 7.1     Plan:  Concentration-guided dosing due to renal impairment/insufficiency  Order Vancomycin 1000 mg IVPB x 1 dose  Renal labs as indicated   Vancomycin concentration ordered for 6/15 AM to assess clearance  Pharmacy will continue to monitor patient and adjust therapy as indicated    Thank you for the consult,  JUSTIN KERN VERONICA  6/14/2025 5:38 PM

## 2025-06-15 ENCOUNTER — APPOINTMENT (OUTPATIENT)
Dept: ULTRASOUND IMAGING | Age: 70
End: 2025-06-15
Attending: STUDENT IN AN ORGANIZED HEALTH CARE EDUCATION/TRAINING PROGRAM
Payer: COMMERCIAL

## 2025-06-15 PROBLEM — Z85.3 HISTORY OF BREAST CANCER: Status: ACTIVE | Noted: 2025-06-15

## 2025-06-15 PROBLEM — I21.4 NSTEMI (NON-ST ELEVATED MYOCARDIAL INFARCTION) (HCC): Status: ACTIVE | Noted: 2025-06-15

## 2025-06-15 PROBLEM — R07.9 CHEST PAIN: Status: ACTIVE | Noted: 2025-06-15

## 2025-06-15 PROBLEM — Z86.59 HISTORY OF SCHIZOAFFECTIVE DISORDER: Status: ACTIVE | Noted: 2025-06-15

## 2025-06-15 PROBLEM — E86.0 DEHYDRATION: Status: ACTIVE | Noted: 2025-06-15

## 2025-06-15 PROBLEM — R94.31 PROLONGED Q-T INTERVAL ON ECG: Status: ACTIVE | Noted: 2025-06-15

## 2025-06-15 LAB
ANION GAP SERPL CALCULATED.3IONS-SCNC: 14 MMOL/L (ref 9–16)
BASOPHILS # BLD: 0.06 K/UL (ref 0–0.2)
BASOPHILS NFR BLD: 1 % (ref 0–2)
BUN SERPL-MCNC: 25 MG/DL (ref 8–23)
C3 SERPL-MCNC: 138 MG/DL (ref 90–180)
C4 SERPL-MCNC: 33 MG/DL (ref 10–40)
CALCIUM SERPL-MCNC: 8.1 MG/DL (ref 8.6–10.4)
CHLORIDE SERPL-SCNC: 107 MMOL/L (ref 98–107)
CO2 SERPL-SCNC: 19 MMOL/L (ref 20–31)
CREAT SERPL-MCNC: 1.8 MG/DL (ref 0.6–0.9)
EKG ATRIAL RATE: 73 BPM
EKG P AXIS: 45 DEGREES
EKG P-R INTERVAL: 140 MS
EKG Q-T INTERVAL: 558 MS
EKG QRS DURATION: 88 MS
EKG QTC CALCULATION (BAZETT): 614 MS
EKG R AXIS: 14 DEGREES
EKG T AXIS: 60 DEGREES
EKG VENTRICULAR RATE: 73 BPM
EOSINOPHIL # BLD: 0.06 K/UL (ref 0–0.4)
EOSINOPHIL,URINE: NORMAL
EOSINOPHILS RELATIVE PERCENT: 1 % (ref 1–4)
ERYTHROCYTE [DISTWIDTH] IN BLOOD BY AUTOMATED COUNT: 16.6 % (ref 11.8–14.4)
FREE KAPPA/LAMBDA RATIO: 1.06 (ref 0.22–1.74)
GFR, ESTIMATED: 30 ML/MIN/1.73M2
GLUCOSE BLD-MCNC: 100 MG/DL (ref 65–105)
GLUCOSE BLD-MCNC: 124 MG/DL (ref 65–105)
GLUCOSE BLD-MCNC: 50 MG/DL (ref 65–105)
GLUCOSE BLD-MCNC: 55 MG/DL (ref 65–105)
GLUCOSE BLD-MCNC: 59 MG/DL (ref 65–105)
GLUCOSE BLD-MCNC: 61 MG/DL (ref 65–105)
GLUCOSE BLD-MCNC: 68 MG/DL (ref 65–105)
GLUCOSE BLD-MCNC: 78 MG/DL (ref 65–105)
GLUCOSE BLD-MCNC: 80 MG/DL (ref 65–105)
GLUCOSE BLD-MCNC: 82 MG/DL (ref 65–105)
GLUCOSE BLD-MCNC: 88 MG/DL (ref 65–105)
GLUCOSE BLD-MCNC: 89 MG/DL (ref 65–105)
GLUCOSE BLD-MCNC: 89 MG/DL (ref 65–105)
GLUCOSE BLD-MCNC: 92 MG/DL (ref 65–105)
GLUCOSE SERPL-MCNC: 55 MG/DL (ref 74–99)
HCT VFR BLD AUTO: 25 % (ref 36.3–47.1)
HGB BLD-MCNC: 8.1 G/DL (ref 11.9–15.1)
IMM GRANULOCYTES # BLD AUTO: 0 K/UL (ref 0–0.3)
IMM GRANULOCYTES NFR BLD: 0 %
KAPPA LC FREE SER-MCNC: 34.4 MG/L
L PNEUMO1 AG UR QL IA.RAPID: NEGATIVE
LAMBDA LC FREE SERPL-MCNC: 32.6 MG/L (ref 4.2–27.7)
LYMPHOCYTES NFR BLD: 1.83 K/UL (ref 1–4.8)
LYMPHOCYTES RELATIVE PERCENT: 30 % (ref 24–44)
MCH RBC QN AUTO: 38.9 PG (ref 25.2–33.5)
MCHC RBC AUTO-ENTMCNC: 32.4 G/DL (ref 28.4–34.8)
MCV RBC AUTO: 120.2 FL (ref 82.6–102.9)
MICROORGANISM SPEC CULT: NORMAL
MONOCYTES NFR BLD: 0.31 K/UL (ref 0.1–0.8)
MONOCYTES NFR BLD: 5 % (ref 1–7)
MORPHOLOGY: ABNORMAL
MORPHOLOGY: ABNORMAL
NEUTROPHILS NFR BLD: 63 % (ref 36–66)
NEUTS SEG NFR BLD: 3.84 K/UL (ref 1.8–7.7)
NRBC BLD-RTO: 0 PER 100 WBC
OSMOLALITY UR: 461 MOSM/KG (ref 80–1300)
PLATELET # BLD AUTO: 217 K/UL (ref 138–453)
PMV BLD AUTO: 10.1 FL (ref 8.1–13.5)
POTASSIUM SERPL-SCNC: 3.7 MMOL/L (ref 3.7–5.3)
RBC # BLD AUTO: 2.08 M/UL (ref 3.95–5.11)
S PNEUM AG SPEC QL: NEGATIVE
SERVICE CMNT-IMP: NORMAL
SODIUM SERPL-SCNC: 140 MMOL/L (ref 136–145)
SODIUM UR-SCNC: 119 MMOL/L
SPECIMEN DESCRIPTION: NORMAL
SPECIMEN SOURCE: NORMAL
TROPONIN I SERPL HS-MCNC: 80 NG/L (ref 0–14)
WBC OTHER # BLD: 6.1 K/UL (ref 3.5–11.3)

## 2025-06-15 PROCEDURE — 84484 ASSAY OF TROPONIN QUANT: CPT

## 2025-06-15 PROCEDURE — 93010 ELECTROCARDIOGRAM REPORT: CPT | Performed by: INTERNAL MEDICINE

## 2025-06-15 PROCEDURE — 80048 BASIC METABOLIC PNL TOTAL CA: CPT

## 2025-06-15 PROCEDURE — 84300 ASSAY OF URINE SODIUM: CPT

## 2025-06-15 PROCEDURE — 86160 COMPLEMENT ANTIGEN: CPT

## 2025-06-15 PROCEDURE — 83935 ASSAY OF URINE OSMOLALITY: CPT

## 2025-06-15 PROCEDURE — 36415 COLL VENOUS BLD VENIPUNCTURE: CPT

## 2025-06-15 PROCEDURE — 76770 US EXAM ABDO BACK WALL COMP: CPT

## 2025-06-15 PROCEDURE — 99222 1ST HOSP IP/OBS MODERATE 55: CPT | Performed by: PSYCHIATRY & NEUROLOGY

## 2025-06-15 PROCEDURE — 6360000002 HC RX W HCPCS: Performed by: PSYCHIATRY & NEUROLOGY

## 2025-06-15 PROCEDURE — 95700 EEG CONT REC W/VID EEG TECH: CPT

## 2025-06-15 PROCEDURE — 87449 NOS EACH ORGANISM AG IA: CPT

## 2025-06-15 PROCEDURE — 99223 1ST HOSP IP/OBS HIGH 75: CPT | Performed by: INTERNAL MEDICINE

## 2025-06-15 PROCEDURE — 6360000002 HC RX W HCPCS

## 2025-06-15 PROCEDURE — 99222 1ST HOSP IP/OBS MODERATE 55: CPT | Performed by: INTERNAL MEDICINE

## 2025-06-15 PROCEDURE — 2500000003 HC RX 250 WO HCPCS

## 2025-06-15 PROCEDURE — 2500000003 HC RX 250 WO HCPCS: Performed by: PSYCHIATRY & NEUROLOGY

## 2025-06-15 PROCEDURE — 2580000003 HC RX 258

## 2025-06-15 PROCEDURE — 85025 COMPLETE CBC W/AUTO DIFF WBC: CPT

## 2025-06-15 PROCEDURE — 84165 PROTEIN E-PHORESIS SERUM: CPT

## 2025-06-15 PROCEDURE — 84155 ASSAY OF PROTEIN SERUM: CPT

## 2025-06-15 PROCEDURE — 87205 SMEAR GRAM STAIN: CPT

## 2025-06-15 PROCEDURE — 83521 IG LIGHT CHAINS FREE EACH: CPT

## 2025-06-15 PROCEDURE — 87899 AGENT NOS ASSAY W/OPTIC: CPT

## 2025-06-15 PROCEDURE — 99232 SBSQ HOSP IP/OBS MODERATE 35: CPT

## 2025-06-15 PROCEDURE — 82525 ASSAY OF COPPER: CPT

## 2025-06-15 PROCEDURE — 2060000000 HC ICU INTERMEDIATE R&B

## 2025-06-15 PROCEDURE — 95711 VEEG 2-12 HR UNMONITORED: CPT

## 2025-06-15 RX ORDER — LORAZEPAM 2 MG/ML
2 INJECTION INTRAMUSCULAR
Status: DISCONTINUED | OUTPATIENT
Start: 2025-06-15 | End: 2025-06-15

## 2025-06-15 RX ORDER — LORAZEPAM 2 MG/ML
2 INJECTION INTRAMUSCULAR ONCE
Status: DISCONTINUED | OUTPATIENT
Start: 2025-06-15 | End: 2025-06-15

## 2025-06-15 RX ORDER — LORAZEPAM 2 MG/ML
1 INJECTION INTRAMUSCULAR EVERY 10 MIN PRN
Status: DISCONTINUED | OUTPATIENT
Start: 2025-06-15 | End: 2025-07-03 | Stop reason: HOSPADM

## 2025-06-15 RX ORDER — DEXTROSE MONOHYDRATE AND SODIUM CHLORIDE 5; .45 G/100ML; G/100ML
INJECTION, SOLUTION INTRAVENOUS CONTINUOUS
Status: DISCONTINUED | OUTPATIENT
Start: 2025-06-15 | End: 2025-06-17

## 2025-06-15 RX ADMIN — DEXTROSE AND SODIUM CHLORIDE: 5; .45 INJECTION, SOLUTION INTRAVENOUS at 17:41

## 2025-06-15 RX ADMIN — AMPICILLIN AND SULBACTAM 1500 MG: 1; .5 INJECTION, POWDER, FOR SOLUTION INTRAMUSCULAR; INTRAVENOUS at 09:30

## 2025-06-15 RX ADMIN — SODIUM CHLORIDE, PRESERVATIVE FREE 10 ML: 5 INJECTION INTRAVENOUS at 09:00

## 2025-06-15 RX ADMIN — HEPARIN SODIUM 5000 UNITS: 5000 INJECTION INTRAVENOUS; SUBCUTANEOUS at 22:17

## 2025-06-15 RX ADMIN — THIAMINE HYDROCHLORIDE 200 MG: 100 INJECTION, SOLUTION INTRAMUSCULAR; INTRAVENOUS at 16:17

## 2025-06-15 RX ADMIN — THIAMINE HYDROCHLORIDE 200 MG: 100 INJECTION, SOLUTION INTRAMUSCULAR; INTRAVENOUS at 20:11

## 2025-06-15 RX ADMIN — SODIUM CHLORIDE, PRESERVATIVE FREE 10 ML: 5 INJECTION INTRAVENOUS at 20:11

## 2025-06-15 RX ADMIN — DEXTROSE 750 ML: 10 SOLUTION INTRAVENOUS at 03:51

## 2025-06-15 RX ADMIN — WATER 10 MG: 1 INJECTION INTRAMUSCULAR; INTRAVENOUS; SUBCUTANEOUS at 16:15

## 2025-06-15 RX ADMIN — HEPARIN SODIUM 5000 UNITS: 5000 INJECTION INTRAVENOUS; SUBCUTANEOUS at 15:08

## 2025-06-15 RX ADMIN — HEPARIN SODIUM 5000 UNITS: 5000 INJECTION INTRAVENOUS; SUBCUTANEOUS at 06:13

## 2025-06-15 RX ADMIN — DEXTROSE AND SODIUM CHLORIDE: 5; .45 INJECTION, SOLUTION INTRAVENOUS at 08:05

## 2025-06-15 RX ADMIN — SODIUM CHLORIDE: 0.45 INJECTION, SOLUTION INTRAVENOUS at 06:49

## 2025-06-15 RX ADMIN — THIAMINE HYDROCHLORIDE 200 MG: 100 INJECTION, SOLUTION INTRAMUSCULAR; INTRAVENOUS at 09:33

## 2025-06-15 NOTE — PLAN OF CARE
Problem: Seizure Precautions  Goal: Remains free of injury related to seizures activity  Outcome: Progressing  Flowsheets (Taken 6/14/2025 1930)  Remains free of injury related to seizure activity:   Maintain airway, patient safety  and administer oxygen as ordered   Monitor patient for seizure activity, document and report duration and description of seizure to Licensed Independent Practitioner   If seizure occurs, turn patient to side and suction secretions as needed   Reorient patient post seizure   Seizure pads on all 4 side rails     Problem: Respiratory - Adult  Goal: Achieves optimal ventilation and oxygenation  6/15/2025 0214 by Faiza Guy RN  Outcome: Progressing  6/14/2025 2329 by Raina uLcas RCP  Outcome: Progressing     Problem: Safety - Adult  Goal: Free from fall injury  Outcome: Progressing     Problem: Skin/Tissue Integrity  Goal: Skin integrity remains intact  Description: 1.  Monitor for areas of redness and/or skin breakdown  2.  Assess vascular access sites hourly  3.  Every 4-6 hours minimum:  Change oxygen saturation probe site  4.  Every 4-6 hours:  If on nasal continuous positive airway pressure, respiratory therapy assess nares and determine need for appliance change or resting period  Outcome: Progressing

## 2025-06-15 NOTE — PROGRESS NOTES
[facility]@    [facility]   IN-PATIENT SERVICE   [facility]    Progress Note    6/15/2025    11:42 AM    Name:   Albertina Manning  MRN:     [id number]     Acct:      [id number]   Room:   0450/0450-01   Day:  1  Admit Date:  6/14/2025  7:18 PM    PCP:   Rocky Tobias Sr., DO  Code Status:  Full Code    Subjective:     C/C: Altered mental status, slurred speech, tremors    Patient evaluated at bedside, her daughter is also present at bedside.  Patient is alert and oriented x 2.  Patient's daughter states that her altered mental status, slurred speech, and tremors started about 2 weeks ago and she has had a progressive decline since then.  Prior to that she was completely independent in activities of daily living.  States that patient had a UTI which she initially thought caused issues with her mentation.  Patient's daughter also states that she has been having swallowing issues.    Brief History:     This is a 70-year-old female with past medical history of dementia, atherosclerosis of native coronary artery of native heart, CKD stage IIIb, tobacco use, depression, chronic obstructive bronchitis, schizoaffective disorder, tremor, and secondary malignant neoplasm of bone who presented to [place] emergency department originally earlier today and subsequently transferred to [facility] for further management of pneumonia, altered mental status, and elevated troponins. Patient with altered mental status with an inability to communicate. Over the past week patient has had worsening mentation and refusing to eat or take her medications at her nursing facility.  Her daughter had reported that she has been diagnosed with dementia within the past year and has had increasingly worsening symptoms of dementia.  Workup in the ED there in [place] was remarkable for both pneumonia and elevated troponins chest x-ray did show pneumonia of the right upper lobe.  There is a concern due to her

## 2025-06-15 NOTE — CONSULTS
[facility] Neurology   IN-PATIENT SERVICE   [facility]    NEUROLOGY CONSULT NOTE            Date:   6/14/2025  Patient name:  Albertina Manning  Date of admission:  6/14/2025  7:18 PM  MRN:   [id number]  Account:  [id number]  YOB: 1955  PCP:    Rocky Tobias Sr., DO  Room:   [address] [place] [address]  Code Status:    Full Code    Chief Complaint:     AMS    History Obtained From:     electronic medical record    History of Present Illness:     Albertina Manning is a 70 y.o. female with a past medical history of recent diagnosis of rapidly progressing dementia, metastatic breast cancer, schizoaffective disorder, CKD, CAD, DM 2, and depression who was transferred from [facility] ED after presenting with altered mental status from SNF.  Patient reportedly had worsening mentation and refusal to eat, drink, or take her medications.  In the [facility] ED, patient was found to have evidence of right upper lobe pneumonia, FREDERICK on CKD, elevated troponin, and hypernatremia.  CT head at OSH was unremarkable.  UDS unremarkable, positive for benzodiazepines-were given in the ED.    Neurology was consulted for AMS and concern for seizure-like activity.  On my arrival at bedside, patient exhibiting continuous choreoathetoid movements and orofacial dyskinesias. Is stuporous, with no usable speech production, not following commands.    Patient had a recent extensive neurological workup at [facility] less than 1 month ago due to encephalopathy which reportedly improved after treating an underlying UTI.  Workup included: MRI brain, MRV, and EEG.  Studies notable for mild global parenchymal atrophy, moderate to severe small vessel disease, and severe diffuse encephalopathy on EEG (no evidence of seizures).      Past Medical History:     Past Medical History:   Diagnosis Date    Alcohol dependence in remission (HCC)     Anxiety     Arthritis     CAD (coronary artery disease)     without angina    Cancer (HCC)

## 2025-06-15 NOTE — CARE COORDINATION
Attempted to meet with patient to complete initial case management assessment but patient is currently disoriented and agitated and yelling. Not appropriate at this time

## 2025-06-15 NOTE — PLAN OF CARE
Problem: Seizure Precautions  Goal: Remains free of injury related to seizures activity  Outcome: Progressing     Problem: Respiratory - Adult  Goal: Achieves optimal ventilation and oxygenation  Outcome: Progressing     Problem: Neurosensory - Adult  Goal: Achieves stable or improved neurological status  Outcome: Progressing  Goal: Absence of seizures  Outcome: Progressing     Problem: Cardiovascular - Adult  Goal: Maintains optimal cardiac output and hemodynamic stability  Outcome: Progressing     Problem: Skin/Tissue Integrity - Adult  Goal: Skin integrity remains intact  Description: 1.  Monitor for areas of redness and/or skin breakdown  2.  Assess vascular access sites hourly  3.  Every 4-6 hours minimum:  Change oxygen saturation probe site  4.  Every 4-6 hours:  If on nasal continuous positive airway pressure, respiratory therapy assess nares and determine need for appliance change or resting period  Outcome: Progressing  Goal: Incisions, wounds, or drain sites healing without S/S of infection  Outcome: Progressing     Problem: Musculoskeletal - Adult  Goal: Return mobility to safest level of function  Outcome: Progressing     Problem: Infection - Adult  Goal: Absence of infection at discharge  Outcome: Progressing     Problem: Metabolic/Fluid and Electrolytes - Adult  Goal: Electrolytes maintained within normal limits  Outcome: Progressing  Goal: Hemodynamic stability and optimal renal function maintained  Outcome: Progressing  Goal: Glucose maintained within prescribed range  Outcome: Progressing     Problem: Safety - Adult  Goal: Free from fall injury  Outcome: Progressing     Problem: Skin/Tissue Integrity  Goal: Skin integrity remains intact  Description: 1.  Monitor for areas of redness and/or skin breakdown  2.  Assess vascular access sites hourly  3.  Every 4-6 hours minimum:  Change oxygen saturation probe site  4.  Every 4-6 hours:  If on nasal continuous positive airway pressure, respiratory

## 2025-06-15 NOTE — CONSULTS
[facility]    Consult / H&P               Today's Date: 6/14/2025  Patient Name: Albertina Manning  Date of admission: 6/14/2025  7:18 PM  Patient's age: 70 y.o., 1955  Admission Dx: Pneumonia [J18.9]  Pneumonia due to organism [J18.9]    Requesting Physician: Mary Grace Silva MD    Cardiac Evaluation Reason: Elevated troponins    History Obtained From: patient and chart review     History of Present Illness:      This patient 70 y.o. years old with PMH significant for dementia, CKD, is admitted to the hospital with pneumonia, altered mental status in the setting of sepsis, hypernatremia.  Per documentation, patient has poor p.o. intake worsening mentation for few days.  Initial lactic acid was 5.6, WBC 7.1.  CT head was negative for any acute abnormality.    Initial troponin 73, repeat 81.  EKG without any ischemic changes.  Sodium 147, creatinine 2.4, EGFR 21.    On bedside evaluation, patient completely confused.  No history was obtained from the patient    Past Medical History:   has a past medical history of Alcohol dependence in remission (HCC), Anxiety, Arthritis, CAD (coronary artery disease), Cancer (HCC), Chronic bronchitis (HCC), Cognitive communication deficit, Diverticulosis, Esophageal varices without bleeding (HCC), Insomnia, Major depressive disorder, Osteoporosis, Schizoaffective disorder (HCC), and Tremor.    Past Surgical History:   has a past surgical history that includes Tubal ligation; back surgery; US BREAST BIOPSY W LOC DEVICE 1ST LESION RIGHT (Right, 2/27/2023); US PLACE BREAST LOC DEVICE 1ST LESION RIGHT (Right, 8/8/2023); Breast biopsy (Right, 8/8/2023); and Axillary Surgery (Right, 8/8/2023).     Home Medications:    Prior to Admission medications    Medication Sig Start Date End Date Taking? Authorizing Provider   atorvastatin (LIPITOR) 10 MG tablet Take 1 tablet by mouth daily    Provider, MD Desirae   dexAMETHasone (DECADRON) 2 MG tablet Take 1 tablet by mouth  daily (with breakfast)    Desirae Cooper MD   donepezil (ARICEPT) 5 MG tablet Take 1 tablet by mouth nightly    Dseirae Cooper MD   potassium chloride 20 MEQ/15ML (10%) oral solution Take 15 mLs by mouth daily    Desirae Cooper MD   amantadine (SYMMETREL) 100 MG capsule Take 1 capsule by mouth 2 times daily    Desirae Cooper MD   ammonium lactate (LAC-HYDRIN) 12 % lotion Apply 1 Bottle topically as needed for Dry Skin (apply to the patinets feet every morning) Apply topically as needed.    Desirae Cooper MD   sulfamethoxazole-trimethoprim (BACTRIM DS;SEPTRA DS) 800-160 MG per tablet Take 1 tablet by mouth 2 times daily 1 tablet 2 times daily for UTI, D/C Saturday 6/21    Desirae Cooper MD   Diclofenac 35 MG CAPS Take 75 mg by mouth 2 times daily at 0800 and 1400    Desirae Cooper MD   guaiFENesin 200 MG/5ML LIQD Take 15 mLs by mouth in the morning and 15 mLs in the evening. For cough.    Desirae Cooper MD   magnesium oxide (MAG-OX) 400 (240 Mg) MG tablet Take 1 tablet by mouth 2 times daily    Desirae Cooper MD   OLANZapine (ZYPREXA) 2.5 MG tablet Take 1 tablet by mouth 2 times daily For mood    Desirae Cooper MD   sulfacetamide (BLEPH-10) 10 % ophthalmic solution Place 1 drop into both eyes 4 times daily    Desirae Cooper MD   letrozole (FEMARA) 2.5 MG tablet TAKE 1 TABLET BY MOUTH DAILY 4/21/25 6/14/25  Evelia Mayorga MD   Abemaciclib 150 MG TABS Take 150 mg by mouth 2 times daily 1/30/25   Evelia Mayorag MD   ondansetron (ZOFRAN) 4 MG tablet Take 1 tablet by mouth every 8 hours as needed for Nausea or Vomiting 12/12/24   Evelia Mayorga MD   polyvinyl alcohol (ARTIFICIAL TEARS) 1.4 % ophthalmic solution 1 drop as needed    Desirae Cooper MD   dapagliflozin (FARXIGA) 10 MG tablet Take 1 tablet by mouth every morning    Desirae Cooper MD   traZODone (DESYREL) 50 MG tablet  5/5/23   Desirae Cooper MD   DULoxetine

## 2025-06-15 NOTE — PROGRESS NOTES
[facility]  PROGRESS NOTE    Shift date: 6/15/2025  Shift day: Sunday   Shift # 1    Room # 0450/0450-01   Name: Albertina Manning                Sikh: unknown   Place of Quaker: n/a    Referral: Routine Visit    Admit Date & Time: 6/14/2025  7:18 PM    Assessment:  Albertina Manning is a 70 y.o. female in the hospital because of pneumonia . Upon entering the room writer observes pt is awake but not oriented/aware.      Intervention:  Writer introduced self and title as    Pt did not appear completely aware of 's presence.  inquired how pt was feeling, but pt was not able to respond.  offered short prayer over pt.     Outcome:  Pt appeared less anxious after prayer.     Plan:  Chaplains will remain available to offer spiritual and emotional support as needed.      Electronically signed by Brandon Baeza, Intern, on 6/15/2025 at 11:46 AM.  [facility]  [phone]

## 2025-06-15 NOTE — CONSULTS
Nephrology Consult Note    Reason for Consult: FREDERICK, mild hypernatremia  Requesting Physician: Dr. Walden    Chief Complaint: Altered mental status,?  Seizure    History Obtained From:  family member -patient's daughter, electronic medical record    History of Present Illness:              This is a 70 y.o. female who presented to the hospital for evaluation of altered mental status, very poor oral intake over the last 3 to 4 days possibly a seizure.  Patient's daughter tells me that about a month or so ago patient was actually fairly independent and was feeling well and doing okay and then she started having issues with altered mental status for which she was actually hospitalized at [facility].  There was a question of her having had a UTI that was being treated.  During that admission she did have FREDERICK with a creatinine of 2.5 which responded to fluids and her creatinine actually had come down to normal of 0.77 upon discharge about 2 weeks or so ago    She now presents back to the emergency room from [facility] with complaints of very poor oral intake over the last 3 days or so, increasing confusion and towards the end she was having some seizure-like activity with some myoclonic jerks.  Patient's daughter does not give any history of any recent new meds.  She did tell me that as of late she was being worked up for possible dementia.  She apparently has history of schizoaffective disorder.  She has previously been treated for depression as well at 1 time.  Patient has history of COPD, history of smoking, recent admission to [facility] with altered mental status for which she underwent extensive neurological workup as well less than a month ago.  She has had history of breast cancer and right breast lumpectomy    In the emergency room on initial eval she was noted to have a sodium level of 147, creatinine of 2.4.  She was started on hypotonic fluid after conversing with us.  Her sodium level has improved down to

## 2025-06-16 PROBLEM — Z79.899 POLYPHARMACY: Status: ACTIVE | Noted: 2025-06-16

## 2025-06-16 PROBLEM — E87.6 HYPOKALEMIA: Status: ACTIVE | Noted: 2025-06-16

## 2025-06-16 PROBLEM — G93.41 ACUTE METABOLIC ENCEPHALOPATHY: Status: ACTIVE | Noted: 2025-06-16

## 2025-06-16 LAB
ANION GAP SERPL CALCULATED.3IONS-SCNC: 12 MMOL/L (ref 9–16)
BASOPHILS # BLD: 0.06 K/UL (ref 0–0.2)
BASOPHILS NFR BLD: 1 % (ref 0–2)
BUN SERPL-MCNC: 14 MG/DL (ref 8–23)
CALCIUM SERPL-MCNC: 7.5 MG/DL (ref 8.6–10.4)
CHLORIDE SERPL-SCNC: 109 MMOL/L (ref 98–107)
CO2 SERPL-SCNC: 18 MMOL/L (ref 20–31)
CREAT SERPL-MCNC: 1.3 MG/DL (ref 0.6–0.9)
EOSINOPHIL # BLD: 0.18 K/UL (ref 0–0.44)
EOSINOPHILS RELATIVE PERCENT: 3 % (ref 1–4)
ERYTHROCYTE [DISTWIDTH] IN BLOOD BY AUTOMATED COUNT: 16 % (ref 11.8–14.4)
GFR, ESTIMATED: 44 ML/MIN/1.73M2
GLUCOSE BLD-MCNC: 115 MG/DL (ref 65–105)
GLUCOSE BLD-MCNC: 117 MG/DL (ref 65–105)
GLUCOSE BLD-MCNC: 125 MG/DL (ref 65–105)
GLUCOSE BLD-MCNC: 125 MG/DL (ref 65–105)
GLUCOSE BLD-MCNC: 130 MG/DL (ref 65–105)
GLUCOSE BLD-MCNC: 97 MG/DL (ref 65–105)
GLUCOSE SERPL-MCNC: 132 MG/DL (ref 74–99)
HCT VFR BLD AUTO: 26.6 % (ref 36.3–47.1)
HGB BLD-MCNC: 8.4 G/DL (ref 11.9–15.1)
IMM GRANULOCYTES # BLD AUTO: 0 K/UL (ref 0–0.3)
IMM GRANULOCYTES NFR BLD: 0 %
LYMPHOCYTES NFR BLD: 1.28 K/UL (ref 1.1–3.7)
LYMPHOCYTES RELATIVE PERCENT: 21 % (ref 24–43)
MAGNESIUM SERPL-MCNC: 2.4 MG/DL (ref 1.6–2.4)
MCH RBC QN AUTO: 37.2 PG (ref 25.2–33.5)
MCHC RBC AUTO-ENTMCNC: 31.6 G/DL (ref 28.4–34.8)
MCV RBC AUTO: 117.7 FL (ref 82.6–102.9)
MONOCYTES NFR BLD: 0.24 K/UL (ref 0.1–1.2)
MONOCYTES NFR BLD: 4 % (ref 3–12)
MORPHOLOGY: ABNORMAL
NEUTROPHILS NFR BLD: 71 % (ref 36–65)
NEUTS SEG NFR BLD: 4.34 K/UL (ref 1.5–8.1)
NRBC BLD-RTO: 0 PER 100 WBC
PHOSPHATE SERPL-MCNC: 2.3 MG/DL (ref 2.5–4.5)
PLATELET # BLD AUTO: 182 K/UL (ref 138–453)
PMV BLD AUTO: 10.5 FL (ref 8.1–13.5)
POTASSIUM SERPL-SCNC: 3.2 MMOL/L (ref 3.7–5.3)
RBC # BLD AUTO: 2.26 M/UL (ref 3.95–5.11)
SODIUM SERPL-SCNC: 139 MMOL/L (ref 136–145)
WBC OTHER # BLD: 6.1 K/UL (ref 3.5–11.3)

## 2025-06-16 PROCEDURE — 2060000000 HC ICU INTERMEDIATE R&B

## 2025-06-16 PROCEDURE — 95714 VEEG EA 12-26 HR UNMNTR: CPT

## 2025-06-16 PROCEDURE — 83735 ASSAY OF MAGNESIUM: CPT

## 2025-06-16 PROCEDURE — 6370000000 HC RX 637 (ALT 250 FOR IP)

## 2025-06-16 PROCEDURE — 36415 COLL VENOUS BLD VENIPUNCTURE: CPT

## 2025-06-16 PROCEDURE — 94761 N-INVAS EAR/PLS OXIMETRY MLT: CPT

## 2025-06-16 PROCEDURE — 84100 ASSAY OF PHOSPHORUS: CPT

## 2025-06-16 PROCEDURE — 4A10X4Z MONITORING OF CENTRAL NERVOUS ELECTRICAL ACTIVITY, EXTERNAL APPROACH: ICD-10-PCS | Performed by: PSYCHIATRY & NEUROLOGY

## 2025-06-16 PROCEDURE — 99232 SBSQ HOSP IP/OBS MODERATE 35: CPT | Performed by: STUDENT IN AN ORGANIZED HEALTH CARE EDUCATION/TRAINING PROGRAM

## 2025-06-16 PROCEDURE — 99232 SBSQ HOSP IP/OBS MODERATE 35: CPT

## 2025-06-16 PROCEDURE — 85025 COMPLETE CBC W/AUTO DIFF WBC: CPT

## 2025-06-16 PROCEDURE — 80048 BASIC METABOLIC PNL TOTAL CA: CPT

## 2025-06-16 PROCEDURE — 94640 AIRWAY INHALATION TREATMENT: CPT

## 2025-06-16 PROCEDURE — 2580000003 HC RX 258

## 2025-06-16 PROCEDURE — 99233 SBSQ HOSP IP/OBS HIGH 50: CPT | Performed by: SURGERY

## 2025-06-16 PROCEDURE — 82947 ASSAY GLUCOSE BLOOD QUANT: CPT

## 2025-06-16 PROCEDURE — 6360000002 HC RX W HCPCS

## 2025-06-16 PROCEDURE — 95720 EEG PHY/QHP EA INCR W/VEEG: CPT | Performed by: PSYCHIATRY & NEUROLOGY

## 2025-06-16 PROCEDURE — 99232 SBSQ HOSP IP/OBS MODERATE 35: CPT | Performed by: INTERNAL MEDICINE

## 2025-06-16 PROCEDURE — 2500000003 HC RX 250 WO HCPCS

## 2025-06-16 RX ORDER — POTASSIUM CHLORIDE 1500 MG/1
40 TABLET, EXTENDED RELEASE ORAL ONCE
Status: DISCONTINUED | OUTPATIENT
Start: 2025-06-16 | End: 2025-06-16 | Stop reason: CLARIF

## 2025-06-16 RX ORDER — POTASSIUM CHLORIDE 7.45 MG/ML
10 INJECTION INTRAVENOUS
Status: COMPLETED | OUTPATIENT
Start: 2025-06-16 | End: 2025-06-17

## 2025-06-16 RX ORDER — POTASSIUM CHLORIDE 7.45 MG/ML
10 INJECTION INTRAVENOUS ONCE
Status: DISCONTINUED | OUTPATIENT
Start: 2025-06-16 | End: 2025-06-16

## 2025-06-16 RX ORDER — MAGNESIUM SULFATE IN WATER 40 MG/ML
2000 INJECTION, SOLUTION INTRAVENOUS PRN
Status: DISCONTINUED | OUTPATIENT
Start: 2025-06-16 | End: 2025-06-16 | Stop reason: CLARIF

## 2025-06-16 RX ORDER — POTASSIUM CHLORIDE 1500 MG/1
40 TABLET, EXTENDED RELEASE ORAL PRN
Status: DISCONTINUED | OUTPATIENT
Start: 2025-06-16 | End: 2025-06-16 | Stop reason: CLARIF

## 2025-06-16 RX ORDER — POTASSIUM CHLORIDE 7.45 MG/ML
10 INJECTION INTRAVENOUS PRN
Status: DISCONTINUED | OUTPATIENT
Start: 2025-06-16 | End: 2025-06-16 | Stop reason: CLARIF

## 2025-06-16 RX ADMIN — THIAMINE HYDROCHLORIDE 200 MG: 100 INJECTION, SOLUTION INTRAMUSCULAR; INTRAVENOUS at 22:12

## 2025-06-16 RX ADMIN — ACETAMINOPHEN 650 MG: 650 SUPPOSITORY RECTAL at 00:13

## 2025-06-16 RX ADMIN — AMPICILLIN AND SULBACTAM 1500 MG: 1; .5 INJECTION, POWDER, FOR SOLUTION INTRAMUSCULAR; INTRAVENOUS at 12:05

## 2025-06-16 RX ADMIN — IPRATROPIUM BROMIDE AND ALBUTEROL SULFATE 1 DOSE: .5; 2.5 SOLUTION RESPIRATORY (INHALATION) at 15:16

## 2025-06-16 RX ADMIN — POTASSIUM CHLORIDE 10 MEQ: 7.46 INJECTION, SOLUTION INTRAVENOUS at 18:11

## 2025-06-16 RX ADMIN — POTASSIUM CHLORIDE 10 MEQ: 7.46 INJECTION, SOLUTION INTRAVENOUS at 19:59

## 2025-06-16 RX ADMIN — THIAMINE HYDROCHLORIDE 200 MG: 100 INJECTION, SOLUTION INTRAMUSCULAR; INTRAVENOUS at 09:30

## 2025-06-16 RX ADMIN — IPRATROPIUM BROMIDE AND ALBUTEROL SULFATE 1 DOSE: .5; 2.5 SOLUTION RESPIRATORY (INHALATION) at 20:40

## 2025-06-16 RX ADMIN — HEPARIN SODIUM 5000 UNITS: 5000 INJECTION INTRAVENOUS; SUBCUTANEOUS at 06:33

## 2025-06-16 RX ADMIN — HEPARIN SODIUM 5000 UNITS: 5000 INJECTION INTRAVENOUS; SUBCUTANEOUS at 22:12

## 2025-06-16 RX ADMIN — IPRATROPIUM BROMIDE AND ALBUTEROL SULFATE 1 DOSE: .5; 2.5 SOLUTION RESPIRATORY (INHALATION) at 08:51

## 2025-06-16 RX ADMIN — DEXTROSE AND SODIUM CHLORIDE: 5; .45 INJECTION, SOLUTION INTRAVENOUS at 14:14

## 2025-06-16 RX ADMIN — HEPARIN SODIUM 5000 UNITS: 5000 INJECTION INTRAVENOUS; SUBCUTANEOUS at 15:05

## 2025-06-16 RX ADMIN — SODIUM CHLORIDE, PRESERVATIVE FREE 10 ML: 5 INJECTION INTRAVENOUS at 19:59

## 2025-06-16 RX ADMIN — IPRATROPIUM BROMIDE AND ALBUTEROL SULFATE 1 DOSE: .5; 2.5 SOLUTION RESPIRATORY (INHALATION) at 11:52

## 2025-06-16 RX ADMIN — POTASSIUM CHLORIDE 10 MEQ: 7.46 INJECTION, SOLUTION INTRAVENOUS at 20:57

## 2025-06-16 RX ADMIN — THIAMINE HYDROCHLORIDE 200 MG: 100 INJECTION, SOLUTION INTRAMUSCULAR; INTRAVENOUS at 16:10

## 2025-06-16 RX ADMIN — DEXTROSE AND SODIUM CHLORIDE 1000 ML: 5; .45 INJECTION, SOLUTION INTRAVENOUS at 03:44

## 2025-06-16 NOTE — CARE COORDINATION
Case Management Assessment  Initial Evaluation    Date/Time of Evaluation: 6/16/2025 4:42 PM  Assessment Completed by: MIGUEL SAUL RN    If patient is discharged prior to next notation, then this note serves as note for discharge by case management.    Patient Name: Albertina Manning                   YOB: 1955  Diagnosis: Pneumonia [J18.9]  Pneumonia due to organism [J18.9]                   Date / Time: 6/14/2025  7:18 PM    Patient Admission Status: Inpatient   Readmission Risk (Low < 19, Mod (19-27), High > 27): Readmission Risk Score: 18.6    Current PCP: Rocky oTbias Sr., DO  PCP verified by CM? (P) Yes    Chart Reviewed: Yes      History Provided by: (P) Child/Family  Patient Orientation: (P) Unable to Assess    Patient Cognition: (P) Other (see comment) (LIT)    Hospitalization in the last 30 days (Readmission):  No    If yes, Readmission Assessment in  Navigator will be completed.    Advance Directives:      Code Status: Full Code   Patient's Primary Decision Maker is: (P) Patient Declined (Legal Next of Kin Remains as Decision Maker) (legal guardian)      Discharge Planning:    Patient lives with: (P) Children Type of Home: (P) House  Primary Care Giver: (P) Self  Patient Support Systems include: (P) Children, Family Members   Current Financial resources: (P) Medicaid  Current community resources:    Current services prior to admission: (P) Durable Medical Equipment            Current DME: (P) Walker            Type of Home Care services:  (P) None    ADLS  Prior functional level: (P) Assistance with the following:, Shopping, Cooking  Current functional level: (P) Assistance with the following:, Shopping, Cooking    PT AM-PAC:   /24  OT AM-PAC:   /24    Family can provide assistance at DC: (P) Yes  Would you like Case Management to discuss the discharge plan with any other family members/significant others, and if so, who? (P) No  Plans to Return to Present Housing: (P) Yes  Other

## 2025-06-16 NOTE — PROCEDURES
PROCEDURE NOTE  Date: 6/15/2025   Name: Albertina Manning  YOB: 1955    Procedures              Referring physician: Dr. Oneill  Date: 6/16/2025  Start Time: 6/15/2025 @ 1620  End Time: 6/16/2025 @0730    Indication  Patient with encephalopathy, EEG done to rule out subclinical seizures.       Introduction  This continuous video-EEG was acquired using a [employer] workstation at 256 samples/s. Electrodes were placed according to the International 10-20 system. Automated spike and seizure detection algorithms were applied. Video was recorded during this study.    Description  No clear PDR was estimated due to continuous motion and EMG artifact.. No consistent focal slowing or interhemispheric asymmetry was noted. Normal sleep structures were observed. There were no interictal epileptiform discharges or electrographic seizures reliably confirmed.    Events  Patient was observed to have random tremor whole body. EEG correlated with EMG and motion artifact' however EEG is limited.       Impression  Abnormal continuous vEEG recording, the slowing mentioned above suggests mild-moderate non specific encephalopathy.     The EEG was limited due to continuous motion and EMG artifact that made recognizing sharper discharges reliably impossible. Correlate clinically.     Elie Cui MD  Epilepsy Board Certified.  Neurology Board Certified.    Electronically Signed

## 2025-06-16 NOTE — PLAN OF CARE
Problem: Seizure Precautions  Goal: Remains free of injury related to seizures activity  6/16/2025 0216 by Faiza Guy RN  Outcome: Progressing  6/15/2025 1702 by Rolan Phelps RN  Outcome: Progressing     Problem: Respiratory - Adult  Goal: Achieves optimal ventilation and oxygenation  6/16/2025 0216 by Faiza Guy RN  Outcome: Progressing  6/15/2025 1702 by Rolan Phelps RN  Outcome: Progressing     Problem: Safety - Adult  Goal: Free from fall injury  6/16/2025 0216 by Faiza Guy RN  Outcome: Progressing  6/15/2025 1702 by Rolan Phelps RN  Outcome: Progressing     Problem: Skin/Tissue Integrity  Goal: Skin integrity remains intact  Description: 1.  Monitor for areas of redness and/or skin breakdown  2.  Assess vascular access sites hourly  3.  Every 4-6 hours minimum:  Change oxygen saturation probe site  4.  Every 4-6 hours:  If on nasal continuous positive airway pressure, respiratory therapy assess nares and determine need for appliance change or resting period  6/16/2025 0216 by Faiza Guy RN  Outcome: Progressing  6/15/2025 1702 by Rolan Phelps RN  Outcome: Progressing     Problem: ABCDS Injury Assessment  Goal: Absence of physical injury  6/16/2025 0216 by Faiza Guy RN  Outcome: Progressing  6/15/2025 1702 by Rolan Phelps RN  Outcome: Progressing     Problem: Neurosensory - Adult  Goal: Achieves stable or improved neurological status  6/16/2025 0216 by Faiza Guy RN  Outcome: Progressing  6/15/2025 1702 by Rolan Phelps RN  Outcome: Progressing  Goal: Absence of seizures  6/16/2025 0216 by Faiza Guy RN  Outcome: Progressing  6/15/2025 1702 by Rolan Phelps RN  Outcome: Progressing     Problem: Cardiovascular - Adult  Goal: Maintains optimal cardiac output and hemodynamic stability  6/16/2025 0216 by Faiza Guy RN  Outcome: Progressing  6/15/2025 1702 by Rolan Phelps RN  Outcome: Progressing     Problem: Skin/Tissue

## 2025-06-16 NOTE — PROGRESS NOTES
PHARMACY NOTE:    The electrolyte replacement protocol for potassium/magnesium/phosphate  has been discontinued per P&T guidelines because the patient has reduced renal function (CrCl < 30 mL/min).      The patient's most recent potassium & magnesium levels are:  Recent Labs     06/14/25  1158 06/14/25  2010 06/15/25  0714 06/16/25  0912   K 5.0 4.9 3.7 3.2*   MG 3.6* 3.6*  --  2.4     Estimated Creatinine Clearance: 29 mL/min (A) (based on SCr of 1.3 mg/dL (H)).    For patients with decreased renal function (below 30ml/min) needing potassium/magnesium supplementation, please order individual bolus doses with appropriate monitoring.      Please contact the inpatient pharmacy with any concerns.  Thank you.  Jaqueline Cabezas RPH  6/16/2025 1:43 PM

## 2025-06-16 NOTE — PROGRESS NOTES
Renal Progress Note    Patient :  Albertina Manning; 70 y.o. MRN# [id number]  [facility]  Attending:  Dedrick Waledn DO  Admit Date:  6/14/2025   Hospital Day: 2    Subjective:     Afebrile, hemodynamically stable, on room air  Labs showing potassium 2.2, bicarb 18, BUN 14, creatinine 1.3  Urine output not charted  Complement levels normal, eosinophils, electrophoresis pending, K/L 1.06  Renal ultrasound 6/15/2025 demonstrates right side 9.4 cm and left side 8.9 cm with normal echogenicity and no hydronephrosis    Outpatient Medications:     Medications Prior to Admission: atorvastatin (LIPITOR) 10 MG tablet, Take 1 tablet by mouth daily  dexAMETHasone (DECADRON) 2 MG tablet, Take 1 tablet by mouth daily (with breakfast)  donepezil (ARICEPT) 5 MG tablet, Take 1 tablet by mouth nightly  potassium chloride 20 MEQ/15ML (10%) oral solution, Take 15 mLs by mouth daily  amantadine (SYMMETREL) 100 MG capsule, Take 1 capsule by mouth 2 times daily  ammonium lactate (LAC-HYDRIN) 12 % lotion, Apply 1 Bottle topically as needed for Dry Skin (apply to the patinets feet every morning) Apply topically as needed.  sulfamethoxazole-trimethoprim (BACTRIM DS;SEPTRA DS) 800-160 MG per tablet, Take 1 tablet by mouth 2 times daily 1 tablet 2 times daily for UTI, D/C Saturday 6/21  Diclofenac 35 MG CAPS, Take 75 mg by mouth 2 times daily at 0800 and 1400  guaiFENesin 200 MG/5ML LIQD, Take 15 mLs by mouth in the morning and 15 mLs in the evening. For cough.  magnesium oxide (MAG-OX) 400 (240 Mg) MG tablet, Take 1 tablet by mouth 2 times daily  OLANZapine (ZYPREXA) 2.5 MG tablet, Take 1 tablet by mouth 2 times daily For mood  sulfacetamide (BLEPH-10) 10 % ophthalmic solution, Place 1 drop into both eyes 4 times daily  letrozole (FEMARA) 2.5 MG tablet, TAKE 1 TABLET BY MOUTH DAILY  Abemaciclib 150 MG TABS, Take 150 mg by mouth 2 times daily  ondansetron (ZOFRAN) 4 MG tablet, Take 1 tablet by mouth every 8 hours as needed for      I/O last 3 completed shifts:  In: 3125 [I.V.:3044.3; IV Piggyback:80.8]  Out: 50 [Urine:50].    Patient Vitals for the past 96 hrs (Last 3 readings):   Weight   25 0600 49.5 kg (109 lb 2 oz)   06/15/25 0354 49.3 kg (108 lb 11 oz)       Vital Signs:   Temperature:  Temp: 98.7 °F (37.1 °C)  TMax:   Temp (24hrs), Av.7 °F (37.6 °C), Min:97.1 °F (36.2 °C), Max:102.2 °F (39 °C)    Respirations:  Respirations: 15  Pulse:   Pulse: 64  BP:    BP: 104/65  BP Range: Systolic (24hrs), Av , Min:89 , Max:144       Diastolic (24hrs), Av, Min:60, Max:111      Physical Examination:     General:  AAO x 3, speaking in full sentences, no accessory muscle use.  HEENT: Atraumatic, normocephalic, no throat congestion, moist mucosa.  Eyes:   Pupils equal, round and reactive to light, EOMI.  Neck:   Supple  Chest:   Bilateral vesicular breath sounds, no rales or wheezes.  Cardiac:  S1 S2 RR, no murmurs, gallops or rubs.  Abdomen: Soft, non-tender, no masses or organomegaly, BS audible.  :   No suprapubic or flank tenderness.  Neuro:  AAO x 3, No FND.  SKIN:  No rashes, good skin turgor.  Extremities:  No edema.    Labs:       Recent Labs     25  1158 06/15/25  0714 25  0912   WBC 7.1 6.1 6.1   RBC 2.41* 2.08* 2.26*   HGB 9.4* 8.1* 8.4*   HCT 27.3* 25.0* 26.6*   .3* 120.2* 117.7*   MCH 39.0* 38.9* 37.2*   MCHC 34.4 32.4 31.6   RDW 16.1* 16.6* 16.0*    217 182   MPV 10.2 10.1 10.5      BMP:   Recent Labs     06/14/25  2010 06/15/25  0714 25  0912   * 140 139   K 4.9 3.7 3.2*   * 107 109*   CO2 19* 19* 18*   BUN 31* 25* 14   CREATININE 2.3* 1.8* 1.3*   GLUCOSE 55* 55* 132*   CALCIUM 8.4* 8.1* 7.5*      Phosphorus:     Recent Labs     25  0912   PHOS 2.3*     Magnesium:    Recent Labs     25  1158 25  0912   MG 3.6* 3.6* 2.4     SPEP:  Lab Results   Component Value Date/Time    ALBCAL PENDING 06/15/2025 07:14 AM    ALBPCT PENDING 06/15/2025

## 2025-06-16 NOTE — PROGRESS NOTES
@[facility] [email or web address]@    [facility]   IN-PATIENT SERVICE   [facility]    Progress Note    6/16/2025    4:08 PM    Name:   Albertina Manning  MRN:     [id number]     Acct:      [id number]   Room:   0450/0450-01   Day:  2  Admit Date:  6/14/2025  7:18 PM    PCP:   Rocky Tobias Sr., DO  Code Status:  Full Code    Subjective:     C/C: Altered mental status, slurred speech, tremors    Patient evaluated at bedside, her mentation has improved today.  She is alert and oriented x 2-3.  Denies any chest pain, abdominal pain, headaches, dizziness, or any other symptoms.    Brief History:     This is a 70-year-old female with past medical history of dementia, atherosclerosis of native coronary artery of native heart, CKD stage IIIb, tobacco use, depression, chronic obstructive bronchitis, schizoaffective disorder, tremor, and secondary malignant neoplasm of bone who presented to [place] emergency department originally earlier today and subsequently transferred to [facility] for further management of pneumonia, altered mental status, and elevated troponins. Patient with altered mental status with an inability to communicate. Over the past week patient has had worsening mentation and refusing to eat or take her medications at her nursing facility.  Her daughter had reported that she has been diagnosed with dementia within the past year and has had increasingly worsening symptoms of dementia.  Workup in the ED there in [place] was remarkable for both pneumonia and elevated troponins chest x-ray did show pneumonia of the right upper lobe.  There is a concern due to her current state of aspiration pneumonia. She was started on vancomycin and cefepime.  Initial lactate 5.6, white count of 7.1.  Patient was altered and was given Ativan 1 mg injections in the ED at [place].  Her ammonia level was unremarkable at 42.  Her CT of the head in [place] was negative for acute changes.  She also had elevated  troponins  EKG showed no ST changes. Due to an inability to perform cath procedure patient was transferred to [facility] for further cardiac workup and evaluation.    Medications:     Allergies:    Allergies   Allergen Reactions    Risperdal [Risperidone] Other (See Comments)     Patient cannot remember       Current Meds:   Scheduled Meds:    atorvastatin  10 mg Oral Daily    [Held by provider] donepezil  5 mg Oral Nightly    sodium chloride flush  5-40 mL IntraVENous 2 times per day    ipratropium 0.5 mg-albuterol 2.5 mg  1 Dose Inhalation Q4H WA RT    guaiFENesin  600 mg Oral BID    ampicillin-sulbactam  1,500 mg IntraVENous Daily    thiamine  200 mg IntraVENous TID    heparin (porcine)  5,000 Units SubCUTAneous 3 times per day    aspirin  81 mg Oral Daily     Continuous Infusions:    dextrose 5 % and 0.45 % NaCl 100 mL/hr at 25 1414    dextrose Stopped (06/15/25 0514)    sodium chloride       PRN Meds: LORazepam, glucose, dextrose bolus **OR** dextrose bolus, glucagon (rDNA), dextrose, sodium chloride flush, sodium chloride, acetaminophen **OR** acetaminophen, albuterol, benzonatate, sennosides-docusate sodium    Data:     Past Medical History:   has a past medical history of Alcohol dependence in remission (HCC), Anxiety, Arthritis, CAD (coronary artery disease), Cancer (HCC), Chronic bronchitis (HCC), Cognitive communication deficit, Diverticulosis, Esophageal varices without bleeding (HCC), Insomnia, Major depressive disorder, Osteoporosis, Schizoaffective disorder (HCC), and Tremor.    Social History:   reports that she has been smoking cigarettes. She has never used smokeless tobacco. She reports that she does not currently use alcohol. She reports that she does not use drugs.     Family History: No family history on file.    Vitals:  BP (!) 102/47   Pulse 89   Temp 97.4 °F (36.3 °C)   Resp 22   Wt 49.5 kg (109 lb 2 oz)   SpO2 99%   BMI 23.61 kg/m²   Temp (24hrs), Av.4 °F (37.4 °C),

## 2025-06-16 NOTE — PROGRESS NOTES
[facility]  Progress Note                   Date:   6/16/2025  Patient name: Albertina Manning  Date of admission:  6/14/2025  7:18 PM  MRN:   [id number]  YOB: 1955  PCP: Rocky Tobias Sr.,     Reason for Admission: Pneumonia [J18.9]  Pneumonia due to organism [J18.9]    Subjective:       Clinical Changes /Abnormalities: Patient seen and examined with sister and respiratory in room , in a deep sleep , response to stimuli tele /vital labs reviewed     Review of Systems    Medications:   Scheduled Meds:   atorvastatin  10 mg Oral Daily    [Held by provider] donepezil  5 mg Oral Nightly    sodium chloride flush  5-40 mL IntraVENous 2 times per day    ipratropium 0.5 mg-albuterol 2.5 mg  1 Dose Inhalation Q4H WA RT    guaiFENesin  600 mg Oral BID    ampicillin-sulbactam  1,500 mg IntraVENous Daily    thiamine  200 mg IntraVENous TID    heparin (porcine)  5,000 Units SubCUTAneous 3 times per day    aspirin  81 mg Oral Daily     Continuous Infusions:   dextrose 5 % and 0.45 % NaCl 1,000 mL (06/16/25 0344)    dextrose Stopped (06/15/25 0514)    sodium chloride       CBC:   Recent Labs     06/14/25  1158 06/15/25  0714 06/16/25  0912   WBC 7.1 6.1 6.1   HGB 9.4* 8.1* 8.4*    217 182     BMP:    Recent Labs     06/14/25  2010 06/15/25  0714 06/16/25  0912   * 140 139   K 4.9 3.7 3.2*   * 107 109*   CO2 19* 19* 18*   BUN 31* 25* 14   CREATININE 2.3* 1.8* 1.3*   GLUCOSE 55* 55* 132*     Hepatic:  Recent Labs     06/14/25  1158   AST 44*   ALT 25   BILITOT 0.2   ALKPHOS 126*     Troponin:   Recent Labs     06/14/25  1354 06/14/25  2010 06/15/25  0714   TROPHS 81* 86* 80*     BNP: No results for input(s): \"BNP\" in the last 72 hours.  Lipids: No results for input(s): \"CHOL\", \"HDL\" in the last 72 hours.    Invalid input(s): \"LDLCALCU\"  INR:   Recent Labs     06/14/25 2010   INR 1.2       Objective:   Vitals: /65   Pulse 64   Temp 98.7 °F (37.1 °C) (Axillary)   Resp

## 2025-06-16 NOTE — PROGRESS NOTES
[facility]   IN-PATIENT SERVICE   [facility]    Progress Note             Date:   6/16/2025  Patient name:  Albertina Manning  Date of admission:  6/14/2025  7:18 PM  MRN:   [id number]  Account:  [id number]  YOB: 1955  PCP:    Rocky Tobias Sr., DO  Room:   [address] [place] [address]  Code Status:    Full Code    Chief Complaint:   Neurology consulted 6/15/25 for AMS and atypical movements X 2 weeks       Interval hx:     Patient seen and examined at bedside this morning.  No acute events overnight.  Neurological exam unchanged from yesterday, continuous choreoathetoid movements and orofacial dyskinesias.  Vitals and chart reviewed.      MRI was not obtained. LP is still pending   Per chart review and discussion with daughter, pateint was recently re started on aricept and amantadine following a new diagnosis of dementia which temporally correlates with onset of hyperkinetic movements. Will consider medication-induced dyskinesia  Will hold aricept now, amantadine was not continued on admission.          Brief History of Present Illness:     As per H&P:    Albertina Manning is a 70 y.o. female with a past medical history of recent diagnosis of rapidly progressing dementia, metastatic breast cancer, schizoaffective disorder, CKD, CAD, DM 2, and depression who was transferred from [place] ED after presenting with altered mental status from SNF.  Patient reportedly had worsening mentation and refusal to eat, drink, or take her medications.  In the [place] ED, patient was found to have evidence of right upper lobe pneumonia, FREDERICK on CKD, elevated troponin, and hypernatremia.  CT head at OSH was unremarkable.  UDS unremarkable, positive for benzodiazepines-were given in the ED.     Neurology was consulted for AMS and concern for seizure-like activity.  On my arrival at bedside, patient exhibiting continuous choreoathetoid movements and orofacial dyskinesias. Is stuporous, with no usable speech  9:12 AM   Result Value Ref Range    WBC 6.1 3.5 - 11.3 k/uL    RBC 2.26 (L) 3.95 - 5.11 m/uL    Hemoglobin 8.4 (L) 11.9 - 15.1 g/dL    Hematocrit 26.6 (L) 36.3 - 47.1 %    .7 (H) 82.6 - 102.9 fL    MCH 37.2 (H) 25.2 - 33.5 pg    MCHC 31.6 28.4 - 34.8 g/dL    RDW 16.0 (H) 11.8 - 14.4 %    Platelets 182 138 - 453 k/uL    MPV 10.5 8.1 - 13.5 fL    NRBC Automated 0.0 0.0 per 100 WBC    Neutrophils % PENDING %    Lymphocytes % PENDING %    Monocytes % PENDING %    Eosinophils % PENDING %    Basophils % PENDING %    Immature Granulocytes % PENDING 0 %    Neutrophils Absolute PENDING k/uL    Lymphocytes Absolute PENDING k/uL    Monocytes Absolute PENDING k/uL    Eosinophils Absolute PENDING k/uL    Basophils Absolute PENDING 0.0 - 0.2 k/uL    Immature Granulocytes Absolute PENDING 0.00 - 0.30 k/uL     Recent Labs     06/16/25  0912   WBC 6.1   RBC 2.26*   HGB 8.4*   HCT 26.6*   .7*   MCH 37.2*   MCHC 31.6   RDW 16.0*      MPV 10.5     Recent Labs     06/14/25  1158 06/14/25  1427 06/15/25  0714   *   < > 140   K 5.0   < > 3.7   *   < > 107   CO2 22   < > 19*   BUN 30*   < > 25*   CREATININE 2.4*   < > 1.8*   GLUCOSE 48*   < > 55*   CALCIUM 9.0   < > 8.1*   BILITOT 0.2  --   --    ALKPHOS 126*  --   --    AST 44*  --   --    ALT 25  --   --     < > = values in this interval not displayed.     Hemoglobin A1C   Date Value Ref Range Status   07/06/2020 5.4 4.0 - 6.0 % Final       Assessment :      Primary Problem  Pneumonia due to organism    Active Hospital Problems    Diagnosis Date Noted    Chest pain [R07.9] 06/15/2025     Priority: High    NSTEMI (non-ST elevated myocardial infarction) (HCC) [I21.4] 06/15/2025     Priority: High    Prolonged Q-T interval on ECG [R94.31] 06/15/2025     Priority: High    Dehydration [E86.0] 06/15/2025    History of schizoaffective disorder [Z86.59] 06/15/2025    History of breast cancer [Z85.3] 06/15/2025    Pneumonia due to organism [J18.9] 06/14/2025

## 2025-06-16 NOTE — PROGRESS NOTES
06/16/25 0851   Care Plan - Respiratory Goals   Achieves optimal ventilation and oxygenation Assess for changes in respiratory status;Assess for changes in mentation and behavior;Position to facilitate oxygenation and minimize respiratory effort;Oxygen supplementation based on oxygen saturation or arterial blood gases;Respiratory therapy support as indicated;Assess and instruct to report shortness of breath or any respiratory difficulty

## 2025-06-17 ENCOUNTER — APPOINTMENT (OUTPATIENT)
Age: 70
End: 2025-06-17
Attending: STUDENT IN AN ORGANIZED HEALTH CARE EDUCATION/TRAINING PROGRAM
Payer: COMMERCIAL

## 2025-06-17 ENCOUNTER — APPOINTMENT (OUTPATIENT)
Dept: MRI IMAGING | Age: 70
End: 2025-06-17
Attending: STUDENT IN AN ORGANIZED HEALTH CARE EDUCATION/TRAINING PROGRAM
Payer: COMMERCIAL

## 2025-06-17 ENCOUNTER — TELEPHONE (OUTPATIENT)
Dept: ONCOLOGY | Age: 70
End: 2025-06-17

## 2025-06-17 LAB
ALBUMIN PERCENT: 52 % (ref 56–66)
ALBUMIN SERPL-MCNC: 2.7 G/DL (ref 3.2–5.2)
ALPHA 2 PERCENT: 16 % (ref 7–12)
ALPHA1 GLOB SERPL ELPH-MCNC: 0.5 G/DL (ref 0.1–0.4)
ALPHA1 GLOB SERPL ELPH-MCNC: 9 % (ref 3–5)
ALPHA2 GLOB SERPL ELPH-MCNC: 0.8 G/DL (ref 0.5–0.9)
ANION GAP SERPL CALCULATED.3IONS-SCNC: 9 MMOL/L (ref 9–16)
B-GLOBULIN SERPL ELPH-MCNC: 0.6 G/DL (ref 0.7–1.4)
B-GLOBULIN SERPL ELPH-MCNC: 11 % (ref 8–13)
BASOPHILS # BLD: 0 K/UL (ref 0–0.2)
BASOPHILS NFR BLD: 0 % (ref 0–2)
BUN SERPL-MCNC: 10 MG/DL (ref 8–23)
CALCIUM SERPL-MCNC: 7.4 MG/DL (ref 8.6–10.4)
CHLORIDE SERPL-SCNC: 108 MMOL/L (ref 98–107)
CO2 SERPL-SCNC: 17 MMOL/L (ref 20–31)
COPPER SERPL-MCNC: 125.2 UG/DL (ref 80–155)
CREAT SERPL-MCNC: 1.1 MG/DL (ref 0.6–0.9)
ECHO AO ROOT DIAM: 2.6 CM
ECHO AO ROOT INDEX: 1.83 CM/M2
ECHO AV AREA PEAK VELOCITY: 1.5 CM2
ECHO AV AREA VTI: 1.4 CM2
ECHO AV AREA/BSA PEAK VELOCITY: 1.1 CM2/M2
ECHO AV AREA/BSA VTI: 1 CM2/M2
ECHO AV MEAN GRADIENT: 5 MMHG
ECHO AV MEAN VELOCITY: 1.1 M/S
ECHO AV PEAK GRADIENT: 13 MMHG
ECHO AV PEAK VELOCITY: 1.8 M/S
ECHO AV VELOCITY RATIO: 0.56
ECHO AV VTI: 30.7 CM
ECHO BSA: 1.44 M2
ECHO EST RA PRESSURE: 3 MMHG
ECHO LA AREA 2C: 8.3 CM2
ECHO LA AREA 4C: 11.5 CM2
ECHO LA DIAMETER INDEX: 1.27 CM/M2
ECHO LA DIAMETER: 1.8 CM
ECHO LA MAJOR AXIS: 5.3 CM
ECHO LA MINOR AXIS: 3.5 CM
ECHO LA TO AORTIC ROOT RATIO: 0.69
ECHO LA VOL MOD A2C: 17 ML (ref 22–52)
ECHO LA VOL MOD A4C: 19 ML (ref 22–52)
ECHO LA VOLUME INDEX MOD A2C: 12 ML/M2 (ref 16–34)
ECHO LA VOLUME INDEX MOD A4C: 13 ML/M2 (ref 16–34)
ECHO LV E' LATERAL VELOCITY: 10.8 CM/S
ECHO LV E' SEPTAL VELOCITY: 4.13 CM/S
ECHO LV EDV A2C: 34 ML
ECHO LV EDV A4C: 47 ML
ECHO LV EDV INDEX A4C: 33 ML/M2
ECHO LV EDV NDEX A2C: 24 ML/M2
ECHO LV EF PHYSICIAN: 58 %
ECHO LV EJECTION FRACTION A2C: 52 %
ECHO LV EJECTION FRACTION A4C: 59 %
ECHO LV EJECTION FRACTION BIPLANE: 58 % (ref 55–100)
ECHO LV ESV A2C: 16 ML
ECHO LV ESV A4C: 19 ML
ECHO LV ESV INDEX A2C: 11 ML/M2
ECHO LV ESV INDEX A4C: 13 ML/M2
ECHO LV FRACTIONAL SHORTENING: 59 % (ref 28–44)
ECHO LV INTERNAL DIMENSION DIASTOLE INDEX: 2.75 CM/M2
ECHO LV INTERNAL DIMENSION DIASTOLIC: 3.9 CM (ref 3.9–5.3)
ECHO LV INTERNAL DIMENSION SYSTOLIC INDEX: 1.13 CM/M2
ECHO LV INTERNAL DIMENSION SYSTOLIC: 1.6 CM
ECHO LV IVSD: 0.8 CM (ref 0.6–0.9)
ECHO LV MASS 2D: 75.1 G (ref 67–162)
ECHO LV MASS INDEX 2D: 52.9 G/M2 (ref 43–95)
ECHO LV POSTERIOR WALL DIASTOLIC: 0.6 CM (ref 0.6–0.9)
ECHO LV RELATIVE WALL THICKNESS RATIO: 0.31
ECHO LVOT AREA: 2.5 CM2
ECHO LVOT AV VTI INDEX: 0.55
ECHO LVOT DIAM: 1.8 CM
ECHO LVOT MEAN GRADIENT: 2 MMHG
ECHO LVOT PEAK GRADIENT: 4 MMHG
ECHO LVOT PEAK VELOCITY: 1 M/S
ECHO LVOT STROKE VOLUME INDEX: 30.4 ML/M2
ECHO LVOT SV: 43.2 ML
ECHO LVOT VTI: 17 CM
ECHO MV A VELOCITY: 1.09 M/S
ECHO MV AREA VTI: 1.3 CM2
ECHO MV E DECELERATION TIME (DT): 232 MS
ECHO MV E VELOCITY: 0.88 M/S
ECHO MV E/A RATIO: 0.81
ECHO MV E/E' LATERAL: 8.15
ECHO MV E/E' RATIO (AVERAGED): 14.73
ECHO MV E/E' SEPTAL: 21.31
ECHO MV LVOT VTI INDEX: 1.92
ECHO MV MAX VELOCITY: 1.3 M/S
ECHO MV MEAN GRADIENT: 3 MMHG
ECHO MV MEAN VELOCITY: 0.8 M/S
ECHO MV PEAK GRADIENT: 7 MMHG
ECHO MV VTI: 32.6 CM
ECHO PV MAX VELOCITY: 1.2 M/S
ECHO PV PEAK GRADIENT: 6 MMHG
ECHO RIGHT VENTRICULAR SYSTOLIC PRESSURE (RVSP): 33 MMHG
ECHO RV BASAL DIMENSION: 2.5 CM
ECHO RV FREE WALL PEAK S': 13.4 CM/S
ECHO RV TAPSE: 2 CM (ref 1.7–?)
ECHO TV REGURGITANT MAX VELOCITY: 2.76 M/S
ECHO TV REGURGITANT PEAK GRADIENT: 30 MMHG
EOSINOPHIL # BLD: 0.2 K/UL (ref 0–0.4)
EOSINOPHILS RELATIVE PERCENT: 3 % (ref 1–4)
ERYTHROCYTE [DISTWIDTH] IN BLOOD BY AUTOMATED COUNT: 15.9 % (ref 11.8–14.4)
GAMMA GLOB SERPL ELPH-MCNC: 0.6 G/DL (ref 0.5–1.5)
GAMMA GLOBULIN %: 12 % (ref 11–19)
GFR, ESTIMATED: 54 ML/MIN/1.73M2
GLUCOSE BLD-MCNC: 102 MG/DL (ref 65–105)
GLUCOSE BLD-MCNC: 106 MG/DL (ref 65–105)
GLUCOSE BLD-MCNC: 111 MG/DL (ref 65–105)
GLUCOSE BLD-MCNC: 119 MG/DL (ref 65–105)
GLUCOSE BLD-MCNC: 122 MG/DL (ref 65–105)
GLUCOSE BLD-MCNC: 79 MG/DL (ref 65–105)
GLUCOSE BLD-MCNC: 91 MG/DL (ref 65–105)
GLUCOSE SERPL-MCNC: 108 MG/DL (ref 74–99)
HCT VFR BLD AUTO: 25.3 % (ref 36.3–47.1)
HGB BLD-MCNC: 8.1 G/DL (ref 11.9–15.1)
IMM GRANULOCYTES # BLD AUTO: 0 K/UL (ref 0–0.3)
IMM GRANULOCYTES NFR BLD: 0 %
LYMPHOCYTES NFR BLD: 1.12 K/UL (ref 1–4.8)
LYMPHOCYTES RELATIVE PERCENT: 17 % (ref 24–44)
MCH RBC QN AUTO: 37.9 PG (ref 25.2–33.5)
MCHC RBC AUTO-ENTMCNC: 32 G/DL (ref 28.4–34.8)
MCV RBC AUTO: 118.2 FL (ref 82.6–102.9)
MONOCYTES NFR BLD: 0.13 K/UL (ref 0.1–0.8)
MONOCYTES NFR BLD: 2 % (ref 1–7)
MORPHOLOGY: ABNORMAL
MORPHOLOGY: ABNORMAL
NEUTROPHILS NFR BLD: 78 % (ref 36–66)
NEUTS SEG NFR BLD: 5.15 K/UL (ref 1.8–7.7)
NRBC BLD-RTO: 0 PER 100 WBC
PATHOLOGIST: ABNORMAL
PLATELET # BLD AUTO: 168 K/UL (ref 138–453)
PMV BLD AUTO: 10.4 FL (ref 8.1–13.5)
POTASSIUM SERPL-SCNC: 3.7 MMOL/L (ref 3.7–5.3)
PROT PATTERN SERPL ELPH-IMP: ABNORMAL
PROT SERPL-MCNC: 5.2 G/DL (ref 6.6–8.7)
RBC # BLD AUTO: 2.14 M/UL (ref 3.95–5.11)
SODIUM SERPL-SCNC: 134 MMOL/L (ref 136–145)
TOTAL PROT. SUM,%: 100 % (ref 98–102)
TOTAL PROT. SUM: 5.2 G/DL (ref 6.3–8.2)
WBC OTHER # BLD: 6.6 K/UL (ref 3.5–11.3)

## 2025-06-17 PROCEDURE — 2580000003 HC RX 258

## 2025-06-17 PROCEDURE — 97530 THERAPEUTIC ACTIVITIES: CPT

## 2025-06-17 PROCEDURE — 93306 TTE W/DOPPLER COMPLETE: CPT

## 2025-06-17 PROCEDURE — 97162 PT EVAL MOD COMPLEX 30 MIN: CPT

## 2025-06-17 PROCEDURE — 36415 COLL VENOUS BLD VENIPUNCTURE: CPT

## 2025-06-17 PROCEDURE — 6360000002 HC RX W HCPCS

## 2025-06-17 PROCEDURE — 97535 SELF CARE MNGMENT TRAINING: CPT

## 2025-06-17 PROCEDURE — 2500000003 HC RX 250 WO HCPCS: Performed by: ANESTHESIOLOGY

## 2025-06-17 PROCEDURE — 92610 EVALUATE SWALLOWING FUNCTION: CPT

## 2025-06-17 PROCEDURE — 95714 VEEG EA 12-26 HR UNMNTR: CPT

## 2025-06-17 PROCEDURE — 99232 SBSQ HOSP IP/OBS MODERATE 35: CPT | Performed by: STUDENT IN AN ORGANIZED HEALTH CARE EDUCATION/TRAINING PROGRAM

## 2025-06-17 PROCEDURE — 6360000004 HC RX CONTRAST MEDICATION: Performed by: ANESTHESIOLOGY

## 2025-06-17 PROCEDURE — 70553 MRI BRAIN STEM W/O & W/DYE: CPT

## 2025-06-17 PROCEDURE — 80048 BASIC METABOLIC PNL TOTAL CA: CPT

## 2025-06-17 PROCEDURE — 95720 EEG PHY/QHP EA INCR W/VEEG: CPT | Performed by: PSYCHIATRY & NEUROLOGY

## 2025-06-17 PROCEDURE — 85025 COMPLETE CBC W/AUTO DIFF WBC: CPT

## 2025-06-17 PROCEDURE — 2060000000 HC ICU INTERMEDIATE R&B

## 2025-06-17 PROCEDURE — 2500000003 HC RX 250 WO HCPCS

## 2025-06-17 PROCEDURE — 6370000000 HC RX 637 (ALT 250 FOR IP)

## 2025-06-17 PROCEDURE — 99232 SBSQ HOSP IP/OBS MODERATE 35: CPT

## 2025-06-17 PROCEDURE — 99233 SBSQ HOSP IP/OBS HIGH 50: CPT | Performed by: SURGERY

## 2025-06-17 PROCEDURE — 82947 ASSAY GLUCOSE BLOOD QUANT: CPT

## 2025-06-17 PROCEDURE — A9579 GAD-BASE MR CONTRAST NOS,1ML: HCPCS | Performed by: ANESTHESIOLOGY

## 2025-06-17 PROCEDURE — 97167 OT EVAL HIGH COMPLEX 60 MIN: CPT

## 2025-06-17 PROCEDURE — 94640 AIRWAY INHALATION TREATMENT: CPT

## 2025-06-17 RX ORDER — GADOTERIDOL 279.3 MG/ML
12 INJECTION INTRAVENOUS
Status: COMPLETED | OUTPATIENT
Start: 2025-06-17 | End: 2025-06-17

## 2025-06-17 RX ORDER — SODIUM CHLORIDE 0.9 % (FLUSH) 0.9 %
10 SYRINGE (ML) INJECTION PRN
Status: DISCONTINUED | OUTPATIENT
Start: 2025-06-17 | End: 2025-07-03 | Stop reason: HOSPADM

## 2025-06-17 RX ORDER — 0.9 % SODIUM CHLORIDE 0.9 %
500 INTRAVENOUS SOLUTION INTRAVENOUS ONCE
Status: COMPLETED | OUTPATIENT
Start: 2025-06-17 | End: 2025-06-17

## 2025-06-17 RX ORDER — SODIUM CHLORIDE 9 MG/ML
INJECTION, SOLUTION INTRAVENOUS CONTINUOUS
Status: DISCONTINUED | OUTPATIENT
Start: 2025-06-17 | End: 2025-06-18

## 2025-06-17 RX ADMIN — THIAMINE HYDROCHLORIDE 200 MG: 100 INJECTION, SOLUTION INTRAMUSCULAR; INTRAVENOUS at 09:46

## 2025-06-17 RX ADMIN — POTASSIUM CHLORIDE 10 MEQ: 7.46 INJECTION, SOLUTION INTRAVENOUS at 00:02

## 2025-06-17 RX ADMIN — IPRATROPIUM BROMIDE AND ALBUTEROL SULFATE 1 DOSE: .5; 2.5 SOLUTION RESPIRATORY (INHALATION) at 12:21

## 2025-06-17 RX ADMIN — IPRATROPIUM BROMIDE AND ALBUTEROL SULFATE 1 DOSE: .5; 2.5 SOLUTION RESPIRATORY (INHALATION) at 09:23

## 2025-06-17 RX ADMIN — HEPARIN SODIUM 5000 UNITS: 5000 INJECTION INTRAVENOUS; SUBCUTANEOUS at 05:30

## 2025-06-17 RX ADMIN — SODIUM CHLORIDE, PRESERVATIVE FREE 10 ML: 5 INJECTION INTRAVENOUS at 22:51

## 2025-06-17 RX ADMIN — DEXTROSE AND SODIUM CHLORIDE: 5; .45 INJECTION, SOLUTION INTRAVENOUS at 11:02

## 2025-06-17 RX ADMIN — SODIUM CHLORIDE, PRESERVATIVE FREE 10 ML: 5 INJECTION INTRAVENOUS at 09:46

## 2025-06-17 RX ADMIN — THIAMINE HYDROCHLORIDE 200 MG: 100 INJECTION, SOLUTION INTRAMUSCULAR; INTRAVENOUS at 20:30

## 2025-06-17 RX ADMIN — GADOTERIDOL 12 ML: 279.3 INJECTION, SOLUTION INTRAVENOUS at 22:51

## 2025-06-17 RX ADMIN — THIAMINE HYDROCHLORIDE 200 MG: 100 INJECTION, SOLUTION INTRAMUSCULAR; INTRAVENOUS at 15:27

## 2025-06-17 RX ADMIN — HEPARIN SODIUM 5000 UNITS: 5000 INJECTION INTRAVENOUS; SUBCUTANEOUS at 15:27

## 2025-06-17 RX ADMIN — SODIUM CHLORIDE: 0.9 INJECTION, SOLUTION INTRAVENOUS at 16:02

## 2025-06-17 RX ADMIN — SODIUM CHLORIDE 500 ML: 0.9 INJECTION, SOLUTION INTRAVENOUS at 15:39

## 2025-06-17 RX ADMIN — IPRATROPIUM BROMIDE AND ALBUTEROL SULFATE 1 DOSE: .5; 2.5 SOLUTION RESPIRATORY (INHALATION) at 15:55

## 2025-06-17 RX ADMIN — AMPICILLIN AND SULBACTAM 1500 MG: 1; .5 INJECTION, POWDER, FOR SOLUTION INTRAMUSCULAR; INTRAVENOUS at 09:44

## 2025-06-17 RX ADMIN — Medication 1 MG: at 22:23

## 2025-06-17 RX ADMIN — HEPARIN SODIUM 5000 UNITS: 5000 INJECTION INTRAVENOUS; SUBCUTANEOUS at 22:01

## 2025-06-17 RX ADMIN — GUAIFENESIN 600 MG: 600 TABLET, MULTILAYER, EXTENDED RELEASE ORAL at 20:30

## 2025-06-17 NOTE — PLAN OF CARE
Problem: Seizure Precautions  Goal: Remains free of injury related to seizures activity  Outcome: Progressing     Problem: Respiratory - Adult  Goal: Achieves optimal ventilation and oxygenation  6/17/2025 0345 by Faiza Guy RN  Outcome: Progressing  6/16/2025 2042 by Shukri Wilcox RCP  Outcome: Progressing  Flowsheets (Taken 6/16/2025 0851 by Renetta Arriaga RCP)  Achieves optimal ventilation and oxygenation:   Assess for changes in respiratory status   Assess for changes in mentation and behavior   Position to facilitate oxygenation and minimize respiratory effort   Oxygen supplementation based on oxygen saturation or arterial blood gases   Respiratory therapy support as indicated   Assess and instruct to report shortness of breath or any respiratory difficulty     Problem: Safety - Adult  Goal: Free from fall injury  Outcome: Progressing     Problem: Skin/Tissue Integrity  Goal: Skin integrity remains intact  Description: 1.  Monitor for areas of redness and/or skin breakdown  2.  Assess vascular access sites hourly  3.  Every 4-6 hours minimum:  Change oxygen saturation probe site  4.  Every 4-6 hours:  If on nasal continuous positive airway pressure, respiratory therapy assess nares and determine need for appliance change or resting period  Outcome: Progressing     Problem: ABCDS Injury Assessment  Goal: Absence of physical injury  Outcome: Progressing     Problem: Neurosensory - Adult  Goal: Achieves stable or improved neurological status  Outcome: Progressing  Goal: Absence of seizures  Outcome: Progressing     Problem: Cardiovascular - Adult  Goal: Maintains optimal cardiac output and hemodynamic stability  Outcome: Progressing     Problem: Skin/Tissue Integrity - Adult  Goal: Skin integrity remains intact  Description: 1.  Monitor for areas of redness and/or skin breakdown  2.  Assess vascular access sites hourly  3.  Every 4-6 hours minimum:  Change oxygen saturation probe site  4.  Every

## 2025-06-17 NOTE — TELEPHONE ENCOUNTER
Had a call from Anjelica the daughter of Albertina stating that her mom was sent to [facility] for confusion and she stated that she was admitted for a few days. Stated she will be going to [facility] and she's going to try and get her into [employer] in [place] where she works and stated she will keep us posted as to what will be going on with her. She also stated that she stopped taking all her medication and stopped smoking and drinking her coffee. Stated to me that she has been saying some weird strange things like talking to dead people. Asked her to keep us updated with her prognosis as too what is going on and she had agreed she would keep us updated.

## 2025-06-17 NOTE — PROGRESS NOTES
Physical Therapy  Facility/Department: [address] ONC/MED SURG   Physical Therapy Initial Evaluation    Patient Name: Albertina Manning        MRN: [id number]    : 1955    Date of Service: 2025    No chief complaint on file.    Albertina Manning is a 70 y.o. Non- / non  female with past medical history of dementia, atherosclerosis of native coronary artery of native heart, CKD stage IIIb, tobacco use, depression, chronic obstructive bronchitis, schizoaffective disorder, tremor, and secondary malignant neoplasm of bone who presents with No chief complaint on file. and is admitted to the hospital for the management of Pneumonia due to organism.    Past Medical History:  has a past medical history of Alcohol dependence in remission (HCC), Anxiety, Arthritis, CAD (coronary artery disease), Cancer (HCC), Chronic bronchitis (HCC), Cognitive communication deficit, Diverticulosis, Esophageal varices without bleeding (HCC), Insomnia, Major depressive disorder, Osteoporosis, Schizoaffective disorder (HCC), and Tremor.  Past Surgical History:  has a past surgical history that includes Tubal ligation; back surgery; US BREAST BIOPSY W LOC DEVICE 1ST LESION RIGHT (Right, 2023); US PLACE BREAST LOC DEVICE 1ST LESION RIGHT (Right, 2023); Breast biopsy (Right, 2023); and Axillary Surgery (Right, 2023).    Discharge Recommendations   Further therapy recommended at discharge.    PT Equipment Recommendations  Equipment Needed: Yes (pt currently requires RW and skilled assistance for transfer/amb)  Mobility Devices: Walker  Walker: Rolling    Assessment  Body Structures, Functions, Activity Limitations Requiring Skilled Therapeutic Intervention: Decreased ADL status, Decreased body mechanics, Decreased strength, Decreased high-level IADLs, Decreased balance, Decreased endurance, Decreased coordination, Decreased posture, Decreased safe awareness, Decreased functional mobility   Assessment: Pt ambulates 3

## 2025-06-17 NOTE — PROCEDURES
LONG-TERM EEG-VIDEO MONITORING   CLINICAL NEUROPHYSIOLOGY LABORATORY  DEPARTMENT OF NEUROLOGY  [facility]    Patient: Albertina Manning  Age: 70 y.o.  MRN: [id number]    Referring Physician: Godwin Zimmerman DO  History: The patient is a 70 y.o. female who presented breakthrough seizure/encephalopathy. This long-term video-EEG monitoring study was performed to determine the nature of the patient's clinical events. The patient is on neuroactive medications.   Albertina Manning   Current Facility-Administered Medications   Medication Dose Route Frequency Provider Last Rate Last Admin    dextrose 5 % and 0.45 % sodium chloride infusion   IntraVENous Continuous Dedrick Walden  mL/hr at 06/17/25 1102 New Bag at 06/17/25 1102    LORazepam (ATIVAN) injection 1 mg  1 mg IntraVENous Q10 Min PRN Sylwia Malik MD        atorvastatin (LIPITOR) tablet 10 mg  10 mg Oral Daily VinYazan kaiser B, PA-C        glucose chewable tablet 16 g  4 tablet Oral PRN VinYazna caceres, PA-C        dextrose bolus 10% 125 mL  125 mL IntraVENous PRN VinYazan kaiser B, PA-C   Stopped at 06/14/25 2359    Or    dextrose bolus 10% 250 mL  250 mL IntraVENous PRN Yazan Banuelos, PA-C        glucagon injection 1 mg  1 mg SubCUTAneous PRN Yazan Banuelos B, PA-C        dextrose 10 % infusion   IntraVENous Continuous PRN Vin, Yazan B, PA-C   Stopped at 06/15/25 0514    [Held by provider] donepezil (ARICEPT) tablet 5 mg  5 mg Oral Nightly Vin, Yazan B, PA-C        sodium chloride flush 0.9 % injection 5-40 mL  5-40 mL IntraVENous 2 times per day Vin, Yazan B, PA-C   10 mL at 06/17/25 0946    sodium chloride flush 0.9 % injection 10 mL  10 mL IntraVENous PRN Vin, Yazan DAVID, PA-C        0.9 % sodium chloride infusion   IntraVENous PRN Vin, Yazan DAVID, PA-C        acetaminophen (TYLENOL) tablet 650 mg  650 mg Oral Q6H PRN VinYazan kasier PA-C        Or    acetaminophen (TYLENOL) suppository 650 mg  650 mg Rectal Q6H PRN Vin Yazan DAVID, PA-C

## 2025-06-17 NOTE — PLAN OF CARE
Problem: Safety - Adult  Goal: Free from fall injury  6/17/2025 1226 by Sunshine Deng RN  Outcome: Progressing  6/17/2025 0345 by Faiza Guy RN  Outcome: Progressing     Problem: Skin/Tissue Integrity  Goal: Skin integrity remains intact  Description: 1.  Monitor for areas of redness and/or skin breakdown  2.  Assess vascular access sites hourly  3.  Every 4-6 hours minimum:  Change oxygen saturation probe site  4.  Every 4-6 hours:  If on nasal continuous positive airway pressure, respiratory therapy assess nares and determine need for appliance change or resting period  6/17/2025 0345 by Faiza Guy RN  Outcome: Progressing     Problem: ABCDS Injury Assessment  Goal: Absence of physical injury  6/17/2025 1226 by Sunshine Deng RN  Outcome: Progressing  6/17/2025 0345 by Faiza Guy RN  Outcome: Progressing     Problem: Discharge Planning  Goal: Discharge to home or other facility with appropriate resources  6/17/2025 1226 by Sunshine Deng RN  Outcome: Progressing  6/17/2025 0345 by Faiza Guy RN  Outcome: Progressing

## 2025-06-17 NOTE — PROGRESS NOTES
Facility/Department: [address] ONC/MED SURG   CLINICAL BEDSIDE SWALLOW EVALUATION    NAME: Albertina Manning  : 1955  MRN: [id number]    ADMISSION DATE: 2025  ADMITTING DIAGNOSIS: has Elevated liver function tests; Abnormal weight loss; Atherosclerosis of native coronary artery of native heart without angina pectoris; Malignant neoplasm of central portion of right breast in female, estrogen receptor positive (HCC); Brief psychotic disorder (HCC); Hyperglycemia; Insomnia; Major depressive disorder with single episode, in partial remission; Muscle weakness (generalized); Nicotine dependence; Obstructive chronic bronchitis without exacerbation (HCC); Other specified anxiety disorders; Pedal edema; Right shoulder pain; Schizoaffective disorder, chronic condition (HCC); Secondary malignant neoplasm of bone (HCC); Tobacco dependence due to cigarettes; Tremor; Osteopenia of multiple sites; Metastasis to bone (HCC); FREDERICK (acute kidney injury); Chemotherapy adverse reaction; Macrocytic anemia; Low back pain; Pneumonia due to organism; Elevated troponin; Hypernatremia; Hypermagnesemia; Acute kidney injury superimposed on stage 3b chronic kidney disease (HCC); Dementia (HCC); AMS (altered mental status); Chest pain; NSTEMI (non-ST elevated myocardial infarction) (HCC); Prolonged Q-T interval on ECG; Dehydration; History of schizoaffective disorder; History of breast cancer; Hypokalemia; Acute metabolic encephalopathy; and Polypharmacy on their problem list.    Recent Chest Xray/CT of Chest:   1. Focal airspace opacity in the right upper lobe, concerning for pneumonia.  Recommend follow-up to resolution.  2. Moderate hiatal hernia.    Date of Eval: 2025  Evaluating Therapist: MARTA HALL    Current Diet level:  Current Diet : NPO  Current Liquid Diet : NPO    Primary Complaint   70-year-old female with history of dementia, CAD, hypertension, schizoaffective presents for evaluation of altered mental status.

## 2025-06-17 NOTE — PROCEDURES
LONG-TERM EEG-VIDEO MONITORING   CLINICAL NEUROPHYSIOLOGY LABORATORY  DEPARTMENT OF NEUROLOGY  [facility]    Patient: Albertina Manning  Age: 70 y.o.  MRN: [id number]    Referring Physician: Godwin Zimmerman DO  History: The patient is a 70 y.o. female who presented breakthrough seizure/encephalopathy. This long-term video-EEG monitoring study was performed to determine the nature of the patient's clinical events. The patient is on neuroactive medications.   Albertina Manning   Current Facility-Administered Medications   Medication Dose Route Frequency Provider Last Rate Last Admin    potassium chloride 10 mEq/100 mL IVPB (Peripheral Line)  10 mEq IntraVENous Q2H Dedrick Walden,  mL/hr at 06/16/25 1959 10 mEq at 06/16/25 1959    dextrose 5 % and 0.45 % sodium chloride infusion   IntraVENous Continuous Dedrick Walden,  mL/hr at 06/16/25 1414 New Bag at 06/16/25 1414    LORazepam (ATIVAN) injection 1 mg  1 mg IntraVENous Q10 Min PRN Sylwia Malik MD        atorvastatin (LIPITOR) tablet 10 mg  10 mg Oral Daily VinYazan B, PA-C        glucose chewable tablet 16 g  4 tablet Oral PRN VinYazan kaiser, PA-C        dextrose bolus 10% 125 mL  125 mL IntraVENous PRN VinYazan kaiser B, PA-C   Stopped at 06/14/25 2359    Or    dextrose bolus 10% 250 mL  250 mL IntraVENous PRN Vin, Yazan B, PA-C        glucagon injection 1 mg  1 mg SubCUTAneous PRN Vin, Yazan B, PA-C        dextrose 10 % infusion   IntraVENous Continuous PRN Vin, Yazan B, PA-C   Stopped at 06/15/25 0514    [Held by provider] donepezil (ARICEPT) tablet 5 mg  5 mg Oral Nightly Vin, Yazan B, PA-C        sodium chloride flush 0.9 % injection 5-40 mL  5-40 mL IntraVENous 2 times per day Vin, Yazan B, PA-C   10 mL at 06/16/25 1959    sodium chloride flush 0.9 % injection 10 mL  10 mL IntraVENous PRN Vin, Yazan B, PA-C        0.9 % sodium chloride infusion   IntraVENous PRN Vin, Yazan B, PA-C        acetaminophen

## 2025-06-17 NOTE — PROGRESS NOTES
@[email or web address]@    [facility]   IN-PATIENT SERVICE   [facility]    Progress Note    6/17/2025    10:21 AM    Name:   Albertina Manning  MRN:     [id number]     Acct:      [id number]   Room:   0450/0450-01   Day:  3  Admit Date:  6/14/2025  7:18 PM    PCP:   Rocky Tobias Sr., DO  Code Status:  Full Code    Subjective:     C/C: Altered mental status, slurred speech, tremors    Patient evaluated at bedside, her mentation has improved today.  She is alert and oriented x 2-3.  Denies any chest pain, abdominal pain, headaches, dizziness, or any other symptoms.  She has been hypotensive received a 250 cc bolus of normal saline overnight.    Brief History:     This is a 70-year-old female with past medical history of dementia, atherosclerosis of native coronary artery of native heart, CKD stage IIIb, tobacco use, depression, chronic obstructive bronchitis, schizoaffective disorder, tremor, and secondary malignant neoplasm of bone who presented to [place] emergency department originally earlier today and subsequently transferred to [facility] for further management of pneumonia, altered mental status, and elevated troponins. Patient with altered mental status with an inability to communicate. Over the past week patient has had worsening mentation and refusing to eat or take her medications at her nursing facility.  Her daughter had reported that she has been diagnosed with dementia within the past year and has had increasingly worsening symptoms of dementia.  Workup in the ED there in [place] was remarkable for both pneumonia and elevated troponins chest x-ray did show pneumonia of the right upper lobe.  There is a concern due to her current state of aspiration pneumonia. She was started on vancomycin and cefepime.  Initial lactate 5.6, white count of 7.1.  Patient was altered and was given Ativan 1 mg injections in the ED at [place].  Her ammonia level was unremarkable at 42.  Her CT of

## 2025-06-17 NOTE — PROGRESS NOTES
[facility] Neurology   IN-PATIENT SERVICE   [facility]    Progress Note             Date:   6/17/2025  Patient name:  Albertina Manning  Date of admission:  6/14/2025  7:18 PM  MRN:   [id number]  Account:  [id number]  YOB: 1955  PCP:    Rocky Tobias Sr., DO  Room:   [address] [place] [address]  Code Status:    Full Code    Chief Complaint:   Neurology consulted 6/15/25 for AMS and atypical movements X 2 weeks       Interval hx:     Patient seen and examined at bedside this morning.  No acute events overnight.  Neurological exam improved from yesterday, less choreoathetoid movements and orofacial dyskinesias and patient speech is now intelligible.  Alert and oriented to self and time. Yesterday she stated her movements began \"after my last child\" however daughter states this started 2 weeks ago, remains confused. Vitals and chart reviewed.        MRI pending  Will consider need for LP however will hold off for now given clinical improvement          Brief History of Present Illness:     As per H&P:    Albertina Manning is a 70 y.o. female with a past medical history of recent diagnosis of rapidly progressing dementia, metastatic breast cancer, schizoaffective disorder, CKD, CAD, DM 2, and depression who was transferred from [place] ED after presenting with altered mental status from SNF.  Patient reportedly had worsening mentation and refusal to eat, drink, or take her medications.  In the [place] ED, patient was found to have evidence of right upper lobe pneumonia, FREDERICK on CKD, elevated troponin, and hypernatremia.  CT head at OSH was unremarkable.  UDS unremarkable, positive for benzodiazepines-were given in the ED.     Neurology was consulted for AMS and concern for seizure-like activity.  On my arrival at bedside, patient exhibiting continuous choreoathetoid movements and orofacial dyskinesias. Is stuporous, with no usable speech production, not following commands.     Patient had a recent extensive

## 2025-06-17 NOTE — PROGRESS NOTES
[facility]  Progress Note                   Date:   6/17/2025  Patient name: Albertina Manning  Date of admission:  6/14/2025  7:18 PM  MRN:   [id number]  YOB: 1955  PCP: Rocky Tobias Sr.,     Reason for Admission: Pneumonia [J18.9]  Pneumonia due to organism [J18.9]    Subjective:       Clinical Changes /Abnormalities: Patient seen and examined confused tele /vital labs reviewed     Review of Systems    Medications:   Scheduled Meds:   atorvastatin  10 mg Oral Daily    [Held by provider] donepezil  5 mg Oral Nightly    sodium chloride flush  5-40 mL IntraVENous 2 times per day    ipratropium 0.5 mg-albuterol 2.5 mg  1 Dose Inhalation Q4H WA RT    guaiFENesin  600 mg Oral BID    ampicillin-sulbactam  1,500 mg IntraVENous Daily    thiamine  200 mg IntraVENous TID    heparin (porcine)  5,000 Units SubCUTAneous 3 times per day    aspirin  81 mg Oral Daily     Continuous Infusions:   dextrose 5 % and 0.45 % NaCl 100 mL/hr at 06/17/25 1102    dextrose Stopped (06/15/25 0514)    sodium chloride       CBC:   Recent Labs     06/15/25  0714 06/16/25  0912 06/17/25  0607   WBC 6.1 6.1 6.6   HGB 8.1* 8.4* 8.1*    182 168     BMP:    Recent Labs     06/15/25  0714 06/16/25  0912 06/17/25  0607    139 134*   K 3.7 3.2* 3.7    109* 108*   CO2 19* 18* 17*   BUN 25* 14 10   CREATININE 1.8* 1.3* 1.1*   GLUCOSE 55* 132* 108*     Hepatic:  No results for input(s): \"AST\", \"ALT\", \"BILITOT\", \"ALKPHOS\" in the last 72 hours.    Invalid input(s): \"ALB\"    Troponin:   Recent Labs     06/14/25  1354 06/14/25  2010 06/15/25  0714   TROPHS 81* 86* 80*     BNP: No results for input(s): \"BNP\" in the last 72 hours.  Lipids: No results for input(s): \"CHOL\", \"HDL\" in the last 72 hours.    Invalid input(s): \"LDLCALCU\"  INR:   Recent Labs     06/14/25 2010   INR 1.2       Objective:   Vitals: BP (!) 89/59   Pulse 73   Temp 98.7 °F (37.1 °C)   Resp 22   Wt 52 kg (114 lb 10.2 oz)   SpO2 100%

## 2025-06-17 NOTE — PROGRESS NOTES
Occupational Therapy  Occupational Therapy Initial Evaluation  Facility/Department: [address] ONC/MED SURG   Patient Name: Albertina Manning        MRN: [id number]    : 1955    Date of Service: 2025    Past Medical History:  has a past medical history of Alcohol dependence in remission (HCC), Anxiety, Arthritis, CAD (coronary artery disease), Cancer (HCC), Chronic bronchitis (HCC), Cognitive communication deficit, Diverticulosis, Esophageal varices without bleeding (HCC), Insomnia, Major depressive disorder, Osteoporosis, Schizoaffective disorder (HCC), and Tremor.  Past Surgical History:  has a past surgical history that includes Tubal ligation; back surgery; US BREAST BIOPSY W LOC DEVICE 1ST LESION RIGHT (Right, 2023); US PLACE BREAST LOC DEVICE 1ST LESION RIGHT (Right, 2023); Breast biopsy (Right, 2023); and Axillary Surgery (Right, 2023).    Discharge Recommendations  Discharge Recommendations: Patient would benefit from continued therapy after discharge  OT Equipment Recommendations  Equipment Needed: No    Assessment  Performance deficits / Impairments: Decreased functional mobility ;Decreased ADL status;Decreased strength;Decreased fine motor control;Decreased balance;Decreased coordination;Decreased endurance;Decreased cognition;Decreased safe awareness;Decreased high-level IADLs  Assessment: Pt engaged in bed mobility, STS transfers, and static/dynamic standing at EOB using r/w. Engagement was affected by tremors, fatigue, and dizziness. Pt would benefit from continued therapy services to address deficits above. Pt was also limited d/t cognition.  Prognosis: Fair  Decision Making: High Complexity  REQUIRES OT FOLLOW-UP: Yes  Activity Tolerance  Activity Tolerance: Patient limited by fatigue;Patient Tolerated treatment well;Treatment limited secondary to decreased cognition (limited by dizziness as well)  Safety Devices  Type of Devices: Bed alarm in place;Call light within

## 2025-06-17 NOTE — CARE COORDINATION
Case Management   Daily Progress Note       Patient Name: Albertina Manning                   YOB: 1955  Diagnosis: Pneumonia [J18.9]  Pneumonia due to organism [J18.9]                       GMLOS: 4.6 days  Length of Stay: 3  days    Anticipated Discharge Date: One day until discharge    Readmission Risk (Low < 19, Mod (19-27), High > 27): Readmission Risk Score: 18.8        Current Transitional Plan    [] Home Independently    [] Home with HC    [x] Skilled Nursing Facility    [] Acute Rehabilitation    [] Long Term Acute Care (LTAC)    [] Other:     Plan for the Stay (Medical Management) : Accepted at [facility]          Workflow Continuation (Additional Notes) :        MIGUEL SAUL RN  June 17, 2025

## 2025-06-18 LAB
ANION GAP SERPL CALCULATED.3IONS-SCNC: 13 MMOL/L (ref 9–16)
BASOPHILS # BLD: 0.05 K/UL (ref 0–0.2)
BASOPHILS NFR BLD: 1 % (ref 0–2)
BUN SERPL-MCNC: 7 MG/DL (ref 8–23)
CALCIUM SERPL-MCNC: 7.4 MG/DL (ref 8.6–10.4)
CHLORIDE SERPL-SCNC: 113 MMOL/L (ref 98–107)
CO2 SERPL-SCNC: 14 MMOL/L (ref 20–31)
CREAT SERPL-MCNC: 1 MG/DL (ref 0.6–0.9)
EOSINOPHIL # BLD: 0.26 K/UL (ref 0–0.44)
EOSINOPHILS RELATIVE PERCENT: 5 % (ref 1–4)
ERYTHROCYTE [DISTWIDTH] IN BLOOD BY AUTOMATED COUNT: 15.9 % (ref 11.8–14.4)
GFR, ESTIMATED: 61 ML/MIN/1.73M2
GLUCOSE BLD-MCNC: 103 MG/DL (ref 65–105)
GLUCOSE BLD-MCNC: 42 MG/DL (ref 65–105)
GLUCOSE BLD-MCNC: 45 MG/DL (ref 65–105)
GLUCOSE BLD-MCNC: 68 MG/DL (ref 65–105)
GLUCOSE BLD-MCNC: 74 MG/DL (ref 65–105)
GLUCOSE BLD-MCNC: 85 MG/DL (ref 65–105)
GLUCOSE SERPL-MCNC: 94 MG/DL (ref 74–99)
HCT VFR BLD AUTO: 26.5 % (ref 36.3–47.1)
HGB BLD-MCNC: 8 G/DL (ref 11.9–15.1)
IMM GRANULOCYTES # BLD AUTO: 0.05 K/UL (ref 0–0.3)
IMM GRANULOCYTES NFR BLD: 1 %
LYMPHOCYTES NFR BLD: 0.82 K/UL (ref 1.1–3.7)
LYMPHOCYTES RELATIVE PERCENT: 16 % (ref 24–43)
MCH RBC QN AUTO: 37.4 PG (ref 25.2–33.5)
MCHC RBC AUTO-ENTMCNC: 30.2 G/DL (ref 28.4–34.8)
MCV RBC AUTO: 123.8 FL (ref 82.6–102.9)
MONOCYTES NFR BLD: 0.31 K/UL (ref 0.1–1.2)
MONOCYTES NFR BLD: 6 % (ref 3–12)
MORPHOLOGY: ABNORMAL
MORPHOLOGY: ABNORMAL
NEUTROPHILS NFR BLD: 71 % (ref 36–65)
NEUTS SEG NFR BLD: 3.61 K/UL (ref 1.5–8.1)
NRBC BLD-RTO: 0 PER 100 WBC
PLATELET # BLD AUTO: 160 K/UL (ref 138–453)
PMV BLD AUTO: 10.9 FL (ref 8.1–13.5)
POTASSIUM SERPL-SCNC: 3.2 MMOL/L (ref 3.7–5.3)
RBC # BLD AUTO: 2.14 M/UL (ref 3.95–5.11)
SODIUM SERPL-SCNC: 140 MMOL/L (ref 136–145)
WBC OTHER # BLD: 5.1 K/UL (ref 3.5–11.3)

## 2025-06-18 PROCEDURE — 82947 ASSAY GLUCOSE BLOOD QUANT: CPT

## 2025-06-18 PROCEDURE — 6370000000 HC RX 637 (ALT 250 FOR IP)

## 2025-06-18 PROCEDURE — 6360000002 HC RX W HCPCS

## 2025-06-18 PROCEDURE — 2580000003 HC RX 258

## 2025-06-18 PROCEDURE — 99232 SBSQ HOSP IP/OBS MODERATE 35: CPT | Performed by: STUDENT IN AN ORGANIZED HEALTH CARE EDUCATION/TRAINING PROGRAM

## 2025-06-18 PROCEDURE — 99233 SBSQ HOSP IP/OBS HIGH 50: CPT | Performed by: NURSE PRACTITIONER

## 2025-06-18 PROCEDURE — 97530 THERAPEUTIC ACTIVITIES: CPT

## 2025-06-18 PROCEDURE — 2060000000 HC ICU INTERMEDIATE R&B

## 2025-06-18 PROCEDURE — 85025 COMPLETE CBC W/AUTO DIFF WBC: CPT

## 2025-06-18 PROCEDURE — 80048 BASIC METABOLIC PNL TOTAL CA: CPT

## 2025-06-18 PROCEDURE — 99232 SBSQ HOSP IP/OBS MODERATE 35: CPT

## 2025-06-18 PROCEDURE — 95714 VEEG EA 12-26 HR UNMNTR: CPT

## 2025-06-18 PROCEDURE — 36415 COLL VENOUS BLD VENIPUNCTURE: CPT

## 2025-06-18 PROCEDURE — 94640 AIRWAY INHALATION TREATMENT: CPT

## 2025-06-18 PROCEDURE — 94761 N-INVAS EAR/PLS OXIMETRY MLT: CPT

## 2025-06-18 PROCEDURE — 2500000003 HC RX 250 WO HCPCS

## 2025-06-18 RX ORDER — POTASSIUM CHLORIDE 7.45 MG/ML
10 INJECTION INTRAVENOUS PRN
Status: DISPENSED | OUTPATIENT
Start: 2025-06-18 | End: 2025-06-21

## 2025-06-18 RX ORDER — NICOTINE 21 MG/24HR
1 PATCH, TRANSDERMAL 24 HOURS TRANSDERMAL DAILY
Status: DISCONTINUED | OUTPATIENT
Start: 2025-06-18 | End: 2025-07-03 | Stop reason: HOSPADM

## 2025-06-18 RX ORDER — POTASSIUM CHLORIDE 1500 MG/1
40 TABLET, EXTENDED RELEASE ORAL PRN
Status: ACTIVE | OUTPATIENT
Start: 2025-06-18 | End: 2025-06-21

## 2025-06-18 RX ORDER — POTASSIUM CHLORIDE 1500 MG/1
40 TABLET, EXTENDED RELEASE ORAL ONCE
Status: COMPLETED | OUTPATIENT
Start: 2025-06-18 | End: 2025-06-18

## 2025-06-18 RX ORDER — MAGNESIUM SULFATE IN WATER 40 MG/ML
2000 INJECTION, SOLUTION INTRAVENOUS PRN
Status: DISCONTINUED | OUTPATIENT
Start: 2025-06-18 | End: 2025-07-03 | Stop reason: HOSPADM

## 2025-06-18 RX ORDER — DEXTROSE MONOHYDRATE AND SODIUM CHLORIDE 5; .45 G/100ML; G/100ML
INJECTION, SOLUTION INTRAVENOUS CONTINUOUS
Status: DISCONTINUED | OUTPATIENT
Start: 2025-06-18 | End: 2025-06-18

## 2025-06-18 RX ORDER — DEXTROSE, SODIUM CHLORIDE, SODIUM LACTATE, POTASSIUM CHLORIDE, AND CALCIUM CHLORIDE 5; .6; .31; .03; .02 G/100ML; G/100ML; G/100ML; G/100ML; G/100ML
INJECTION, SOLUTION INTRAVENOUS CONTINUOUS
Status: DISCONTINUED | OUTPATIENT
Start: 2025-06-18 | End: 2025-06-19

## 2025-06-18 RX ORDER — LORAZEPAM 2 MG/ML
1 INJECTION INTRAMUSCULAR ONCE
Status: COMPLETED | OUTPATIENT
Start: 2025-06-18 | End: 2025-06-18

## 2025-06-18 RX ADMIN — THIAMINE HYDROCHLORIDE 200 MG: 100 INJECTION, SOLUTION INTRAMUSCULAR; INTRAVENOUS at 08:41

## 2025-06-18 RX ADMIN — GUAIFENESIN 600 MG: 600 TABLET, MULTILAYER, EXTENDED RELEASE ORAL at 20:26

## 2025-06-18 RX ADMIN — Medication 1 MG: at 13:24

## 2025-06-18 RX ADMIN — THIAMINE HYDROCHLORIDE 200 MG: 100 INJECTION, SOLUTION INTRAMUSCULAR; INTRAVENOUS at 20:26

## 2025-06-18 RX ADMIN — IPRATROPIUM BROMIDE AND ALBUTEROL SULFATE 1 DOSE: .5; 2.5 SOLUTION RESPIRATORY (INHALATION) at 19:31

## 2025-06-18 RX ADMIN — DEXTROSE 125 ML: 10 SOLUTION INTRAVENOUS at 16:39

## 2025-06-18 RX ADMIN — ATORVASTATIN CALCIUM 10 MG: 10 TABLET, FILM COATED ORAL at 08:46

## 2025-06-18 RX ADMIN — THIAMINE HYDROCHLORIDE 200 MG: 100 INJECTION, SOLUTION INTRAMUSCULAR; INTRAVENOUS at 17:40

## 2025-06-18 RX ADMIN — POTASSIUM CHLORIDE 40 MEQ: 1500 TABLET, EXTENDED RELEASE ORAL at 08:46

## 2025-06-18 RX ADMIN — AMPICILLIN AND SULBACTAM 1500 MG: 1; .5 INJECTION, POWDER, FOR SOLUTION INTRAMUSCULAR; INTRAVENOUS at 09:07

## 2025-06-18 RX ADMIN — DEXTROSE 125 ML: 10 SOLUTION INTRAVENOUS at 03:46

## 2025-06-18 RX ADMIN — DEXTROSE 125 ML: 10 SOLUTION INTRAVENOUS at 23:57

## 2025-06-18 RX ADMIN — IPRATROPIUM BROMIDE AND ALBUTEROL SULFATE 1 DOSE: .5; 2.5 SOLUTION RESPIRATORY (INHALATION) at 07:38

## 2025-06-18 RX ADMIN — DEXTROSE AND SODIUM CHLORIDE 1000 ML: 5; .45 INJECTION, SOLUTION INTRAVENOUS at 04:33

## 2025-06-18 RX ADMIN — SODIUM CHLORIDE, PRESERVATIVE FREE 10 ML: 5 INJECTION INTRAVENOUS at 08:54

## 2025-06-18 RX ADMIN — DEXTROSE 125 ML: 10 SOLUTION INTRAVENOUS at 23:28

## 2025-06-18 RX ADMIN — HEPARIN SODIUM 5000 UNITS: 5000 INJECTION INTRAVENOUS; SUBCUTANEOUS at 05:40

## 2025-06-18 RX ADMIN — ASPIRIN 81 MG: 81 TABLET, COATED ORAL at 08:46

## 2025-06-18 RX ADMIN — SODIUM CHLORIDE, SODIUM LACTATE, POTASSIUM CHLORIDE, CALCIUM CHLORIDE AND DEXTROSE MONOHYDRATE: 5; 600; 310; 30; 20 INJECTION, SOLUTION INTRAVENOUS at 10:14

## 2025-06-18 RX ADMIN — SODIUM CHLORIDE, SODIUM LACTATE, POTASSIUM CHLORIDE, CALCIUM CHLORIDE AND DEXTROSE MONOHYDRATE: 5; 600; 310; 30; 20 INJECTION, SOLUTION INTRAVENOUS at 23:19

## 2025-06-18 NOTE — PLAN OF CARE
Problem: Respiratory - Adult  Goal: Achieves optimal ventilation and oxygenation  Outcome: Not Progressing  Flowsheets (Taken 6/18/2025 0231)  Achieves optimal ventilation and oxygenation:   Assess for changes in respiratory status   Position to facilitate oxygenation and minimize respiratory effort   Initiate smoking cessation protocol as indicated   Assess the need for suctioning and aspirate as needed   Respiratory therapy support as indicated   Assess and instruct to report shortness of breath or any respiratory difficulty   Encourage broncho-pulmonary hygiene including cough, deep breathe, incentive spirometry   Oxygen supplementation based on oxygen saturation or arterial blood gases   Assess for changes in mentation and behavior

## 2025-06-18 NOTE — PLAN OF CARE
Problem: Seizure Precautions  Goal: Remains free of injury related to seizures activity  Outcome: Progressing     Problem: Respiratory - Adult  Goal: Achieves optimal ventilation and oxygenation  Outcome: Progressing     Problem: Neurosensory - Adult  Goal: Achieves stable or improved neurological status  Outcome: Progressing  Goal: Absence of seizures  Outcome: Progressing     Problem: Cardiovascular - Adult  Goal: Maintains optimal cardiac output and hemodynamic stability  Outcome: Progressing     Problem: Skin/Tissue Integrity - Adult  Goal: Skin integrity remains intact  Description: 1.  Monitor for areas of redness and/or skin breakdown  2.  Assess vascular access sites hourly  3.  Every 4-6 hours minimum:  Change oxygen saturation probe site  4.  Every 4-6 hours:  If on nasal continuous positive airway pressure, respiratory therapy assess nares and determine need for appliance change or resting period  Outcome: Progressing  Goal: Incisions, wounds, or drain sites healing without S/S of infection  Outcome: Progressing     Problem: Infection - Adult  Goal: Absence of infection at discharge  Outcome: Progressing     Problem: Skin/Tissue Integrity  Goal: Skin integrity remains intact  Description: 1.  Monitor for areas of redness and/or skin breakdown  2.  Assess vascular access sites hourly  3.  Every 4-6 hours minimum:  Change oxygen saturation probe site  4.  Every 4-6 hours:  If on nasal continuous positive airway pressure, respiratory therapy assess nares and determine need for appliance change or resting period  Outcome: Progressing

## 2025-06-18 NOTE — PROGRESS NOTES
SPEECH LANGUAGE PATHOLOGY   Speech Language Pathology  [facility] ONC/MED SURG    Date: 6/18/2025  Patient Name: Albertina Manning  YOB: 1955   AGE: 70 y.o.  MRN: [id number]        Patient Not Available for Speech Therapy     Due to:  [] Testing  [] Hemodialysis  [] Cancelled by RN  [] Surgery   [] Intubation/Sedation/Pain Medication  [] Medical instability  [] Refusal  [x] Other: Unable to wake for participation in speech therapy. ST to re-attempt as able.     Completed by: Jj Rae M.S., CCC-SLP

## 2025-06-18 NOTE — PROGRESS NOTES
[facility]  Progress Note                   Date:   6/18/2025  Patient name: Albertina Manning  Date of admission:  6/14/2025  7:18 PM  MRN:   [id number]  YOB: 1955  PCP: Rocky Tobias Sr.,     Reason for Admission: Pneumonia [J18.9]  Pneumonia due to organism [J18.9]    Subjective:       Clinical Changes /Abnormalities: Patient seen and examined confused tele /vital labs reviewed   Pt asking to go outside to smoke     Medications:   Scheduled Meds:   nicotine  1 patch TransDERmal Daily    atorvastatin  10 mg Oral Daily    [Held by provider] donepezil  5 mg Oral Nightly    sodium chloride flush  5-40 mL IntraVENous 2 times per day    ipratropium 0.5 mg-albuterol 2.5 mg  1 Dose Inhalation Q4H WA RT    guaiFENesin  600 mg Oral BID    ampicillin-sulbactam  1,500 mg IntraVENous Daily    thiamine  200 mg IntraVENous TID    [Held by provider] heparin (porcine)  5,000 Units SubCUTAneous 3 times per day    [Held by provider] aspirin  81 mg Oral Daily     Continuous Infusions:   dextrose 5% in lactated ringers 100 mL/hr at 06/18/25 1014    dextrose Stopped (06/15/25 0514)    sodium chloride       CBC:   Recent Labs     06/16/25  0912 06/17/25  0607 06/18/25  0528   WBC 6.1 6.6 5.1   HGB 8.4* 8.1* 8.0*    168 160     BMP:    Recent Labs     06/16/25  0912 06/17/25  0607 06/18/25  0528    134* 140   K 3.2* 3.7 3.2*   * 108* 113*   CO2 18* 17* 14*   BUN 14 10 7*   CREATININE 1.3* 1.1* 1.0*   GLUCOSE 132* 108* 94     Hepatic:  No results for input(s): \"AST\", \"ALT\", \"BILITOT\", \"ALKPHOS\" in the last 72 hours.    Invalid input(s): \"ALB\"    Troponin:   No results for input(s): \"TROPHS\" in the last 72 hours.    BNP: No results for input(s): \"BNP\" in the last 72 hours.  Lipids: No results for input(s): \"CHOL\", \"HDL\" in the last 72 hours.    Invalid input(s): \"LDLCALCU\"  INR:   No results for input(s): \"INR\" in the last 72 hours.    ECHO 6/17/25    Left Ventricle: Normal left

## 2025-06-18 NOTE — PROGRESS NOTES
Physical Therapy  Facility/Department: [address] ONC/MED SURG   Physical Therapy Daily Treatment Note    Patient Name: Albertina Manning        MRN: [id number]    : 1955    Date of Service: 2025    No chief complaint on file.    Past Medical History:  has a past medical history of Alcohol dependence in remission (HCC), Anxiety, Arthritis, CAD (coronary artery disease), Cancer (HCC), Chronic bronchitis (HCC), Cognitive communication deficit, Diverticulosis, Esophageal varices without bleeding (HCC), Insomnia, Major depressive disorder, Osteoporosis, Schizoaffective disorder (HCC), and Tremor.  Past Surgical History:  has a past surgical history that includes Tubal ligation; back surgery; US BREAST BIOPSY W LOC DEVICE 1ST LESION RIGHT (Right, 2023); US PLACE BREAST LOC DEVICE 1ST LESION RIGHT (Right, 2023); Breast biopsy (Right, 2023); and Axillary Surgery (Right, 2023).    Discharge Recommendations  Discharge Recommendations: Patient would benefit from continued therapy after discharge  PT Equipment Recommendations  Equipment Needed: No  Mobility Devices: Walker  Walker: Rolling  Other: continue to assess as pt was unable to participate this date.    Assessment  Body Structures, Functions, Activity Limitations Requiring Skilled Therapeutic Intervention: Decreased ADL status;Decreased body mechanics;Decreased strength;Decreased high-level IADLs;Decreased balance;Decreased endurance;Decreased coordination;Decreased posture;Decreased safe awareness;Decreased functional mobility   Assessment: Pt agitated and aggressive with staff and therapist this date. Pt unable to understand skilled education and encouragement to participate in any task this date. Pt would be unsafe to return to prior living arrangements as she requires assistance for all mobility, decreased balance and safety awareness and cognitive deficits. Pt would benefit from further therapy to address functional mobility deficits and

## 2025-06-18 NOTE — PLAN OF CARE
Problem: Respiratory - Adult  Goal: Achieves optimal ventilation and oxygenation  6/18/2025 0255 by Faiza Guy, RN  Outcome: Progressing  6/18/2025 0231 by Monique Lau, INGRID  Outcome: Not Progressing  Flowsheets (Taken 6/18/2025 0231)  Achieves optimal ventilation and oxygenation:   Assess for changes in respiratory status   Position to facilitate oxygenation and minimize respiratory effort   Initiate smoking cessation protocol as indicated   Assess the need for suctioning and aspirate as needed   Respiratory therapy support as indicated   Assess and instruct to report shortness of breath or any respiratory difficulty   Encourage broncho-pulmonary hygiene including cough, deep breathe, incentive spirometry   Oxygen supplementation based on oxygen saturation or arterial blood gases   Assess for changes in mentation and behavior

## 2025-06-18 NOTE — PROGRESS NOTES
0145 - Pt increasingly anxious and tearful. Pt expressing worry about her personal belongings and writer called legal guardian Anjelica to talk to her and try to reassure her. Pt asking to go home now, starting to pull off LTME, and refusing blood sugar check at this time.     Electronically signed by Faiza Guy RN on 6/18/2025 at 1:59 AM

## 2025-06-18 NOTE — PROCEDURES
LONG-TERM EEG-VIDEO MONITORING   CLINICAL NEUROPHYSIOLOGY LABORATORY  DEPARTMENT OF NEUROLOGY  [facility]    Patient: Albertina Manning  Age: 70 y.o.  MRN: [id number]    Referring Physician: Godwin Zimmerman DO  History: The patient is a 70 y.o. female who presented breakthrough seizure/encephalopathy. This long-term video-EEG monitoring study was performed to determine the nature of the patient's clinical events. The patient is on neuroactive medications.   Albertina Manning   Current Facility-Administered Medications   Medication Dose Route Frequency Provider Last Rate Last Admin    sodium phosphate 15 mmol in sodium chloride 0.9 % 250 mL IVPB  15 mmol IntraVENous PRN Dedrick Walden, DO        magnesium sulfate 2000 mg in 50 mL IVPB premix  2,000 mg IntraVENous PRN Dedrick Walden, DO        potassium chloride (KLOR-CON M) extended release tablet 40 mEq  40 mEq Oral PRN Dedrick Walden, DO        Or    potassium bicarb-citric acid (EFFER-K) effervescent tablet 40 mEq  40 mEq Oral PRN Dedrick Walden, DO        Or    potassium chloride 10 mEq/100 mL IVPB (Peripheral Line)  10 mEq IntraVENous PRN Dedrick Walden, DO        nicotine (NICODERM CQ) 14 MG/24HR 1 patch  1 patch TransDERmal Daily Dedrick Walden DO   1 patch at 06/18/25 1014    dextrose 5 % in lactated ringers infusion   IntraVENous Continuous Dedrick Walden  mL/hr at 06/18/25 1014 New Bag at 06/18/25 1014    sodium chloride flush 0.9 % injection 10 mL  10 mL IntraVENous PRN Leo Callaway MD   10 mL at 06/17/25 2251    LORazepam (ATIVAN) injection 1 mg  1 mg IntraVENous Q10 Min PRN Sylwia Malik MD   1 mg at 06/17/25 2223    atorvastatin (LIPITOR) tablet 10 mg  10 mg Oral Daily Yazan Banuelos PA-C   10 mg at 06/18/25 0846    glucose chewable tablet 16 g  4 tablet Oral PRN Yazan Banuelos PA-C        dextrose bolus 10% 125 mL  125 mL IntraVENous PRN Yazan Banuelos PA-C   Stopped at 06/18/25 0357    Or  patient had no events or seizures.    Summary: During this day of recording no events were recorded. The interictal EEG was abnormal due to diffuse polymorphic theta slowing suggesting mild encephalopathy.  No abnormal epileptiform activity was seen. Monitoring was continued in order to record the patient's typical events. The EKG channel revealed no abnormalities.    ETHAN EWING MD  Diplomate, American Board of Psychiatry and Neurology  Diplomate, American Board of Clinical Neurophysiology  Diplomate, American Board of Epilepsy     Please note this is a preliminary report and updated daily. The final report will have a summary of behavior and electrographic findings with clinical correlation.

## 2025-06-18 NOTE — PROGRESS NOTES
[email or web address] [email or web address]@    [facility]   IN-PATIENT SERVICE   [place]    Progress Note    6/18/2025    9:40 AM    Name:   Albertina Manning  MRN:     [id number]     Acct:      [id number]   Room:   0450/0450-01   Day:  4  Admit Date:  6/14/2025  7:18 PM    PCP:   Rocky Tobias Sr., DO  Code Status:  Full Code    Subjective:     C/C: Altered mental status, slurred speech, tremors    Patient evaluated at bedside, her mentation has declined.  She is alert and oriented x 1.  She seems upset and wants to go home.  Her hypotension is better.  She has not been eating or drinking much and has also been hypoglycemic.    Brief History:     This is a 70-year-old female with past medical history of dementia, atherosclerosis of native coronary artery of native heart, CKD stage IIIb, tobacco use, depression, chronic obstructive bronchitis, schizoaffective disorder, tremor, and secondary malignant neoplasm of bone who presented to [place] emergency department originally earlier today and subsequently transferred to [facility] for further management of pneumonia, altered mental status, and elevated troponins. Patient with altered mental status with an inability to communicate. Over the past week patient has had worsening mentation and refusing to eat or take her medications at her nursing facility.  Her daughter had reported that she has been diagnosed with dementia within the past year and has had increasingly worsening symptoms of dementia.  Workup in the ED there in [place] was remarkable for both pneumonia and elevated troponins chest x-ray did show pneumonia of the right upper lobe.  There is a concern due to her current state of aspiration pneumonia. She was started on vancomycin and cefepime.  Initial lactate 5.6, white count of 7.1.  Patient was altered and was given Ativan 1 mg injections in the ED at [place].  Her ammonia level was unremarkable at 42.  Her CT of the head in [place] was negative  Normocephalic.   Cardiovascular:      Rate and Rhythm: Normal rate.      Heart sounds: No murmur heard.     No friction rub. No gallop.   Pulmonary:      Effort: No respiratory distress.      Breath sounds: No wheezing, rhonchi or rales.   Abdominal:      Tenderness: There is no abdominal tenderness. There is no guarding or rebound.   Musculoskeletal:         General: No swelling.   Skin:     General: Skin is warm.   Neurological:      Mental Status: She is alert.      Comments: Alert and oriented x 1   Psychiatric:         Mood and Affect: Mood normal.       Assessment:        Hospital Problems           Last Modified POA    * (Principal) Pneumonia due to organism 6/14/2025 Yes    Chest pain 6/15/2025 Yes    NSTEMI (non-ST elevated myocardial infarction) ([facility]) 6/15/2025 Yes    Prolonged Q-T interval on ECG 6/15/2025 Yes    Atherosclerosis of native coronary artery of native heart without angina pectoris 6/14/2025 Yes    Elevated troponin 6/14/2025 Yes    Hypernatremia 6/14/2025 Yes    Hypermagnesemia 6/14/2025 Yes    Acute kidney injury superimposed on stage 3b chronic kidney disease ([facility]) 6/14/2025 Yes    Dementia ([facility]) 6/14/2025 Yes    AMS (altered mental status) 6/14/2025 Yes    Dehydration 6/15/2025 Yes    History of schizoaffective disorder 6/15/2025 Yes    History of breast cancer 6/15/2025 Yes    Hypokalemia 6/16/2025 Yes    Acute metabolic encephalopathy 6/16/2025 Yes    Polypharmacy 6/16/2025 Yes       Plan:        Pneumonia, concern for aspiration  Continue Unasyn    Acute encephalopathy, medication induced vs neurocognitive/autoimmune  CT head shows no acute changes  Hold Aricept and amantadine as per neurology recommendations  EEG: No epileptiform activity seen  MRI brain shows no acute changes, moderate to marked diffuse chronic microvascular ischemic/aging changes  Neurology following, await further recommendations    Dysphagia  Patient passed her bedside swallow per speech yesterday  Was initiated on

## 2025-06-18 NOTE — CONSULTS
[facility] Neurology   IN-PATIENT SERVICE   [facility]    Progress Note             Date:   6/18/2025  Patient name:  Albertina Manning  Date of admission:  6/14/2025  7:18 PM  MRN:   [id number]  Account:  [id number]  YOB: 1955  PCP:    Rocky Tobias Sr., DO  Room:   [address] [place] [address]  Code Status:    Full Code    Chief Complaint:   Neurology consulted 6/15/25 for AMS and atypical movements X 2 weeks       Interval hx:     Patient was seen and examined at bedside.  Patient had an episode of anxiousness overnight and started to pull off her LTM EEG and refusing blood sugar.  Could be an episode of acute psychosis as a result of holding her antipsychotic medication    LTME was abnormal due to diffuse polymorphic theta slowing suggesting mild encephalopathy.  No epileptiform activity was seen.  Patient is cleared to discharge from neurology standpoint.    Her choreoathetoid movements was less prominent today.  MRI Moderate to marked diffuse chronic microvascular ischemic/aging changes in the supratentorial white matter, Mild cortical atrophy changes over the convexities of both cerebral hemispheres, and mild-to-moderate central atrophy.       Brief History of Present Illness:     As per H&P:    Albertina Manning is a 70 y.o. female with a past medical history of recent diagnosis of rapidly progressing dementia, metastatic breast cancer, schizoaffective disorder, CKD, CAD, DM 2, and depression who was transferred from [place] ED after presenting with altered mental status from SNF.  Patient reportedly had worsening mentation and refusal to eat, drink, or take her medications.  In the [place] ED, patient was found to have evidence of right upper lobe pneumonia, FREDERICK on CKD, elevated troponin, and hypernatremia.  CT head at OSH was unremarkable.  UDS unremarkable, positive for benzodiazepines-were given in the ED.     Neurology was consulted for AMS and concern for seizure-like activity.  On my  Velocity 1.0 m/s    LVOT Peak Gradient 4 mmHg    LVPWd 0.6 0.6 - 0.9 cm    LV E' Lateral Velocity 10.80 cm/s    LV E' Septal Velocity 4.13 cm/s    LV Ejection Fraction A2C 52 %    LV Ejection Fraction A4C 59 %    EF BP 58 55 - 100 %    LVOT Area 2.5 cm2    LVOT SV 43.2 ml    LA Minor Axis 3.5 cm    LA Major [place] 5.3 cm    LA Area 2C 8.3 cm2    LA Area 4C 11.5 cm2    LA Volume MOD A2C 17 (A) 22 - 52 mL    LA Volume MOD A4C 19 (A) 22 - 52 mL    LA Diameter 1.8 cm    AV Mean Velocity 1.1 m/s    AV Mean Gradient 5 mmHg    AV VTI 30.7 cm    AV Peak Velocity 1.8 m/s    AV Peak Gradient 13 mmHg    AV Area by VTI 1.4 cm2    AV Area by Peak Velocity 1.5 cm2    Aortic Root 2.6 cm    MV E Wave Deceleration Time 232.0 ms    MV A Velocity 1.09 m/s    MV E Velocity 0.88 m/s    MV Mean Gradient 3 mmHg    MV VTI 32.6 cm    MV Mean Velocity 0.8 m/s    MV Max Velocity 1.3 m/s    MV Peak Gradient 7 mmHg    MV Area by VTI 1.3 cm2    PV Max Velocity 1.2 m/s    PV Peak Gradient 6 mmHg    RV Basal Dimension 2.5 cm    RV Free Wall Peak S' 13.4 cm/s    TAPSE 2.0 >=1.7 cm    TR Max Velocity 2.76 m/s    TR Peak Gradient 30 mmHg    Body Surface Area 1.44 m2    Fractional Shortening 2D 59 28 - 44 %    LV ESV Index A4C 13 mL/m2    LV EDV Index A4C 33 mL/m2    LV ESV Index A2C 11 mL/m2    LV EDV Index A2C 24 mL/m2    LVIDd Index 2.75 cm/m2    LVIDs Index 1.13 cm/m2    LV RWT Ratio 0.31     LV Mass 2D 75.1 67 - 162 g    LV Mass 2D Index 52.9 43 - 95 g/m2    MV E/A 0.81     E/E' Ratio (Averaged) 14.73     E/E' Lateral 8.15     E/E' Septal 21.31     LVOT Stroke Volume Index 30.4 mL/m2    LA Volume Index MOD A2C 12 (A) 16 - 34 ml/m2    LA Volume Index MOD A4C 13 (A) 16 - 34 ml/m2    LA Size Index 1.27 cm/m2    LA/AO Root Ratio 0.69     Ao Root Index 1.83 cm/m2    AV Velocity Ratio 0.56     LVOT:AV VTI Index 0.55     SASCHA/BSA VTI 1.0 cm2/m2    SASCHA/BSA Peak Velocity 1.1 cm2/m2    MV:LVOT VTI Index 1.92     Est. RA Pressure 3 mmHg    RVSP 33 mmHg    EF

## 2025-06-18 NOTE — PROGRESS NOTES
Occupational Therapy    [facility]  Occupational Therapy Not Seen Note    DATE: 2025    NAME: Albertina Manning  MRN: [id number]   : 1955      Patient not seen this date for Occupational Therapy due to:    Patient is not appropriate for active participation in OT evaluation/treatment at this time d/t Pt hallucinating per RN    Next Scheduled Treatment: will continue to check back as able.      Electronically signed by SIRISHA London on 2025 at 2:03 PM

## 2025-06-18 NOTE — TELEPHONE ENCOUNTER
Please be sure to get a follow up appointment scheduled with Albertina.  She needs to see our physician so that he can determine if we are ordering Verzenio.  We could probably do a virtual visit if needed.  I just don't want us holding up her meds.  She was a no show Mid may, and a cancel 5/29 for hospitalization.

## 2025-06-19 ENCOUNTER — RESULTS FOLLOW-UP (OUTPATIENT)
Dept: EMERGENCY DEPT | Age: 70
End: 2025-06-19

## 2025-06-19 LAB
ANION GAP SERPL CALCULATED.3IONS-SCNC: 12 MMOL/L (ref 9–16)
ANION GAP SERPL CALCULATED.3IONS-SCNC: 13 MMOL/L (ref 9–16)
BASOPHILS # BLD: 0 K/UL (ref 0–0.2)
BASOPHILS NFR BLD: 0 % (ref 0–2)
BUN SERPL-MCNC: 4 MG/DL (ref 8–23)
BUN SERPL-MCNC: 5 MG/DL (ref 8–23)
CA-I BLD-SCNC: 1.14 MMOL/L (ref 1.13–1.33)
CALCIUM SERPL-MCNC: 7.3 MG/DL (ref 8.6–10.4)
CALCIUM SERPL-MCNC: 7.9 MG/DL (ref 8.6–10.4)
CHLORIDE SERPL-SCNC: 102 MMOL/L (ref 98–107)
CHLORIDE SERPL-SCNC: 109 MMOL/L (ref 98–107)
CO2 SERPL-SCNC: 15 MMOL/L (ref 20–31)
CO2 SERPL-SCNC: 16 MMOL/L (ref 20–31)
CORTIS SERPL-MCNC: 12.5 UG/DL (ref 2.5–19.5)
CORTIS SERPL-MCNC: 29.3 UG/DL (ref 2.5–19.5)
CORTIS SERPL-MCNC: 39.1 UG/DL (ref 2.5–19.5)
CORTIS SERPL-MCNC: 46.1 UG/DL (ref 2.5–19.5)
CREAT SERPL-MCNC: 0.8 MG/DL (ref 0.6–0.9)
CREAT SERPL-MCNC: 0.9 MG/DL (ref 0.6–0.9)
EOSINOPHIL # BLD: 0.31 K/UL (ref 0–0.4)
EOSINOPHILS RELATIVE PERCENT: 6 % (ref 1–4)
ERYTHROCYTE [DISTWIDTH] IN BLOOD BY AUTOMATED COUNT: 15.3 % (ref 11.8–14.4)
GFR, ESTIMATED: 69 ML/MIN/1.73M2
GFR, ESTIMATED: 79 ML/MIN/1.73M2
GLUCOSE BLD-MCNC: 101 MG/DL (ref 65–105)
GLUCOSE BLD-MCNC: 123 MG/DL (ref 65–105)
GLUCOSE BLD-MCNC: 134 MG/DL (ref 65–105)
GLUCOSE BLD-MCNC: 137 MG/DL (ref 65–105)
GLUCOSE BLD-MCNC: 16 MG/DL (ref 65–105)
GLUCOSE BLD-MCNC: 161 MG/DL (ref 65–105)
GLUCOSE BLD-MCNC: 17 MG/DL (ref 65–105)
GLUCOSE BLD-MCNC: 173 MG/DL (ref 65–105)
GLUCOSE BLD-MCNC: 173 MG/DL (ref 65–105)
GLUCOSE BLD-MCNC: 206 MG/DL (ref 65–105)
GLUCOSE BLD-MCNC: 209 MG/DL (ref 65–105)
GLUCOSE BLD-MCNC: 22 MG/DL (ref 65–105)
GLUCOSE BLD-MCNC: 22 MG/DL (ref 65–105)
GLUCOSE BLD-MCNC: 225 MG/DL (ref 65–105)
GLUCOSE BLD-MCNC: 255 MG/DL (ref 65–105)
GLUCOSE BLD-MCNC: 297 MG/DL (ref 65–105)
GLUCOSE BLD-MCNC: 37 MG/DL (ref 65–105)
GLUCOSE BLD-MCNC: 49 MG/DL (ref 65–105)
GLUCOSE BLD-MCNC: 50 MG/DL (ref 74–100)
GLUCOSE BLD-MCNC: 51 MG/DL (ref 65–105)
GLUCOSE BLD-MCNC: 53 MG/DL (ref 65–105)
GLUCOSE BLD-MCNC: 55 MG/DL (ref 65–105)
GLUCOSE BLD-MCNC: 57 MG/DL (ref 65–105)
GLUCOSE BLD-MCNC: 59 MG/DL (ref 65–105)
GLUCOSE BLD-MCNC: 69 MG/DL (ref 65–105)
GLUCOSE BLD-MCNC: 78 MG/DL (ref 65–105)
GLUCOSE BLD-MCNC: 87 MG/DL (ref 65–105)
GLUCOSE BLD-MCNC: 89 MG/DL (ref 65–105)
GLUCOSE BLD-MCNC: 91 MG/DL (ref 65–105)
GLUCOSE SERPL-MCNC: 106 MG/DL (ref 74–99)
GLUCOSE SERPL-MCNC: 404 MG/DL (ref 74–99)
HCT VFR BLD AUTO: 23.2 % (ref 36.3–47.1)
HGB BLD-MCNC: 7.5 G/DL (ref 11.9–15.1)
IMM GRANULOCYTES # BLD AUTO: 0 K/UL (ref 0–0.3)
IMM GRANULOCYTES NFR BLD: 0 %
LYMPHOCYTES NFR BLD: 0.94 K/UL (ref 1–4.8)
LYMPHOCYTES RELATIVE PERCENT: 18 % (ref 24–44)
MAGNESIUM SERPL-MCNC: 1.5 MG/DL (ref 1.6–2.4)
MAGNESIUM SERPL-MCNC: 2.2 MG/DL (ref 1.6–2.4)
MCH RBC QN AUTO: 37.5 PG (ref 25.2–33.5)
MCHC RBC AUTO-ENTMCNC: 32.3 G/DL (ref 28.4–34.8)
MCV RBC AUTO: 116 FL (ref 82.6–102.9)
MICROORGANISM SPEC CULT: NORMAL
MONOCYTES NFR BLD: 0.16 K/UL (ref 0.1–0.8)
MONOCYTES NFR BLD: 3 % (ref 1–7)
MORPHOLOGY: ABNORMAL
MORPHOLOGY: ABNORMAL
NEUTROPHILS NFR BLD: 73 % (ref 36–66)
NEUTS SEG NFR BLD: 3.79 K/UL (ref 1.8–7.7)
NRBC BLD-RTO: 0 PER 100 WBC
PHOSPHATE SERPL-MCNC: 0.7 MG/DL (ref 2.5–4.5)
PHOSPHATE SERPL-MCNC: 2.9 MG/DL (ref 2.5–4.5)
PLATELET # BLD AUTO: 159 K/UL (ref 138–453)
PMV BLD AUTO: 10.8 FL (ref 8.1–13.5)
POTASSIUM SERPL-SCNC: 2.9 MMOL/L (ref 3.7–5.3)
POTASSIUM SERPL-SCNC: 3 MMOL/L (ref 3.7–5.3)
RBC # BLD AUTO: 2 M/UL (ref 3.95–5.11)
SERVICE CMNT-IMP: NORMAL
SODIUM SERPL-SCNC: 129 MMOL/L (ref 136–145)
SODIUM SERPL-SCNC: 138 MMOL/L (ref 136–145)
SPECIMEN DESCRIPTION: NORMAL
TSH SERPL DL<=0.05 MIU/L-ACNC: 0.91 UIU/ML (ref 0.27–4.2)
WBC OTHER # BLD: 5.2 K/UL (ref 3.5–11.3)

## 2025-06-19 PROCEDURE — 84443 ASSAY THYROID STIM HORMONE: CPT

## 2025-06-19 PROCEDURE — 02HV33Z INSERTION OF INFUSION DEVICE INTO SUPERIOR VENA CAVA, PERCUTANEOUS APPROACH: ICD-10-PCS | Performed by: STUDENT IN AN ORGANIZED HEALTH CARE EDUCATION/TRAINING PROGRAM

## 2025-06-19 PROCEDURE — 2580000003 HC RX 258: Performed by: NURSE PRACTITIONER

## 2025-06-19 PROCEDURE — 76937 US GUIDE VASCULAR ACCESS: CPT

## 2025-06-19 PROCEDURE — 6360000002 HC RX W HCPCS

## 2025-06-19 PROCEDURE — 82533 TOTAL CORTISOL: CPT

## 2025-06-19 PROCEDURE — 2500000003 HC RX 250 WO HCPCS

## 2025-06-19 PROCEDURE — 36415 COLL VENOUS BLD VENIPUNCTURE: CPT

## 2025-06-19 PROCEDURE — 94640 AIRWAY INHALATION TREATMENT: CPT

## 2025-06-19 PROCEDURE — 83735 ASSAY OF MAGNESIUM: CPT

## 2025-06-19 PROCEDURE — 2580000003 HC RX 258

## 2025-06-19 PROCEDURE — 82330 ASSAY OF CALCIUM: CPT

## 2025-06-19 PROCEDURE — C1751 CATH, INF, PER/CENT/MIDLINE: HCPCS

## 2025-06-19 PROCEDURE — 82947 ASSAY GLUCOSE BLOOD QUANT: CPT

## 2025-06-19 PROCEDURE — 2709999900 HC NON-CHARGEABLE SUPPLY

## 2025-06-19 PROCEDURE — 85025 COMPLETE CBC W/AUTO DIFF WBC: CPT

## 2025-06-19 PROCEDURE — 6370000000 HC RX 637 (ALT 250 FOR IP)

## 2025-06-19 PROCEDURE — 94761 N-INVAS EAR/PLS OXIMETRY MLT: CPT

## 2025-06-19 PROCEDURE — 99232 SBSQ HOSP IP/OBS MODERATE 35: CPT

## 2025-06-19 PROCEDURE — 2060000000 HC ICU INTERMEDIATE R&B

## 2025-06-19 PROCEDURE — 3E0436Z INTRODUCTION OF NUTRITIONAL SUBSTANCE INTO CENTRAL VEIN, PERCUTANEOUS APPROACH: ICD-10-PCS

## 2025-06-19 PROCEDURE — 80048 BASIC METABOLIC PNL TOTAL CA: CPT

## 2025-06-19 PROCEDURE — 36569 INSJ PICC 5 YR+ W/O IMAGING: CPT

## 2025-06-19 PROCEDURE — 84100 ASSAY OF PHOSPHORUS: CPT

## 2025-06-19 RX ORDER — COSYNTROPIN 0.25 MG/ML
250 INJECTION, POWDER, FOR SOLUTION INTRAMUSCULAR; INTRAVENOUS ONCE
Status: COMPLETED | OUTPATIENT
Start: 2025-06-19 | End: 2025-06-19

## 2025-06-19 RX ORDER — OCTREOTIDE ACETATE 50 UG/ML
100 INJECTION, SOLUTION INTRAVENOUS; SUBCUTANEOUS ONCE
Status: COMPLETED | OUTPATIENT
Start: 2025-06-19 | End: 2025-06-19

## 2025-06-19 RX ORDER — DEXTROSE MONOHYDRATE 25 G/50ML
25 INJECTION, SOLUTION INTRAVENOUS PRN
Status: DISCONTINUED | OUTPATIENT
Start: 2025-06-19 | End: 2025-07-03 | Stop reason: HOSPADM

## 2025-06-19 RX ORDER — DEXTROSE MONOHYDRATE 25 G/50ML
INJECTION, SOLUTION INTRAVENOUS
Status: COMPLETED
Start: 2025-06-19 | End: 2025-06-19

## 2025-06-19 RX ORDER — THIAMINE HYDROCHLORIDE 100 MG/ML
200 INJECTION, SOLUTION INTRAMUSCULAR; INTRAVENOUS DAILY
Status: COMPLETED | OUTPATIENT
Start: 2025-06-20 | End: 2025-06-24

## 2025-06-19 RX ORDER — HYDROCORTISONE SODIUM SUCCINATE 100 MG/2ML
100 INJECTION INTRAMUSCULAR; INTRAVENOUS EVERY 8 HOURS SCHEDULED
Status: DISCONTINUED | OUTPATIENT
Start: 2025-06-19 | End: 2025-06-19

## 2025-06-19 RX ORDER — DEXTROSE MONOHYDRATE 100 MG/ML
INJECTION, SOLUTION INTRAVENOUS CONTINUOUS
Status: DISCONTINUED | OUTPATIENT
Start: 2025-06-19 | End: 2025-06-20

## 2025-06-19 RX ADMIN — DEXTROSE: 10 SOLUTION INTRAVENOUS at 04:52

## 2025-06-19 RX ADMIN — POTASSIUM CHLORIDE 10 MEQ: 7.46 INJECTION, SOLUTION INTRAVENOUS at 12:55

## 2025-06-19 RX ADMIN — AMPICILLIN AND SULBACTAM 1500 MG: 1; .5 INJECTION, POWDER, FOR SOLUTION INTRAMUSCULAR; INTRAVENOUS at 15:41

## 2025-06-19 RX ADMIN — DEXTROSE: 10 SOLUTION INTRAVENOUS at 15:25

## 2025-06-19 RX ADMIN — SODIUM CHLORIDE, PRESERVATIVE FREE 10 ML: 5 INJECTION INTRAVENOUS at 19:52

## 2025-06-19 RX ADMIN — COSYNTROPIN 250 MCG: 0.25 INJECTION, POWDER, LYOPHILIZED, FOR SOLUTION INTRAMUSCULAR; INTRAVENOUS at 15:13

## 2025-06-19 RX ADMIN — POTASSIUM CHLORIDE 10 MEQ: 7.46 INJECTION, SOLUTION INTRAVENOUS at 10:50

## 2025-06-19 RX ADMIN — SODIUM PHOSPHATE, MONOBASIC, MONOHYDRATE AND SODIUM PHOSPHATE, DIBASIC, ANHYDROUS 30 MMOL: 276; 142 INJECTION, SOLUTION INTRAVENOUS at 10:51

## 2025-06-19 RX ADMIN — DEXTROSE 250 ML: 10 SOLUTION INTRAVENOUS at 09:10

## 2025-06-19 RX ADMIN — I.V. FAT EMULSION 100 ML: 20 EMULSION INTRAVENOUS at 18:33

## 2025-06-19 RX ADMIN — POTASSIUM CHLORIDE 10 MEQ: 7.46 INJECTION, SOLUTION INTRAVENOUS at 15:36

## 2025-06-19 RX ADMIN — MAGNESIUM SULFATE HEPTAHYDRATE 2000 MG: 40 INJECTION, SOLUTION INTRAVENOUS at 10:49

## 2025-06-19 RX ADMIN — METHYLPREDNISOLONE SODIUM SUCCINATE 60 MG: 40 INJECTION, POWDER, LYOPHILIZED, FOR SOLUTION INTRAMUSCULAR; INTRAVENOUS at 11:24

## 2025-06-19 RX ADMIN — LEUCINE, PHENYLALANINE, LYSINE, METHIONINE, ISOLEUCINE, VALINE, HISTIDINE, THREONINE, TRYPTOPHAN, ALANINE, GLYCINE, ARGININE, PROLINE, SERINE, TYROSINE, SODIUM ACETATE, DIBASIC POTASSIUM PHOSPHATE, MAGNESIUM CHLORIDE, SODIUM CHLORIDE, CALCIUM CHLORIDE, DEXTROSE
365; 280; 290; 200; 300; 290; 240; 210; 90; 1035; 515; 575; 340; 250; 20; 340; 261; 51; 59; 33; 15 INJECTION INTRAVENOUS at 18:27

## 2025-06-19 RX ADMIN — DEXTROSE 250 ML: 10 SOLUTION INTRAVENOUS at 09:37

## 2025-06-19 RX ADMIN — DEXTROSE 125 ML: 10 SOLUTION INTRAVENOUS at 05:02

## 2025-06-19 RX ADMIN — GLUCAGON 1 MG: 1 INJECTION, POWDER, LYOPHILIZED, FOR SOLUTION INTRAMUSCULAR; INTRAVENOUS at 09:44

## 2025-06-19 RX ADMIN — SODIUM CHLORIDE, PRESERVATIVE FREE 10 ML: 5 INJECTION INTRAVENOUS at 16:52

## 2025-06-19 RX ADMIN — POTASSIUM CHLORIDE 10 MEQ: 7.46 INJECTION, SOLUTION INTRAVENOUS at 18:22

## 2025-06-19 RX ADMIN — DEXTROSE MONOHYDRATE 50 ML: 25 INJECTION, SOLUTION INTRAVENOUS at 09:55

## 2025-06-19 RX ADMIN — DEXTROSE 250 ML: 10 SOLUTION INTRAVENOUS at 06:24

## 2025-06-19 RX ADMIN — DEXTROSE 250 ML: 10 SOLUTION INTRAVENOUS at 04:26

## 2025-06-19 RX ADMIN — POTASSIUM CHLORIDE 10 MEQ: 7.46 INJECTION, SOLUTION INTRAVENOUS at 16:52

## 2025-06-19 RX ADMIN — OCTREOTIDE ACETATE 100 MCG: 50 INJECTION, SOLUTION INTRAVENOUS; SUBCUTANEOUS at 15:27

## 2025-06-19 RX ADMIN — IPRATROPIUM BROMIDE AND ALBUTEROL SULFATE 1 DOSE: .5; 2.5 SOLUTION RESPIRATORY (INHALATION) at 16:04

## 2025-06-19 RX ADMIN — POTASSIUM CHLORIDE 10 MEQ: 7.46 INJECTION, SOLUTION INTRAVENOUS at 14:19

## 2025-06-19 NOTE — PLAN OF CARE
Called for worsening mentation for the patient.  Patient is a 70-year-old female with history of dementia, CAD, CKD stage IIIb, depression, schizoaffective disorder on arrival the patient was oriented to her name, was not responding to any other orientation questions was having tremor, getting the 10 bolus this morning had not eaten anything as well. She was protecting her airway, was redirectable.     Blood glucose drawn showed glucose of 19, and not giving her responsiveness.  D50 was ordered recheck blood glucose was 91.    Apparently the patient has been on D10 continuous 100 cc/h and had IV not working,/infiltration?.  Due to dementia and altered mentation has not been eating that well.  Patient also drank apple juice at bedside.  Stat labs were drawn which showed mag of 1.5, phosphorus of 0.7 potassium of 2.9    Recommendations  Replace phosphorus  Replace potassium  Replace magnesium  Repeat BMP at 2 PM  Agreeable with continuous D10,  blood glucose check if continues to drop blood glucose can give D 20 PIV, at a lower rate 20-30cc to avoid infiltration  Recommend dietician and vascular team consult, might require TPN    Update: Crit Care were consulted to admit the patient because of recurrent hypoglycemia.  Patient has been getting poked in the fingertips which were cyanotic on my examination and most likely have very low blood perfusion, and in my opinion the blood glucose reading is not accurate from fingertips.  Also the patient was requiring frequent monitoring and hence one-to-one or 1:21 nursing request was required. Requested the attending physician to transfer her to the neuro ICU 1C or car 1 for the nursing requirements, additionally requested the attending to reach out to vascular access for potential peripheral IV placement.    Vascular access tried her for peripheral IV but apparently patient was not able to tolerate the procedure.  They did not assess for PICC line placement, we were reached  out again for central line placement.    I called the PICC team and requested them to try for a PICC line, apparently the patient was moving her arm a lot during the peripheral IV placement.  I requested them that placing a central line would be even more harmful in her case if she is moving her extremities this much, she will try to move her head and it might result in a pneumothorax, along with that placing a femoral line could be even harder since it could damage the vasculature around all the ligament tissue.  They expressed understanding and will proceed with a PICC line placement.    This conversation was relayed back to the attending physician, they were told that PICC line would come to place the access.     Please feel free to reach out to the ICU team for any questions or assistance.    Thank you

## 2025-06-19 NOTE — PLAN OF CARE
Problem: Seizure Precautions  Goal: Remains free of injury related to seizures activity  Outcome: Progressing     Problem: Respiratory - Adult  Goal: Achieves optimal ventilation and oxygenation  Outcome: Progressing     Problem: Skin/Tissue Integrity - Adult  Goal: Skin integrity remains intact  Description: 1.  Monitor for areas of redness and/or skin breakdown  2.  Assess vascular access sites hourly  3.  Every 4-6 hours minimum:  Change oxygen saturation probe site  4.  Every 4-6 hours:  If on nasal continuous positive airway pressure, respiratory therapy assess nares and determine need for appliance change or resting period  Outcome: Progressing     Problem: Metabolic/Fluid and Electrolytes - Adult  Goal: Electrolytes maintained within normal limits  Outcome: Progressing     Problem: Safety - Adult  Goal: Free from fall injury  Outcome: Progressing     Problem: Skin/Tissue Integrity  Goal: Skin integrity remains intact  Description: 1.  Monitor for areas of redness and/or skin breakdown  2.  Assess vascular access sites hourly  3.  Every 4-6 hours minimum:  Change oxygen saturation probe site  4.  Every 4-6 hours:  If on nasal continuous positive airway pressure, respiratory therapy assess nares and determine need for appliance change or resting period  Outcome: Progressing     Problem: Discharge Planning  Goal: Discharge to home or other facility with appropriate resources  Outcome: Progressing     Problem: Nutrition Deficit:  Goal: Optimize nutritional status  Outcome: Progressing

## 2025-06-19 NOTE — PLAN OF CARE
Problem: Seizure Precautions  Goal: Remains free of injury related to seizures activity  6/18/2025 2228 by Carolyn Del Cid RN  Outcome: Progressing  Flowsheets (Taken 6/14/2025 1930 by Faiza Guy, RN)  Remains free of injury related to seizure activity:   Maintain airway, patient safety  and administer oxygen as ordered   Monitor patient for seizure activity, document and report duration and description of seizure to Licensed Independent Practitioner   If seizure occurs, turn patient to side and suction secretions as needed   Reorient patient post seizure   Seizure pads on all 4 side rails      Problem: Neurosensory - Adult  Goal: Achieves stable or improved neurological status  6/18/2025 2228 by Carolyn Del Cid RN  Outcome: Progressing  Flowsheets (Taken 6/18/2025 2228)  Achieves stable or improved neurological status:   Assess for and report changes in neurological status   Initiate measures to prevent increased intracranial pressure   Maintain blood pressure and fluid volume within ordered parameters to optimize cerebral perfusion and minimize risk of hemorrhage   Monitor temperature, glucose, and sodium. Initiate appropriate interventions as ordered  Goal: Absence of seizures  6/18/2025 2228 by Carolyn Del Cid RN  Flowsheets (Taken 6/18/2025 2228)  Absence of seizures:   Monitor for seizure activity.  If seizure occurs, document type and location of movements and any associated apnea   If seizure occurs, turn head to side and suction secretions as needed   Administer anticonvulsants as ordered   Support airway/breathing, administer oxygen as needed    Problem: Cardiovascular - Adult  Goal: Maintains optimal cardiac output and hemodynamic stability  6/18/2025 2228 by Carolyn Del Cid RN  Outcome: Progressing  Flowsheets (Taken 6/18/2025 2228)  Maintains optimal cardiac output and hemodynamic stability:   Monitor blood pressure and heart rate   Monitor urine output and notify Licensed

## 2025-06-19 NOTE — PROGRESS NOTES
[facility] [place] [facility] [place]    Shift date: 6/19/2025  Shift day: Thursday   Shift # 2    Room # 0450/0450-01   Name: Albertina Manning                Mu-ism: Unknown   Place of Catholic: Unknown    Referral: Rapid Response    Admit Date & Time: 6/14/2025  7:18 PM    Assessment:  Albertina Manning is a 70 y.o. female in the hospital because Pneumonia. Upon entering the room writer observes RRT members in room. Writer did not immediately enter, found legal guardian's phone number. After some staff left, writer entered room and notified doctor that guardian number ready.  Mentioned she had spoken to \"daughter\" (writer unsure, chart says \"legal guardian\" without specifying relation) prior to rapid being called.  Was telling patient that she needs to eat.      Intervention:  Writer introduced self and title as  Writer offered space for patient  to express feelings, needs, and concerns and provided a ministry presence. Pt speech was slightly incomprehensible but expressed being ok after so much activity and that she was not hungry. Writer offered to pray and began but pt seemed to be trying to say something.     Outcome:  Writer offered further  services as needed.    Plan:  Chaplains will remain available to offer spiritual and emotional support as needed.      Electronically signed by Moreno Díaz, Intern, on 6/19/2025 at 10:10 AM.  [facility]  [phone]

## 2025-06-19 NOTE — CONSULTS
Picc placement order:    Benefits include stable, long term intravenous access. Blood draws. Peripheral vein preservation. Safely deliver vesicant medications.    Risks include failure to obtain the desired result(s) of the procedure, discomfort, injury, the need for additional procedures/therapies, permanent loss of body function, bleeding/bruising, arterial puncture, air embolism, nerve damage, hematoma, phlebitis, catheter fracture/rupture, catheter embolism, catheter occlusion, catheter migration, catheter site infection, unintentional/accidental removal of catheter, bloodstream infection, infiltration, cardiac arrhythmia, vein thrombosis, difficult catheter removal.   Alternatives discussed including centrally inserted central catheter, as well as less invasive procedures such as multiple peripheral IVs, extended dwell catheters and midline placements.     Consent signed and obtained by proceduralist, from DPOA.    Picc placement note:    Prescribed IV Therapy = limited access/multiple blown pivs/need for multiple incompatible medications  Peripheral ultrasound assessment done. Plan for left brachial vein insertion.   Vein measurement = 0.56 cm and area based CVR = 9.2%. Preferable CVR based on area would be less than 20% (which correlates to less than 45% linear CVR).  Product type: Bard 5 fr 3 lumen Power PICC.  History/Labs/Allergies Reviewed  Placed By: domo Miller RN IV Team  Assisted By: Thomas Miller RN  Time out Performed using Two Identifiers  Trimmed at 36 cm  External catheter length 2 cm  Number of attempts 1  Estimated blood loss = 1 ml  Special equipment used- VPS Tip tracker, ultrasound, and micro-introducer technique   Catheter securement = statlock adhesive securement device  Catheter adhesive (secureportIV) utilized at insertion site  Dressing applied= 3-piece bordered Tegaderm CHG  Lidocaine administered intradermally conc.1%, approx 2 ml.     RN aware picc placed with VPS ECG technology and is

## 2025-06-19 NOTE — CONSULTS
Comprehensive Nutrition Assessment    Type and Reason for Visit:  Initial, Consult (Poor Intakes/Appetite x 5 or more days.)    Nutrition Recommendations/Plan:   Continue current diet per SLP recommendations.  Will provide frozen Magic Cups x 1 per day.  Encourage/monitor intakes as tolerated.  If pt continues to refuse PO/have poor PO intakes, consider nutrition support of Parenteral Nutrition at 42 mL/hr starting with 100 gm Dextrose, 50 gm AA, and 100 mL IV lipids.  Replace electrolytes before starting nutrition support.  Will monitor labs, weights, and plan of care.     Malnutrition Assessment:  Malnutrition Status:  Insufficient data (06/19/25 1531)    Context:  Acute Illness     Findings of the 6 clinical characteristics of malnutrition:  Energy Intake:  50% or less of estimated energy requirements for 5 or more days x past few weeks.  Weight Loss:  Mild weight loss - 22% x past 15-16 months.    Body Fat Loss:  Unable to assess   Muscle Mass Loss:  Unable to assess  Fluid Accumulation:  No fluid accumulation   Strength:  Not Performed    Nutrition Assessment:    Consulted for Poor Intakes/Appetite x 5 or more days.  Admitted for AMS and pneumonia.  PMH: dementia, atherosclerosis of native coronary artery of native heart, CKD, chronic obstructive bronchitis, schizoaffective disorder, h/o secondary malignant neoplasm of bone.  PICC team in room at visit attempting to place PICC line in order to provide electrolyte replacements and IV fluids.  Per RN/chart review, over the past week pt has had worsening mentation and refusing to eat or take her medications at her nursing facility.  RN reports since admission pt has also been refusing meals and won't eat d/t thinking food is poisoned.  Pt s/p bedside swallow study with Speech Therapy on 6/17 which pt passed for an easy to chew diet with mildly (nectar) thick liquids.  RN reports this morning pt with a very low blood sugar and poorly responsive - pt received

## 2025-06-19 NOTE — PROGRESS NOTES
@Page [email or web address]@    [facility]   IN-PATIENT SERVICE   [facility]    Progress Note    6/19/2025    9:08 AM    Name:   Albertina Manning  MRN:     [id number]     Acct:      [id number]   Room:   0450/0450-01   Day:  5  Admit Date:  6/14/2025  7:18 PM    PCP:   Rocky Tobias Sr., DO  Code Status:  Full Code    Subjective:     C/C: Altered mental status, slurred speech, tremors    Patient evaluated at bedside, she is still alert and oriented x 1.  I spoke to patient's daughter on the phone and she states that patient was very independent about 2 weeks ago and has had a progressive decline in her mentation.     Brief History:     This is a 70-year-old female with past medical history of dementia, atherosclerosis of native coronary artery of native heart, CKD stage IIIb, tobacco use, depression, chronic obstructive bronchitis, schizoaffective disorder, tremor, and secondary malignant neoplasm of bone who presented to [place] emergency department originally earlier today and subsequently transferred to [facility] for further management of pneumonia, altered mental status, and elevated troponins. Patient with altered mental status with an inability to communicate. Over the past week patient has had worsening mentation and refusing to eat or take her medications at her nursing facility.  Her daughter had reported that she has been diagnosed with dementia within the past year and has had increasingly worsening symptoms of dementia.  Workup in the ED there in [place] was remarkable for both pneumonia and elevated troponins chest x-ray did show pneumonia of the right upper lobe.  There is a concern due to her current state of aspiration pneumonia. She was started on vancomycin and cefepime.  Initial lactate 5.6, white count of 7.1.  Patient was altered and was given Ativan 1 mg injections in the ED at [place].  Her ammonia level was unremarkable at 42.  Her CT of the head in [place] was negative

## 2025-06-20 LAB
ANION GAP SERPL CALCULATED.3IONS-SCNC: 13 MMOL/L (ref 9–16)
ANION GAP SERPL CALCULATED.3IONS-SCNC: 15 MMOL/L (ref 9–16)
BASOPHILS # BLD: 0 K/UL (ref 0–0.2)
BASOPHILS NFR BLD: 0 % (ref 0–2)
BUN SERPL-MCNC: 11 MG/DL (ref 8–23)
BUN SERPL-MCNC: 8 MG/DL (ref 8–23)
CALCIUM SERPL-MCNC: 8.2 MG/DL (ref 8.6–10.4)
CALCIUM SERPL-MCNC: 8.5 MG/DL (ref 8.6–10.4)
CHLORIDE SERPL-SCNC: 113 MMOL/L (ref 98–107)
CHLORIDE SERPL-SCNC: 115 MMOL/L (ref 98–107)
CO2 SERPL-SCNC: 13 MMOL/L (ref 20–31)
CO2 SERPL-SCNC: 15 MMOL/L (ref 20–31)
CREAT SERPL-MCNC: 0.8 MG/DL (ref 0.6–0.9)
CREAT SERPL-MCNC: 0.8 MG/DL (ref 0.6–0.9)
EOSINOPHIL # BLD: 0 K/UL (ref 0–0.4)
EOSINOPHILS RELATIVE PERCENT: 0 % (ref 1–4)
ERYTHROCYTE [DISTWIDTH] IN BLOOD BY AUTOMATED COUNT: 15.6 % (ref 11.8–14.4)
GFR, ESTIMATED: 79 ML/MIN/1.73M2
GFR, ESTIMATED: 79 ML/MIN/1.73M2
GLUCOSE BLD-MCNC: 136 MG/DL (ref 65–105)
GLUCOSE BLD-MCNC: 141 MG/DL (ref 65–105)
GLUCOSE BLD-MCNC: 160 MG/DL (ref 65–105)
GLUCOSE BLD-MCNC: 204 MG/DL (ref 65–105)
GLUCOSE BLD-MCNC: 237 MG/DL (ref 65–105)
GLUCOSE SERPL-MCNC: 187 MG/DL (ref 74–99)
GLUCOSE SERPL-MCNC: 201 MG/DL (ref 74–99)
HCT VFR BLD AUTO: 30.9 % (ref 36.3–47.1)
HGB BLD-MCNC: 9.3 G/DL (ref 11.9–15.1)
IMM GRANULOCYTES # BLD AUTO: 0.06 K/UL (ref 0–0.3)
IMM GRANULOCYTES NFR BLD: 1 %
LYMPHOCYTES NFR BLD: 0.44 K/UL (ref 1–4.8)
LYMPHOCYTES RELATIVE PERCENT: 7 % (ref 24–44)
MAGNESIUM SERPL-MCNC: 2.1 MG/DL (ref 1.6–2.4)
MAGNESIUM SERPL-MCNC: 2.2 MG/DL (ref 1.6–2.4)
MCH RBC QN AUTO: 38.4 PG (ref 25.2–33.5)
MCHC RBC AUTO-ENTMCNC: 30.1 G/DL (ref 28.4–34.8)
MCV RBC AUTO: 127.7 FL (ref 82.6–102.9)
MONOCYTES NFR BLD: 0.25 K/UL (ref 0.1–0.8)
MONOCYTES NFR BLD: 4 % (ref 1–7)
MORPHOLOGY: ABNORMAL
MORPHOLOGY: ABNORMAL
NEUTROPHILS NFR BLD: 88 % (ref 36–66)
NEUTS SEG NFR BLD: 5.55 K/UL (ref 1.8–7.7)
NRBC BLD-RTO: 0 PER 100 WBC
PHOSPHATE SERPL-MCNC: 2.4 MG/DL (ref 2.5–4.5)
PHOSPHATE SERPL-MCNC: 2.5 MG/DL (ref 2.5–4.5)
PLATELET # BLD AUTO: 161 K/UL (ref 138–453)
PMV BLD AUTO: 10.6 FL (ref 8.1–13.5)
POTASSIUM SERPL-SCNC: 3.7 MMOL/L (ref 3.7–5.3)
POTASSIUM SERPL-SCNC: 3.8 MMOL/L (ref 3.7–5.3)
POTASSIUM SERPL-SCNC: 3.9 MMOL/L (ref 3.7–5.3)
RBC # BLD AUTO: 2.42 M/UL (ref 3.95–5.11)
SODIUM SERPL-SCNC: 141 MMOL/L (ref 136–145)
SODIUM SERPL-SCNC: 143 MMOL/L (ref 136–145)
WBC OTHER # BLD: 6.3 K/UL (ref 3.5–11.3)

## 2025-06-20 PROCEDURE — 2500000003 HC RX 250 WO HCPCS

## 2025-06-20 PROCEDURE — 99232 SBSQ HOSP IP/OBS MODERATE 35: CPT

## 2025-06-20 PROCEDURE — 82947 ASSAY GLUCOSE BLOOD QUANT: CPT

## 2025-06-20 PROCEDURE — 6360000002 HC RX W HCPCS

## 2025-06-20 PROCEDURE — 84132 ASSAY OF SERUM POTASSIUM: CPT

## 2025-06-20 PROCEDURE — 6370000000 HC RX 637 (ALT 250 FOR IP)

## 2025-06-20 PROCEDURE — 2580000003 HC RX 258

## 2025-06-20 PROCEDURE — 85025 COMPLETE CBC W/AUTO DIFF WBC: CPT

## 2025-06-20 PROCEDURE — 36415 COLL VENOUS BLD VENIPUNCTURE: CPT

## 2025-06-20 PROCEDURE — 93005 ELECTROCARDIOGRAM TRACING: CPT | Performed by: NURSE PRACTITIONER

## 2025-06-20 PROCEDURE — 80048 BASIC METABOLIC PNL TOTAL CA: CPT

## 2025-06-20 PROCEDURE — 83735 ASSAY OF MAGNESIUM: CPT

## 2025-06-20 PROCEDURE — 94640 AIRWAY INHALATION TREATMENT: CPT

## 2025-06-20 PROCEDURE — 2060000000 HC ICU INTERMEDIATE R&B

## 2025-06-20 PROCEDURE — 94761 N-INVAS EAR/PLS OXIMETRY MLT: CPT

## 2025-06-20 PROCEDURE — 84100 ASSAY OF PHOSPHORUS: CPT

## 2025-06-20 PROCEDURE — 6370000000 HC RX 637 (ALT 250 FOR IP): Performed by: NURSE PRACTITIONER

## 2025-06-20 RX ORDER — DULOXETIN HYDROCHLORIDE 20 MG/1
20 CAPSULE, DELAYED RELEASE ORAL DAILY
Status: DISCONTINUED | OUTPATIENT
Start: 2025-06-20 | End: 2025-07-03 | Stop reason: HOSPADM

## 2025-06-20 RX ORDER — IPRATROPIUM BROMIDE AND ALBUTEROL SULFATE 2.5; .5 MG/3ML; MG/3ML
1 SOLUTION RESPIRATORY (INHALATION)
Status: DISCONTINUED | OUTPATIENT
Start: 2025-06-20 | End: 2025-06-21

## 2025-06-20 RX ORDER — MIRTAZAPINE 15 MG/1
7.5 TABLET, FILM COATED ORAL NIGHTLY
Status: DISCONTINUED | OUTPATIENT
Start: 2025-06-20 | End: 2025-07-03 | Stop reason: HOSPADM

## 2025-06-20 RX ADMIN — THIAMINE HYDROCHLORIDE 200 MG: 100 INJECTION, SOLUTION INTRAMUSCULAR; INTRAVENOUS at 10:03

## 2025-06-20 RX ADMIN — SODIUM PHOSPHATE, MONOBASIC, MONOHYDRATE AND SODIUM PHOSPHATE, DIBASIC, ANHYDROUS 15 MMOL: 276; 142 INJECTION, SOLUTION INTRAVENOUS at 18:14

## 2025-06-20 RX ADMIN — IPRATROPIUM BROMIDE AND ALBUTEROL SULFATE 1 DOSE: .5; 2.5 SOLUTION RESPIRATORY (INHALATION) at 08:00

## 2025-06-20 RX ADMIN — SODIUM CHLORIDE, PRESERVATIVE FREE 10 ML: 5 INJECTION INTRAVENOUS at 10:02

## 2025-06-20 RX ADMIN — ASCORBIC ACID, VITAMIN A PALMITATE, CHOLECALCIFEROL, THIAMINE HYDROCHLORIDE, RIBOFLAVIN-5 PHOSPHATE SODIUM, PYRIDOXINE HYDROCHLORIDE, NIACINAMIDE, DEXPANTHENOL, ALPHA-TOCOPHEROL ACETATE, VITAMIN K1, FOLIC ACID, BIOTIN, CYANOCOBALAMIN: 200; 3300; 200; 6; 3.6; 6; 40; 15; 10; 150; 600; 60; 5 INJECTION, SOLUTION INTRAVENOUS at 18:12

## 2025-06-20 RX ADMIN — METHYLPREDNISOLONE SODIUM SUCCINATE 60 MG: 40 INJECTION, POWDER, LYOPHILIZED, FOR SOLUTION INTRAMUSCULAR; INTRAVENOUS at 09:58

## 2025-06-20 RX ADMIN — IPRATROPIUM BROMIDE AND ALBUTEROL SULFATE 1 DOSE: .5; 2.5 SOLUTION RESPIRATORY (INHALATION) at 22:04

## 2025-06-20 RX ADMIN — I.V. FAT EMULSION 100 ML: 20 EMULSION INTRAVENOUS at 18:17

## 2025-06-20 RX ADMIN — DULOXETINE 20 MG: 20 CAPSULE, DELAYED RELEASE ORAL at 17:00

## 2025-06-20 NOTE — PLAN OF CARE
Problem: Seizure Precautions  Goal: Remains free of injury related to seizures activity  6/19/2025 2056 by Carolyn Del Cid, RN  Outcome: Progressing  Flowsheets (Taken 6/14/2025 1930 by Faiza Guy, RN)  Remains free of injury related to seizure activity:   Maintain airway, patient safety  and administer oxygen as ordered   Monitor patient for seizure activity, document and report duration and description of seizure to Licensed Independent Practitioner   If seizure occurs, turn patient to side and suction secretions as needed   Reorient patient post seizure   Seizure pads on all 4 side rails    Problem: Neurosensory - Adult  Goal: Achieves stable or improved neurological status  Outcome: Progressing  Flowsheets (Taken 6/18/2025 2228)  Achieves stable or improved neurological status:   Assess for and report changes in neurological status   Initiate measures to prevent increased intracranial pressure   Maintain blood pressure and fluid volume within ordered parameters to optimize cerebral perfusion and minimize risk of hemorrhage   Monitor temperature, glucose, and sodium. Initiate appropriate interventions as ordered  Goal: Absence of seizures  Outcome: Progressing  Flowsheets (Taken 6/18/2025 2228)  Absence of seizures:   Monitor for seizure activity.  If seizure occurs, document type and location of movements and any associated apnea   If seizure occurs, turn head to side and suction secretions as needed   Administer anticonvulsants as ordered   Support airway/breathing, administer oxygen as needed     Problem: Cardiovascular - Adult  Goal: Maintains optimal cardiac output and hemodynamic stability  Outcome: Progressing  Flowsheets (Taken 6/18/2025 2228)  Maintains optimal cardiac output and hemodynamic stability:   Monitor blood pressure and heart rate   Monitor urine output and notify Licensed Independent Practitioner for values outside of normal range   Assess for signs of decreased cardiac output    Progressing  Flowsheets (Taken 6/18/2025 2228)  Return Mobility to Safest Level of Function:   Assess patient stability and activity tolerance for standing, transferring and ambulating with or without assistive devices   Assist with transfers and ambulation using safe patient handling equipment as needed   Ensure adequate protection for wounds/incisions during mobilization   Obtain physical therapy/occupational therapy consults as needed   Apply continuous passive motion per provider or physical therapy orders to increase flexion toward goal   Instruct patient/family in ordered activity level     Problem: Infection - Adult  Goal: Absence of infection at discharge  Outcome: Progressing  Flowsheets (Taken 6/18/2025 2228)  Absence of infection at discharge:   Assess and monitor for signs and symptoms of infection   Monitor lab/diagnostic results   Monitor all insertion sites i.e., indwelling lines, tubes and drains   Monitor endotracheal (as able) and nasal secretions for changes in amount and color   Administer medications as ordered   Instruct and encourage patient and family to use good hand hygiene technique   Identify and instruct in appropriate isolation precautions for identified infection/condition   [place] appropriate cooling/warming therapies per order     Problem: Metabolic/Fluid and Electrolytes - Adult  Goal: Electrolytes maintained within normal limits  6/19/2025 2056 by Carolyn Del Cid, RN  Outcome: Not Progressing  Flowsheets (Taken 6/18/2025 2228)  Electrolytes maintained within normal limits:   Monitor labs and assess patient for signs and symptoms of electrolyte imbalances   Monitor response to electrolyte replacements, including repeat lab results as appropriate   Administer electrolyte replacement as ordered   Fluid restriction as ordered   Instruct patient on fluid and nutrition restrictions as appropriate  Goal: Hemodynamic stability and optimal renal function maintained  Outcome:

## 2025-06-20 NOTE — CONSULTS
Department of Psychiatry  Consult Service   Psychiatric Assessment        REASON FOR CONSULT: agitated, on multiple psych meds    CONSULTING PHYSICIAN: Dr. Walden    History obtained from: Patient, EMR, treatment team    HISTORY OF PRESENT ILLNESS:          The patient is a 70 y.o. female with significant psychiatric history of schizoaffective disorder and dementia who is admitted medically for treatment of pneumonia with altered mental status. Patient has history of schizoaffective disorder and dementia. She had elevated troponins on arrival. Most recent EKG from 6/14/2025 with prolonged Qtc of 614. Per EMR, patient home psychotropic medications include olanzapine 2.5 mg BID duloxetine 30 mg, mirtazapine 15 mg nightly, and lorazepam 1 mg QID for anxiety. Patient exhibiting hyperkinetic movements with initiation of amantadine and Aricept. These were held, and involuntary movements have been improving. She currently has no psychotropic medications on board. Patient's daughter reports that her mother was independent two weeks ago and has had progressive decline in mentation.     EEG abnormal suggesting mild encephalopathy.     Patient seen face-to-face for initial assessment. She is restless and has been pulling at wires. Staff report she has been refusing to eat secondary to belief that her food is poisoned. She has a bedside sitter to help redirect. Patient is oriented to self, but is otherwise disoriented and unable to provide any personal or psychiatric history. She is disorganized in her thought process and calling out for her mother. Not able to engage in constructive conversation. She may be responding to internal stimuli, but it is difficult to fully assess secondary to her current presentation. She is restless, but no orofacial movements observed.     Unfortunately patient has prolonged Qtc and antipsychotics are contraindicated at this time. Hyponatremia also poses concern as SSRI's and SNRI's are

## 2025-06-20 NOTE — PLAN OF CARE
Problem: Respiratory - Adult  Goal: Achieves optimal ventilation and oxygenation  Outcome: Progressing  Flowsheets (Taken 6/20/2025 0800)  Achieves optimal ventilation and oxygenation:   Assess for changes in respiratory status   Oxygen supplementation based on oxygen saturation or arterial blood gases   Assess and instruct to report shortness of breath or any respiratory difficulty   Respiratory therapy support as indicated

## 2025-06-20 NOTE — PROGRESS NOTES
Department of Psychiatry  Consult Progress Note  6/20/2025    Patient Name: Albertina Manning    Reason for initial consult:  agitated, on multiple psychiatric medications          Interval History:      Staff report patient has been decompensating. She is somnolent, has not been eating, and lethargic. Noted that hyponatremia is improved today. Will restart her antidepressants duloxetine and mirtazapine at lowest dose and titrate as tolerated. Please monitor for hyponatremia.     Data   height is 1.448 m (4' 9.01\") and weight is 51.1 kg (112 lb 10.5 oz). Her oral temperature is 98.3 °F (36.8 °C). Her blood pressure is 116/60 and her pulse is 83. Her respiration is 17 and oxygen saturation is 100%.   Labs:   No results displayed because visit has over 200 results.            Reviewed patient's current plan of care and vital signs.    Labs reviewed: [x] Yes    Medications  Current Facility-Administered Medications: ipratropium 0.5 mg-albuterol 2.5 mg (DUONEB) nebulizer solution 1 Dose, 1 Dose, Inhalation, BID RT  fat emulsion (INTRALIPID/NUTRILIPID) 20 % infusion 100 mL, 100 mL, IntraVENous, Continuous TPN  PN-Adult Premix 5/15 - Standard Electrolytes - Central Line, , IntraVENous, Continuous TPN  mirtazapine (REMERON) tablet 7.5 mg, 7.5 mg, Oral, Nightly  DULoxetine (CYMBALTA) extended release capsule 20 mg, 20 mg, Oral, Daily  dextrose 50 % IV solution, 25 g, IntraVENous, PRN  methylPREDNISolone sodium succ (SOLU-MEDROL) 60 mg in sterile water 1.5 mL injection, 60 mg, IntraVENous, Daily  thiamine (B-1) injection 200 mg, 200 mg, IntraVENous, Daily  PN-Adult Premix 5/15 - Standard Electrolytes - Central Line, , IntraVENous, Continuous TPN  sodium phosphate 15 mmol in sodium chloride 0.9 % 250 mL IVPB, 15 mmol, IntraVENous, PRN  magnesium sulfate 2000 mg in 50 mL IVPB premix, 2,000 mg, IntraVENous, PRN  potassium chloride (KLOR-CON M) extended release tablet 40 mEq, 40 mEq, Oral, PRN **OR** potassium bicarb-citric acid

## 2025-06-20 NOTE — PROGRESS NOTES
Department of Psychiatry  Consult Progress Note  6/20/2025    Patient Name: Albertina Manning    Reason for initial consult:  agitated, on multiple psychiatric medications              Interval History:      Request for follow up for concerns of being unable to maintain wakefulness and refusing to eat. Patient is seen today face-to-face. She is sleeping and unable to arouse for interview. Nurse reports concerns for extreme lethargy this morning. Patient refused breakfast and consumed only magic cup for lunch. Nurse reports patient was more wakeful this afternoon. Concerns for increased tiredness and fatigue in setting of hypoglycemia. Patient is receiving TPN and blood sugars are reported as improving. Nurse reports patient accepting medications this morning. EKG today, QTc 459. She is resumed on Cymbalta and Remeron.  No recommendations to start antipsychotics at this time in presence of sedation.         Mental Status Exam  Level of consciousness: Somnolent, unable to arouse   Appearance: Appropriate attire for setting, resting in bed, with fair  grooming and hygiene   Behavior/Motor: Somnolent, unable to arouse   Attitude toward examiner: does not engage, currently sleeping  Speech: Unable to assess, Somnolent, unable to arouse   Mood:  Unable to assess, Somnolent, unable to arouse   Affect: Unable to assess, Somnolent, unable to arouse   Thought processes: Somnolent, unable to arouse    Thought content: Somnolent, unable to arouse   Suicidal Ideation: Somnolent, unable to arouse   Delusions: Somnolent, unable to arouse   Perceptual Disturbance:Somnolent, unable to arouse   Cognition: Oriented to Somnolent, unable to arouse   Memory: Somnolent, unable to arouse   Insight: Somnolent, unable to arouse    Judgement: Somnolent, unable to arouse        Data   height is 1.448 m (4' 9.01\") and weight is 51.1 kg (112 lb 10.5 oz). Her oral temperature is 98.3 °F (36.8 °C). Her blood pressure is 116/60 and her pulse is 83.  injury)    Chemotherapy adverse reaction    Macrocytic anemia    Low back pain    Pneumonia due to organism    Elevated troponin    Hypernatremia    Hypermagnesemia    Acute kidney injury superimposed on stage 3b chronic kidney disease (HCC)    Dementia (HCC)    AMS (altered mental status)    Chest pain    NSTEMI (non-ST elevated myocardial infarction) (HCC)    Prolonged Q-T interval on ECG    Dehydration    History of schizoaffective disorder    History of breast cancer    Hypokalemia    Acute metabolic encephalopathy    Polypharmacy        PLAN    Patient does not benefit from admission to [facility].   Patient is resumed on Cymbalta and Remeron.  No recommendations to start Olanazapine 2.5 mg BID in presence of increased sedation.  Qtc is improved, at 459 on 6/20..   Recommend consult with palliative care with concerns for medical decline.  Psychiatry will sign off at this time. Please reach out for questions or concerns.    Thank you very much for allowing us to participate in the care of this patient.    Electronically signed by CHERYL Boudreaux CNP on 6/20/2025 at 5:49 PM     **This report has been created using voice recognition software. It may contain minor errors which are inherent in voice recognition technology.**

## 2025-06-20 NOTE — PROGRESS NOTES
@[facility][email or web address]@    [facility]   IN-PATIENT SERVICE   [facility]    Progress Note    6/20/2025    10:10 AM    Name:   Albertina Manning  MRN:     [id number]     Acct:      [id number]   Room:   0450/0450-01   Day:  6  Admit Date:  6/14/2025  7:18 PM    PCP:   Rocky Tobias Sr., DO  Code Status:  Full Code    Subjective:     C/C: Altered mental status, slurred speech, tremors    Patient evaluated at bedside, she is still alert and oriented x 2.  Patient had multiple episodes of hypoglycemia yesterday and multiple of her electrolytes were low.  Patient's mentation did not change during these episodes.  A rapid response was also called for hypoglycemia.  Blood glucose was obtained from different sites yet still remained low.  Today patient seems to be doing better and her electrolytes and blood glucose have improved.    Brief History:     This is a 70-year-old female with past medical history of dementia, atherosclerosis of native coronary artery of native heart, CKD stage IIIb, tobacco use, depression, chronic obstructive bronchitis, schizoaffective disorder, tremor, and secondary malignant neoplasm of bone who presented to [place] emergency department originally earlier today and subsequently transferred to [facility] for further management of pneumonia, altered mental status, and elevated troponins. Patient with altered mental status with an inability to communicate. Over the past week patient has had worsening mentation and refusing to eat or take her medications at her nursing facility.  Her daughter had reported that she has been diagnosed with dementia within the past year and has had increasingly worsening symptoms of dementia.  Workup in the ED there in [place] was remarkable for both pneumonia and elevated troponins chest x-ray did show pneumonia of the right upper lobe.  There is a concern due to her current state of aspiration pneumonia. She was started on vancomycin and  < > 206* 225* 209* 237* 204* 160*    < > = values in this interval not displayed.     ABG:  Lab Results   Component Value Date/Time    FIO2 INFORMATION NOT PROVIDED 06/14/2025 08:10 PM     Lab Results   Component Value Date/Time    SPECIAL R FORARM 6ML AN 06/14/2025 11:58 AM     Lab Results   Component Value Date/Time    CULTURE NO GROWTH 5 DAYS 06/14/2025 11:58 AM       Radiology:  CT HEAD WO CONTRAST  Result Date: 6/14/2025  No acute intracranial abnormality.     XR CHEST PORTABLE  Result Date: 6/14/2025  1. Focal airspace opacity in the right upper lobe, concerning for pneumonia. Recommend follow-up to resolution. 2. Moderate hiatal hernia.       Physical Examination:        Physical Exam  HENT:      Head: Normocephalic.   Cardiovascular:      Rate and Rhythm: Normal rate.      Heart sounds: No murmur heard.     No friction rub. No gallop.   Pulmonary:      Effort: No respiratory distress.      Breath sounds: No wheezing, rhonchi or rales.   Abdominal:      Tenderness: There is no abdominal tenderness. There is no guarding or rebound.   Musculoskeletal:         General: No swelling.   Skin:     General: Skin is warm.   Neurological:      Mental Status: She is alert.      Comments: Alert and oriented x 1   Psychiatric:         Mood and Affect: Mood normal.       Assessment:        Hospital Problems           Last Modified POA    * (Principal) Pneumonia due to organism 6/14/2025 Yes    Chest pain 6/15/2025 Yes    NSTEMI (non-ST elevated myocardial infarction) ([facility]) 6/15/2025 Yes    Prolonged Q-T interval on ECG 6/15/2025 Yes    Atherosclerosis of native coronary artery of native heart without angina pectoris 6/14/2025 Yes    Elevated troponin 6/14/2025 Yes    Hypernatremia 6/14/2025 Yes    Hypermagnesemia 6/14/2025 Yes    Acute kidney injury superimposed on stage 3b chronic kidney disease ([facility]) 6/14/2025 Yes    Dementia ([facility]) 6/14/2025 Yes    AMS (altered mental status) 6/14/2025 Yes    Dehydration 6/15/2025 Yes

## 2025-06-20 NOTE — PLAN OF CARE
Problem: Seizure Precautions  Goal: Remains free of injury related to seizures activity  Outcome: Progressing     Problem: Respiratory - Adult  Goal: Achieves optimal ventilation and oxygenation  6/20/2025 1144 by Lady Collazo RN  Outcome: Progressing  6/20/2025 0810 by Shanda Triana RCP  Outcome: Progressing  Flowsheets (Taken 6/20/2025 0800)  Achieves optimal ventilation and oxygenation:   Assess for changes in respiratory status   Oxygen supplementation based on oxygen saturation or arterial blood gases   Assess and instruct to report shortness of breath or any respiratory difficulty   Respiratory therapy support as indicated     Problem: Neurosensory - Adult  Goal: Achieves stable or improved neurological status  Outcome: Progressing  Goal: Absence of seizures  Outcome: Progressing     Problem: Cardiovascular - Adult  Goal: Maintains optimal cardiac output and hemodynamic stability  Outcome: Progressing     Problem: Skin/Tissue Integrity - Adult  Goal: Skin integrity remains intact  Description: 1.  Monitor for areas of redness and/or skin breakdown  2.  Assess vascular access sites hourly  3.  Every 4-6 hours minimum:  Change oxygen saturation probe site  4.  Every 4-6 hours:  If on nasal continuous positive airway pressure, respiratory therapy assess nares and determine need for appliance change or resting period  Outcome: Progressing  Goal: Incisions, wounds, or drain sites healing without S/S of infection  Outcome: Progressing     Problem: Musculoskeletal - Adult  Goal: Return mobility to safest level of function  Outcome: Progressing     Problem: Infection - Adult  Goal: Absence of infection at discharge  Outcome: Progressing     Problem: Metabolic/Fluid and Electrolytes - Adult  Goal: Electrolytes maintained within normal limits  Outcome: Progressing  Goal: Hemodynamic stability and optimal renal function maintained  Outcome: Progressing  Goal: Glucose maintained within prescribed range  Outcome:

## 2025-06-20 NOTE — PROGRESS NOTES
Comprehensive Nutrition Assessment    Type and Reason for Visit:  Reassess    Nutrition Recommendations/Plan:   Modify TPN: Increase Premix 5/15 to 50 mL/hr, 180 g dextrose, 60 g AA. Add 100 mL lipids. Provides 1052 kcals, 60 g Pro.   Continue current diet: Easy to chew w/ Mildly thick liquids.   Continue ONS Magic cup 1x/d.  Add ONS Ensure Plus, thickened BID.   Will monitor labs, weights, intake, GI status, and plan of care.      Malnutrition Assessment:  Malnutrition Status:  Insufficient data (06/19/25 1531)    Context:  Acute Illness     Findings of the 6 clinical characteristics of malnutrition:  Energy Intake:  50% or less of estimated energy requirements for 5 or more days (x past few weeks)  Weight Loss:  Mild weight loss (22% x past 15-16 months)     Body Fat Loss:  Unable to assess     Muscle Mass Loss:  Unable to assess    Fluid Accumulation:  No fluid accumulation     Strength:  Not Performed    Nutrition Assessment:    Follow up. Pt on easy to chew diet with nectar thick liquids, Magic cup supplement at lunch. Pt continues to refuse food, intakes 0% of meals. RN reports trying to feed her magic cup, pt refused. Premix TPN @ 42 mL/hr with 150 g dextrose, 50 g AA, 100 mL lipids providing 910 kcals. Noted hypoglycemia and electrolyte imbalances yesterday, now improving. Pt asleep upon attempt to interview. Discussed with RN, will try Ensures BID thickened. Called dietary ambassador and requested giving extra time/attention when pt ordering meals.    Nutrition Related Findings:    Labs/ Meds reviewed. Wound Type: Pressure Injury, Stage II       Current Nutrition Intake & Therapies:    Average Meal Intake: Refusing to eat  Average Supplements Intake: Refusing to take  ADULT DIET; Easy to Chew; Mildly Thick (Nectar)  ADULT ORAL NUTRITION SUPPLEMENT; Lunch; Frozen Oral Supplement  PN-Adult Premix 5/15 - Standard Electrolytes - Central Line  Current Parenteral Nutrition Orders:  Type and Formula: Premix  Central   Lipids: 100ml  Duration: Continuous  Rate/Volume: 42 mL/hr  Current PN Order Provides: 910 kcals, 50 g protein.    Anthropometric Measures:  Height: 144.8 cm (4' 9.01\")  Ideal Body Weight (IBW): 85 lbs (39 kg)    Admission Body Weight: 49.3 kg (108 lb 11 oz)  Current Body Weight: 51.1 kg (112 lb 10.5 oz), 134.9 % IBW. Weight Source: Bed scale  Current BMI (kg/m2): 24.4  Usual Body Weight: 67.2 kg (148 lb 3 oz) (3/7/24 bed scale per chart review)  % Weight Change (Calculated): -22.6  Weight Adjustment For: No Adjustment  BMI Categories: Normal Weight (BMI 18.5-24.9)    Estimated Daily Nutrient Needs:  Energy Requirements Based On: Kcal/kg  Weight Used for Energy Requirements: Admission  Energy (kcal/day): 0650-3795 kcals/day  Weight Used for Protein Requirements: Admission  Protein (g/day): 60-80 gm pro/day  Method Used for Fluid Requirements: Defer to provider  Fluid (ml/day): per MD    Nutrition Diagnosis:   Inadequate oral intake related to cognitive or neurological impairment, swallowing difficulty as evidenced by intake 0-25% (pt refusing PO intakes)    Nutrition Interventions:   Food and/or Nutrient Delivery: Continue Current Diet, Continue Oral Nutrition Supplement  Nutrition Education/Counseling: No recommendation at this time  Coordination of Nutrition Care: Continue to monitor while inpatient  Plan of Care discussed with: RN    Goals:  Goals: Meet at least 75% of estimated needs, prior to discharge  Type of Goal: Continue current goal  Previous Goal Met: Progressing toward Goal(s)    Nutrition Monitoring and Evaluation:   Behavioral-Environmental Outcomes: None Identified  Food/Nutrient Intake Outcomes: Diet Advancement/Tolerance, Food and Nutrient Intake, Supplement Intake  Physical Signs/Symptoms Outcomes: Biochemical Data, GI Status, Meal Time Behavior, Nutrition Focused Physical Findings, Skin, Weight    Discharge Planning:    Too soon to determine     Clarissa Craig  Contact: [phone]

## 2025-06-21 LAB
ANION GAP SERPL CALCULATED.3IONS-SCNC: 11 MMOL/L (ref 9–16)
ANION GAP SERPL CALCULATED.3IONS-SCNC: 13 MMOL/L (ref 9–16)
BASOPHILS # BLD: 0 K/UL (ref 0–0.2)
BASOPHILS NFR BLD: 0 % (ref 0–2)
BUN SERPL-MCNC: 18 MG/DL (ref 8–23)
BUN SERPL-MCNC: 21 MG/DL (ref 8–23)
CALCIUM SERPL-MCNC: 7.9 MG/DL (ref 8.6–10.4)
CALCIUM SERPL-MCNC: 7.9 MG/DL (ref 8.6–10.4)
CHLORIDE SERPL-SCNC: 111 MMOL/L (ref 98–107)
CHLORIDE SERPL-SCNC: 116 MMOL/L (ref 98–107)
CO2 SERPL-SCNC: 16 MMOL/L (ref 20–31)
CO2 SERPL-SCNC: 16 MMOL/L (ref 20–31)
CREAT SERPL-MCNC: 0.7 MG/DL (ref 0.6–0.9)
CREAT SERPL-MCNC: 0.7 MG/DL (ref 0.6–0.9)
EOSINOPHIL # BLD: 0 K/UL (ref 0–0.44)
EOSINOPHILS RELATIVE PERCENT: 0 % (ref 1–4)
ERYTHROCYTE [DISTWIDTH] IN BLOOD BY AUTOMATED COUNT: 15.9 % (ref 11.8–14.4)
GFR, ESTIMATED: >90 ML/MIN/1.73M2
GFR, ESTIMATED: >90 ML/MIN/1.73M2
GLUCOSE BLD-MCNC: 158 MG/DL (ref 65–105)
GLUCOSE BLD-MCNC: 163 MG/DL (ref 65–105)
GLUCOSE BLD-MCNC: 190 MG/DL (ref 65–105)
GLUCOSE SERPL-MCNC: 159 MG/DL (ref 74–99)
GLUCOSE SERPL-MCNC: 166 MG/DL (ref 74–99)
HCT VFR BLD AUTO: 23.4 % (ref 36.3–47.1)
HCT VFR BLD AUTO: 24.3 % (ref 36.3–47.1)
HCT VFR BLD AUTO: 24.5 % (ref 36.3–47.1)
HGB BLD-MCNC: 7.5 G/DL (ref 11.9–15.1)
HGB BLD-MCNC: 7.6 G/DL (ref 11.9–15.1)
HGB BLD-MCNC: 8.1 G/DL (ref 11.9–15.1)
IMM GRANULOCYTES # BLD AUTO: 0.15 K/UL (ref 0–0.3)
IMM GRANULOCYTES NFR BLD: 2 %
LYMPHOCYTES NFR BLD: 0.74 K/UL (ref 1.1–3.7)
LYMPHOCYTES RELATIVE PERCENT: 10 % (ref 24–43)
MAGNESIUM SERPL-MCNC: 1.9 MG/DL (ref 1.6–2.4)
MAGNESIUM SERPL-MCNC: 2 MG/DL (ref 1.6–2.4)
MCH RBC QN AUTO: 37.9 PG (ref 25.2–33.5)
MCHC RBC AUTO-ENTMCNC: 32.1 G/DL (ref 28.4–34.8)
MCV RBC AUTO: 118.2 FL (ref 82.6–102.9)
MONOCYTES NFR BLD: 0.37 K/UL (ref 0.1–1.2)
MONOCYTES NFR BLD: 5 % (ref 3–12)
MORPHOLOGY: ABNORMAL
MORPHOLOGY: ABNORMAL
NEUTROPHILS NFR BLD: 83 % (ref 36–65)
NEUTS SEG NFR BLD: 6.14 K/UL (ref 1.5–8.1)
NRBC BLD-RTO: 0.4 PER 100 WBC
PHOSPHATE SERPL-MCNC: 2.5 MG/DL (ref 2.5–4.5)
PHOSPHATE SERPL-MCNC: 3.1 MG/DL (ref 2.5–4.5)
PLATELET # BLD AUTO: 171 K/UL (ref 138–453)
PMV BLD AUTO: 10.4 FL (ref 8.1–13.5)
POTASSIUM SERPL-SCNC: 3.7 MMOL/L (ref 3.7–5.3)
POTASSIUM SERPL-SCNC: 3.9 MMOL/L (ref 3.7–5.3)
RBC # BLD AUTO: 1.98 M/UL (ref 3.95–5.11)
SODIUM SERPL-SCNC: 140 MMOL/L (ref 136–145)
SODIUM SERPL-SCNC: 143 MMOL/L (ref 136–145)
WBC OTHER # BLD: 7.4 K/UL (ref 3.5–11.3)

## 2025-06-21 PROCEDURE — 85025 COMPLETE CBC W/AUTO DIFF WBC: CPT

## 2025-06-21 PROCEDURE — 2500000003 HC RX 250 WO HCPCS

## 2025-06-21 PROCEDURE — 36415 COLL VENOUS BLD VENIPUNCTURE: CPT

## 2025-06-21 PROCEDURE — 83735 ASSAY OF MAGNESIUM: CPT

## 2025-06-21 PROCEDURE — 99232 SBSQ HOSP IP/OBS MODERATE 35: CPT

## 2025-06-21 PROCEDURE — 6370000000 HC RX 637 (ALT 250 FOR IP)

## 2025-06-21 PROCEDURE — 85018 HEMOGLOBIN: CPT

## 2025-06-21 PROCEDURE — 84100 ASSAY OF PHOSPHORUS: CPT

## 2025-06-21 PROCEDURE — 94761 N-INVAS EAR/PLS OXIMETRY MLT: CPT

## 2025-06-21 PROCEDURE — 6360000002 HC RX W HCPCS

## 2025-06-21 PROCEDURE — 97535 SELF CARE MNGMENT TRAINING: CPT

## 2025-06-21 PROCEDURE — 94640 AIRWAY INHALATION TREATMENT: CPT

## 2025-06-21 PROCEDURE — 6370000000 HC RX 637 (ALT 250 FOR IP): Performed by: NURSE PRACTITIONER

## 2025-06-21 PROCEDURE — 2580000003 HC RX 258

## 2025-06-21 PROCEDURE — 2060000000 HC ICU INTERMEDIATE R&B

## 2025-06-21 PROCEDURE — 85014 HEMATOCRIT: CPT

## 2025-06-21 PROCEDURE — 97530 THERAPEUTIC ACTIVITIES: CPT

## 2025-06-21 PROCEDURE — 80048 BASIC METABOLIC PNL TOTAL CA: CPT

## 2025-06-21 RX ADMIN — ACETAMINOPHEN 650 MG: 325 TABLET ORAL at 17:33

## 2025-06-21 RX ADMIN — THIAMINE HYDROCHLORIDE 200 MG: 100 INJECTION, SOLUTION INTRAMUSCULAR; INTRAVENOUS at 09:33

## 2025-06-21 RX ADMIN — IPRATROPIUM BROMIDE AND ALBUTEROL SULFATE 1 DOSE: .5; 2.5 SOLUTION RESPIRATORY (INHALATION) at 08:54

## 2025-06-21 RX ADMIN — GUAIFENESIN 600 MG: 600 TABLET, MULTILAYER, EXTENDED RELEASE ORAL at 09:31

## 2025-06-21 RX ADMIN — DULOXETINE 20 MG: 20 CAPSULE, DELAYED RELEASE ORAL at 09:32

## 2025-06-21 RX ADMIN — I.V. FAT EMULSION 100 ML: 20 EMULSION INTRAVENOUS at 17:40

## 2025-06-21 RX ADMIN — GUAIFENESIN 600 MG: 600 TABLET, MULTILAYER, EXTENDED RELEASE ORAL at 19:35

## 2025-06-21 RX ADMIN — METHYLPREDNISOLONE SODIUM SUCCINATE 60 MG: 40 INJECTION, POWDER, LYOPHILIZED, FOR SOLUTION INTRAMUSCULAR; INTRAVENOUS at 09:33

## 2025-06-21 RX ADMIN — SODIUM PHOSPHATE, MONOBASIC, MONOHYDRATE AND SODIUM PHOSPHATE, DIBASIC, ANHYDROUS 15 MMOL: 276; 142 INJECTION, SOLUTION INTRAVENOUS at 17:35

## 2025-06-21 RX ADMIN — ACETAMINOPHEN 650 MG: 325 TABLET ORAL at 06:26

## 2025-06-21 RX ADMIN — SODIUM CHLORIDE, PRESERVATIVE FREE 10 ML: 5 INJECTION INTRAVENOUS at 19:36

## 2025-06-21 RX ADMIN — ATORVASTATIN CALCIUM 10 MG: 10 TABLET, FILM COATED ORAL at 09:31

## 2025-06-21 RX ADMIN — MIRTAZAPINE 7.5 MG: 15 TABLET, FILM COATED ORAL at 19:35

## 2025-06-21 RX ADMIN — LEUCINE, PHENYLALANINE, LYSINE, METHIONINE, ISOLEUCINE, VALINE, HISTIDINE, THREONINE, TRYPTOPHAN, ALANINE, GLYCINE, ARGININE, PROLINE, SERINE, TYROSINE, SODIUM ACETATE, DIBASIC POTASSIUM PHOSPHATE, MAGNESIUM CHLORIDE, SODIUM CHLORIDE, CALCIUM CHLORIDE, DEXTROSE
365; 280; 290; 200; 300; 290; 240; 210; 90; 1035; 515; 575; 340; 250; 20; 340; 261; 51; 59; 33; 15 INJECTION INTRAVENOUS at 17:44

## 2025-06-21 ASSESSMENT — PAIN SCALES - GENERAL
PAINLEVEL_OUTOF10: 8
PAINLEVEL_OUTOF10: 5
PAINLEVEL_OUTOF10: 3

## 2025-06-21 ASSESSMENT — PAIN SCALES - WONG BAKER: WONGBAKER_NUMERICALRESPONSE: NO HURT

## 2025-06-21 ASSESSMENT — PAIN DESCRIPTION - LOCATION: LOCATION: HEAD

## 2025-06-21 ASSESSMENT — PAIN DESCRIPTION - DESCRIPTORS: DESCRIPTORS: ACHING

## 2025-06-21 NOTE — PROGRESS NOTES
Occupational Therapy  Occupational Therapy Daily Treatment Note  Facility/Department: [address] ONC/MED SURG   Patient Name: Albertina Manning        MRN: [id number]    : 1955    Date of Service: 2025    No chief complaint on file.    Past Medical History:  has a past medical history of Alcohol dependence in remission (HCC), Anxiety, Arthritis, CAD (coronary artery disease), Cancer (HCC), Chronic bronchitis (HCC), Cognitive communication deficit, Diverticulosis, Esophageal varices without bleeding (HCC), Insomnia, Major depressive disorder, Osteoporosis, Schizoaffective disorder (HCC), and Tremor.  Past Surgical History:  has a past surgical history that includes Tubal ligation; back surgery; US BREAST BIOPSY W LOC DEVICE 1ST LESION RIGHT (Right, 2023); US PLACE BREAST LOC DEVICE 1ST LESION RIGHT (Right, 2023); Breast biopsy (Right, 2023); and Axillary Surgery (Right, 2023).    Discharge Recommendations  Discharge Recommendations: Patient would benefit from continued therapy after discharge       Assessment  Performance deficits / Impairments: Decreased functional mobility ;Decreased ADL status;Decreased strength;Decreased fine motor control;Decreased balance;Decreased coordination;Decreased endurance;Decreased cognition;Decreased safe awareness;Decreased high-level IADLs  Assessment: Pt limited by above deficits impacting ADL completion, ADL transfers and safe functional mobility to maximize pts potential and quality of life, continue with skilled OT during acute hospital stay and post discharge to decrease caregiver burden and enable pt to return home at prior level of function.  Prognosis: Good  Activity Tolerance  Activity Tolerance: Patient limited by fatigue;Patient Tolerated treatment well  Activity Tolerance Comments: Pt tolerated treatment session well however limited by fatigue and fear of falling.  Safety Devices  Type of Devices: Bed alarm in place;Call light within reach;Telesitter

## 2025-06-21 NOTE — PROGRESS NOTES
@[facility]LO[facility]@    [facility]   IN-PATIENT SERVICE   [facility]    Progress Note    6/21/2025    10:04 AM    Name:   Albertina Manning  MRN:     [id number]     Acct:      [id number]   Room:   0450/0450-01   Day:  7  Admit Date:  6/14/2025  7:18 PM    PCP:   Rocky Tobias Sr., DO  Code Status:  Full Code    Subjective:     C/C: Altered mental status, slurred speech, tremors    Patient evaluated at bedside, her mentation has improved significantly today.  She is still alert and oriented x 3.  Denies any complaints or concerns.  States that she does not have an appetite but she is willing to have a protein shake.    Brief History:     This is a 70-year-old female with past medical history of dementia, atherosclerosis of native coronary artery of native heart, CKD stage IIIb, tobacco use, depression, chronic obstructive bronchitis, schizoaffective disorder, tremor, and secondary malignant neoplasm of bone who presented to [place] emergency department originally earlier today and subsequently transferred to [facility] for further management of pneumonia, altered mental status, and elevated troponins. Patient with altered mental status with an inability to communicate. Over the past week patient has had worsening mentation and refusing to eat or take her medications at her nursing facility.  Her daughter had reported that she has been diagnosed with dementia within the past year and has had increasingly worsening symptoms of dementia.  Workup in the ED there in [place] was remarkable for both pneumonia and elevated troponins chest x-ray did show pneumonia of the right upper lobe.  There is a concern due to her current state of aspiration pneumonia. She was started on vancomycin and cefepime.  Initial lactate 5.6, white count of 7.1.  Patient was altered and was given Ativan 1 mg injections in the ED at [place].  Her ammonia level was unremarkable at 42.  Her CT of the head in [place] was  -continue medications as ordered    VTE prophylaxis with heparin    Discharge planning: Patient has been accepted at a SNF.     Dedrick Walden DO  6/21/2025  10:04 AM

## 2025-06-21 NOTE — PLAN OF CARE
Problem: Seizure Precautions  Goal: Remains free of injury related to seizures activity  6/21/2025 0828 by Lady Collazo RN  Outcome: Progressing  6/21/2025 0605 by Marlee Glover RN  Outcome: Progressing     Problem: Respiratory - Adult  Goal: Achieves optimal ventilation and oxygenation  6/21/2025 0828 by Lady Collazo RN  Outcome: Progressing  6/21/2025 0605 by Marlee Glover RN  Outcome: Progressing     Problem: Neurosensory - Adult  Goal: Achieves stable or improved neurological status  6/21/2025 0828 by Lady Collazo RN  Outcome: Progressing  6/21/2025 0605 by Marlee Glover RN  Outcome: Progressing  Goal: Absence of seizures  6/21/2025 0828 by Lady Collazo RN  Outcome: Progressing  6/21/2025 0605 by Marlee Glover RN  Outcome: Progressing     Problem: Cardiovascular - Adult  Goal: Maintains optimal cardiac output and hemodynamic stability  6/21/2025 0828 by Lady Collazo RN  Outcome: Progressing  6/21/2025 0605 by Marlee Glover RN  Outcome: Progressing     Problem: Skin/Tissue Integrity - Adult  Goal: Skin integrity remains intact  Description: 1.  Monitor for areas of redness and/or skin breakdown  2.  Assess vascular access sites hourly  3.  Every 4-6 hours minimum:  Change oxygen saturation probe site  4.  Every 4-6 hours:  If on nasal continuous positive airway pressure, respiratory therapy assess nares and determine need for appliance change or resting period  6/21/2025 0828 by Lady Collazo RN  Outcome: Progressing  6/21/2025 0605 by Marlee Glover RN  Outcome: Progressing  Flowsheets (Taken 6/20/2025 2030)  Skin Integrity Remains Intact: Monitor for areas of redness and/or skin breakdown  Goal: Incisions, wounds, or drain sites healing without S/S of infection  6/21/2025 0828 by Lady Collazo RN  Outcome: Progressing  6/21/2025 0605 by Marlee Glover RN  Outcome: Progressing  Flowsheets (Taken 6/20/2025 2030)  Incisions, Wounds, or Drain Sites Healing Without Sign and Symptoms

## 2025-06-21 NOTE — PROGRESS NOTES
Comprehensive Nutrition Assessment    Type and Reason for Visit:  Reassess    Nutrition Recommendations/Plan:   Modify TPN: Increase Premix 5/15 to 60 mL/hr + 100 mL lipids. Provides 1222 kcals, 72 g Pro. -MVI, trace mineral per protocol.  Continue current diet: Easy to chew w/ Mildly thick liquids.   Continue ONS Magic cup 1x/d.  Continue ONS Ensure Plus BID. Nursing to thicken.   Will monitor labs, weights, intake, GI status, and plan of care.     Malnutrition Assessment:  Malnutrition Status:  Insufficient data (06/19/25 1531)    Context:  Acute Illness     Findings of the 6 clinical characteristics of malnutrition:  Energy Intake:  50% or less of estimated energy requirements for 5 or more days (x past few weeks)  Weight Loss:  Mild weight loss (22% x past 15-16 months)     Body Fat Loss:  Unable to assess     Muscle Mass Loss:  Unable to assess    Fluid Accumulation:  No fluid accumulation     Strength:  Not Performed    Nutrition Assessment:    Re-assess. TPN continues --Premix TPN @ 50 mL/hr + 100 mL lipids providing 1052 kcals, 60 gm protein.  Pt continues on easy to chew diet with nectar thick liquids, Magic cup supplement at lunch. Pt resting in bed at visit. Per RN, pt ate bites of breakfast, 100% of ice cream cup, yesterday ate part of a magic cup with family. Did not receive Ensure though per family likes chocolate flavored ONS. No new weight to assess. Labs include Cl 116, Glu 136-159, Ca 7.9. Noted Na Phos replacement yesterday.    Nutrition Related Findings:    Meds/labs reviewed. Wound Type: Pressure Injury, Stage II       Current Nutrition Intake & Therapies:    Average Meal Intake: Refusing to eat  Average Supplements Intake: Refusing to take  ADULT DIET; Easy to Chew; Mildly Thick (Nectar)  ADULT ORAL NUTRITION SUPPLEMENT; Lunch; Frozen Oral Supplement  PN-Adult Premix 5/15 - Standard Electrolytes - Central Line  ADULT ORAL NUTRITION SUPPLEMENT; Breakfast, Dinner; Standard High Calorie/High

## 2025-06-21 NOTE — PLAN OF CARE
Problem: Seizure Precautions  Goal: Remains free of injury related to seizures activity  Outcome: Progressing     Problem: Respiratory - Adult  Goal: Achieves optimal ventilation and oxygenation  Outcome: Progressing     Problem: Safety - Adult  Goal: Free from fall injury  Outcome: Progressing     Problem: Skin/Tissue Integrity  Goal: Skin integrity remains intact  Description: 1.  Monitor for areas of redness and/or skin breakdown  2.  Assess vascular access sites hourly  3.  Every 4-6 hours minimum:  Change oxygen saturation probe site  4.  Every 4-6 hours:  If on nasal continuous positive airway pressure, respiratory therapy assess nares and determine need for appliance change or resting period  Outcome: Progressing  Flowsheets (Taken 6/20/2025 2030)  Skin Integrity Remains Intact: Monitor for areas of redness and/or skin breakdown     Problem: ABCDS Injury Assessment  Goal: Absence of physical injury  Outcome: Progressing  Flowsheets (Taken 6/20/2025 2030)  Absence of Physical Injury: Implement safety measures based on patient assessment     Problem: Neurosensory - Adult  Goal: Achieves stable or improved neurological status  Outcome: Progressing  Goal: Absence of seizures  Outcome: Progressing     Problem: Cardiovascular - Adult  Goal: Maintains optimal cardiac output and hemodynamic stability  Outcome: Progressing     Problem: Skin/Tissue Integrity - Adult  Goal: Skin integrity remains intact  Description: 1.  Monitor for areas of redness and/or skin breakdown  2.  Assess vascular access sites hourly  3.  Every 4-6 hours minimum:  Change oxygen saturation probe site  4.  Every 4-6 hours:  If on nasal continuous positive airway pressure, respiratory therapy assess nares and determine need for appliance change or resting period  Outcome: Progressing  Flowsheets (Taken 6/20/2025 2030)  Skin Integrity Remains Intact: Monitor for areas of redness and/or skin breakdown  Goal: Incisions, wounds, or drain sites

## 2025-06-22 LAB
EKG ATRIAL RATE: 65 BPM
EKG P AXIS: 48 DEGREES
EKG P-R INTERVAL: 152 MS
EKG Q-T INTERVAL: 442 MS
EKG QRS DURATION: 96 MS
EKG QTC CALCULATION (BAZETT): 459 MS
EKG R AXIS: 6 DEGREES
EKG T AXIS: 50 DEGREES
EKG VENTRICULAR RATE: 65 BPM
GLUCOSE BLD-MCNC: 109 MG/DL (ref 65–105)
GLUCOSE BLD-MCNC: 126 MG/DL (ref 65–105)
GLUCOSE BLD-MCNC: 135 MG/DL (ref 65–105)
GLUCOSE BLD-MCNC: 138 MG/DL (ref 65–105)
GLUCOSE BLD-MCNC: 198 MG/DL (ref 65–105)
HCT VFR BLD AUTO: 24.2 % (ref 36.3–47.1)
HGB BLD-MCNC: 8 G/DL (ref 11.9–15.1)

## 2025-06-22 PROCEDURE — 82947 ASSAY GLUCOSE BLOOD QUANT: CPT

## 2025-06-22 PROCEDURE — 85018 HEMOGLOBIN: CPT

## 2025-06-22 PROCEDURE — 85014 HEMATOCRIT: CPT

## 2025-06-22 PROCEDURE — 6370000000 HC RX 637 (ALT 250 FOR IP)

## 2025-06-22 PROCEDURE — 36415 COLL VENOUS BLD VENIPUNCTURE: CPT

## 2025-06-22 PROCEDURE — 6370000000 HC RX 637 (ALT 250 FOR IP): Performed by: NURSE PRACTITIONER

## 2025-06-22 PROCEDURE — 6360000002 HC RX W HCPCS

## 2025-06-22 PROCEDURE — 2500000003 HC RX 250 WO HCPCS

## 2025-06-22 PROCEDURE — 99232 SBSQ HOSP IP/OBS MODERATE 35: CPT | Performed by: STUDENT IN AN ORGANIZED HEALTH CARE EDUCATION/TRAINING PROGRAM

## 2025-06-22 PROCEDURE — 2060000000 HC ICU INTERMEDIATE R&B

## 2025-06-22 PROCEDURE — 92526 ORAL FUNCTION THERAPY: CPT

## 2025-06-22 RX ADMIN — THIAMINE HYDROCHLORIDE 200 MG: 100 INJECTION, SOLUTION INTRAMUSCULAR; INTRAVENOUS at 08:59

## 2025-06-22 RX ADMIN — SODIUM CHLORIDE, PRESERVATIVE FREE 10 ML: 5 INJECTION INTRAVENOUS at 19:50

## 2025-06-22 RX ADMIN — DULOXETINE 20 MG: 20 CAPSULE, DELAYED RELEASE ORAL at 08:58

## 2025-06-22 RX ADMIN — ATORVASTATIN CALCIUM 10 MG: 10 TABLET, FILM COATED ORAL at 08:58

## 2025-06-22 RX ADMIN — GUAIFENESIN 600 MG: 600 TABLET, MULTILAYER, EXTENDED RELEASE ORAL at 08:58

## 2025-06-22 RX ADMIN — I.V. FAT EMULSION 100 ML: 20 EMULSION INTRAVENOUS at 17:54

## 2025-06-22 RX ADMIN — LEUCINE, PHENYLALANINE, LYSINE, METHIONINE, ISOLEUCINE, VALINE, HISTIDINE, THREONINE, TRYPTOPHAN, ALANINE, GLYCINE, ARGININE, PROLINE, SERINE, TYROSINE, SODIUM ACETATE, DIBASIC POTASSIUM PHOSPHATE, MAGNESIUM CHLORIDE, SODIUM CHLORIDE, CALCIUM CHLORIDE, DEXTROSE
1035; 575; 33; 15; 515; 240; 300; 365; 290; 51; 200; 280; 261; 340; 250; 340; 59; 210; 90; 20; 290 INJECTION INTRAVENOUS at 17:50

## 2025-06-22 RX ADMIN — GUAIFENESIN 600 MG: 600 TABLET, MULTILAYER, EXTENDED RELEASE ORAL at 19:49

## 2025-06-22 RX ADMIN — MIRTAZAPINE 7.5 MG: 15 TABLET, FILM COATED ORAL at 19:50

## 2025-06-22 RX ADMIN — METHYLPREDNISOLONE SODIUM SUCCINATE 60 MG: 40 INJECTION, POWDER, LYOPHILIZED, FOR SOLUTION INTRAMUSCULAR; INTRAVENOUS at 08:58

## 2025-06-22 NOTE — PROGRESS NOTES
Comprehensive Nutrition Assessment    Type and Reason for Visit:  Reassess    Nutrition Recommendations/Plan:   Modify TPN: Increase Premix 5/15 to 65 mL/hr + 100 mL lipids. Provides 1308 kcals, 78 g Pro. +MVI, trace mineral per protocol.  Continue current diet: Easy to chew w/ Mildly thick liquids.   Continue ONS Magic cup 1x/d.  Continue ONS Ensure Plus BID. Nursing to thicken.   Will monitor labs, weights, intake, GI status, and plan of care.     Malnutrition Assessment:  Malnutrition Status:  Insufficient data (06/19/25 1531)    Context:  Acute Illness       Nutrition Assessment:    Re-assess. TPN continues --Premix TPN @ 60 mL/hr + 100 mL lipids providing 1222 kcals, 72 gm protein. Pt continues on easy to chew diet with nectar thick liquids with magic cup once a day and Ensure BID. Per nursing documentation, 1-25% meal intakes. Weights reviewed. Labs include Cl 111, Glu 166-135, Ca 7.9.    Nutrition Related Findings:    Meds/labs reviewed Wound Type: Pressure Injury, Stage II       Current Nutrition Intake & Therapies:    Average Meal Intake: 1-25%  Average Supplements Intake: Unable to assess  ADULT DIET; Easy to Chew; Mildly Thick (Nectar)  ADULT ORAL NUTRITION SUPPLEMENT; Lunch; Frozen Oral Supplement  ADULT ORAL NUTRITION SUPPLEMENT; Breakfast, Dinner; Standard High Calorie/High Protein Oral Supplement  PN-Adult Premix 5/15 - Standard Electrolytes - Central Line  Current Parenteral Nutrition Orders:  Type and Formula: Premix Central   Lipids: 100ml, Daily  Duration: Continuous  Rate/Volume: 60 mL/hr (1440 mL/d)  Current PN Order Provides: 1222 kcal, 72 gm protein  Additional Calorie Sources:  None    Anthropometric Measures:  Height: 144.8 cm (4' 9.01\")  Ideal Body Weight (IBW): 85 lbs (39 kg)    Admission Body Weight: 49.3 kg (108 lb 11 oz)  Current Body Weight: 51.3 kg (113 lb 1.5 oz), 134.9 % IBW. Weight Source: Bed scale  Current BMI (kg/m2): 24.5  Usual Body Weight: 67.2 kg (148 lb 3 oz) (3/7/24 bed

## 2025-06-22 NOTE — PROGRESS NOTES
[facility]  Office: [phone]  Robert Morneal, DO, Rocky Cobb, DO, Harshal Saldana DO, Wei Block, DO, Dipika Saleem MD, Angelica Connelly MD, Navjot Hutchinson MD, Olga Lidia Woodson MD,  Nacho Huertas MD, Edmundo Mariano MD, Orestes Garnica MD,  Elie Reynolds DO, Curtis Martins MD, Joseph Crowe MD, Kurt Monreal DO, Regla Hill MD,  Romel Duarte DO, Nelsy Hermosillo MD, Evelina Cortez MD, Matthew Naik MD,  Keaton Sanchez MD, Yanet Bentley MD, Russ Doan MD, Mayur Osorio MD, Camilo Lewis MD, Mary Grace Silva MD, Vijay Meza, DO, Cuca Suero MD, eDdrick Walden DO, Hayder Suarez MD, Elie Reaves MD, Mohsin Reza, MD, Rai Ott MD, Shirley Waterhouse, CNP,  Maxine Badillo, CNP, Vijay Qureshi, CNP,  Dena Tolliver, ELIDIA, Shayy Cordero CNP, Geno Carrillo, CNP, Mei Zazueta, CNP, Abiola Santiago, CNP, Dana Cordero, PA-C, Ariana Mike, CNP, Greta Byrne, CNP,  Kristi Miller, CNP, Janet Buitrago, CNP, Yazan Banuelos, PA-C, Shelly Zamarripa, PA-C, Raysa Mcgregor, CNP,        Mayda Robertson, JAKI, Jahaira Moon, CNP, Aline Larson, CNP         [facility]   IN-PATIENT SERVICE   [facility]    Progress Note    6/22/2025    8:22 AM    Name:   Albertina Manning  MRN:     [id number]     Acct:      [id number]   Room:   [facility] Day:  8  Admit Date:  6/14/2025  7:18 PM    PCP:   Rocky Tobias Sr., DO  Code Status:  Full Code    Subjective:     C/C: No chief complaint on file.      No acute events overnight. She states she fells well today and denies any pain. She is working with speech therapy and is only eating a few small bites of her meals.    Brief History:     70-year-old female with past medical history of dementia, CAD, CKD stage IIIb, tobacco use, depression, schizoaffective disorder who initially presented to [place] emergency department from her nursing facility and subsequently transferred to [facility] for further management of  pneumonia, altered mental status, and elevated troponins. Patient with altered mental status with an inability to communicate. Workup in the [place] ED was remarkable for both pneumonia and elevated troponins chest x-ray did show pneumonia of the right upper lobe.  She was started on vancomycin and cefepime.  Initial lactate 5.6, white count of 7.1.  Patient was altered and was given Ativan 1 mg injections in the ED at [place].  Her ammonia level was unremarkable at 42.  Her CT of the head in [place] was negative for acute changes.  She also had elevated troponins  EKG showed no ST changes. Due to an inability to perform cath procedure patient was transferred to [facility] for further cardiac workup and evaluation. She was evaluated by cardiology who felt elevated troponins were related to FREDERICK and did not recommend further intervention.  Neurology and psychiatry were consulted for altered mental status.  MRI brain was performed that showed no acute changes.  Psychiatry made medication changes and patient's mental status improved.  She was started on TPN due to poor oral intake.  The plan is to discharge her to SNF.    Medications:     Allergies:    Allergies   Allergen Reactions    Risperdal [Risperidone] Other (See Comments)     Patient cannot remember       Current Meds:   Scheduled Meds:    mirtazapine  7.5 mg Oral Nightly    DULoxetine  20 mg Oral Daily    methylPREDNISolone  60 mg IntraVENous Daily    thiamine  200 mg IntraVENous Daily    nicotine  1 patch TransDERmal Daily    atorvastatin  10 mg Oral Daily    [Held by provider] donepezil  5 mg Oral Nightly    sodium chloride flush  5-40 mL IntraVENous 2 times per day    guaiFENesin  600 mg Oral BID    [Held by provider] heparin (porcine)  5,000 Units SubCUTAneous 3 times per day    [Held by provider] aspirin  81 mg Oral Daily     Continuous Infusions:    PN-Adult Premix 5/15 - Standard Electrolytes - Central Line 60 mL/hr at 06/21/25 6904    dextrose Stopped

## 2025-06-22 NOTE — PLAN OF CARE
Problem: Seizure Precautions  Goal: Remains free of injury related to seizures activity  Outcome: Progressing     Problem: Respiratory - Adult  Goal: Achieves optimal ventilation and oxygenation  Outcome: Progressing     Problem: Safety - Adult  Goal: Free from fall injury  Outcome: Progressing     Problem: Skin/Tissue Integrity  Goal: Skin integrity remains intact  Description: 1.  Monitor for areas of redness and/or skin breakdown  2.  Assess vascular access sites hourly  3.  Every 4-6 hours minimum:  Change oxygen saturation probe site  4.  Every 4-6 hours:  If on nasal continuous positive airway pressure, respiratory therapy assess nares and determine need for appliance change or resting period  Outcome: Progressing     Problem: ABCDS Injury Assessment  Goal: Absence of physical injury  Outcome: Progressing  Flowsheets (Taken 6/21/2025 2000)  Absence of Physical Injury: Implement safety measures based on patient assessment     Problem: Neurosensory - Adult  Goal: Achieves stable or improved neurological status  Outcome: Progressing  Goal: Absence of seizures  Outcome: Progressing     Problem: Cardiovascular - Adult  Goal: Maintains optimal cardiac output and hemodynamic stability  Outcome: Progressing     Problem: Skin/Tissue Integrity - Adult  Goal: Skin integrity remains intact  Description: 1.  Monitor for areas of redness and/or skin breakdown  2.  Assess vascular access sites hourly  3.  Every 4-6 hours minimum:  Change oxygen saturation probe site  4.  Every 4-6 hours:  If on nasal continuous positive airway pressure, respiratory therapy assess nares and determine need for appliance change or resting period  Outcome: Progressing  Goal: Incisions, wounds, or drain sites healing without S/S of infection  Outcome: Progressing     Problem: Musculoskeletal - Adult  Goal: Return mobility to safest level of function  Outcome: Progressing     Problem: Infection - Adult  Goal: Absence of infection at

## 2025-06-22 NOTE — PROGRESS NOTES
Speech Language Pathology  [facility]    Dysphagia Treatment Note    Date: 6/22/2025  Patient’s Name: Albertina Manning  MRN: [id number]    Patient Active Problem List   Diagnosis Code    Elevated liver function tests R79.89    Abnormal weight loss R63.4    Atherosclerosis of native coronary artery of native heart without angina pectoris I25.10    Malignant neoplasm of central portion of right breast in female, estrogen receptor positive (HCC) C50.111, Z17.0    Brief psychotic disorder (HCC) F23    Hyperglycemia R73.9    Insomnia G47.00    Major depressive disorder with single episode, in partial remission F32.4    Muscle weakness (generalized) M62.81    Nicotine dependence F17.200    Obstructive chronic bronchitis without exacerbation (HCC) J44.89    Other specified anxiety disorders F41.8    Pedal edema R60.0    Right shoulder pain M25.511    Schizoaffective disorder, chronic condition (HCC) F25.9    Secondary malignant neoplasm of bone (HCC) C79.51    Tobacco dependence due to cigarettes F17.210    Tremor R25.1    Osteopenia of multiple sites M85.89    Metastasis to bone (HCC) C79.51    FREDERICK (acute kidney injury) N17.9    Chemotherapy adverse reaction T45.1X5A    Macrocytic anemia D53.9    Low back pain M54.50    Pneumonia due to organism J18.9    Elevated troponin R79.89    Hypernatremia E87.0    Hypermagnesemia E83.41    Acute kidney injury superimposed on stage 3b chronic kidney disease (HCC) N17.9, N18.32    Dementia (HCC) F03.90    AMS (altered mental status) R41.82    Chest pain R07.9    NSTEMI (non-ST elevated myocardial infarction) (HCC) I21.4    Prolonged Q-T interval on ECG R94.31    Dehydration E86.0    History of schizoaffective disorder Z86.59    History of breast cancer Z85.3    Hypokalemia E87.6    Acute metabolic encephalopathy G93.41    Polypharmacy Z79.899       Pain: pt did not c/o pain.     Dysphagia Treatment  Treatment time:[id number]    Subjective: [x] Alert [x] Cooperative

## 2025-06-22 NOTE — PLAN OF CARE
Problem: Seizure Precautions  Goal: Remains free of injury related to seizures activity  Outcome: Progressing     Problem: Respiratory - Adult  Goal: Achieves optimal ventilation and oxygenation  Outcome: Progressing     Problem: Neurosensory - Adult  Goal: Achieves stable or improved neurological status  Outcome: Progressing  Goal: Absence of seizures  Outcome: Progressing     Problem: Cardiovascular - Adult  Goal: Maintains optimal cardiac output and hemodynamic stability  Outcome: Progressing

## 2025-06-22 NOTE — CARE COORDINATION
Case Management   Daily Progress Note       Patient Name: Albertina Manning                   YOB: 1955  Diagnosis: Pneumonia [J18.9]  Pneumonia due to organism [J18.9]                       GMLOS: 4.6 days  Length of Stay: 8  days    Anticipated Discharge Date: One day until discharge    Readmission Risk (Low < 19, Mod (19-27), High > 27): Readmission Risk Score: 19.3        Current Transitional Plan    [] Home Independently    [] Home with HC    [x] Skilled Nursing Facility    [] Acute Rehabilitation    [] Long Term Acute Care (LTAC)    [] Other:     Plan for the Stay (Medical Management) :          Workflow Continuation (Additional Notes) :  Messaged admissions at [facility] to inquire on precert and if able to accommodate TPN, await response   [facility] cannot accommodate TPN, I messaged Dr. Meza via PS, who relates he is going to consult palliative for goals of care    PHILIP MANCIA RN  June 22, 2025

## 2025-06-23 LAB
GLUCOSE BLD-MCNC: 114 MG/DL (ref 65–105)
GLUCOSE BLD-MCNC: 116 MG/DL (ref 65–105)
GLUCOSE BLD-MCNC: 118 MG/DL (ref 65–105)
GLUCOSE BLD-MCNC: 119 MG/DL (ref 65–105)
GLUCOSE BLD-MCNC: 137 MG/DL (ref 65–105)

## 2025-06-23 PROCEDURE — 2500000003 HC RX 250 WO HCPCS

## 2025-06-23 PROCEDURE — 99232 SBSQ HOSP IP/OBS MODERATE 35: CPT | Performed by: STUDENT IN AN ORGANIZED HEALTH CARE EDUCATION/TRAINING PROGRAM

## 2025-06-23 PROCEDURE — 6370000000 HC RX 637 (ALT 250 FOR IP)

## 2025-06-23 PROCEDURE — 97530 THERAPEUTIC ACTIVITIES: CPT

## 2025-06-23 PROCEDURE — 6360000002 HC RX W HCPCS: Performed by: STUDENT IN AN ORGANIZED HEALTH CARE EDUCATION/TRAINING PROGRAM

## 2025-06-23 PROCEDURE — 6370000000 HC RX 637 (ALT 250 FOR IP): Performed by: NURSE PRACTITIONER

## 2025-06-23 PROCEDURE — 82947 ASSAY GLUCOSE BLOOD QUANT: CPT

## 2025-06-23 PROCEDURE — 94761 N-INVAS EAR/PLS OXIMETRY MLT: CPT

## 2025-06-23 PROCEDURE — 92526 ORAL FUNCTION THERAPY: CPT

## 2025-06-23 PROCEDURE — 6360000002 HC RX W HCPCS

## 2025-06-23 PROCEDURE — 97110 THERAPEUTIC EXERCISES: CPT

## 2025-06-23 PROCEDURE — 2060000000 HC ICU INTERMEDIATE R&B

## 2025-06-23 RX ADMIN — MIRTAZAPINE 7.5 MG: 15 TABLET, FILM COATED ORAL at 21:01

## 2025-06-23 RX ADMIN — GUAIFENESIN 600 MG: 600 TABLET, MULTILAYER, EXTENDED RELEASE ORAL at 21:01

## 2025-06-23 RX ADMIN — ATORVASTATIN CALCIUM 10 MG: 10 TABLET, FILM COATED ORAL at 08:57

## 2025-06-23 RX ADMIN — I.V. FAT EMULSION 100 ML: 20 EMULSION INTRAVENOUS at 18:32

## 2025-06-23 RX ADMIN — ACETAMINOPHEN 650 MG: 325 TABLET ORAL at 18:43

## 2025-06-23 RX ADMIN — THIAMINE HYDROCHLORIDE 200 MG: 100 INJECTION, SOLUTION INTRAMUSCULAR; INTRAVENOUS at 08:57

## 2025-06-23 RX ADMIN — ACETAMINOPHEN 650 MG: 325 TABLET ORAL at 04:07

## 2025-06-23 RX ADMIN — HEPARIN SODIUM 5000 UNITS: 5000 INJECTION INTRAVENOUS; SUBCUTANEOUS at 21:03

## 2025-06-23 RX ADMIN — SODIUM CHLORIDE, PRESERVATIVE FREE 10 ML: 5 INJECTION INTRAVENOUS at 08:57

## 2025-06-23 RX ADMIN — DULOXETINE 20 MG: 20 CAPSULE, DELAYED RELEASE ORAL at 08:57

## 2025-06-23 RX ADMIN — ASCORBIC ACID, VITAMIN A PALMITATE, CHOLECALCIFEROL, THIAMINE HYDROCHLORIDE, RIBOFLAVIN-5 PHOSPHATE SODIUM, PYRIDOXINE HYDROCHLORIDE, NIACINAMIDE, DEXPANTHENOL, ALPHA-TOCOPHEROL ACETATE, VITAMIN K1, FOLIC ACID, BIOTIN, CYANOCOBALAMIN: 200; 3300; 200; 6; 3.6; 6; 40; 15; 10; 150; 600; 60; 5 INJECTION, SOLUTION INTRAVENOUS at 18:28

## 2025-06-23 RX ADMIN — GUAIFENESIN 600 MG: 600 TABLET, MULTILAYER, EXTENDED RELEASE ORAL at 08:57

## 2025-06-23 ASSESSMENT — PAIN SCALES - GENERAL
PAINLEVEL_OUTOF10: 3
PAINLEVEL_OUTOF10: 8
PAINLEVEL_OUTOF10: 0

## 2025-06-23 ASSESSMENT — PAIN DESCRIPTION - DESCRIPTORS
DESCRIPTORS: ACHING
DESCRIPTORS: ACHING

## 2025-06-23 ASSESSMENT — PAIN SCALES - WONG BAKER
WONGBAKER_NUMERICALRESPONSE: HURTS A LITTLE BIT
WONGBAKER_NUMERICALRESPONSE: NO HURT

## 2025-06-23 ASSESSMENT — PAIN DESCRIPTION - LOCATION
LOCATION: HEAD
LOCATION: HEAD

## 2025-06-23 NOTE — PROGRESS NOTES
Physical Therapy  Facility/Department: [address] ONC/MED SURG   Physical Therapy Daily Treatment Note    Patient Name: Albertina Manning        MRN: [id number]    : 1955    Date of Service: 2025    Past Medical History:  has a past medical history of Alcohol dependence in remission (HCC), Anxiety, Arthritis, CAD (coronary artery disease), Cancer (HCC), Chronic bronchitis (HCC), Cognitive communication deficit, Diverticulosis, Esophageal varices without bleeding (HCC), Insomnia, Major depressive disorder, Osteoporosis, Schizoaffective disorder (HCC), and Tremor.  Past Surgical History:  has a past surgical history that includes Tubal ligation; back surgery; US BREAST BIOPSY W LOC DEVICE 1ST LESION RIGHT (Right, 2023); US PLACE BREAST LOC DEVICE 1ST LESION RIGHT (Right, 2023); Breast biopsy (Right, 2023); and Axillary Surgery (Right, 2023).    Discharge Recommendations  Discharge Recommendations: Patient would benefit from continued therapy after discharge  PT Equipment Recommendations  Equipment Needed: No  Other: Pt owns rollator    Assessment  Body Structures, Functions, Activity Limitations Requiring Skilled Therapeutic Intervention: Decreased ADL status;Decreased body mechanics;Decreased strength;Decreased high-level IADLs;Decreased balance;Decreased endurance;Decreased coordination;Decreased posture;Decreased safe awareness;Decreased functional mobility   Assessment: Pt amb 30' CGA RW. Pt Is limited by fatigue in mobility requiring seated rest break. Pt is currently unsafe to return to prior living arrangements. Pt would benefit from continued acute PT to address deficits.  Therapy Prognosis: Good  Decision Making: Medium Complexity  Activity Tolerance  Activity Tolerance: Patient limited by fatigue;Patient limited by endurance  Safety Devices  Type of Devices: Call light within reach;All fall risk precautions in place;Nurse notified;Patient at risk for falls;Left in chair;Chair alarm in  place;Gait belt  Restraints  Restraints Initially in Place: No    AM-PAC  AM-[facility] Basic Mobility - Inpatient   How much help is needed turning from your back to your side while in a flat bed without using bedrails?: A Little  How much help is needed moving from lying on your back to sitting on the side of a flat bed without using bedrails?: A Little  How much help is needed moving to and from a bed to a chair?: A Little  How much help is needed standing up from a chair using your arms?: A Little  How much help is needed walking in hospital room?: A Little  How much help is needed climbing 3-5 steps with a railing?: A Lot  AM-PAC Inpatient Mobility Raw Score : 17  AM-PAC Inpatient T-Scale Score : 42.13  Mobility Inpatient CMS 0-100% Score: 50.57  Mobility Inpatient CMS G-Code Modifier : CK    Restrictions/Precautions  Restrictions/Precautions  Restrictions/Precautions: Seizure;Isolation (Droplet)  Activity Level: Up as Tolerated  Required Braces or Orthoses?: No  Position Activity Restriction  Other Position/Activity Restrictions: IV L UE     Subjective  General  Chart Reviewed: No  Patient assessed for rehabilitation services?: Yes  Response To Previous Treatment: Not applicable  Family/Caregiver Present: No  Follows Commands: Within Functional Limits  General  General Comments: RN and pt agreeable to PT. pt alert in bed upon arrival.  Subjective  Subjective: Pt denies pain, n/t.     Objective  Orientation  Orientation Level: Oriented to place;Oriented to time;Disoriented to situation;Oriented to person  Cognition  Overall Cognitive Status: Exceptions  Arousal/Alertness: Appears intact  Following Commands: Follows one step commands with increased time  Attention Span: Attends with cues to redirect  Memory: Impaired;Decreased recall of precautions;Decreased recall of recent events  Safety Judgement: Decreased awareness of need for assistance;Decreased awareness of need for safety  Problem Solving: Assistance required

## 2025-06-23 NOTE — PLAN OF CARE
Problem: Seizure Precautions  Goal: Remains free of injury related to seizures activity  Outcome: Progressing  Flowsheets (Taken 6/23/2025 0730)  Remains free of injury related to seizure activity:   Maintain airway, patient safety  and administer oxygen as ordered   Monitor patient for seizure activity, document and report duration and description of seizure to Licensed Independent Practitioner   If seizure occurs, turn patient to side and suction secretions as needed   Reorient patient post seizure     Problem: Respiratory - Adult  Goal: Achieves optimal ventilation and oxygenation  Outcome: Progressing  Flowsheets (Taken 6/23/2025 0845)  Achieves optimal ventilation and oxygenation:   Assess for changes in respiratory status   Assess for changes in mentation and behavior     Problem: Neurosensory - Adult  Goal: Achieves stable or improved neurological status  Outcome: Progressing  Flowsheets (Taken 6/23/2025 0845)  Achieves stable or improved neurological status: Assess for and report changes in neurological status  Goal: Absence of seizures  Outcome: Progressing  Flowsheets (Taken 6/23/2025 0845)  Absence of seizures: Monitor for seizure activity.  If seizure occurs, document type and location of movements and any associated apnea     Problem: Cardiovascular - Adult  Goal: Maintains optimal cardiac output and hemodynamic stability  Outcome: Progressing  Flowsheets (Taken 6/23/2025 0845)  Maintains optimal cardiac output and hemodynamic stability:   Monitor blood pressure and heart rate   Monitor urine output and notify Licensed Independent Practitioner for values outside of normal range     Problem: Skin/Tissue Integrity - Adult  Goal: Skin integrity remains intact  Description: 1.  Monitor for areas of redness and/or skin breakdown  2.  Assess vascular access sites hourly  3.  Every 4-6 hours minimum:  Change oxygen saturation probe site  4.  Every 4-6 hours:  If on nasal continuous positive airway pressure,  respiratory therapy assess nares and determine need for appliance change or resting period  Outcome: Progressing  Flowsheets  Taken 6/23/2025 1012  Skin Integrity Remains Intact: Monitor for areas of redness and/or skin breakdown  Taken 6/23/2025 0845  Skin Integrity Remains Intact: Monitor for areas of redness and/or skin breakdown  Goal: Incisions, wounds, or drain sites healing without S/S of infection  Outcome: Progressing  Flowsheets  Taken 6/23/2025 1012  Incisions, Wounds, or Drain Sites Healing Without Sign and Symptoms of Infection: ADMISSION and DAILY: Assess and document risk factors for pressure ulcer development  Taken 6/23/2025 0845  Incisions, Wounds, or Drain Sites Healing Without Sign and Symptoms of Infection: ADMISSION and DAILY: Assess and document risk factors for pressure ulcer development     Problem: Musculoskeletal - Adult  Goal: Return mobility to safest level of function  Outcome: Progressing  Flowsheets (Taken 6/23/2025 0845)  Return Mobility to Safest Level of Function:   Assess patient stability and activity tolerance for standing, transferring and ambulating with or without assistive devices   Assist with transfers and ambulation using safe patient handling equipment as needed     Problem: Infection - Adult  Goal: Absence of infection at discharge  Outcome: Progressing  Flowsheets  Taken 6/23/2025 1012  Absence of infection at discharge:   Assess and monitor for signs and symptoms of infection   Monitor lab/diagnostic results  Taken 6/23/2025 0830  Absence of infection at discharge:   Assess and monitor for signs and symptoms of infection   Monitor lab/diagnostic results   Monitor all insertion sites i.e., indwelling lines, tubes and drains     Problem: Metabolic/Fluid and Electrolytes - Adult  Goal: Electrolytes maintained within normal limits  Outcome: Progressing  Flowsheets (Taken 6/23/2025 0830)  Electrolytes maintained within normal limits:   Administer electrolyte replacement as

## 2025-06-23 NOTE — PLAN OF CARE
Problem: Seizure Precautions  Goal: Remains free of injury related to seizures activity  6/23/2025 0057 by Marlee Glover RN  Outcome: Progressing  6/22/2025 1847 by Leo Cervantes RN  Outcome: Progressing     Problem: Respiratory - Adult  Goal: Achieves optimal ventilation and oxygenation  6/23/2025 0057 by Marlee Glover RN  Outcome: Progressing  6/22/2025 1847 by Leo Cervantes RN  Outcome: Progressing     Problem: Safety - Adult  Goal: Free from fall injury  Outcome: Progressing     Problem: Skin/Tissue Integrity  Goal: Skin integrity remains intact  Description: 1.  Monitor for areas of redness and/or skin breakdown  2.  Assess vascular access sites hourly  3.  Every 4-6 hours minimum:  Change oxygen saturation probe site  4.  Every 4-6 hours:  If on nasal continuous positive airway pressure, respiratory therapy assess nares and determine need for appliance change or resting period  Outcome: Progressing     Problem: ABCDS Injury Assessment  Goal: Absence of physical injury  Outcome: Progressing     Problem: Neurosensory - Adult  Goal: Achieves stable or improved neurological status  6/23/2025 0057 by Marlee Glover RN  Outcome: Progressing  6/22/2025 1847 by Leo Cervantes RN  Outcome: Progressing  Goal: Absence of seizures  6/23/2025 0057 by Marlee Glover RN  Outcome: Progressing  6/22/2025 1847 by Leo Cervantes RN  Outcome: Progressing     Problem: Cardiovascular - Adult  Goal: Maintains optimal cardiac output and hemodynamic stability  6/23/2025 0057 by Marlee Glover RN  Outcome: Progressing  6/22/2025 1847 by Leo Cervantes RN  Outcome: Progressing     Problem: Skin/Tissue Integrity - Adult  Goal: Skin integrity remains intact  Description: 1.  Monitor for areas of redness and/or skin breakdown  2.  Assess vascular access sites hourly  3.  Every 4-6 hours minimum:  Change oxygen saturation probe site  4.  Every 4-6 hours:  If on nasal continuous positive airway pressure, respiratory  therapy assess nares and determine need for appliance change or resting period  Outcome: Progressing  Goal: Incisions, wounds, or drain sites healing without S/S of infection  Outcome: Progressing     Problem: Musculoskeletal - Adult  Goal: Return mobility to safest level of function  Outcome: Progressing     Problem: Infection - Adult  Goal: Absence of infection at discharge  Outcome: Progressing     Problem: Metabolic/Fluid and Electrolytes - Adult  Goal: Electrolytes maintained within normal limits  Outcome: Progressing  Goal: Hemodynamic stability and optimal renal function maintained  Outcome: Progressing  Goal: Glucose maintained within prescribed range  Outcome: Progressing     Problem: Discharge Planning  Goal: Discharge to home or other facility with appropriate resources  Outcome: Progressing     Problem: Nutrition Deficit:  Goal: Optimize nutritional status  Outcome: Progressing  Flowsheets (Taken 6/22/2025 1403 by Raina Patel RD)  Nutrient intake appropriate for improving, restoring, or maintaining nutritional needs: Recommend, monitor, and adjust tube feedings and TPN/PPN based on assessed needs

## 2025-06-23 NOTE — PROGRESS NOTES
pneumonia, altered mental status, and elevated troponins. Patient with altered mental status with an inability to communicate. Workup in the [place] ED was remarkable for both pneumonia and elevated troponins chest x-ray did show pneumonia of the right upper lobe.  She was started on vancomycin and cefepime.  Initial lactate 5.6, white count of 7.1.  Patient was altered and was given Ativan 1 mg injections in the ED at [place].  Her ammonia level was unremarkable at 42.  Her CT of the head in [place] was negative for acute changes.  She also had elevated troponins  EKG showed no ST changes. Due to an inability to perform cath procedure patient was transferred to [facility] for further cardiac workup and evaluation. She was evaluated by cardiology who felt elevated troponins were related to FREDERICK and did not recommend further intervention.  Neurology and psychiatry were consulted for altered mental status.  MRI brain was performed that showed no acute changes.  Psychiatry made medication changes and patient's mental status improved.  She was started on TPN due to poor oral intake.  Palliative care was consulted for goals of care discussion in regards to her nutrition.    Medications:     Allergies:    Allergies   Allergen Reactions    Risperdal [Risperidone] Other (See Comments)     Patient cannot remember       Current Meds:   Scheduled Meds:    mirtazapine  7.5 mg Oral Nightly    DULoxetine  20 mg Oral Daily    thiamine  200 mg IntraVENous Daily    nicotine  1 patch TransDERmal Daily    atorvastatin  10 mg Oral Daily    [Held by provider] donepezil  5 mg Oral Nightly    sodium chloride flush  5-40 mL IntraVENous 2 times per day    guaiFENesin  600 mg Oral BID    [Held by provider] heparin (porcine)  5,000 Units SubCUTAneous 3 times per day    [Held by provider] aspirin  81 mg Oral Daily     Continuous Infusions:    PN-Adult Premix 5/15 - Standard Electrolytes - Central Line 65 mL/hr at 06/22/25 1750    dextrose  111*   CO2 15* 16* 16*   GLUCOSE 187* 159* 166*   BUN 11 18 21   CREATININE 0.8 0.7 0.7   MG 2.1 2.0 1.9   ANIONGAP 15 11 13   LABGLOM 79 >90 >90   CALCIUM 8.5* 7.9* 7.9*   PHOS 2.4* 3.1 2.5     Recent Labs     06/22/25  0755 06/22/25  1252 06/22/25  1707 06/22/25 2013 06/23/25  0423 06/23/25  0734   POCGLU 109* 135* 126* 198* 137* 119*     ABG:  Lab Results   Component Value Date/Time    FIO2 INFORMATION NOT PROVIDED 06/14/2025 08:10 PM     Lab Results   Component Value Date/Time    SPECIAL R FORARM 6ML AN 06/14/2025 11:58 AM     Lab Results   Component Value Date/Time    CULTURE NO GROWTH 5 DAYS 06/14/2025 11:58 AM       Radiology:  MRI BRAIN W WO CONTRAST  Result Date: 6/17/2025  1. No evidence of acute infarction, acute intracranial hemorrhage, mass effect or midline shift. 2. Moderate to marked diffuse chronic microvascular ischemic/aging changes in the supratentorial white matter. 3. Mild cortical atrophy changes over the convexities of both cerebral hemispheres. Mild-to-moderate central atrophy. 4. No evidence of abnormal enhancement in the brain, meninges or in the bony structures. 5. Bilateral diffuse mastoiditis. 6. Paranasal sinus disease.     US RENAL COMPLETE  Result Date: 6/15/2025  1. No acute findings.       Physical Examination:        General: Alert and oriented to self, no acute distress  Neurologic: Awake, alert, oriented to self, generally weak  Lungs: Clear to auscultation, no wheezing, non-labored respiration  Heart: Normal rate, regular rhythm, no murmur, gallop or edema  Abdomen: Soft, non-tender, non-distended, normal bowel sounds  Musculoskeletal: No tenderness or swelling     Assessment:        Hospital Problems           Last Modified POA    * (Principal) Pneumonia due to organism 6/14/2025 Yes    Chest pain 6/15/2025 Yes    NSTEMI (non-ST elevated myocardial infarction) (HCC) 6/15/2025 Yes    Prolonged Q-T interval on ECG 6/15/2025 Yes    Atherosclerosis of native coronary artery of

## 2025-06-23 NOTE — PROGRESS NOTES
Speech Language Pathology  [facility]    Dysphagia Treatment Note    Date: 6/23/2025  Patient’s Name: Albertina Manning  MRN: [id number]  Diagnosis:   Patient Active Problem List   Diagnosis Code    Elevated liver function tests R79.89    Abnormal weight loss R63.4    Atherosclerosis of native coronary artery of native heart without angina pectoris I25.10    Malignant neoplasm of central portion of right breast in female, estrogen receptor positive (HCC) C50.111, Z17.0    Brief psychotic disorder (HCC) F23    Hyperglycemia R73.9    Insomnia G47.00    Major depressive disorder with single episode, in partial remission F32.4    Muscle weakness (generalized) M62.81    Nicotine dependence F17.200    Obstructive chronic bronchitis without exacerbation (HCC) J44.89    Other specified anxiety disorders F41.8    Pedal edema R60.0    Right shoulder pain M25.511    Schizoaffective disorder, chronic condition (HCC) F25.9    Secondary malignant neoplasm of bone (HCC) C79.51    Tobacco dependence due to cigarettes F17.210    Tremor R25.1    Osteopenia of multiple sites M85.89    Metastasis to bone (HCC) C79.51    FREDERICK (acute kidney injury) N17.9    Chemotherapy adverse reaction T45.1X5A    Macrocytic anemia D53.9    Low back pain M54.50    Pneumonia due to organism J18.9    Elevated troponin R79.89    Hypernatremia E87.0    Hypermagnesemia E83.41    Acute kidney injury superimposed on stage 3b chronic kidney disease (HCC) N17.9, N18.32    Dementia (HCC) F03.90    AMS (altered mental status) R41.82    Chest pain R07.9    NSTEMI (non-ST elevated myocardial infarction) (HCC) I21.4    Prolonged Q-T interval on ECG R94.31    Dehydration E86.0    History of schizoaffective disorder Z86.59    History of breast cancer Z85.3    Hypokalemia E87.6    Acute metabolic encephalopathy G93.41    Polypharmacy Z79.899       Pain: 0/10    Dysphagia Treatment  Treatment time:[id number]    Subjective: [x] Alert [x] Cooperative     []

## 2025-06-23 NOTE — PROGRESS NOTES
Comprehensive Nutrition Assessment    Type and Reason for Visit:  Reassess    Nutrition Recommendations/Plan:   Continue current TPN at 65 mL/hr + 100 mL lipids. Provides 1308 kcals, 78 g Pro. +MVI, trace mineral per protocol.  Continue current diet: Easy to chew w/ Mildly thick liquids.   Increase ONS Magic cup to TID  Continue ONS Ensure Plus BID. Nursing to thicken.   Will monitor labs, weights, intake, GI status, and plan of care.     Malnutrition Assessment:  Malnutrition Status:  Insufficient data (06/19/25 1531)    Context:  Acute Illness     Findings of the 6 clinical characteristics of malnutrition:  Energy Intake:  50% or less of estimated energy requirements for 5 or more days (x past few weeks)  Weight Loss:  Mild weight loss (22% x past 15-16 months)     Body Fat Loss:  Unable to assess     Muscle Mass Loss:  Unable to assess    Fluid Accumulation:  No fluid accumulation     Strength:  Not Performed    Nutrition Assessment:    Re-assess. TPN continues --Premix TPN @ 65 mL/hr + 100 mL lipids providing 1307 kcals, 78 gm protein. Pt continues on easy to chew diet with nectar thick liquids with magic cup once a day and Ensure BID. Per nursing documentation, 1-25% meal intakes, consuming ~25% of Ensures, eating magic cup -- will increase frequency. Weights reviewed - significant loss of 11.7% over 6 months. Labs not in orders, noted glu 109-114. Noted palliative consult for [facility] regarding nutrition, d/w RN.    Nutrition Related Findings:    Meds/labs reviewed. Wound Type: Pressure Injury, Stage II       Current Nutrition Intake & Therapies:    Average Meal Intake: 1-25%  Average Supplements Intake: 1-25%  ADULT DIET; Easy to Chew; Mildly Thick (Nectar)  ADULT ORAL NUTRITION SUPPLEMENT; Lunch; Frozen Oral Supplement  ADULT ORAL NUTRITION SUPPLEMENT; Breakfast, Dinner; Standard High Calorie/High Protein Oral Supplement  PN-Adult Premix 5/15 - Standard Electrolytes - Central Line  Additional Calorie  Sources:  None    Anthropometric Measures:  Height: 144.8 cm (4' 9.01\")  Ideal Body Weight (IBW): 85 lbs (39 kg)    Admission Body Weight: 49.3 kg (108 lb 11 oz)  Current Body Weight: 51.3 kg (113 lb 1.5 oz), 134.9 % IBW. Weight Source: Bed scale  Current BMI (kg/m2): 24.5  Usual Body Weight: 67.2 kg (148 lb 3 oz) (3/7/24 bed scale per chart review)  % Weight Change (Calculated): -22.6  Weight Adjustment For: No Adjustment  BMI Categories: Normal Weight (BMI 18.5-24.9)    Estimated Daily Nutrient Needs:  Energy Requirements Based On: Kcal/kg  Weight Used for Energy Requirements: Admission  Energy (kcal/day): 1076-4975 kcals/day  Weight Used for Protein Requirements: Admission  Protein (g/day): 60-80 gm pro/day  Method Used for Fluid Requirements: Defer to provider  Fluid (ml/day): per MD    Nutrition Diagnosis:   Inadequate oral intake related to cognitive or neurological impairment, swallowing difficulty as evidenced by intake 0-25% (pt refusing PO intakes)    Nutrition Interventions:   Food and/or Nutrient Delivery: Continue Current Parenteral Nutrition, Continue Current Diet, Modify Oral Nutrition Supplement  Nutrition Education/Counseling: No recommendation at this time  Coordination of Nutrition Care: Continue to monitor while inpatient  Plan of Care discussed with: RN    Goals:  Goals: Meet at least 75% of estimated needs, prior to discharge  Type of Goal: Continue current goal  Previous Goal Met: Goal(s) Achieved    Nutrition Monitoring and Evaluation:   Behavioral-Environmental Outcomes: None Identified  Food/Nutrient Intake Outcomes: Food and Nutrient Intake, Supplement Intake  Physical Signs/Symptoms Outcomes: Biochemical Data, GI Status, Meal Time Behavior, Nutrition Focused Physical Findings, Skin, Weight    Discharge Planning:    Too soon to determine     Raina Patel RD  Contact: [phone]

## 2025-06-23 NOTE — CARE COORDINATION
Case Management   Daily Progress Note       Patient Name: Albertina Manning                   YOB: 1955  Diagnosis: Pneumonia [J18.9]  Pneumonia due to organism [J18.9]                       GMLOS: 4.6 days  Length of Stay: 9  days    Anticipated Discharge Date: To be determined    Readmission Risk (Low < 19, Mod (19-27), High > 27): Readmission Risk Score: 19.2        Current Transitional Plan    [] Home Independently    [] Home with HC    [x] Skilled Nursing Facility    [] Acute Rehabilitation    [] Long Term Acute Care (LTAC)    [] Other:     Plan for the Stay (Medical Management) :          Workflow Continuation (Additional Notes) :    Palliative has been consulted for goals of care family meeting.  Dietitian has recommended to continue TPN.     CHEYENNE BRANDON  June 23, 2025

## 2025-06-24 PROBLEM — Z71.89 GOALS OF CARE, COUNSELING/DISCUSSION: Status: ACTIVE | Noted: 2025-06-24

## 2025-06-24 PROBLEM — Z51.5 ENCOUNTER FOR PALLIATIVE CARE: Status: ACTIVE | Noted: 2025-06-24

## 2025-06-24 LAB
ANION GAP SERPL CALCULATED.3IONS-SCNC: 13 MMOL/L (ref 9–16)
BUN SERPL-MCNC: 29 MG/DL (ref 8–23)
CALCIUM SERPL-MCNC: 7.9 MG/DL (ref 8.6–10.4)
CHLORIDE SERPL-SCNC: 105 MMOL/L (ref 98–107)
CO2 SERPL-SCNC: 20 MMOL/L (ref 20–31)
CREAT SERPL-MCNC: 0.7 MG/DL (ref 0.6–0.9)
GFR, ESTIMATED: >90 ML/MIN/1.73M2
GLUCOSE BLD-MCNC: 101 MG/DL (ref 65–105)
GLUCOSE BLD-MCNC: 107 MG/DL (ref 65–105)
GLUCOSE BLD-MCNC: 108 MG/DL (ref 65–105)
GLUCOSE BLD-MCNC: 119 MG/DL (ref 65–105)
GLUCOSE BLD-MCNC: 121 MG/DL (ref 65–105)
GLUCOSE BLD-MCNC: 123 MG/DL (ref 65–105)
GLUCOSE SERPL-MCNC: 113 MG/DL (ref 74–99)
POTASSIUM SERPL-SCNC: 3.6 MMOL/L (ref 3.7–5.3)
SODIUM SERPL-SCNC: 138 MMOL/L (ref 136–145)

## 2025-06-24 PROCEDURE — 6370000000 HC RX 637 (ALT 250 FOR IP): Performed by: STUDENT IN AN ORGANIZED HEALTH CARE EDUCATION/TRAINING PROGRAM

## 2025-06-24 PROCEDURE — 99232 SBSQ HOSP IP/OBS MODERATE 35: CPT | Performed by: STUDENT IN AN ORGANIZED HEALTH CARE EDUCATION/TRAINING PROGRAM

## 2025-06-24 PROCEDURE — 6370000000 HC RX 637 (ALT 250 FOR IP)

## 2025-06-24 PROCEDURE — 2500000003 HC RX 250 WO HCPCS

## 2025-06-24 PROCEDURE — 97110 THERAPEUTIC EXERCISES: CPT

## 2025-06-24 PROCEDURE — 82947 ASSAY GLUCOSE BLOOD QUANT: CPT

## 2025-06-24 PROCEDURE — 97535 SELF CARE MNGMENT TRAINING: CPT

## 2025-06-24 PROCEDURE — 6360000002 HC RX W HCPCS

## 2025-06-24 PROCEDURE — 2060000000 HC ICU INTERMEDIATE R&B

## 2025-06-24 PROCEDURE — 36415 COLL VENOUS BLD VENIPUNCTURE: CPT

## 2025-06-24 PROCEDURE — 6370000000 HC RX 637 (ALT 250 FOR IP): Performed by: NURSE PRACTITIONER

## 2025-06-24 PROCEDURE — 6360000002 HC RX W HCPCS: Performed by: STUDENT IN AN ORGANIZED HEALTH CARE EDUCATION/TRAINING PROGRAM

## 2025-06-24 PROCEDURE — 99222 1ST HOSP IP/OBS MODERATE 55: CPT

## 2025-06-24 PROCEDURE — 80048 BASIC METABOLIC PNL TOTAL CA: CPT

## 2025-06-24 RX ORDER — ONDANSETRON 4 MG/1
4 TABLET, ORALLY DISINTEGRATING ORAL EVERY 8 HOURS PRN
Status: DISCONTINUED | OUTPATIENT
Start: 2025-06-24 | End: 2025-07-03 | Stop reason: HOSPADM

## 2025-06-24 RX ADMIN — HEPARIN SODIUM 5000 UNITS: 5000 INJECTION INTRAVENOUS; SUBCUTANEOUS at 14:50

## 2025-06-24 RX ADMIN — GUAIFENESIN 600 MG: 600 TABLET, MULTILAYER, EXTENDED RELEASE ORAL at 09:21

## 2025-06-24 RX ADMIN — THIAMINE HYDROCHLORIDE 200 MG: 100 INJECTION, SOLUTION INTRAMUSCULAR; INTRAVENOUS at 09:21

## 2025-06-24 RX ADMIN — I.V. FAT EMULSION 100 ML: 20 EMULSION INTRAVENOUS at 17:42

## 2025-06-24 RX ADMIN — ASPIRIN 81 MG: 81 TABLET, COATED ORAL at 09:20

## 2025-06-24 RX ADMIN — DULOXETINE 20 MG: 20 CAPSULE, DELAYED RELEASE ORAL at 09:20

## 2025-06-24 RX ADMIN — ATORVASTATIN CALCIUM 10 MG: 10 TABLET, FILM COATED ORAL at 09:21

## 2025-06-24 RX ADMIN — ONDANSETRON 4 MG: 4 TABLET, ORALLY DISINTEGRATING ORAL at 17:45

## 2025-06-24 RX ADMIN — HEPARIN SODIUM 5000 UNITS: 5000 INJECTION INTRAVENOUS; SUBCUTANEOUS at 22:30

## 2025-06-24 RX ADMIN — LEUCINE, PHENYLALANINE, LYSINE, METHIONINE, ISOLEUCINE, VALINE, HISTIDINE, THREONINE, TRYPTOPHAN, ALANINE, GLYCINE, ARGININE, PROLINE, SERINE, TYROSINE, SODIUM ACETATE, DIBASIC POTASSIUM PHOSPHATE, MAGNESIUM CHLORIDE, SODIUM CHLORIDE, CALCIUM CHLORIDE, DEXTROSE
1035; 575; 33; 15; 515; 240; 300; 365; 290; 51; 200; 280; 261; 340; 250; 340; 59; 210; 90; 20; 290 INJECTION INTRAVENOUS at 17:40

## 2025-06-24 RX ADMIN — GUAIFENESIN 600 MG: 600 TABLET, MULTILAYER, EXTENDED RELEASE ORAL at 20:06

## 2025-06-24 RX ADMIN — HEPARIN SODIUM 5000 UNITS: 5000 INJECTION INTRAVENOUS; SUBCUTANEOUS at 06:03

## 2025-06-24 RX ADMIN — ACETAMINOPHEN 650 MG: 325 TABLET ORAL at 15:41

## 2025-06-24 RX ADMIN — ACETAMINOPHEN 650 MG: 325 TABLET ORAL at 04:12

## 2025-06-24 RX ADMIN — MIRTAZAPINE 7.5 MG: 15 TABLET, FILM COATED ORAL at 20:06

## 2025-06-24 RX ADMIN — SODIUM CHLORIDE, PRESERVATIVE FREE 10 ML: 5 INJECTION INTRAVENOUS at 20:07

## 2025-06-24 RX ADMIN — SODIUM CHLORIDE, PRESERVATIVE FREE 10 ML: 5 INJECTION INTRAVENOUS at 09:21

## 2025-06-24 ASSESSMENT — PAIN SCALES - GENERAL
PAINLEVEL_OUTOF10: 3
PAINLEVEL_OUTOF10: 2

## 2025-06-24 ASSESSMENT — PAIN DESCRIPTION - ORIENTATION: ORIENTATION: MID

## 2025-06-24 ASSESSMENT — PAIN DESCRIPTION - LOCATION
LOCATION: NECK;BACK
LOCATION: HEAD

## 2025-06-24 ASSESSMENT — PAIN SCALES - WONG BAKER: WONGBAKER_NUMERICALRESPONSE: HURTS LITTLE MORE

## 2025-06-24 ASSESSMENT — PAIN DESCRIPTION - DESCRIPTORS: DESCRIPTORS: ACHING

## 2025-06-24 NOTE — PROGRESS NOTES
Comprehensive Nutrition Assessment    Type and Reason for Visit:  Reassess    Nutrition Recommendations/Plan:   Continue current TPN at 65 mL/hr + 100 mL lipids. Provides 1308 kcals, 78 g Pro. -MVI, trace mineral per protocol.  Recommend K+ replacement outside of TPN bag.   Continue current diet per SLP recs; Frozen ONS TID, high kcal/high PRO ONS BID  Will monitor labs, weights, intake, GI status, and plan of care.     Malnutrition Assessment:  Malnutrition Status:  Insufficient data (06/19/25 1531)    Context:  Acute Illness     Findings of the 6 clinical characteristics of malnutrition:  Energy Intake:  50% or less of estimated energy requirements for 5 or more days (x past few weeks)  Weight Loss:  Mild weight loss (22% x past 15-16 months)     Body Fat Loss:  Unable to assess     Muscle Mass Loss:  Unable to assess    Fluid Accumulation:  No fluid accumulation     Strength:  Not Performed    Nutrition Assessment:    S/p SLP eval, upgrading to thin liquids w/ monitoring for aspiration. Pt asleep at time of RD visit. Will continue to monitor PO intake, currently remaining poor, 0-25% of meals, ONS. TPN/IL to continue. Recommend replace K+ outside of TPN bag. RD will continue to monitor per protocol.    Nutrition Related Findings:    Labs: K+ 3.6, -137 mg/dL x 24 hrs. Wound Type: Pressure Injury, Stage II       Current Nutrition Intake & Therapies:    Average Meal Intake: 1-25%  Average Supplements Intake: 1-25%  ADULT DIET; Easy to Chew; Mildly Thick (Nectar)  ADULT ORAL NUTRITION SUPPLEMENT; Breakfast, Dinner; Standard High Calorie/High Protein Oral Supplement  ADULT ORAL NUTRITION SUPPLEMENT; Lunch, Breakfast, Dinner; Frozen Oral Supplement  PN-Adult Premix 5/15 - Standard Electrolytes - Central Line  PN-Adult Premix 5/15 - Standard Electrolytes - Central Line  Current Parenteral Nutrition Orders:  Type and Formula: Premix Central   Lipids: 100ml, Daily  Duration: Continuous  Rate/Volume: 65 mL/hr

## 2025-06-24 NOTE — CARE COORDINATION
Case Management   Daily Progress Note       Patient Name: Albertina Manning                   YOB: 1955  Diagnosis: Pneumonia [J18.9]  Pneumonia due to organism [J18.9]                       GMLOS: 4.6 days  Length of Stay: 10  days    Anticipated Discharge Date: To be determined    Readmission Risk (Low < 19, Mod (19-27), High > 27): Readmission Risk Score: 17.6        Current Transitional Plan    [] Home Independently    [] Home with HC    [x] Skilled Nursing Facility    [] Acute Rehabilitation    [] Long Term Acute Care (LTAC)    [] Other:     Plan for the Stay (Medical Management) :          Workflow Continuation (Additional Notes) :    Per previous notes TPN will continue upon discharge.  Palliative following, remains a full code.     [facility] message from [facility], cannot accept TPN.      Filtered snf list printed.    PC to pt's daughter/guardian Anjelica, discussed that [facility] declined as they cannot accommodate TPN.  Anjelica does work at the facility and verbalizes that to be accurate.  Discussed choosing a new facility, Anjelica states that she would like her mom to get a PEG tube.  Snf list left in room for her to review but she would like pt to go to [facility] with a feeding tube.        CHEYENNE BRANDON  June 24, 2025

## 2025-06-24 NOTE — PROGRESS NOTES
[facility]  Office: [phone]  Robert Monreal, DO, Rocky Cobb, DO, Harshal Saldana DO, Wei Block, DO, Dipika Saleem MD, Angelica Connelly MD, Navjot Hutchinson MD, Olga Lidia Woodson MD,  Nacho Huertas MD, Edmundo Mariano MD, Orestes Garnica MD,  Elie Reynolds DO, Curtis Martins MD, Joseph Crowe MD, Kurt Monreal DO, Regla Hill MD,  Romel Duarte DO, Nelsy Hermosillo MD, Evelina Cortez MD, Matthew Naik MD,  Keaton Sanchez MD, Yanet Bentley MD, Russ Doan MD, Mayur Osorio MD, Camilo Lewis MD, Mar yGrace Silva MD, Vijay Meza, DO, Cuca Suero MD, Dedrick Walden DO, Hayder Suarez MD, Elie Reaves MD, Mohsin Reza, MD, Rai Ott MD, Shirley Waterhouse, CNP,  Maxine Badillo CNP, Vijay Qureshi, CNP,  Dena Tolliver, ELIDIA, Shayy Cordero CNP, Geno Carrillo, CNP, Mei Zazueta, CNP, Abiola Santiago, CNP, Dana Cordero, PA-C, Ariana Mike, CNP, Greta Byrne, CNP,  Kristi Miller, CNP, Janet Buitrago, CNP, Yazan Banuelos, PA-C, Shelly Zamarripa, PA-C, Raysa Mcgregor, CNP,        Mayda Robertson, CNS, Jahaira Moon, CNP, Aline Larson, CNP         [facility]   IN-PATIENT SERVICE   [place]    Progress Note    6/24/2025    2:01 PM    Name:   Albertina Manning  MRN:     [id number]     Acct:      [id number]   Room:   [address] [place] [address] Day:  10  Admit Date:  6/14/2025  7:18 PM    PCP:   Rocky Tobias Sr., DO  Code Status:  Full Code    Subjective:     C/C: AMS   Interval History Status: improved.     Patient seen and examined at bedside this morning.  No acute events overnight.  Resting comfortably in bed.  Continues on TPN.  Awaiting placement    Brief History:     70-year-old female with past medical history of dementia, CAD, CKD stage IIIb, tobacco use, depression, schizoaffective disorder who initially presented to [place] emergency department from her nursing facility and subsequently transferred to [facility] for further management of  ml   Net 2007.12 ml       Labs:  Hematology:  Recent Labs     06/21/25  1808 06/22/25  0109   HGB 7.6* 8.0*   HCT 24.5* 24.2*     Chemistry:  Recent Labs     06/21/25  1524 06/24/25  0802    138   K 3.9 3.6*   * 105   CO2 16* 20   GLUCOSE 166* 113*   BUN 21 29*   CREATININE 0.7 0.7   MG 1.9  --    ANIONGAP 13 13   LABGLOM >90 >90   CALCIUM 7.9* 7.9*   PHOS 2.5  --      Recent Labs     06/23/25  1603 06/23/25  1955/25  0016 06/24/25  0406 06/24/25  0758 06/24/25  1145   POCGLU 118* 116* 123* 108* 107* 101     ABG:  Lab Results   Component Value Date/Time    FIO2 INFORMATION NOT PROVIDED 06/14/2025 08:10 PM     Lab Results   Component Value Date/Time    SPECIAL R FORARM 6ML AN 06/14/2025 11:58 AM     Lab Results   Component Value Date/Time    CULTURE NO GROWTH 5 DAYS 06/14/2025 11:58 AM       Radiology:  MRI BRAIN W WO CONTRAST  Result Date: 6/17/2025  1. No evidence of acute infarction, acute intracranial hemorrhage, mass effect or midline shift. 2. Moderate to marked diffuse chronic microvascular ischemic/aging changes in the supratentorial white matter. 3. Mild cortical atrophy changes over the convexities of both cerebral hemispheres. Mild-to-moderate central atrophy. 4. No evidence of abnormal enhancement in the brain, meninges or in the bony structures. 5. Bilateral diffuse mastoiditis. 6. Paranasal sinus disease.       Physical Examination:        General appearance:  alert, cooperative and no distress  Mental Status:  oriented to person, place and time and normal affect  Lungs:  clear to auscultation bilaterally, normal effort  Heart:  regular rate and rhythm, no murmur  Abdomen:  soft, nontender, nondistended, normal bowel sounds, no masses, hepatomegaly, splenomegaly  Extremities:  no edema, redness, tenderness in the calves  Skin:  no gross lesions, rashes, induration    Assessment:        Hospital Problems           Last Modified POA    * (Principal) Pneumonia due to organism 6/14/2025

## 2025-06-24 NOTE — CONSULTS
Palliative Care Inpatient Consult    NAME:  Albertina Manning  MEDICAL RECORD NUMBER:  [id number]  AGE: 70 y.o.   GENDER: female  : 1955  TODAY'S DATE:  2025    Reasons for Consultation:    Symptom and/or pain management  Provision of information regarding PC and/or hospice philosophies  Complex, time-intensive communication and interdisciplinary psychosocial support  Clarification of goals of care and/or assistance with difficult decision-making  Guidance in regards to resources and transition(s)    Members of PC team contributing to this consultation are: Maribel Zuniga CNP     Plan      Palliative Interaction:    Patient evaluated on rounds.   She is resting in bed. She is able to tell me she is in the hospital but is not sure which one.   She tells me her daughters name is Anjelica.     She is aware that she has had minimal oral intake. She states she has no desire to eat. She states she does not have an appetite nor does anything taste good. She has been initiated on TPN.     Her daughter Anjelica is her legal Guardian. Documentation was noted in the paperchart.     I was able to call and speak with Anjelica. Again explained that patient has little to no oral intake and is continued on TPN.     Discussed goals of care moving forward with this with consideration to patients overall quality of life.     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 She states she is going to discuss this with her ADON of the facility.     She does question a feeding tube. Unclear if this is an option however I would have concerns of patient pulling at a PEG tube given her dementia and overall mentation.     Anjelica wishes to continue current interventions at this time.     Will continue goals of care with family.         Additional Assessments:   We feel the patient symptoms are being controlled. Their current regimen is reviewed by myself and discussed  injury superimposed on stage 3b chronic kidney disease (HCC) [N17.9, N18.32] 06/14/2025    Dementia (HCC) [F03.90] 06/14/2025    AMS (altered mental status) [R41.82] 06/14/2025    Atherosclerosis of native coronary artery of native heart without angina pectoris [I25.10] 12/26/2022       PAST MEDICAL HISTORY      Diagnosis Date    Alcohol dependence in remission (HCC)     Anxiety     Arthritis     CAD (coronary artery disease)     without angina    Cancer (HCC)     secondary malignant neoplasm of bone, metastatic right breast    Chronic bronchitis (HCC)     Cognitive communication deficit     Diverticulosis     Esophageal varices without bleeding (HCC)     Insomnia     Major depressive disorder     Osteoporosis     Schizoaffective disorder (HCC)     Tremor        PAST SURGICAL HISTORY  Past Surgical History:   Procedure Laterality Date    AXILLARY SURGERY Right 8/8/2023    AXILLARY LYMPH SENTINEL NODE BIOPSY DISSECTION performed by Jacquelyn Oden DO at [facility] OR    BACK SURGERY      BREAST BIOPSY Right 8/8/2023    NEEDLE DIRECTED (1030) RIGHT BREAST LUMPECTOMY, SENTINEL LYMPH NODE BIOPSY  (11) performed by Jacquelyn Oden DO at [facility] OR    TUBAL LIGATION      US BREAST BIOPSY W LOC DEVICE 1ST LESION RIGHT Right 2/27/2023    US BREAST BIOPSY W LOC DEVICE 1ST L[facility]ION RIGHT [facility]    US PLACE BREAST LOC DEVICE 1ST LESION RIGHT Right 8/8/2023    US GUIDED NEEDLE LOC OF RIGHT BREAST 8/8/2023 [facility] ULTRASOUND       SOCIAL HISTORY  Social History     Tobacco Use    Smoking status: Every Day     Current packs/day: 0.50     Types: Cigarettes    Smokeless tobacco: Never   Vaping Use    Vaping status: Never Used   Substance Use Topics    Alcohol use: Not Currently     Comment: 2 beers daily    Drug use: Never       FAMILY HISTORY  No family history on file.    ALLERGIES  Allergies   Allergen Reactions    Risperdal [Risperidone] Other (See Comments)     Patient cannot remember       MEDICATIONS  Current

## 2025-06-24 NOTE — PROGRESS NOTES
deficits  Initiation: Requires cues for all  Sequencing: Requires cues for all  Cognition Comment: Writer arrived while RN was bathing pt in bed. Pt preoccupied with pain with tears in her eyes as well as c/o back and neck pain.    Activities of Daily Living  Grooming: Maximum assistance  Grooming Skilled Clinical Factors: Pt attempted to clean mouth with mouth swab however pt unable to follow through. Writer attempted to assist pt however pt refused and turned her head.  UE Bathing: Dependent/Total  UE Bathing Skilled Clinical Factors: Pt dependent for UB bathing on this date due to pain  LE Bathing: Dependent/Total  LE Bathing Skilled Clinical Factors: Pt is dependent for LB bathing due to pain  UE Dressing: Dependent/Total  UE Dressing Skilled Clinical Factors: Pt demo's doff/don of gown with total assist while supine in bed on this date. Pt demo's ability to dethread/thread arms through sleeves total assist and don to shoulders with total assist.  LE Dressing: Dependent/Total  Putting On/Taking Off Footwear: Dependent/Total    Balance  Balance  Sitting:  (unable to assess due to pain)  Standing:  (unable to assess due to pain level)    Transfers/Mobility  Bed mobility  Rolling to Left: Dependent/Total  Rolling to Right: Dependent/Total  Supine to Sit: Unable to assess  Sit to Supine: Unable to assess  Scooting: Substantial/Maximal assistance;2 Person assistance  Bed Mobility Comments: Pt in bed at start of treatment session. Pt is dependent to roll side to side in bed. Pt requires maxA x2 for scooting up in bed. Pt postitoned on her right side with wedges to offload her back and bottom and knee between knees to increase comfort and decrease pressure. Pt left in bed.         Transfers  Sit to stand: Unable to assess  Stand to sit: Unable to assess  Transfer Comments: Pt unable to tolerate transfers on this date.         Functional Mobility: Unable to assess (Comment)  Functional Mobility Skilled Clinical Factors:  EDT

## 2025-06-24 NOTE — ACP (ADVANCE CARE PLANNING)
Advance Care Planning      Palliative Medicine Provider (MD/NP)  Advance Care Planning (ACP) Conversation      Date of Conversation: 06/24/25  The patient and/or authorized decision maker consented to a voluntary Advance Care Planning conversation.   Individuals present for the conversation:   Legal Guardian Anjelica Mcguire       ACP documents available in EMR prior to discussion:  -Legal Guardianship Document    Primary Palliative Diagnosis(es):  Dementia   Malnutrition     Conversation Summary: Patients daughter who is legal guardian wishes to continue full intervention at this time. No changes made.       Resuscitation Status:    Code Status: Full Code    Outcomes / Completed Documentation:  An explanation of advance directives and their importance was provided and the following forms completed:    -No new documents completed.    If new document completed, original was provided to patient and/or family member.    Copy was placed for scanning into the [facility] EMR.      I spent 5 minutes providing separately identifiable ACP services with the patient and/or surrogate decision maker in a voluntary, in-person conversation discussing the patient's wishes and goals as detailed in the above note.       Maribel Zuniga, APRN - CNP

## 2025-06-24 NOTE — PLAN OF CARE
Problem: Seizure Precautions  Goal: Remains free of injury related to seizures activity  6/24/2025 1809 by Breann Kaufman RN  Outcome: Progressing  6/24/2025 0446 by Nacho Ralph RN  Outcome: Progressing

## 2025-06-24 NOTE — PLAN OF CARE
Problem: Seizure Precautions  Goal: Remains free of injury related to seizures activity  6/24/2025 0446 by Nacho Ralph RN  Outcome: Progressing  6/23/2025 1616 by Molly Byrne RN  Outcome: Progressing  Flowsheets (Taken 6/23/2025 0730)  Remains free of injury related to seizure activity:   Maintain airway, patient safety  and administer oxygen as ordered   Monitor patient for seizure activity, document and report duration and description of seizure to Licensed Independent Practitioner   If seizure occurs, turn patient to side and suction secretions as needed   Reorient patient post seizure     Problem: Respiratory - Adult  Goal: Achieves optimal ventilation and oxygenation  6/24/2025 0446 by Nacho Ralph RN  Outcome: Progressing  6/23/2025 1616 by Molly Byrne RN  Outcome: Progressing  Flowsheets (Taken 6/23/2025 0845)  Achieves optimal ventilation and oxygenation:   Assess for changes in respiratory status   Assess for changes in mentation and behavior     Problem: Neurosensory - Adult  Goal: Achieves stable or improved neurological status  6/24/2025 0446 by Nacho Ralph RN  Outcome: Progressing  Flowsheets (Taken 6/23/2025 2045)  Achieves stable or improved neurological status: Assess for and report changes in neurological status  6/23/2025 1616 by Molly Byrne RN  Outcome: Progressing  Flowsheets (Taken 6/23/2025 0845)  Achieves stable or improved neurological status: Assess for and report changes in neurological status  Goal: Absence of seizures  6/24/2025 0446 by Nacho Ralph RN  Outcome: Progressing  Flowsheets (Taken 6/23/2025 2045)  Absence of seizures: Monitor for seizure activity.  If seizure occurs, document type and location of movements and any associated apnea  6/23/2025 1616 by Molly Byrne RN  Outcome: Progressing  Flowsheets (Taken 6/23/2025 0845)  Absence of seizures: Monitor for seizure activity.  If seizure occurs, document type and location of

## 2025-06-25 LAB
ANION GAP SERPL CALCULATED.3IONS-SCNC: 11 MMOL/L (ref 9–16)
BUN SERPL-MCNC: 24 MG/DL (ref 8–23)
CALCIUM SERPL-MCNC: 7.9 MG/DL (ref 8.6–10.4)
CHLORIDE SERPL-SCNC: 105 MMOL/L (ref 98–107)
CO2 SERPL-SCNC: 21 MMOL/L (ref 20–31)
CREAT SERPL-MCNC: 0.6 MG/DL (ref 0.6–0.9)
GFR, ESTIMATED: >90 ML/MIN/1.73M2
GLUCOSE BLD-MCNC: 101 MG/DL (ref 65–105)
GLUCOSE BLD-MCNC: 111 MG/DL (ref 65–105)
GLUCOSE BLD-MCNC: 111 MG/DL (ref 65–105)
GLUCOSE BLD-MCNC: 118 MG/DL (ref 65–105)
GLUCOSE BLD-MCNC: 120 MG/DL (ref 65–105)
GLUCOSE BLD-MCNC: 124 MG/DL (ref 65–105)
GLUCOSE SERPL-MCNC: 118 MG/DL (ref 74–99)
MAGNESIUM SERPL-MCNC: 1.9 MG/DL (ref 1.6–2.4)
POTASSIUM SERPL-SCNC: 3.4 MMOL/L (ref 3.7–5.3)
SODIUM SERPL-SCNC: 137 MMOL/L (ref 136–145)

## 2025-06-25 PROCEDURE — 36415 COLL VENOUS BLD VENIPUNCTURE: CPT

## 2025-06-25 PROCEDURE — 2500000003 HC RX 250 WO HCPCS: Performed by: STUDENT IN AN ORGANIZED HEALTH CARE EDUCATION/TRAINING PROGRAM

## 2025-06-25 PROCEDURE — 6370000000 HC RX 637 (ALT 250 FOR IP): Performed by: STUDENT IN AN ORGANIZED HEALTH CARE EDUCATION/TRAINING PROGRAM

## 2025-06-25 PROCEDURE — 80048 BASIC METABOLIC PNL TOTAL CA: CPT

## 2025-06-25 PROCEDURE — 6370000000 HC RX 637 (ALT 250 FOR IP)

## 2025-06-25 PROCEDURE — 6360000002 HC RX W HCPCS: Performed by: STUDENT IN AN ORGANIZED HEALTH CARE EDUCATION/TRAINING PROGRAM

## 2025-06-25 PROCEDURE — 99232 SBSQ HOSP IP/OBS MODERATE 35: CPT | Performed by: STUDENT IN AN ORGANIZED HEALTH CARE EDUCATION/TRAINING PROGRAM

## 2025-06-25 PROCEDURE — 2060000000 HC ICU INTERMEDIATE R&B

## 2025-06-25 PROCEDURE — 94761 N-INVAS EAR/PLS OXIMETRY MLT: CPT

## 2025-06-25 PROCEDURE — 6370000000 HC RX 637 (ALT 250 FOR IP): Performed by: NURSE PRACTITIONER

## 2025-06-25 PROCEDURE — 92526 ORAL FUNCTION THERAPY: CPT

## 2025-06-25 PROCEDURE — 82947 ASSAY GLUCOSE BLOOD QUANT: CPT

## 2025-06-25 PROCEDURE — 83735 ASSAY OF MAGNESIUM: CPT

## 2025-06-25 PROCEDURE — 2500000003 HC RX 250 WO HCPCS

## 2025-06-25 RX ORDER — OXYCODONE HYDROCHLORIDE 5 MG/1
10 TABLET ORAL EVERY 4 HOURS PRN
Refills: 0 | Status: DISCONTINUED | OUTPATIENT
Start: 2025-06-25 | End: 2025-07-03 | Stop reason: HOSPADM

## 2025-06-25 RX ORDER — OXYCODONE HYDROCHLORIDE 5 MG/1
5 TABLET ORAL EVERY 4 HOURS PRN
Refills: 0 | Status: DISCONTINUED | OUTPATIENT
Start: 2025-06-25 | End: 2025-07-03 | Stop reason: HOSPADM

## 2025-06-25 RX ORDER — POTASSIUM CHLORIDE 7.45 MG/ML
10 INJECTION INTRAVENOUS ONCE
Status: COMPLETED | OUTPATIENT
Start: 2025-06-25 | End: 2025-06-25

## 2025-06-25 RX ADMIN — DULOXETINE 20 MG: 20 CAPSULE, DELAYED RELEASE ORAL at 09:16

## 2025-06-25 RX ADMIN — MIRTAZAPINE 7.5 MG: 15 TABLET, FILM COATED ORAL at 19:58

## 2025-06-25 RX ADMIN — ATORVASTATIN CALCIUM 10 MG: 10 TABLET, FILM COATED ORAL at 09:16

## 2025-06-25 RX ADMIN — ACETAMINOPHEN 650 MG: 325 TABLET ORAL at 10:22

## 2025-06-25 RX ADMIN — ACETAMINOPHEN 650 MG: 325 TABLET ORAL at 16:36

## 2025-06-25 RX ADMIN — HEPARIN SODIUM 5000 UNITS: 5000 INJECTION INTRAVENOUS; SUBCUTANEOUS at 14:25

## 2025-06-25 RX ADMIN — SODIUM CHLORIDE, PRESERVATIVE FREE 10 ML: 5 INJECTION INTRAVENOUS at 09:16

## 2025-06-25 RX ADMIN — ASCORBIC ACID, VITAMIN A PALMITATE, CHOLECALCIFEROL, THIAMINE HYDROCHLORIDE, RIBOFLAVIN-5 PHOSPHATE SODIUM, PYRIDOXINE HYDROCHLORIDE, NIACINAMIDE, DEXPANTHENOL, ALPHA-TOCOPHEROL ACETATE, VITAMIN K1, FOLIC ACID, BIOTIN, CYANOCOBALAMIN: 200; 3300; 200; 6; 3.6; 6; 40; 15; 10; 150; 600; 60; 5 INJECTION, SOLUTION INTRAVENOUS at 17:46

## 2025-06-25 RX ADMIN — POTASSIUM CHLORIDE 10 MEQ: 7.46 INJECTION, SOLUTION INTRAVENOUS at 12:50

## 2025-06-25 RX ADMIN — HEPARIN SODIUM 5000 UNITS: 5000 INJECTION INTRAVENOUS; SUBCUTANEOUS at 05:58

## 2025-06-25 RX ADMIN — SODIUM CHLORIDE, PRESERVATIVE FREE 10 ML: 5 INJECTION INTRAVENOUS at 19:58

## 2025-06-25 RX ADMIN — ACETAMINOPHEN 650 MG: 325 TABLET ORAL at 04:27

## 2025-06-25 RX ADMIN — GUAIFENESIN 600 MG: 600 TABLET, MULTILAYER, EXTENDED RELEASE ORAL at 09:15

## 2025-06-25 RX ADMIN — OXYCODONE 5 MG: 5 TABLET ORAL at 19:58

## 2025-06-25 RX ADMIN — ASPIRIN 81 MG: 81 TABLET, COATED ORAL at 09:15

## 2025-06-25 RX ADMIN — I.V. FAT EMULSION 100 ML: 20 EMULSION INTRAVENOUS at 17:51

## 2025-06-25 RX ADMIN — HEPARIN SODIUM 5000 UNITS: 5000 INJECTION INTRAVENOUS; SUBCUTANEOUS at 22:08

## 2025-06-25 RX ADMIN — GUAIFENESIN 600 MG: 600 TABLET, MULTILAYER, EXTENDED RELEASE ORAL at 19:58

## 2025-06-25 ASSESSMENT — PAIN DESCRIPTION - DESCRIPTORS
DESCRIPTORS: ACHING
DESCRIPTORS: DISCOMFORT;ACHING

## 2025-06-25 ASSESSMENT — PAIN SCALES - WONG BAKER
WONGBAKER_NUMERICALRESPONSE: HURTS A LITTLE BIT
WONGBAKER_NUMERICALRESPONSE: HURTS A LITTLE BIT

## 2025-06-25 ASSESSMENT — PAIN SCALES - GENERAL
PAINLEVEL_OUTOF10: 6
PAINLEVEL_OUTOF10: 3
PAINLEVEL_OUTOF10: 5
PAINLEVEL_OUTOF10: 1
PAINLEVEL_OUTOF10: 4
PAINLEVEL_OUTOF10: 4

## 2025-06-25 ASSESSMENT — PAIN DESCRIPTION - LOCATION
LOCATION: HEAD
LOCATION: HEAD;THROAT
LOCATION: HEAD;THROAT
LOCATION: HEAD;NECK

## 2025-06-25 NOTE — PROGRESS NOTES
[facility]  Office: [phone]  Robert Monreal, DO, Rocky Cobb, DO, Harshal Saldana DO, Wei Block, DO, Dipika Saleem MD, Angelica Connelly MD, Navjot Hutchinson MD, Olga Lidia Woodson MD,  Nacho Huertas MD, Edmundo Mariano MD, Orestes Garnica MD,  Elie Reynolds DO, Curtis Martins MD, Joseph Crowe MD, Kurt Monreal DO, Regla Hill MD,  Romel Duarte DO, Nelsy Hermosillo MD, Evelina Cortez MD, Matthew Naik MD,  Keaton Sanchez MD, Yanet Bentley MD, Russ Doan MD, Mayur Osorio MD, Camilo Lewis MD, Mary Grace Silva MD, Vijay Meza, DO, Cuca Suero MD, Dedrick Walden DO, Hayder Suarez MD, Elie Reaves MD, Mohsin Reza, MD, Rai Ott MD, Shirley Waterhouse, CNP,  Maxine Badillo, CNP, Vijay Qureshi, CNP,  Dena Tolliver, ELIDIA, Shayy Cordero CNP, Geno Carrillo, CNP, Mei Zazueta, CNP, Abiola Santiago, CNP, Dana Cordero, PA-C, Ariana Mike, CNP, Greta Byrne, CNP,  Kristi Miller, CNP, Janet Buitrago, CNP, Yazan Banuelos, PA-C, Shelly Zamarripa, PA-C, Raysa Mcgregor, CNP,        Mayda Robertson, CNS, Jahaira Moon, CNP, Aline Larson, CNP         [facility]   IN-PATIENT SERVICE   [facility]    Progress Note    6/25/2025    11:20 AM    Name:   Albertina Manning  MRN:     [id number]     Acct:      [id number]   Room:   [address] [place] [address] Day:  11  Admit Date:  6/14/2025  7:18 PM    PCP:   Rocky Tobias Sr., DO  Code Status:  Full Code    Subjective:     C/C: AMS   Interval History Status: improved.     Patient seen and examined at bedside this morning. No acute events overnight. Pain medications adjusted as pain is not well controlled this am. Patient is more alert and does not want a PEG tube placed. Patients daughter is POA and wants PEG tube. Palliative is following. Will plan to consult GI or GS if family would like to pursue given patients current opinion. Patient continues on TPN      Brief History:     70-year-old female with past medical history

## 2025-06-25 NOTE — PLAN OF CARE
Problem: Seizure Precautions  Goal: Remains free of injury related to seizures activity  6/25/2025 0455 by Marcia Lindsey RN  Outcome: Progressing  6/24/2025 1809 by Breann Kaufman RN  Outcome: Progressing     Problem: Respiratory - Adult  Goal: Achieves optimal ventilation and oxygenation  6/25/2025 0455 by Marcia Lindsey RN  Outcome: Progressing  6/24/2025 1809 by Breann Kaufman RN  Outcome: Progressing     Problem: Neurosensory - Adult  Goal: Achieves stable or improved neurological status  6/25/2025 0455 by Marcia Lindsey RN  Outcome: Progressing  6/24/2025 1809 by Breann Kaufman RN  Outcome: Progressing  Goal: Absence of seizures  6/25/2025 0455 by Marcia Lindsey RN  Outcome: Progressing  6/24/2025 1809 by Breann Kaufman RN  Outcome: Progressing     Problem: Cardiovascular - Adult  Goal: Maintains optimal cardiac output and hemodynamic stability  6/24/2025 1809 by Breann Kaufman RN  Outcome: Progressing     Problem: Skin/Tissue Integrity - Adult  Goal: Skin integrity remains intact  Description: 1.  Monitor for areas of redness and/or skin breakdown  2.  Assess vascular access sites hourly  3.  Every 4-6 hours minimum:  Change oxygen saturation probe site  4.  Every 4-6 hours:  If on nasal continuous positive airway pressure, respiratory therapy assess nares and determine need for appliance change or resting period  6/24/2025 1809 by Breann Kaufman RN  Outcome: Progressing  Goal: Incisions, wounds, or drain sites healing without S/S of infection  6/24/2025 1809 by Breann Kaufman RN  Outcome: Progressing     Problem: Musculoskeletal - Adult  Goal: Return mobility to safest level of function  6/25/2025 0455 by Marcia Lindsey RN  Outcome: Progressing  6/24/2025 1809 by Breann Kaufman RN  Outcome: Progressing     Problem: Metabolic/Fluid and Electrolytes - Adult  Goal: Hemodynamic stability and optimal renal function maintained  6/25/2025 0455 by Marcia Lindsey RN  Outcome:

## 2025-06-25 NOTE — PROGRESS NOTES
Speech Language Pathology  [facility]    Dysphagia Treatment Note    Date: 6/25/2025  Patient’s Name: Albertina Manning  MRN: [id number]  Diagnosis:     Patient Active Problem List   Diagnosis Code    Elevated liver function tests R79.89    Abnormal weight loss R63.4    Atherosclerosis of native coronary artery of native heart without angina pectoris I25.10    Malignant neoplasm of central portion of right breast in female, estrogen receptor positive (HCC) C50.111, Z17.0    Brief psychotic disorder (HCC) F23    Hyperglycemia R73.9    Insomnia G47.00    Major depressive disorder with single episode, in partial remission F32.4    Muscle weakness (generalized) M62.81    Nicotine dependence F17.200    Obstructive chronic bronchitis without exacerbation (HCC) J44.89    Other specified anxiety disorders F41.8    Pedal edema R60.0    Right shoulder pain M25.511    Schizoaffective disorder, chronic condition (HCC) F25.9    Secondary malignant neoplasm of bone (HCC) C79.51    Tobacco dependence due to cigarettes F17.210    Tremor R25.1    Osteopenia of multiple sites M85.89    Metastasis to bone (HCC) C79.51    FREDERICK (acute kidney injury) N17.9    Chemotherapy adverse reaction T45.1X5A    Macrocytic anemia D53.9    Low back pain M54.50    Pneumonia due to organism J18.9    Elevated troponin R79.89    Hypernatremia E87.0    Hypermagnesemia E83.41    Acute kidney injury superimposed on stage 3b chronic kidney disease (HCC) N17.9, N18.32    Dementia (HCC) F03.90    AMS (altered mental status) R41.82    Chest pain R07.9    NSTEMI (non-ST elevated myocardial infarction) (HCC) I21.4    Prolonged Q-T interval on ECG R94.31    Dehydration E86.0    History of schizoaffective disorder Z86.59    History of breast cancer Z85.3    Hypokalemia E87.6    Acute metabolic encephalopathy G93.41    Polypharmacy Z79.899    Encounter for palliative care Z51.5    Goals of care, counseling/discussion Z71.89       Pain: Pt did not

## 2025-06-25 NOTE — PLAN OF CARE
Problem: Seizure Precautions  Goal: Remains free of injury related to seizures activity  6/25/2025 1826 by Breann Kaufman, RN  Outcome: Progressing  6/25/2025 1826 by Breann Kaufman RN  Outcome: Progressing  6/25/2025 0455 by Marcia Lindsey RN  Outcome: Progressing

## 2025-06-25 NOTE — PROGRESS NOTES
Physical Therapy        Physical Therapy Cancel Note      DATE: 2025    NAME: Albertina Manning  MRN: [id number]   : 1955      Patient not seen this date for Physical Therapy due to:    Patient Declined: Pt declined any OOB activities and no willing to participate, educated on importance of mobility with pt still refusing stating to tired. Will check back as time permits.       Electronically signed by Jaswinder Gutierrez PTA on 2025 at 11:50 AM

## 2025-06-25 NOTE — PROGRESS NOTES
Comprehensive Nutrition Assessment    Type and Reason for Visit:  Reassess    Nutrition Recommendations/Plan:   Continue current TPN at 65 mL/hr + 100 mL lipids. Provides 1308 kcals, 78 g Pro. -MVI, trace mineral per protocol.  Recommend electrolyte replacements outside of TPN bag.   Pt eating better with family present - was able to eat ice cream and magic cup. Encourage family to be present during meal times as able. Pt may benefit from environment change  Continue current diet per SLP recs; Frozen ONS TID, high kcal/high PRO ONS BID  Will monitor labs, weights, intake, GI status, and plan of care.     Malnutrition Assessment:  Malnutrition Status:  Insufficient data (06/19/25 1531)    Context:  Acute Illness     Findings of the 6 clinical characteristics of malnutrition:  Energy Intake:  50% or less of estimated energy requirements for 5 or more days (x past few weeks)  Weight Loss:  Mild weight loss (22% x past 15-16 months)     Body Fat Loss:  Unable to assess     Muscle Mass Loss:  Unable to assess    Fluid Accumulation:  No fluid accumulation     Strength:  Not Performed    Nutrition Assessment:    S/p SLP eval, upgrading to thin liquids w/ monitoring for aspiration. Though note liquid texture not upgraded, per RN, pt also not interested in thin liquids. Pt asleep at time of RD visit. Per RN, pt continues to have poor PO intakes, 0-25% of meals, ONS. TPN/IL to continue. Per PC notes, dtr continuing with max interventions, facility dtr works at does not do TPN, dtr okay with PEG for nutrition. PC has concerns pt may pull out tube - agree with PC given hx of dementia. Per RN, pt reports does not want a feeding tube.    Nutrition Related Findings:    Labs include K 3.4, Glu 119-111, Ca 7.9. Wound Type: Pressure Injury, Stage II       Current Nutrition Intake & Therapies:    Average Meal Intake: 0%, 1-25%  Average Supplements Intake: 0%, 1-25%  ADULT DIET; Easy to Chew; Mildly Thick (Nectar)  ADULT ORAL  NUTRITION SUPPLEMENT; Breakfast, Dinner; Standard High Calorie/High Protein Oral Supplement  ADULT ORAL NUTRITION SUPPLEMENT; Lunch, Breakfast, Dinner; Frozen Oral Supplement  PN-Adult Premix 5/15 - Standard Electrolytes - Central Line  Current Parenteral Nutrition Orders:  Type and Formula: Premix Central   Lipids: 100ml, Daily  Duration: Continuous  Rate/Volume: 65 mL/hr (1560 mL/d)  Current PN Order Provides: 1307 kcal, 78 gm protein  Additional Calorie Sources:  None    Anthropometric Measures:  Height: 144.8 cm (4' 9.01\")  Ideal Body Weight (IBW): 85 lbs (39 kg)    Admission Body Weight: 49.3 kg (108 lb 11 oz)  Current Body Weight: 51.3 kg (113 lb 1.5 oz), 134.9 % IBW. Weight Source: Bed scale  Current BMI (kg/m2): 24.5  Usual Body Weight: 67.2 kg (148 lb 3 oz) (3/7/24 bed scale per chart review)  % Weight Change (Calculated): -22.6  Weight Adjustment For: No Adjustment  BMI Categories: Normal Weight (BMI 18.5-24.9)    Estimated Daily Nutrient Needs:  Energy Requirements Based On: Kcal/kg  Weight Used for Energy Requirements: Admission  Energy (kcal/day): 5204-3589 kcals/day  Weight Used for Protein Requirements: Admission  Protein (g/day): 60-80 gm pro/day  Method Used for Fluid Requirements: Defer to provider  Fluid (ml/day): per MD    Nutrition Diagnosis:   Inadequate oral intake related to cognitive or neurological impairment, swallowing difficulty as evidenced by intake 0-25% (pt refusing PO intakes)    Nutrition Interventions:   Food and/or Nutrient Delivery: Modify Parenteral Nutrition, Continue Current Diet, Continue Oral Nutrition Supplement  Nutrition Education/Counseling: No recommendation at this time  Coordination of Nutrition Care: Continue to monitor while inpatient  Plan of Care discussed with: RN    Goals:  Goals: Meet at least 75% of estimated needs, prior to discharge  Type of Goal: Continue current goal  Previous Goal Met: Goal(s) Achieved    Nutrition Monitoring and Evaluation:

## 2025-06-26 ENCOUNTER — APPOINTMENT (OUTPATIENT)
Dept: CT IMAGING | Age: 70
End: 2025-06-26
Attending: STUDENT IN AN ORGANIZED HEALTH CARE EDUCATION/TRAINING PROGRAM
Payer: COMMERCIAL

## 2025-06-26 LAB
ALBUMIN SERPL-MCNC: 2.7 G/DL (ref 3.5–5.2)
ALBUMIN/GLOB SERPL: 0.9 {RATIO} (ref 1–2.5)
ALP SERPL-CCNC: 703 U/L (ref 35–104)
ALT SERPL-CCNC: 76 U/L (ref 10–35)
ANION GAP SERPL CALCULATED.3IONS-SCNC: 10 MMOL/L (ref 9–16)
AST SERPL-CCNC: 29 U/L (ref 10–35)
BILIRUB DIRECT SERPL-MCNC: <0.1 MG/DL (ref 0–0.2)
BILIRUB INDIRECT SERPL-MCNC: ABNORMAL MG/DL (ref 0–1)
BILIRUB SERPL-MCNC: 0.3 MG/DL (ref 0–1.2)
BUN SERPL-MCNC: 26 MG/DL (ref 8–23)
CALCIUM SERPL-MCNC: 8.3 MG/DL (ref 8.6–10.4)
CHLORIDE SERPL-SCNC: 107 MMOL/L (ref 98–107)
CO2 SERPL-SCNC: 19 MMOL/L (ref 20–31)
CREAT SERPL-MCNC: 0.6 MG/DL (ref 0.6–0.9)
GFR, ESTIMATED: >90 ML/MIN/1.73M2
GLOBULIN SER CALC-MCNC: 3 G/DL
GLUCOSE BLD-MCNC: 110 MG/DL (ref 65–105)
GLUCOSE BLD-MCNC: 111 MG/DL (ref 65–105)
GLUCOSE BLD-MCNC: 112 MG/DL (ref 65–105)
GLUCOSE BLD-MCNC: 116 MG/DL (ref 65–105)
GLUCOSE BLD-MCNC: 118 MG/DL (ref 65–105)
GLUCOSE BLD-MCNC: 121 MG/DL (ref 65–105)
GLUCOSE SERPL-MCNC: 108 MG/DL (ref 74–99)
PHOSPHATE SERPL-MCNC: 2.8 MG/DL (ref 2.5–4.5)
POTASSIUM SERPL-SCNC: 4.2 MMOL/L (ref 3.7–5.3)
PROT SERPL-MCNC: 5.7 G/DL (ref 6.6–8.7)
SODIUM SERPL-SCNC: 136 MMOL/L (ref 136–145)
TRIGL SERPL-MCNC: 284 MG/DL

## 2025-06-26 PROCEDURE — 6360000002 HC RX W HCPCS: Performed by: STUDENT IN AN ORGANIZED HEALTH CARE EDUCATION/TRAINING PROGRAM

## 2025-06-26 PROCEDURE — 84478 ASSAY OF TRIGLYCERIDES: CPT

## 2025-06-26 PROCEDURE — 80048 BASIC METABOLIC PNL TOTAL CA: CPT

## 2025-06-26 PROCEDURE — 82947 ASSAY GLUCOSE BLOOD QUANT: CPT

## 2025-06-26 PROCEDURE — 80076 HEPATIC FUNCTION PANEL: CPT

## 2025-06-26 PROCEDURE — 36415 COLL VENOUS BLD VENIPUNCTURE: CPT

## 2025-06-26 PROCEDURE — 99232 SBSQ HOSP IP/OBS MODERATE 35: CPT | Performed by: STUDENT IN AN ORGANIZED HEALTH CARE EDUCATION/TRAINING PROGRAM

## 2025-06-26 PROCEDURE — 6370000000 HC RX 637 (ALT 250 FOR IP)

## 2025-06-26 PROCEDURE — 74177 CT ABD & PELVIS W/CONTRAST: CPT

## 2025-06-26 PROCEDURE — 2500000003 HC RX 250 WO HCPCS

## 2025-06-26 PROCEDURE — 2500000003 HC RX 250 WO HCPCS: Performed by: STUDENT IN AN ORGANIZED HEALTH CARE EDUCATION/TRAINING PROGRAM

## 2025-06-26 PROCEDURE — 84100 ASSAY OF PHOSPHORUS: CPT

## 2025-06-26 PROCEDURE — 6370000000 HC RX 637 (ALT 250 FOR IP): Performed by: STUDENT IN AN ORGANIZED HEALTH CARE EDUCATION/TRAINING PROGRAM

## 2025-06-26 PROCEDURE — 99223 1ST HOSP IP/OBS HIGH 75: CPT | Performed by: STUDENT IN AN ORGANIZED HEALTH CARE EDUCATION/TRAINING PROGRAM

## 2025-06-26 PROCEDURE — 92526 ORAL FUNCTION THERAPY: CPT

## 2025-06-26 PROCEDURE — 6360000004 HC RX CONTRAST MEDICATION

## 2025-06-26 PROCEDURE — 6370000000 HC RX 637 (ALT 250 FOR IP): Performed by: NURSE PRACTITIONER

## 2025-06-26 PROCEDURE — 2060000000 HC ICU INTERMEDIATE R&B

## 2025-06-26 RX ORDER — IOPAMIDOL 755 MG/ML
75 INJECTION, SOLUTION INTRAVASCULAR
Status: COMPLETED | OUTPATIENT
Start: 2025-06-26 | End: 2025-06-26

## 2025-06-26 RX ADMIN — HEPARIN SODIUM 5000 UNITS: 5000 INJECTION INTRAVENOUS; SUBCUTANEOUS at 14:12

## 2025-06-26 RX ADMIN — ACETAMINOPHEN 650 MG: 325 TABLET ORAL at 11:54

## 2025-06-26 RX ADMIN — GUAIFENESIN 600 MG: 600 TABLET, MULTILAYER, EXTENDED RELEASE ORAL at 20:34

## 2025-06-26 RX ADMIN — SODIUM CHLORIDE, PRESERVATIVE FREE 10 ML: 5 INJECTION INTRAVENOUS at 20:35

## 2025-06-26 RX ADMIN — DULOXETINE 20 MG: 20 CAPSULE, DELAYED RELEASE ORAL at 09:15

## 2025-06-26 RX ADMIN — HEPARIN SODIUM 5000 UNITS: 5000 INJECTION INTRAVENOUS; SUBCUTANEOUS at 20:35

## 2025-06-26 RX ADMIN — HEPARIN SODIUM 5000 UNITS: 5000 INJECTION INTRAVENOUS; SUBCUTANEOUS at 06:15

## 2025-06-26 RX ADMIN — ASPIRIN 81 MG: 81 TABLET, COATED ORAL at 09:15

## 2025-06-26 RX ADMIN — MIRTAZAPINE 7.5 MG: 15 TABLET, FILM COATED ORAL at 20:34

## 2025-06-26 RX ADMIN — ATORVASTATIN CALCIUM 10 MG: 10 TABLET, FILM COATED ORAL at 09:15

## 2025-06-26 RX ADMIN — SODIUM CHLORIDE, PRESERVATIVE FREE 10 ML: 5 INJECTION INTRAVENOUS at 09:16

## 2025-06-26 RX ADMIN — ACETAMINOPHEN 650 MG: 325 TABLET ORAL at 20:38

## 2025-06-26 RX ADMIN — GUAIFENESIN 600 MG: 600 TABLET, MULTILAYER, EXTENDED RELEASE ORAL at 09:15

## 2025-06-26 RX ADMIN — IOPAMIDOL 75 ML: 755 INJECTION, SOLUTION INTRAVENOUS at 12:31

## 2025-06-26 RX ADMIN — LEUCINE, PHENYLALANINE, LYSINE, METHIONINE, ISOLEUCINE, VALINE, HISTIDINE, THREONINE, TRYPTOPHAN, ALANINE, GLYCINE, ARGININE, PROLINE, SERINE, TYROSINE, SODIUM ACETATE, DIBASIC POTASSIUM PHOSPHATE, MAGNESIUM CHLORIDE, SODIUM CHLORIDE, CALCIUM CHLORIDE, DEXTROSE
1035; 575; 33; 15; 515; 240; 300; 365; 290; 51; 200; 280; 261; 340; 250; 340; 59; 210; 90; 20; 290 INJECTION INTRAVENOUS at 18:16

## 2025-06-26 ASSESSMENT — PAIN DESCRIPTION - DESCRIPTORS
DESCRIPTORS: ACHING
DESCRIPTORS: ACHING

## 2025-06-26 ASSESSMENT — PAIN DESCRIPTION - LOCATION
LOCATION: HEAD
LOCATION: HEAD

## 2025-06-26 ASSESSMENT — PAIN SCALES - GENERAL
PAINLEVEL_OUTOF10: 6
PAINLEVEL_OUTOF10: 3
PAINLEVEL_OUTOF10: 6

## 2025-06-26 NOTE — CONSULTS
[facility]    GASTROENTEROLOGY CONSULT    Patient:   Albertina Manning   :    1955   Facility:   [facility]   Date:    2025  Admission Dx:  Pneumonia [J18.9]  Pneumonia due to organism [J18.9]  Requesting physician: Mary Grace Silva MD  Reason for consult:  PEG placement   CC : \"altered MS\"    SUBJECTIVE     HISTORY OF PRESENT ILLNESS  This is a 70 y.o.   female who was admitted 2025 with Pneumonia [J18.9]  Pneumonia due to organism [J18.9]. We have been asked to see the patient in consultation by Mary Grace Silva MD for Peg placement. She has history of dementia, CAD, CKD, COPD, Etoh/tobacco, breast CA,  and has had poor po intake. TPN was initiated and she has continued to have poor oral intake while admitted. Her daughter wishes for Peg to be placed after discussion of risks/benefits. The patient was admitted to a [facility] one month ago and was found to have a large hiatal hernia on imaging.     Previous GI history:      Last EGD    unknown   Last colonoscopy            Primary GI physician        OBJECTIVE:     PAST MEDICAL/SURGICAL HISTORY  Past Medical History:   Diagnosis Date    Alcohol dependence in remission (HCC)     Anxiety     Arthritis     CAD (coronary artery disease)     without angina    Cancer (HCC)     secondary malignant neoplasm of bone, metastatic right breast    Chronic bronchitis (HCC)     Cognitive communication deficit     Diverticulosis     Esophageal varices without bleeding (HCC)     Insomnia     Major depressive disorder     Osteoporosis     Schizoaffective disorder (HCC)     Tremor      Past Surgical History:   Procedure Laterality Date    AXILLARY SURGERY Right 2023    AXILLARY LYMPH SENTINEL NODE BIOPSY DISSECTION performed by Jacquelyn Oden DO at [facility] OR    BACK SURGERY      BREAST BIOPSY Right 2023    NEEDLE DIRECTED (1030) RIGHT BREAST LUMPECTOMY, SENTINEL LYMPH NODE BIOPSY  (11) performed by Jacquelyn HINDS

## 2025-06-26 NOTE — PROGRESS NOTES
Speech Language Pathology  [facility]    Dysphagia Treatment Note    Date: 6/26/2025  Patient’s Name: Albertina Manning  MRN: [id number]    Patient Active Problem List   Diagnosis Code    Elevated liver function tests R79.89    Abnormal weight loss R63.4    Atherosclerosis of native coronary artery of native heart without angina pectoris I25.10    Malignant neoplasm of central portion of right breast in female, estrogen receptor positive (HCC) C50.111, Z17.0    Brief psychotic disorder (HCC) F23    Hyperglycemia R73.9    Insomnia G47.00    Major depressive disorder with single episode, in partial remission F32.4    Muscle weakness (generalized) M62.81    Nicotine dependence F17.200    Obstructive chronic bronchitis without exacerbation (HCC) J44.89    Other specified anxiety disorders F41.8    Pedal edema R60.0    Right shoulder pain M25.511    Schizoaffective disorder, chronic condition (HCC) F25.9    Secondary malignant neoplasm of bone (HCC) C79.51    Tobacco dependence due to cigarettes F17.210    Tremor R25.1    Osteopenia of multiple sites M85.89    Metastasis to bone (HCC) C79.51    FREDERICK (acute kidney injury) N17.9    Chemotherapy adverse reaction T45.1X5A    Macrocytic anemia D53.9    Low back pain M54.50    Pneumonia due to organism J18.9    Elevated troponin R79.89    Hypernatremia E87.0    Hypermagnesemia E83.41    Acute kidney injury superimposed on stage 3b chronic kidney disease (HCC) N17.9, N18.32    Dementia (HCC) F03.90    AMS (altered mental status) R41.82    Chest pain R07.9    NSTEMI (non-ST elevated myocardial infarction) (HCC) I21.4    Prolonged Q-T interval on ECG R94.31    Dehydration E86.0    History of schizoaffective disorder Z86.59    History of breast cancer Z85.3    Hypokalemia E87.6    Acute metabolic encephalopathy G93.41    Polypharmacy Z79.899    Encounter for palliative care Z51.5    Goals of care, counseling/discussion Z71.89       Pain: 0/10    Dysphagia

## 2025-06-26 NOTE — PROGRESS NOTES
[facility]  Office: [phone]  Robert Monreal, DO, Rocky Cobb, DO, Harshal Saldana DO, Wei Block, DO, Dipika Saleem MD, Angelica Connelly MD, Navjot Hutchinson MD, Olga Lidia Woodson MD,  Nacho Huertas MD, Edmundo Mariano MD, Orestes Garnica MD,  Elie Reynolds DO, Curtis Martins MD, Joseph Crowe MD, Kurt Monreal DO, Regla Hill MD,  Romel Duarte DO, Nelsy Hermosillo MD, Evelina Cortez MD, Matthew Naik MD,  Keaton Sanchez MD, Yanet eBntley MD, Russ Doan MD, Mayur Osorio MD, Camilo Lewis MD, Mary Grace Silva MD, Vijay Meza, DO, Cuca Suero MD, Dedrick Walden DO, Hayder Suarez MD, Elie Reaves MD, Mohsin Reza, MD, Rai Ott MD, Shirley Waterhouse, CNP,  Maxine Badillo CNP, Vijay Qureshi, CNP,  Dena Tolliver, ELIDIA, Shayy Cordero CNP, Geno Carrillo, CNP, Mei Zazueta, CNP, Abiola Santiago, CNP, Dana Cordero, PA-C, Ariana Mike, CNP, Greta Byrne, CNP,  Kristi Miller, CNP, Janet Buitrago, CNP, Yazan Banuelos, PA-C, Shelly Zamarripa, PA-C, Raysa Mcgregor, CNP,        Mayda Robertson, CNS, Jahaira Moon, CNP, Aline Larson, CNP         [facility]   IN-PATIENT SERVICE   [facility]    Progress Note    6/26/2025    7:48 AM    Name:   Albertina Manning  MRN:     [id number]     Acct:      [id number]   Room:   [address] [place] [address] Day:  12  Admit Date:  6/14/2025  7:18 PM    PCP:   Rocky Tobias Sr., DO  Code Status:  Full Code    Subjective:     C/C: AMS   Interval History Status: improved.     Patient seen and examined at bedside this morning. No acute events overnight. Patient continues on TPN. Family is not at bedside. Discussed need for PEG tube placement and patient is now agreeable if it is needed. GI consulted    Brief History:     70-year-old female with past medical history of dementia, CAD, CKD stage IIIb, tobacco use, depression, schizoaffective disorder who initially presented to [place] emergency department from her nursing facility         Hospital Problems           Last Modified POA    * (Principal) Pneumonia due to organism 6/14/2025 Yes    Chest pain 6/15/2025 Yes    NSTEMI (non-ST elevated myocardial infarction) ([facility]) 6/15/2025 Yes    Prolonged Q-T interval on ECG 6/15/2025 Yes    Atherosclerosis of native coronary artery of native heart without angina pectoris 6/14/2025 Yes    Elevated troponin 6/14/2025 Yes    Hypernatremia 6/14/2025 Yes    Hypermagnesemia 6/14/2025 Yes    Acute kidney injury superimposed on stage 3b chronic kidney disease (HCC) 6/14/2025 Yes    Dementia ([facility]) 6/14/2025 Yes    AMS (altered mental status) 6/14/2025 Yes    Dehydration 6/15/2025 Yes    History of schizoaffective disorder 6/15/2025 Yes    History of breast cancer 6/15/2025 Yes    Hypokalemia 6/16/2025 Yes    Acute metabolic encephalopathy 6/16/2025 Yes    Polypharmacy 6/16/2025 Yes    Encounter for palliative care 6/24/2025 Yes    Goals of care, counseling/discussion 6/24/2025 Yes       Plan:        Acute encephalopathy  Schizoaffective disorder  Encephalopathy likely due to withdrawal from antipsychotic medications CT head, MRI brain, EEG unremarkable  Neurology evaluated, psychiatry consult recommended  Psychiatry evaluated, continue Cymbalta and Remeron    Concern for aspiration pneumonia  Completed Unasyn     Failure to thrive  Dysphagia  Speech therapy following, recommended easy to chew diet with nectar thick liquis  Continue TPN due to poor oral intake   GI consulted for PEG placement  Dietitian following   Palliative care consulted for goals of care      Elevated troponin  Prolonged Qtc  Evaluated by cardiology, felt elevated troponins were secondary to FREDERICK, no further intervention  Echo 6/14: EF 58%, normal diastolic function  Avoid QT prolonging medications      Mary Grace iSlva MD  6/26/2025  7:48 AM

## 2025-06-26 NOTE — PLAN OF CARE
Problem: Seizure Precautions  Goal: Remains free of injury related to seizures activity  Outcome: Progressing

## 2025-06-26 NOTE — PROGRESS NOTES
Comprehensive Nutrition Assessment    Type and Reason for Visit:  Reassess    Nutrition Recommendations/Plan:   Continue current TPN at 65 mL/hr. Modify 100 mL lipids to qMWF. Provides ~1192 kcals per day, 78 g Pro. -MVI, trace mineral per protocol.  Pt was eating better with family present - was able to eat ice cream and magic cup. Encourage family to be present during meal times as able. Pt may benefit from environment change  Continue current diet per SLP recs; Frozen ONS TID, high kcal/high PRO ONS BID  Will monitor labs, weights, intake, GI status, and plan of care.     Malnutrition Assessment:  Malnutrition Status:  Insufficient data (06/19/25 1531)    Context:  Acute Illness       Nutrition Assessment:    Re-assess. Pt continues with easy to chew diet with mildly thick liquids with poor intakes per RN. 0-25% of meals and ONS. TPN and IV lipids to continue. Per notes, dtr okay with PEG for nutrition. PC has concerns pt may pull out tube - agree with PC given hx of dementia. Per RN, pt reports does not want a feeding tube.    Nutrition Related Findings:    Meds/labs reviewed. Noted Remeron.Labs include , ALT 76, Glu 120-112, Ca 8.3. Wound Type: Pressure Injury, Stage II       Current Nutrition Intake & Therapies:    Average Meal Intake: 0%, 1-25%  Average Supplements Intake: 0%, 1-25%  ADULT DIET; Easy to Chew; Mildly Thick (Nectar)  ADULT ORAL NUTRITION SUPPLEMENT; Breakfast, Dinner; Standard High Calorie/High Protein Oral Supplement  ADULT ORAL NUTRITION SUPPLEMENT; Lunch, Breakfast, Dinner; Frozen Oral Supplement  PN-Adult Premix 5/15 - Standard Electrolytes - Central Line  Current Parenteral Nutrition Orders:  Type and Formula: Premix Central   Lipids: 100ml, Daily  Duration: Continuous  Rate/Volume: 65 mL/hr (1560 mL/d)  Current PN Order Provides: 1307 kcal, 78 gm protein  Additional Calorie Sources:  None    Anthropometric Measures:  Height: 144.8 cm (4' 9.01\")  Ideal Body Weight (IBW): 85 lbs (39

## 2025-06-26 NOTE — PLAN OF CARE
Problem: Seizure Precautions  Goal: Remains free of injury related to seizures activity  6/25/2025 1826 by Breann Kaufman RN  Outcome: Progressing     Problem: Respiratory - Adult  Goal: Achieves optimal ventilation and oxygenation  6/25/2025 2149 by Shelly Zayas RN  Outcome: Progressing  Flowsheets (Taken 6/23/2025 0845 by Molly Byrne RN)  Achieves optimal ventilation and oxygenation:   Assess for changes in respiratory status   Assess for changes in mentation and behavior  6/25/2025 1826 by Breann Kaufman RN  Outcome: Progressing     Problem: Neurosensory - Adult  Goal: Achieves stable or improved neurological status  6/25/2025 2149 by Shelly Zayas RN  Outcome: Progressing  Flowsheets (Taken 6/23/2025 2045 by Nacho Ralph, RN)  Achieves stable or improved neurological status: Assess for and report changes in neurological status  6/25/2025 1826 by Breann Kaufman RN  Outcome: Progressing  Goal: Absence of seizures  6/25/2025 1826 by Breann Kaufman RN  Outcome: Progressing     Problem: Skin/Tissue Integrity - Adult  Goal: Skin integrity remains intact  Description: 1.  Monitor for areas of redness and/or skin breakdown  2.  Assess vascular access sites hourly  3.  Every 4-6 hours minimum:  Change oxygen saturation probe site  4.  Every 4-6 hours:  If on nasal continuous positive airway pressure, respiratory therapy assess nares and determine need for appliance change or resting period  6/25/2025 2149 by Shelly Zayas RN  Outcome: Progressing  Flowsheets (Taken 6/23/2025 1933 by Nacho Ralph, RN)  Skin Integrity Remains Intact: Monitor for areas of redness and/or skin breakdown  6/25/2025 1826 by Breann Kaufman RN  Outcome: Progressing  Goal: Incisions, wounds, or drain sites healing without S/S of infection  6/25/2025 1826 by Breann Kaufman RN  Outcome: Progressing

## 2025-06-27 ENCOUNTER — ANESTHESIA EVENT (OUTPATIENT)
Dept: ENDOSCOPY | Age: 70
End: 2025-06-27
Payer: COMMERCIAL

## 2025-06-27 ENCOUNTER — APPOINTMENT (OUTPATIENT)
Dept: GENERAL RADIOLOGY | Age: 70
End: 2025-06-27
Attending: STUDENT IN AN ORGANIZED HEALTH CARE EDUCATION/TRAINING PROGRAM
Payer: COMMERCIAL

## 2025-06-27 ENCOUNTER — APPOINTMENT (OUTPATIENT)
Dept: ULTRASOUND IMAGING | Age: 70
End: 2025-06-27
Attending: STUDENT IN AN ORGANIZED HEALTH CARE EDUCATION/TRAINING PROGRAM
Payer: COMMERCIAL

## 2025-06-27 ENCOUNTER — ANESTHESIA (OUTPATIENT)
Dept: ENDOSCOPY | Age: 70
End: 2025-06-27
Payer: COMMERCIAL

## 2025-06-27 PROBLEM — K44.9 HIATAL HERNIA: Status: ACTIVE | Noted: 2025-06-27

## 2025-06-27 PROBLEM — Z93.1 S/P PERCUTANEOUS ENDOSCOPIC GASTROSTOMY (PEG) TUBE PLACEMENT (HCC): Status: ACTIVE | Noted: 2025-06-27

## 2025-06-27 PROBLEM — R13.10 DYSPHAGIA: Status: ACTIVE | Noted: 2025-06-27

## 2025-06-27 LAB
A1AT SERPL-MCNC: 299 MG/DL (ref 90–200)
AFP SERPL-MCNC: 12.3 UG/L
ANION GAP SERPL CALCULATED.3IONS-SCNC: 16 MMOL/L (ref 9–16)
BASOPHILS # BLD: 0.08 K/UL (ref 0–0.2)
BASOPHILS NFR BLD: 1 % (ref 0–2)
BUN SERPL-MCNC: 27 MG/DL (ref 8–23)
CALCIUM SERPL-MCNC: 8.5 MG/DL (ref 8.6–10.4)
CERULOPLASMIN SERPL-MCNC: 24 MG/DL (ref 16–45)
CHLORIDE SERPL-SCNC: 107 MMOL/L (ref 98–107)
CHOLEST SERPL-MCNC: 178 MG/DL (ref 0–199)
CHOLESTEROL/HDL RATIO: 4.9
CMV IGM SERPL QL IA: 0.2
CO2 SERPL-SCNC: 16 MMOL/L (ref 20–31)
CREAT SERPL-MCNC: 0.7 MG/DL (ref 0.6–0.9)
EOSINOPHIL # BLD: 0.46 K/UL (ref 0–0.44)
EOSINOPHILS RELATIVE PERCENT: 6 % (ref 1–4)
ERYTHROCYTE [DISTWIDTH] IN BLOOD BY AUTOMATED COUNT: 15 % (ref 11.8–14.4)
GFR, ESTIMATED: >90 ML/MIN/1.73M2
GLUCOSE BLD-MCNC: 108 MG/DL (ref 65–105)
GLUCOSE BLD-MCNC: 116 MG/DL (ref 65–105)
GLUCOSE BLD-MCNC: 130 MG/DL (ref 65–105)
GLUCOSE BLD-MCNC: 95 MG/DL (ref 65–105)
GLUCOSE SERPL-MCNC: 93 MG/DL (ref 74–99)
HAV IGM SERPL QL IA: NONREACTIVE
HBV CORE IGM SERPL QL IA: NONREACTIVE
HBV SURFACE AG SERPL QL IA: NONREACTIVE
HCT VFR BLD AUTO: 31.2 % (ref 36.3–47.1)
HCV AB SERPL QL IA: NONREACTIVE
HDLC SERPL-MCNC: 36 MG/DL
HGB BLD-MCNC: 10.3 G/DL (ref 11.9–15.1)
IMM GRANULOCYTES # BLD AUTO: 0.3 K/UL (ref 0–0.3)
IMM GRANULOCYTES NFR BLD: 4 %
IRON SATN MFR SERPL: 19 % (ref 20–55)
IRON SERPL-MCNC: 49 UG/DL (ref 37–145)
LDLC SERPL CALC-MCNC: 81 MG/DL (ref 0–100)
LYMPHOCYTES NFR BLD: 1.67 K/UL (ref 1.1–3.7)
LYMPHOCYTES RELATIVE PERCENT: 22 % (ref 24–43)
MCH RBC QN AUTO: 37.7 PG (ref 25.2–33.5)
MCHC RBC AUTO-ENTMCNC: 33 G/DL (ref 28.4–34.8)
MCV RBC AUTO: 114.3 FL (ref 82.6–102.9)
MONOCYTES NFR BLD: 0.76 K/UL (ref 0.1–1.2)
MONOCYTES NFR BLD: 10 % (ref 3–12)
MORPHOLOGY: ABNORMAL
MORPHOLOGY: ABNORMAL
NEUTROPHILS NFR BLD: 57 % (ref 36–65)
NEUTS SEG NFR BLD: 4.33 K/UL (ref 1.5–8.1)
NRBC BLD-RTO: 0.8 PER 100 WBC
PLATELET # BLD AUTO: ABNORMAL K/UL (ref 138–453)
PLATELET, FLUORESCENCE: ABNORMAL K/UL (ref 138–453)
POTASSIUM SERPL-SCNC: 4.7 MMOL/L (ref 3.7–5.3)
RBC # BLD AUTO: 2.73 M/UL (ref 3.95–5.11)
SODIUM SERPL-SCNC: 139 MMOL/L (ref 136–145)
TIBC SERPL-MCNC: 258 UG/DL (ref 250–450)
TRIGL SERPL-MCNC: 307 MG/DL
UNSATURATED IRON BINDING CAPACITY: 209 UG/DL (ref 112–347)
VLDLC SERPL CALC-MCNC: 61 MG/DL (ref 1–30)
WBC OTHER # BLD: 7.6 K/UL (ref 3.5–11.3)

## 2025-06-27 PROCEDURE — 80048 BASIC METABOLIC PNL TOTAL CA: CPT

## 2025-06-27 PROCEDURE — 0DH63UZ INSERTION OF FEEDING DEVICE INTO STOMACH, PERCUTANEOUS APPROACH: ICD-10-PCS | Performed by: STUDENT IN AN ORGANIZED HEALTH CARE EDUCATION/TRAINING PROGRAM

## 2025-06-27 PROCEDURE — 3700000000 HC ANESTHESIA ATTENDED CARE: Performed by: STUDENT IN AN ORGANIZED HEALTH CARE EDUCATION/TRAINING PROGRAM

## 2025-06-27 PROCEDURE — 99232 SBSQ HOSP IP/OBS MODERATE 35: CPT | Performed by: STUDENT IN AN ORGANIZED HEALTH CARE EDUCATION/TRAINING PROGRAM

## 2025-06-27 PROCEDURE — 6360000002 HC RX W HCPCS: Performed by: STUDENT IN AN ORGANIZED HEALTH CARE EDUCATION/TRAINING PROGRAM

## 2025-06-27 PROCEDURE — 83516 IMMUNOASSAY NONANTIBODY: CPT

## 2025-06-27 PROCEDURE — 2500000003 HC RX 250 WO HCPCS: Performed by: STUDENT IN AN ORGANIZED HEALTH CARE EDUCATION/TRAINING PROGRAM

## 2025-06-27 PROCEDURE — 86225 DNA ANTIBODY NATIVE: CPT

## 2025-06-27 PROCEDURE — 6370000000 HC RX 637 (ALT 250 FOR IP): Performed by: STUDENT IN AN ORGANIZED HEALTH CARE EDUCATION/TRAINING PROGRAM

## 2025-06-27 PROCEDURE — 71045 X-RAY EXAM CHEST 1 VIEW: CPT

## 2025-06-27 PROCEDURE — 82390 ASSAY OF CERULOPLASMIN: CPT

## 2025-06-27 PROCEDURE — 36415 COLL VENOUS BLD VENIPUNCTURE: CPT

## 2025-06-27 PROCEDURE — 76705 ECHO EXAM OF ABDOMEN: CPT

## 2025-06-27 PROCEDURE — 83550 IRON BINDING TEST: CPT

## 2025-06-27 PROCEDURE — 7100000001 HC PACU RECOVERY - ADDTL 15 MIN: Performed by: STUDENT IN AN ORGANIZED HEALTH CARE EDUCATION/TRAINING PROGRAM

## 2025-06-27 PROCEDURE — 86645 CMV ANTIBODY IGM: CPT

## 2025-06-27 PROCEDURE — 6370000000 HC RX 637 (ALT 250 FOR IP): Performed by: ANESTHESIOLOGY

## 2025-06-27 PROCEDURE — 82105 ALPHA-FETOPROTEIN SERUM: CPT

## 2025-06-27 PROCEDURE — 7100000000 HC PACU RECOVERY - FIRST 15 MIN: Performed by: STUDENT IN AN ORGANIZED HEALTH CARE EDUCATION/TRAINING PROGRAM

## 2025-06-27 PROCEDURE — 3700000001 HC ADD 15 MINUTES (ANESTHESIA): Performed by: STUDENT IN AN ORGANIZED HEALTH CARE EDUCATION/TRAINING PROGRAM

## 2025-06-27 PROCEDURE — 2720000010 HC SURG SUPPLY STERILE: Performed by: STUDENT IN AN ORGANIZED HEALTH CARE EDUCATION/TRAINING PROGRAM

## 2025-06-27 PROCEDURE — 80061 LIPID PANEL: CPT

## 2025-06-27 PROCEDURE — 2060000000 HC ICU INTERMEDIATE R&B

## 2025-06-27 PROCEDURE — 85055 RETICULATED PLATELET ASSAY: CPT

## 2025-06-27 PROCEDURE — 2709999900 HC NON-CHARGEABLE SUPPLY: Performed by: STUDENT IN AN ORGANIZED HEALTH CARE EDUCATION/TRAINING PROGRAM

## 2025-06-27 PROCEDURE — 86665 EPSTEIN-BARR CAPSID VCA: CPT

## 2025-06-27 PROCEDURE — 6370000000 HC RX 637 (ALT 250 FOR IP)

## 2025-06-27 PROCEDURE — 2580000003 HC RX 258: Performed by: ANESTHESIOLOGY

## 2025-06-27 PROCEDURE — 82947 ASSAY GLUCOSE BLOOD QUANT: CPT

## 2025-06-27 PROCEDURE — 85025 COMPLETE CBC W/AUTO DIFF WBC: CPT

## 2025-06-27 PROCEDURE — 86038 ANTINUCLEAR ANTIBODIES: CPT

## 2025-06-27 PROCEDURE — 82103 ALPHA-1-ANTITRYPSIN TOTAL: CPT

## 2025-06-27 PROCEDURE — 3609013300 HC EGD TUBE PLACEMENT: Performed by: STUDENT IN AN ORGANIZED HEALTH CARE EDUCATION/TRAINING PROGRAM

## 2025-06-27 PROCEDURE — 43246 EGD PLACE GASTROSTOMY TUBE: CPT | Performed by: STUDENT IN AN ORGANIZED HEALTH CARE EDUCATION/TRAINING PROGRAM

## 2025-06-27 PROCEDURE — 86694 HERPES SIMPLEX NES ANTBDY: CPT

## 2025-06-27 PROCEDURE — 6360000002 HC RX W HCPCS: Performed by: ANESTHESIOLOGY

## 2025-06-27 PROCEDURE — 83540 ASSAY OF IRON: CPT

## 2025-06-27 PROCEDURE — 6360000002 HC RX W HCPCS: Performed by: NURSE ANESTHETIST, CERTIFIED REGISTERED

## 2025-06-27 PROCEDURE — 80074 ACUTE HEPATITIS PANEL: CPT

## 2025-06-27 RX ORDER — SODIUM CHLORIDE, SODIUM LACTATE, POTASSIUM CHLORIDE, CALCIUM CHLORIDE 600; 310; 30; 20 MG/100ML; MG/100ML; MG/100ML; MG/100ML
INJECTION, SOLUTION INTRAVENOUS CONTINUOUS
Status: DISCONTINUED | OUTPATIENT
Start: 2025-06-27 | End: 2025-06-27 | Stop reason: HOSPADM

## 2025-06-27 RX ORDER — LORAZEPAM 2 MG/ML
0.5 INJECTION INTRAMUSCULAR
Status: DISCONTINUED | OUTPATIENT
Start: 2025-06-27 | End: 2025-06-27 | Stop reason: HOSPADM

## 2025-06-27 RX ORDER — CEFAZOLIN SODIUM 1 G/3ML
INJECTION, POWDER, FOR SOLUTION INTRAMUSCULAR; INTRAVENOUS
Status: DISCONTINUED | OUTPATIENT
Start: 2025-06-27 | End: 2025-06-27 | Stop reason: SDUPTHER

## 2025-06-27 RX ORDER — IPRATROPIUM BROMIDE AND ALBUTEROL SULFATE 2.5; .5 MG/3ML; MG/3ML
1 SOLUTION RESPIRATORY (INHALATION) ONCE
Status: COMPLETED | OUTPATIENT
Start: 2025-06-27 | End: 2025-06-27

## 2025-06-27 RX ORDER — LIDOCAINE HYDROCHLORIDE 40 MG/ML
INJECTION, SOLUTION RETROBULBAR PRN
Status: DISCONTINUED | OUTPATIENT
Start: 2025-06-27 | End: 2025-06-27 | Stop reason: HOSPADM

## 2025-06-27 RX ORDER — IPRATROPIUM BROMIDE AND ALBUTEROL SULFATE 2.5; .5 MG/3ML; MG/3ML
1 SOLUTION RESPIRATORY (INHALATION)
Status: DISCONTINUED | OUTPATIENT
Start: 2025-06-27 | End: 2025-06-27

## 2025-06-27 RX ORDER — LABETALOL HYDROCHLORIDE 5 MG/ML
10 INJECTION, SOLUTION INTRAVENOUS
Status: DISCONTINUED | OUTPATIENT
Start: 2025-06-27 | End: 2025-06-27

## 2025-06-27 RX ORDER — DIPHENHYDRAMINE HYDROCHLORIDE 50 MG/ML
12.5 INJECTION, SOLUTION INTRAMUSCULAR; INTRAVENOUS
Status: DISCONTINUED | OUTPATIENT
Start: 2025-06-27 | End: 2025-06-27 | Stop reason: HOSPADM

## 2025-06-27 RX ORDER — FENTANYL CITRATE 50 UG/ML
25 INJECTION, SOLUTION INTRAMUSCULAR; INTRAVENOUS EVERY 5 MIN PRN
Status: DISCONTINUED | OUTPATIENT
Start: 2025-06-27 | End: 2025-06-27

## 2025-06-27 RX ORDER — LABETALOL HYDROCHLORIDE 5 MG/ML
10 INJECTION, SOLUTION INTRAVENOUS
Status: DISCONTINUED | OUTPATIENT
Start: 2025-06-27 | End: 2025-06-27 | Stop reason: HOSPADM

## 2025-06-27 RX ORDER — OXYCODONE HYDROCHLORIDE 5 MG/1
5 TABLET ORAL PRN
Status: DISCONTINUED | OUTPATIENT
Start: 2025-06-27 | End: 2025-06-27 | Stop reason: HOSPADM

## 2025-06-27 RX ORDER — PHENYLEPHRINE HCL IN 0.9% NACL 1 MG/10 ML
SYRINGE (ML) INTRAVENOUS
Status: DISCONTINUED | OUTPATIENT
Start: 2025-06-27 | End: 2025-06-27 | Stop reason: SDUPTHER

## 2025-06-27 RX ORDER — SODIUM CHLORIDE 0.9 % (FLUSH) 0.9 %
5-40 SYRINGE (ML) INJECTION EVERY 12 HOURS SCHEDULED
Status: DISCONTINUED | OUTPATIENT
Start: 2025-06-27 | End: 2025-06-27 | Stop reason: HOSPADM

## 2025-06-27 RX ORDER — NALOXONE HYDROCHLORIDE 0.4 MG/ML
INJECTION, SOLUTION INTRAMUSCULAR; INTRAVENOUS; SUBCUTANEOUS PRN
Status: DISCONTINUED | OUTPATIENT
Start: 2025-06-27 | End: 2025-06-27

## 2025-06-27 RX ORDER — FENTANYL CITRATE 50 UG/ML
25 INJECTION, SOLUTION INTRAMUSCULAR; INTRAVENOUS EVERY 5 MIN PRN
Status: DISCONTINUED | OUTPATIENT
Start: 2025-06-27 | End: 2025-06-27 | Stop reason: HOSPADM

## 2025-06-27 RX ORDER — OXYCODONE HYDROCHLORIDE 5 MG/1
10 TABLET ORAL PRN
Status: DISCONTINUED | OUTPATIENT
Start: 2025-06-27 | End: 2025-06-27 | Stop reason: HOSPADM

## 2025-06-27 RX ORDER — SODIUM CHLORIDE 0.9 % (FLUSH) 0.9 %
5-40 SYRINGE (ML) INJECTION PRN
Status: DISCONTINUED | OUTPATIENT
Start: 2025-06-27 | End: 2025-06-27 | Stop reason: HOSPADM

## 2025-06-27 RX ORDER — FENTANYL CITRATE 50 UG/ML
50 INJECTION, SOLUTION INTRAMUSCULAR; INTRAVENOUS EVERY 5 MIN PRN
Status: DISCONTINUED | OUTPATIENT
Start: 2025-06-27 | End: 2025-06-27

## 2025-06-27 RX ORDER — FUROSEMIDE 10 MG/ML
20 INJECTION INTRAMUSCULAR; INTRAVENOUS 2 TIMES DAILY
Status: DISPENSED | OUTPATIENT
Start: 2025-06-27 | End: 2025-06-28

## 2025-06-27 RX ORDER — SODIUM CHLORIDE 0.9 % (FLUSH) 0.9 %
5-40 SYRINGE (ML) INJECTION EVERY 12 HOURS SCHEDULED
Status: DISCONTINUED | OUTPATIENT
Start: 2025-06-27 | End: 2025-06-27

## 2025-06-27 RX ORDER — LIDOCAINE HYDROCHLORIDE 10 MG/ML
INJECTION, SOLUTION INFILTRATION; PERINEURAL
Status: DISCONTINUED | OUTPATIENT
Start: 2025-06-27 | End: 2025-06-27 | Stop reason: SDUPTHER

## 2025-06-27 RX ORDER — HYDRALAZINE HYDROCHLORIDE 20 MG/ML
10 INJECTION INTRAMUSCULAR; INTRAVENOUS
Status: DISCONTINUED | OUTPATIENT
Start: 2025-06-27 | End: 2025-06-27

## 2025-06-27 RX ORDER — METOCLOPRAMIDE HYDROCHLORIDE 5 MG/ML
10 INJECTION INTRAMUSCULAR; INTRAVENOUS
Status: DISCONTINUED | OUTPATIENT
Start: 2025-06-27 | End: 2025-06-27

## 2025-06-27 RX ORDER — SODIUM CHLORIDE 9 MG/ML
INJECTION, SOLUTION INTRAVENOUS PRN
Status: DISCONTINUED | OUTPATIENT
Start: 2025-06-27 | End: 2025-06-27

## 2025-06-27 RX ORDER — PROPOFOL 10 MG/ML
INJECTION, EMULSION INTRAVENOUS
Status: DISCONTINUED | OUTPATIENT
Start: 2025-06-27 | End: 2025-06-27 | Stop reason: SDUPTHER

## 2025-06-27 RX ORDER — NALOXONE HYDROCHLORIDE 0.4 MG/ML
INJECTION, SOLUTION INTRAMUSCULAR; INTRAVENOUS; SUBCUTANEOUS PRN
Status: DISCONTINUED | OUTPATIENT
Start: 2025-06-27 | End: 2025-06-27 | Stop reason: HOSPADM

## 2025-06-27 RX ORDER — SODIUM CHLORIDE 0.9 % (FLUSH) 0.9 %
5-40 SYRINGE (ML) INJECTION PRN
Status: DISCONTINUED | OUTPATIENT
Start: 2025-06-27 | End: 2025-06-27

## 2025-06-27 RX ORDER — PROCHLORPERAZINE EDISYLATE 5 MG/ML
5 INJECTION INTRAMUSCULAR; INTRAVENOUS
Status: DISCONTINUED | OUTPATIENT
Start: 2025-06-27 | End: 2025-06-27 | Stop reason: HOSPADM

## 2025-06-27 RX ORDER — HYDRALAZINE HYDROCHLORIDE 20 MG/ML
10 INJECTION INTRAMUSCULAR; INTRAVENOUS
Status: DISCONTINUED | OUTPATIENT
Start: 2025-06-27 | End: 2025-06-27 | Stop reason: HOSPADM

## 2025-06-27 RX ORDER — SODIUM CHLORIDE 9 MG/ML
INJECTION, SOLUTION INTRAVENOUS PRN
Status: DISCONTINUED | OUTPATIENT
Start: 2025-06-27 | End: 2025-06-27 | Stop reason: HOSPADM

## 2025-06-27 RX ORDER — DROPERIDOL 2.5 MG/ML
0.62 INJECTION, SOLUTION INTRAMUSCULAR; INTRAVENOUS
Status: DISCONTINUED | OUTPATIENT
Start: 2025-06-27 | End: 2025-06-27 | Stop reason: HOSPADM

## 2025-06-27 RX ORDER — PROCHLORPERAZINE EDISYLATE 5 MG/ML
5 INJECTION INTRAMUSCULAR; INTRAVENOUS
Status: DISCONTINUED | OUTPATIENT
Start: 2025-06-27 | End: 2025-06-27

## 2025-06-27 RX ADMIN — SODIUM CHLORIDE, SODIUM LACTATE, POTASSIUM CHLORIDE, AND CALCIUM CHLORIDE: .6; .31; .03; .02 INJECTION, SOLUTION INTRAVENOUS at 12:50

## 2025-06-27 RX ADMIN — PROPOFOL 20 MG: 10 INJECTION, EMULSION INTRAVENOUS at 13:56

## 2025-06-27 RX ADMIN — PROPOFOL 20 MG: 10 INJECTION, EMULSION INTRAVENOUS at 14:04

## 2025-06-27 RX ADMIN — SODIUM CHLORIDE, PRESERVATIVE FREE 40 MG: 5 INJECTION INTRAVENOUS at 10:51

## 2025-06-27 RX ADMIN — ONDANSETRON 4 MG: 4 TABLET, ORALLY DISINTEGRATING ORAL at 03:23

## 2025-06-27 RX ADMIN — LIDOCAINE HYDROCHLORIDE 50 MG: 10 INJECTION, SOLUTION EPIDURAL; INFILTRATION; INTRACAUDAL; PERINEURAL at 13:42

## 2025-06-27 RX ADMIN — I.V. FAT EMULSION 100 ML: 20 EMULSION INTRAVENOUS at 19:16

## 2025-06-27 RX ADMIN — PROPOFOL 20 MG: 10 INJECTION, EMULSION INTRAVENOUS at 14:01

## 2025-06-27 RX ADMIN — MIRTAZAPINE 7.5 MG: 15 TABLET, FILM COATED ORAL at 20:54

## 2025-06-27 RX ADMIN — SODIUM CHLORIDE, PRESERVATIVE FREE 40 MG: 5 INJECTION INTRAVENOUS at 20:52

## 2025-06-27 RX ADMIN — HYDROMORPHONE HYDROCHLORIDE 0.5 MG: 1 INJECTION, SOLUTION INTRAMUSCULAR; INTRAVENOUS; SUBCUTANEOUS at 14:35

## 2025-06-27 RX ADMIN — GUAIFENESIN 600 MG: 600 TABLET, MULTILAYER, EXTENDED RELEASE ORAL at 20:52

## 2025-06-27 RX ADMIN — PROPOFOL 40 MG: 10 INJECTION, EMULSION INTRAVENOUS at 13:49

## 2025-06-27 RX ADMIN — PROPOFOL 20 MG: 10 INJECTION, EMULSION INTRAVENOUS at 13:54

## 2025-06-27 RX ADMIN — IPRATROPIUM BROMIDE AND ALBUTEROL SULFATE 1 DOSE: .5; 2.5 SOLUTION RESPIRATORY (INHALATION) at 14:40

## 2025-06-27 RX ADMIN — ASCORBIC ACID, VITAMIN A PALMITATE, CHOLECALCIFEROL, THIAMINE HYDROCHLORIDE, RIBOFLAVIN-5 PHOSPHATE SODIUM, PYRIDOXINE HYDROCHLORIDE, NIACINAMIDE, DEXPANTHENOL, ALPHA-TOCOPHEROL ACETATE, VITAMIN K1, FOLIC ACID, BIOTIN, CYANOCOBALAMIN: 200; 3300; 200; 6; 3.6; 6; 40; 15; 10; 150; 600; 60; 5 INJECTION, SOLUTION INTRAVENOUS at 18:52

## 2025-06-27 RX ADMIN — ACETAMINOPHEN 650 MG: 325 TABLET ORAL at 03:23

## 2025-06-27 RX ADMIN — CEFAZOLIN 2 G: 1 INJECTION, POWDER, FOR SOLUTION INTRAMUSCULAR; INTRAVENOUS at 14:18

## 2025-06-27 RX ADMIN — PROPOFOL 10 MG: 10 INJECTION, EMULSION INTRAVENOUS at 14:07

## 2025-06-27 RX ADMIN — Medication 150 MCG: at 14:14

## 2025-06-27 RX ADMIN — PROPOFOL 20 MG: 10 INJECTION, EMULSION INTRAVENOUS at 14:09

## 2025-06-27 RX ADMIN — SODIUM CHLORIDE, PRESERVATIVE FREE 10 ML: 5 INJECTION INTRAVENOUS at 20:53

## 2025-06-27 RX ADMIN — PROPOFOL 20 MG: 10 INJECTION, EMULSION INTRAVENOUS at 13:58

## 2025-06-27 RX ADMIN — PROPOFOL 20 MG: 10 INJECTION, EMULSION INTRAVENOUS at 13:52

## 2025-06-27 RX ADMIN — OXYCODONE 10 MG: 5 TABLET ORAL at 20:52

## 2025-06-27 RX ADMIN — FUROSEMIDE 20 MG: 10 INJECTION, SOLUTION INTRAMUSCULAR; INTRAVENOUS at 16:04

## 2025-06-27 RX ADMIN — Medication 100 MCG: at 13:52

## 2025-06-27 RX ADMIN — Medication 100 MCG: at 14:01

## 2025-06-27 RX ADMIN — OXYCODONE 5 MG: 5 TABLET ORAL at 16:04

## 2025-06-27 RX ADMIN — Medication 50 MCG: at 14:06

## 2025-06-27 RX ADMIN — PROPOFOL 10 MG: 10 INJECTION, EMULSION INTRAVENOUS at 14:06

## 2025-06-27 RX ADMIN — HEPARIN SODIUM 5000 UNITS: 5000 INJECTION INTRAVENOUS; SUBCUTANEOUS at 20:52

## 2025-06-27 RX ADMIN — HYDROMORPHONE HYDROCHLORIDE 0.5 MG: 1 INJECTION, SOLUTION INTRAMUSCULAR; INTRAVENOUS; SUBCUTANEOUS at 17:26

## 2025-06-27 RX ADMIN — HEPARIN SODIUM 5000 UNITS: 5000 INJECTION INTRAVENOUS; SUBCUTANEOUS at 06:14

## 2025-06-27 ASSESSMENT — PAIN DESCRIPTION - LOCATION
LOCATION: HEAD
LOCATION: ABDOMEN

## 2025-06-27 ASSESSMENT — PAIN SCALES - GENERAL
PAINLEVEL_OUTOF10: 3
PAINLEVEL_OUTOF10: 8
PAINLEVEL_OUTOF10: 8
PAINLEVEL_OUTOF10: 7
PAINLEVEL_OUTOF10: 5

## 2025-06-27 ASSESSMENT — PAIN - FUNCTIONAL ASSESSMENT
PAIN_FUNCTIONAL_ASSESSMENT: NONE - DENIES PAIN
PAIN_FUNCTIONAL_ASSESSMENT: NONE - DENIES PAIN

## 2025-06-27 ASSESSMENT — PAIN DESCRIPTION - DESCRIPTORS
DESCRIPTORS: ACHING
DESCRIPTORS: DISCOMFORT

## 2025-06-27 ASSESSMENT — LIFESTYLE VARIABLES: SMOKING_STATUS: 1

## 2025-06-27 NOTE — PLAN OF CARE
Problem: Respiratory - Adult  Goal: Achieves optimal ventilation and oxygenation  6/26/2025 2152 by Shelly Zayas RN  Outcome: Progressing  Flowsheets (Taken 6/23/2025 0845 by Molly Byrne, RN)  Achieves optimal ventilation and oxygenation:   Assess for changes in respiratory status   Assess for changes in mentation and behavior  6/26/2025 1838 by Breann Kaufman RN  Outcome: Progressing     Problem: Neurosensory - Adult  Goal: Achieves stable or improved neurological status  6/26/2025 2152 by Shelly Zayas RN  Outcome: Progressing  Flowsheets (Taken 6/23/2025 2045 by Nacho Ralph, RN)  Achieves stable or improved neurological status: Assess for and report changes in neurological status  6/26/2025 1838 by Breann Kaufman RN  Outcome: Progressing

## 2025-06-27 NOTE — PROGRESS NOTES
Comprehensive Nutrition Assessment    Type and Reason for Visit:  Reassess    Nutrition Recommendations/Plan:   Modify TPN premix to 60 mL/hr. Continue 100 mL IV Lipids qMWF. Provides ~1107 kcal/d, 72 gm protein.  When able start tube feeding of standard without fiber (Osmolite 1.2) at 15 mL/hr and increase 15 mL/hr q 4 hours to a goal rate of 50 mL/hr to provide 1440 kcal, 67 gm protein, 984 mL free water. With flushes 30 mL q 4 hours = 1164 mL water/day     Malnutrition Assessment:  Malnutrition Status:  Insufficient data (06/19/25 1531)    Context:  Acute Illness       Nutrition Assessment:    Re-assess. TPN and IV lipids continue. Pt previously on easy to chew diet with mildly thick liquids with poor intakes per RN. 0-25% of meals and ONS. NPO today for PEG placement with GI.    Nutrition Related Findings:    Meds/labs reviewed. Glu 108-130. Wound Type: Pressure Injury, Stage II       Current Nutrition Intake & Therapies:    Average Meal Intake: NPO  Average Supplements Intake: NPO  PN-Adult Premix 5/15 - Standard Electrolytes - Central Line  Diet NPO  Current Parenteral Nutrition Orders:  Type and Formula: Premix Central   Lipids: 100ml, Three times weekly  Duration: Continuous  Rate/Volume: 65 mL/hr (1560 mL/d)  Current PN Order Provides: ~1192 kcals per day, 78 g Pro.  Goal PN Orders Provides:    Additional Calorie Sources:  None    Anthropometric Measures:  Height: 144.8 cm (4' 9\")  Ideal Body Weight (IBW): 85 lbs (39 kg)    Admission Body Weight: 49.3 kg (108 lb 11 oz)  Current Body Weight: 51.3 kg (113 lb 1.5 oz), 134.9 % IBW. Weight Source: Bed scale  Current BMI (kg/m2): 24.5  Usual Body Weight: 67.2 kg (148 lb 3 oz) (3/7/24 bed scale per chart review)  % Weight Change (Calculated): -22.6  Weight Adjustment For: No Adjustment  BMI Categories: Normal Weight (BMI 18.5-24.9)    Estimated Daily Nutrient Needs:  Energy Requirements Based On: Kcal/kg  Weight Used for Energy Requirements: Admission  Energy

## 2025-06-27 NOTE — PROGRESS NOTES
Occupational Therapy    [facility]  Occupational Therapy Not Seen Note    DATE: 2025    NAME: Albertina Manning  MRN: [id number]   : 1955      Patient not seen this date for Occupational Therapy due to:    Testing: Pt at ultrasound, going for PEG tube after, will continue to follow as able.      Electronically signed by DORIS Hernandez on 2025 at 11:39 AM

## 2025-06-27 NOTE — PROGRESS NOTES
Due to wet cough and PNA on right upper lobe, after discussion with family and Dr. Gramajo, decision made to take the conservative approach and wait until Monday to give the PNA a couple more days to clear.  Risks of proceeding include bronchospasm, laryngospasm, possible intubation and prolonged ICU stay.    Richi Alford MD  Anesthesiology

## 2025-06-27 NOTE — PLAN OF CARE
Problem: Seizure Precautions  Goal: Remains free of injury related to seizures activity  6/27/2025 1940 by Sunshine Deng RN  Outcome: Progressing  6/27/2025 1940 by Sunshine Deng RN  Outcome: Progressing     Problem: Respiratory - Adult  Goal: Achieves optimal ventilation and oxygenation  6/27/2025 1940 by Sunshine Deng RN  Outcome: Progressing  6/27/2025 1940 by Sunshine Deng RN  Outcome: Progressing     Problem: Neurosensory - Adult  Goal: Achieves stable or improved neurological status  6/27/2025 1940 by Sunshine Deng RN  Outcome: Progressing  6/27/2025 1940 by Sunshine Deng RN  Outcome: Progressing  Goal: Absence of seizures  6/27/2025 1940 by Sunshine Deng RN  Outcome: Progressing  6/27/2025 1940 by Sunshine Deng RN  Outcome: Progressing     Problem: Cardiovascular - Adult  Goal: Maintains optimal cardiac output and hemodynamic stability  6/27/2025 1940 by Sunshine Deng RN  Outcome: Progressing  6/27/2025 1940 by Sunshine Deng RN  Outcome: Progressing     Problem: Skin/Tissue Integrity - Adult  Goal: Skin integrity remains intact  Description: 1.  Monitor for areas of redness and/or skin breakdown  2.  Assess vascular access sites hourly  3.  Every 4-6 hours minimum:  Change oxygen saturation probe site  4.  Every 4-6 hours:  If on nasal continuous positive airway pressure, respiratory therapy assess nares and determine need for appliance change or resting period  6/27/2025 1940 by Sunshine Deng RN  Outcome: Progressing  6/27/2025 1940 by Sunshine Deng RN  Outcome: Progressing  Goal: Incisions, wounds, or drain sites healing without S/S of infection  6/27/2025 1940 by Sunshine Deng RN  Outcome: Progressing  6/27/2025 1940 by Sunshine Deng RN  Outcome: Progressing     Problem: Musculoskeletal - Adult  Goal: Return mobility to safest level of function  6/27/2025 1940 by Sunshine Deng RN  Outcome: Progressing  6/27/2025 1940 by Sunshine Deng RN  Outcome: Progressing     Problem: Infection - Adult  Goal: Absence of infection at  discharge  6/27/2025 1940 by Sunshine Deng RN  Outcome: Progressing  6/27/2025 1940 by Sunshine Deng RN  Outcome: Progressing     Problem: Metabolic/Fluid and Electrolytes - Adult  Goal: Electrolytes maintained within normal limits  6/27/2025 1940 by Sunshine Deng RN  Outcome: Progressing  6/27/2025 1940 by Sunshine Deng RN  Outcome: Progressing  Goal: Hemodynamic stability and optimal renal function maintained  6/27/2025 1940 by Sunshine Deng RN  Outcome: Progressing  6/27/2025 1940 by Sunshine Deng RN  Outcome: Progressing  Goal: Glucose maintained within prescribed range  6/27/2025 1940 by Sunshine Deng RN  Outcome: Progressing  6/27/2025 1940 by Sunshine Deng RN  Outcome: Progressing

## 2025-06-27 NOTE — PROGRESS NOTES
Physical Therapy        Physical Therapy Cancel Note      DATE: 2025    NAME: Albertina Manning  MRN: [id number]   : 1955      Patient not seen this date for Physical Therapy due to:    Testing: Pt is currently out of room for ultrasound will pursue as able.       Electronically signed by Jaswinder Gutierrez PTA on 2025 at 10:25 AM       mobility to toilet. Pt was found to be incontinent of stool upon standing-dependent for cleaning prior to ambulating to bathroom. Dependent for toileting after BM on toilet. Pt was s/u in recliner at end of session with warm blanket & lots of encouragement-daughter in room. Assessment:  Assessment: Pt demo decreased ADL & functional mobility status over PLOF, continued OT recommended to increase indep & safety awareness in discharge environment. Performance deficits / Impairments: Decreased functional mobility , Decreased ADL status, Decreased endurance, Decreased balance, Decreased safe awareness  Prognosis: Fair  Discharge Recommendations: Subacute/Skilled Nursing Facility    Clinical Decision Making: Clinical Decision making was of Moderate Complexity as the result of analysis of data from a detailed assessment, a consideration of several treatment options, the presence of comorbidities affecting the plan of care and the need for minimal to moderate modifications or assistance required to complete the evaluation. Patient Education:  Patient Education: OT role, POC, safety with mobility  Barriers to Learning: decreased cognition    Equipment Recommendations: Other: Continue to assess pending progress    Safety:  Safety Devices in place: Yes  Type of devices:  All fall risk precautions in place, Gait belt, Call light within reach    Plan:  Times per week: 3-5x  Current Treatment Recommendations: Balance Training, Self-Care / ADL, Functional Mobility Training, Endurance Training, Safety Education & Training    Goals:  Patient goals : Pt did not state    Short term goals  Time Frame for Short term goals: 2 weeks  Short term goal 1: Complete various t/fs including toilet with mod A  Short term goal 2: Complete 2-3 min standing ADL with min A for increased ease of sinkside grooming  Short term goal 3: Complete mobility to/from bathroom with RW & mod A  Short term goal 4: Follow simple commands with min encouragement for increased safety with ADL tasks  Long term goals  Time Frame for Long term goals : No LTG set d/t short ELOS    Evaluation Complexity: Based on the findings of patient history, examination, clinical presentation, and decision making during this evaluation, this patient is of medium complexity.     AM-PAC Inpatient Daily Activity Raw Score: 9  AM-PAC Inpatient ADL T-Scale Score : 25.33  ADL Inpatient CMS 0-100% Score: 79.59  ADL Inpatient CMS G-Code Modifier : CL

## 2025-06-27 NOTE — PROGRESS NOTES
[facility]  Office: [phone]  Robert Monreal, DO, Rocky Cobb, DO, Harshal Saldana DO, Wei Block, DO, Dipika Saleem MD, Angelica Connelly MD, Navjot Hutchinson MD, Olga Lidia Woodson MD,  Nacho Huertas MD, Edmundo Mariano MD, Orestes Garnica MD,  Elie Reynolds DO, Curtis Martins MD, Joseph Crowe MD, Kurt Monreal DO, Regla Hill MD,  Romel Duarte DO, Nelsy Hermosillo MD, Evelina Cortez MD, Matthew Naik MD,  Keaton Sanchez MD, Yanet Bentley MD, Russ Doan MD, Mayur Osorio MD, Camilo Lewis MD, Mary Grace Silva MD, Vijay Meza, DO, Cuca Suero MD, Dedrick Walden DO, Hayder Suarez MD, Elie Reaves MD, Mohsin Reza, MD, Rai Ott MD, Shirley Waterhouse, CNP,  Maxine Badillo, CNP, Vijay Qureshi, CNP,  Dena Tolliver, ELIDIA, Shayy Cordero CNP, Geno Carrillo, CNP, Mei Zazueta, CNP, Abiola Santiago, CNP, Dana Cordero, PA-C, Ariana Mike, CNP, Greta Byrne, CNP,  Kristi Miller, CNP, Janet Buitrago, CNP, Yazan Banuelos, PA-C, Shelly Zamarripa, PA-C, Raysa Mcgregor, CNP,        Mayda Robertson, CNS, Jahaira Moon, CNP, Aline Larson, CNP         [facility]   IN-PATIENT SERVICE   [facility]    Progress Note    6/27/2025    11:39 AM    Name:   Albertina Manning  MRN:     [id number]     Acct:      [id number]   Room:   [address] [place] [address] Day:  13  Admit Date:  6/14/2025  7:18 PM    PCP:   Rocky Tobias Sr., DO  Code Status:  Full Code    Subjective:     C/C: AMS   Interval History Status: improved.     Patient seen and examined at bedside this morning. No acute events overnight. Patient resting in bed comfortably. Awaiting PEG tube placement with GI. Will either be today or tomorrow. Pain is well controlled    Brief History:     70-year-old female with past medical history of dementia, CAD, CKD stage IIIb, tobacco use, depression, schizoaffective disorder who initially presented to [place] emergency department from her nursing facility and subsequently  (24Hr):    Intake/Output Summary (Last 24 hours) at 6/27/2025 1139  Last data filed at 6/27/2025 0616  Gross per 24 hour   Intake 1690.5 ml   Output 1050 ml   Net 640.5 ml       Labs:  Hematology:  Recent Labs     06/27/25  0910   WBC 7.6   RBC 2.73*   HGB 10.3*   HCT 31.2*   .3*   MCH 37.7*   MCHC 33.0   RDW 15.0*   PLT See Reflexed IPF Result       Chemistry:  Recent Labs     06/25/25  0753 06/26/25  1020 06/27/25  0910    136 139   K 3.4* 4.2 4.7    107 107   CO2 21 19* 16*   GLUCOSE 118* 108* 93   BUN 24* 26* 27*   CREATININE 0.6 0.6 0.7   MG 1.9  --   --    ANIONGAP 11 10 16   LABGLOM >90 >90 >90   CALCIUM 7.9* 8.3* 8.5*   PHOS  --  2.8  --      Recent Labs     06/26/25  0801 06/26/25  1020 06/26/25  1020 06/26/25  1147 06/26/25  1734 06/26/25  2043 06/27/25  0405 06/27/25  0859 06/27/25  0910   AST  --  29  --   --   --   --   --   --   --    ALT  --  76*  --   --   --   --   --   --   --    ALKPHOS  --  703*  --   --   --   --   --   --   --    BILITOT  --  0.3  --   --   --   --   --   --   --    BILIDIR  --  <0.1  --   --   --   --   --   --   --    CHOL  --   --   --   --   --   --   --   --  178   HDL  --   --   --   --   --   --   --   --  36*   CHOLHDLRATIO  --   --   --   --   --   --   --   --  4.9   TRIG  --  284*   < >  --   --   --   --   --  307*   VLDL  --   --   --   --   --   --   --   --  61*   POCGLU 110*  --   --  112* 111* 121* 130* 95  --     < > = values in this interval not displayed.     ABG:  Lab Results   Component Value Date/Time    FIO2 INFORMATION NOT PROVIDED 06/14/2025 08:10 PM     Lab Results   Component Value Date/Time    SPECIAL R FORARM 6ML AN 06/14/2025 11:58 AM     Lab Results   Component Value Date/Time    CULTURE NO GROWTH 5 DAYS 06/14/2025 11:58 AM       Radiology:  MRI BRAIN W WO CONTRAST  Result Date: 6/17/2025  1. No evidence of acute infarction, acute intracranial hemorrhage, mass effect or midline shift. 2. Moderate to marked diffuse chronic

## 2025-06-27 NOTE — ANESTHESIA PRE PROCEDURE
Department of Anesthesiology  Preprocedure Note       Name:  Albertina Manning   Age:  70 y.o.  :  1955                                          MRN:  [id number]         Date:  2025      Surgeon: Surgeon(s):  Jacob Daniels MD    Procedure: Procedure(s):  ESOPHAGOGASTRODUODENOSCOPY PERCUTANEOUS ENDOSCOPIC GASTROSTOMY TUBE INSERTION    Medications prior to admission:   Prior to Admission medications    Medication Sig Start Date End Date Taking? Authorizing Provider   atorvastatin (LIPITOR) 10 MG tablet Take 1 tablet by mouth daily    Desirae Cooper MD   dexAMETHasone (DECADRON) 2 MG tablet Take 1 tablet by mouth daily (with breakfast)    Desirae Cooper MD   donepezil (ARICEPT) 5 MG tablet Take 1 tablet by mouth nightly    Desirae Cooper MD   potassium chloride 20 MEQ/15ML (10%) oral solution Take 15 mLs by mouth daily    Desirae Cooper MD   amantadine (SYMMETREL) 100 MG capsule Take 1 capsule by mouth 2 times daily    Desirae Cooper MD   ammonium lactate (LAC-HYDRIN) 12 % lotion Apply 1 Bottle topically as needed for Dry Skin (apply to the patinets feet every morning) Apply topically as needed.    Desirae Cooper MD   sulfamethoxazole-trimethoprim (BACTRIM DS;SEPTRA DS) 800-160 MG per tablet Take 1 tablet by mouth 2 times daily 1 tablet 2 times daily for UTI, D/C     Desirae Cooper MD   Diclofenac 35 MG CAPS Take 75 mg by mouth 2 times daily at 0800 and 1400    Desirae Cooper MD   guaiFENesin 200 MG/5ML LIQD Take 15 mLs by mouth in the morning and 15 mLs in the evening. For cough.    Desirae Cooper MD   magnesium oxide (MAG-OX) 400 (240 Mg) MG tablet Take 1 tablet by mouth 2 times daily    Desirae Cooper MD   OLANZapine (ZYPREXA) 2.5 MG tablet Take 1 tablet by mouth 2 times daily For mood    Desirae Cooper MD   sulfacetamide (BLEPH-10) 10 % ophthalmic solution Place 1 drop into both eyes 4 times daily    [facility]

## 2025-06-27 NOTE — OP NOTE
Esophagogastroduodenoscopy    DATE OF PROCEDURE: 6/27/2025     SURGEON: Jacob Daniels MD  Facility: [facility]  ASSISTANT: None  PREOPERATIVE DIAGNOSIS: Dysphagia    Diagnosis:    POSTOPERATIVE DIAGNOSIS: As described below (see findings and impression)    OPERATION: Upper GI endoscopy with PEG placement    ANESTHESIA: Monitored Anesthesia Care (MAC)     ESTIMATED BLOOD LOSS: Less than 50 ml    COMPLICATIONS: None.     SPECIMENS:  Was Not Obtained    * No specimens in log *     HISTORY: The patient is a 70 y.o. year old female with history of above preop diagnosis.  I recommended esophagogastroduodenoscopy with possible biopsy and I explained the risk, benefits, expected outcome, and alternatives to the procedure.  Risks included but are not limited to bleeding, infection, respiratory distress, hypotension, and perforation of the esophagus, stomach, or duodenum.  Patient understands and is in agreement.      PROCEDURE: The patient was given IV Monitored Anesthesia Care (MAC) and vitals monitoring per anesthesia department.  The patient was placed in the left lateral decubitus position. The patient was monitored continuously with ECG tracing, pulse oximetry, blood pressure monitoring, and direct observation. The gastroscope was inserted into the mouth and advanced under direct vision to second portion of the duodenum.  A careful inspection was made as the gastroscope was withdrawn, including a retroflexed view of the proximal stomach; findings and interventions are described below. Appropriate photodocumentation was obtained. Post sedation patient was stable with stable vital signs and stable O2 saturations.       Findings:    Esophagus:   Normal upper and middle esophagus  10 cm hiatal hernia from 30 to 40 cm    Stomach:    Fundus: Revisualization of hiatal hernia    Body: J-shaped without volvulus    Antrum: normal    Stomach appropriately distensible with gas on direct and retroflexion view    Duodenum:      Descending: normal    Bulb: normal    Peg was placed: The stomach was transilluminated and an optimal position for the PEG tube was identified using the single poke method. The skin was infiltrated with local and the needle and sheath were inserted through the abdomen into the stomach under direct visualization. The needle was removed and a guidewire was inserted through the sheath. The guidewire was grasped from above with a snare. It was removed completely and the 20 Fr PEG tube was secured to the guidewire.     The guidewire and PEG tube were then pulled through the mouth and esophagus and snug to the abdominal wall. There was no evidence of bleeding or other complications. Photos were taken. The Bolster was placed on the PEG site. The patient tolerated the procedure well and was transferred to recovery room in stable condition. Systemic antibiotics already administered 2 g Ancef. Bumper was placed in the 2 cm position.  Abdominal binder used      The scope was removed and the patient tolerated the procedure well.     Impression-   10 cm hiatal hernia  J-shaped stomach  Successful PEG placement    Recommendations/Plan:   Return to hospital [facility] for ongoing care  May resume blood thinners from GI standpoint.  Okay to use PEG tube immediately for free water flushes and medications.    GI will see her tomorrow for bumper adjustment.  Tube feeding can be started after bumper adjustment. Consult nutrition for Tube feeding recommendations and management.    Electronically signed by Jacob Daniels MD  on 6/27/2025 at 2:17 PM   This note is created with the assistance of a speech recognition program.  While intending to generate a document that actually reflects the content of the procedure, the document can still have some errors including those of syntax and sound a like substitutions which may escape proofreading.  It such instances, actual meaning can be extrapolated by contextual diversion.

## 2025-06-27 NOTE — CARE COORDINATION
Case Management   Daily Progress Note       Patient Name: Albertina Manning                   YOB: 1955  Diagnosis: Pneumonia [J18.9]  Pneumonia due to organism [J18.9]                       GMLOS: 4.6 days  Length of Stay: 13  days    Anticipated Discharge Date: Discharge pending    Readmission Risk (Low < 19, Mod (19-27), High > 27): Readmission Risk Score: 18.4        Current Transitional Plan    [] Home Independently    [] Home with HC    [x] Skilled Nursing Facility    [] Acute Rehabilitation    [] Long Term Acute Care (LTAC)    [] Other:     Plan for the Stay (Medical Management) :    PEG today      Workflow Continuation (Additional Notes) :    [facility] can accept, requested they start authZo BRANDON  June 27, 2025

## 2025-06-27 NOTE — ANESTHESIA PRE PROCEDURE
[Transfer Hold] dextrose bolus 10% 125 mL  125 mL IntraVENous PRN Yazan Banuelos PA-C   Stopped at 06/19/25 0511    Or   • [Transfer Hold] dextrose bolus 10% 250 mL  250 mL IntraVENous PRN aYzan Banuelos PA-C   Stopped at 06/19/25 0952   • [Transfer Hold] glucagon injection 1 mg  1 mg SubCUTAneous PRN Yazan Banuelos PA-C   1 mg at 06/19/25 0944   • [Transfer Hold] dextrose 10 % infusion   IntraVENous Continuous PRN Yazan Banueols PA-C   Stopped at 06/15/25 0514   • [Held by provider] donepezil (ARICEPT) tablet 5 mg  5 mg Oral Nightly Yazan Banuelos PA-C       • [Transfer Hold] sodium chloride flush 0.9 % injection 5-40 mL  5-40 mL IntraVENous 2 times per day Yazan Banuelos PA-C   10 mL at 06/26/25 2035   • [Transfer Hold] sodium chloride flush 0.9 % injection 10 mL  10 mL IntraVENous PRN Yazan Banuelos PA-C       • [Transfer Hold] 0.9 % sodium chloride infusion   IntraVENous PRN Yazan Banuelos PA-C       • [Transfer Hold] acetaminophen (TYLENOL) tablet 650 mg  650 mg Oral Q6H PRN Yazan Banuelos PA-C   650 mg at 06/27/25 0323    Or   • [Transfer Hold] acetaminophen (TYLENOL) suppository 650 mg  650 mg Rectal Q6H PRN Yazan Banuelos PA-C   650 mg at 06/16/25 0013   • [Transfer Hold] albuterol (PROVENTIL) (2.5 MG/3ML) 0.083% nebulizer solution 2.5 mg  2.5 mg Nebulization Q2H PRN Yazan Banuelos PA-C       • [Transfer Hold] guaiFENesin (MUCINEX) extended release tablet 600 mg  600 mg Oral BID Yazan Banuelos PA-C   600 mg at 06/26/25 2034   • [Transfer Hold] benzonatate (TESSALON) capsule 100 mg  100 mg Oral TID PRN Yazan Banuelos PA-C       • [Transfer Hold] sennosides-docusate sodium (SENOKOT-S) 8.6-50 MG tablet 2 tablet  2 tablet Oral Daily PRN Yazan Banuelos PA-C       • [Transfer Hold] heparin (porcine) injection 5,000 Units  5,000 Units SubCUTAneous 3 times per day Vijay Meza DO   5,000 Units at 06/27/25 0614   • [Transfer Hold] aspirin EC tablet 81 mg  81 mg Oral Daily Vijay Meza DO   81 mg at

## 2025-06-28 ENCOUNTER — APPOINTMENT (OUTPATIENT)
Dept: GENERAL RADIOLOGY | Age: 70
End: 2025-06-28
Attending: STUDENT IN AN ORGANIZED HEALTH CARE EDUCATION/TRAINING PROGRAM
Payer: COMMERCIAL

## 2025-06-28 LAB
GLUCOSE BLD-MCNC: 103 MG/DL (ref 65–105)
GLUCOSE BLD-MCNC: 105 MG/DL (ref 65–105)
GLUCOSE BLD-MCNC: 115 MG/DL (ref 65–105)
GLUCOSE BLD-MCNC: 118 MG/DL (ref 65–105)
GLUCOSE BLD-MCNC: 124 MG/DL (ref 65–105)
GLUCOSE BLD-MCNC: 134 MG/DL (ref 65–105)

## 2025-06-28 PROCEDURE — 97535 SELF CARE MNGMENT TRAINING: CPT

## 2025-06-28 PROCEDURE — 6370000000 HC RX 637 (ALT 250 FOR IP): Performed by: STUDENT IN AN ORGANIZED HEALTH CARE EDUCATION/TRAINING PROGRAM

## 2025-06-28 PROCEDURE — 6360000002 HC RX W HCPCS: Performed by: STUDENT IN AN ORGANIZED HEALTH CARE EDUCATION/TRAINING PROGRAM

## 2025-06-28 PROCEDURE — 92611 MOTION FLUOROSCOPY/SWALLOW: CPT

## 2025-06-28 PROCEDURE — 99231 SBSQ HOSP IP/OBS SF/LOW 25: CPT | Performed by: INTERNAL MEDICINE

## 2025-06-28 PROCEDURE — 2500000003 HC RX 250 WO HCPCS: Performed by: STUDENT IN AN ORGANIZED HEALTH CARE EDUCATION/TRAINING PROGRAM

## 2025-06-28 PROCEDURE — 2060000000 HC ICU INTERMEDIATE R&B

## 2025-06-28 PROCEDURE — 74230 X-RAY XM SWLNG FUNCJ C+: CPT

## 2025-06-28 PROCEDURE — 99232 SBSQ HOSP IP/OBS MODERATE 35: CPT | Performed by: STUDENT IN AN ORGANIZED HEALTH CARE EDUCATION/TRAINING PROGRAM

## 2025-06-28 RX ADMIN — ATORVASTATIN CALCIUM 10 MG: 10 TABLET, FILM COATED ORAL at 10:20

## 2025-06-28 RX ADMIN — HEPARIN SODIUM 5000 UNITS: 5000 INJECTION INTRAVENOUS; SUBCUTANEOUS at 06:13

## 2025-06-28 RX ADMIN — DULOXETINE 20 MG: 20 CAPSULE, DELAYED RELEASE ORAL at 10:19

## 2025-06-28 RX ADMIN — SODIUM CHLORIDE, PRESERVATIVE FREE 10 ML: 5 INJECTION INTRAVENOUS at 10:20

## 2025-06-28 RX ADMIN — SODIUM CHLORIDE, PRESERVATIVE FREE 40 MG: 5 INJECTION INTRAVENOUS at 21:57

## 2025-06-28 RX ADMIN — HEPARIN SODIUM 5000 UNITS: 5000 INJECTION INTRAVENOUS; SUBCUTANEOUS at 21:57

## 2025-06-28 RX ADMIN — GUAIFENESIN 600 MG: 600 TABLET, MULTILAYER, EXTENDED RELEASE ORAL at 21:57

## 2025-06-28 RX ADMIN — GUAIFENESIN 600 MG: 600 TABLET, MULTILAYER, EXTENDED RELEASE ORAL at 10:19

## 2025-06-28 RX ADMIN — ASPIRIN 81 MG: 81 TABLET, COATED ORAL at 10:20

## 2025-06-28 RX ADMIN — SODIUM CHLORIDE, PRESERVATIVE FREE 40 MG: 5 INJECTION INTRAVENOUS at 10:20

## 2025-06-28 RX ADMIN — LEUCINE, PHENYLALANINE, LYSINE, METHIONINE, ISOLEUCINE, VALINE, HISTIDINE, THREONINE, TRYPTOPHAN, ALANINE, GLYCINE, ARGININE, PROLINE, SERINE, TYROSINE, SODIUM ACETATE, DIBASIC POTASSIUM PHOSPHATE, MAGNESIUM CHLORIDE, SODIUM CHLORIDE, CALCIUM CHLORIDE, DEXTROSE
1035; 575; 33; 15; 515; 240; 300; 365; 290; 51; 200; 280; 261; 340; 250; 340; 59; 210; 90; 20; 290 INJECTION INTRAVENOUS at 17:47

## 2025-06-28 RX ADMIN — MIRTAZAPINE 7.5 MG: 15 TABLET, FILM COATED ORAL at 21:57

## 2025-06-28 RX ADMIN — HEPARIN SODIUM 5000 UNITS: 5000 INJECTION INTRAVENOUS; SUBCUTANEOUS at 16:11

## 2025-06-28 RX ADMIN — OXYCODONE 5 MG: 5 TABLET ORAL at 18:07

## 2025-06-28 RX ADMIN — OXYCODONE 10 MG: 5 TABLET ORAL at 03:26

## 2025-06-28 RX ADMIN — OXYCODONE 10 MG: 5 TABLET ORAL at 10:19

## 2025-06-28 ASSESSMENT — PAIN SCALES - GENERAL
PAINLEVEL_OUTOF10: 9
PAINLEVEL_OUTOF10: 6
PAINLEVEL_OUTOF10: 8
PAINLEVEL_OUTOF10: 6
PAINLEVEL_OUTOF10: 8

## 2025-06-28 ASSESSMENT — PAIN DESCRIPTION - LOCATION
LOCATION: ABDOMEN

## 2025-06-28 ASSESSMENT — PAIN DESCRIPTION - ORIENTATION
ORIENTATION: MID
ORIENTATION: MID

## 2025-06-28 ASSESSMENT — PAIN DESCRIPTION - DESCRIPTORS
DESCRIPTORS: DISCOMFORT
DESCRIPTORS: SHARP
DESCRIPTORS: SHARP;SORE

## 2025-06-28 ASSESSMENT — PAIN - FUNCTIONAL ASSESSMENT
PAIN_FUNCTIONAL_ASSESSMENT: PREVENTS OR INTERFERES SOME ACTIVE ACTIVITIES AND ADLS
PAIN_FUNCTIONAL_ASSESSMENT: PREVENTS OR INTERFERES SOME ACTIVE ACTIVITIES AND ADLS

## 2025-06-28 NOTE — PROGRESS NOTES
Patient:   SHAYE PAYNE            MRN: [id number]            FIN: [id number]               Age:   48 years     Sex:  MALE     :  69   Associated Diagnoses:   None   Author:   JENNA, TAYLER        DISCHARGE SUMMARY      CHIEF COMPLAINT:  Alcohol withdrawal. Seizure.    HISTORY OF PRESENT ILLNESS:  This is a 48-year-old male presented to [facility] ER for evaluation due to episode of seizure witnessed by patient's sister while at home. Patient aditted not drinking for 3 days after drinking ~ \"fifth\" of Rom 3-4 times per week for many years. No N/V. No fever. Initial care provided by ER MD and patient was admitted for further treatment.     PAST MEDICAL HISTORY:  Alcoholism.    PAST SURGICAL HISTORY:  Thyroid nodule resection.    ALLERGIES:  NKDA.    MEDICATIONS:  None upon discharge.    FAMILY HISTORY:  Father - DM, lung CA (smoker). Mother - DM.    SOCIAL HISTORY:  The patient lives at home. Drinks alcohol excessively. Denies tobacco or illegal substance use.    REVIEW OF SYSTEMS:   GENERAL: No weight changes. No fevers or chills.   EYES: No pain or injuries.    ENT: No head injuries, epistaxis, sinus problems.    ENDOCRINE: No polyuria, polydipsia, heat or cold intolerance, excessive sweating.    HEM-LYMPH: No swollen masses or nodes, excessive bruising or bleeding.    CV: No chest pain or palpitations.    RESPIRATORY: No shortness of breath, no dyspnea on exertion, no chronic cough, no hemoptysis.  GI: Good appetite and oral intake of food and fluids. + abdominal pain as above, no constipation. No nausea, vomiting, no melena, no hematemesis.  : No dysuria, hematuria, or flank pains.    SKIN: No rashes or itching.    M-SK: No muscle or joint pains.    NEURO: No weakness, + seizure as above, no syncope.    PSYCH: No declining memory. No depression. No agitation episodes.    PHYSICAL EXAMINATION:  Vitals between:   23-AUG-2017 18:31:02   TO   24-AUG-2017 18:31:02                   LAST  Physical Therapy        Physical Therapy Cancel Note      DATE: 2025    NAME: Albertina Manning  MRN: [id number]   : 1955      Patient not seen this date for Physical Therapy due to:    Patient Declined: Prior to arrival, RN stated pt got nauseous during OT. Upon arrival to pt's room, pt stating she doesn't feel well that she is still feeling nauseous.     Plan to check back as able.      Electronically signed by FREDERIC CANNON PTA on 2025 at 2:56 PM      RESULT MINIMUM MAXIMUM  Temperature 36.9 36.9 37.4  Heart Rate 96 92 102  Respiratory Rate 16 12 17  NISBP           150 102 162  NIDBP           97 72 101  SpO2                    96 96 99  GENERAL: NAD. No acute respiratory distress.  EYES: PERRLA. No discharges. Sclerae anicteric. EOMI.  ENT: Atraumatic. Mucosa clear, dry. No epistaxis.  NECK: Supple. No meningeal signs. Trachea midline.  ENDOCRINE: No gross goiter, exophthalmos, excessive sweating.  HEM-LYMPH: No submandibular/supraclavicular adenopathy bilaterally. No lymphangitis, excessive bruising.  CARDIOVASCULAR: RRR, S1,S2. No JVD.  LUNGS: CTA-B.  ABDOMEN: Soft, nondistended, nontender, + bowel sounds. No guarding or rebound.  GENITOURINARY: No suprapubic or flank tenderness bilaterally.  EXTREMITIES: No edema. No calf tenderness. Negative Homans sign bilaterally.  SKIN: Visible skin areas revealed no rashes, hemorrhages, petechiae, ecchymosis, or jaundice.  MUSCULOSKELETAL: No atrophy, contractures, or effusion in the joints.  NEUROLOGIC: Grossly no focal signs. No evidence of seizures at time of examination. Reflexes, gait, cerebellar function not tested at the time of examination.  PSYCHIATRIC: Awake. A&O x 3. Cooperative. Not agitated.    LABORATORY AND DIAGNOSTIC TESTS:  Labs between:  23-AUG-2017 18:31 to 24-AUG-2017 18:31    CBC:                 WBC  HgB  Hct  Plt  MCV  RDW   24-AUG-2017 4.9  14.5  42.6  (L) 54  98.4  14.8     DIFF:                 Seg  Neutroph//ABS  Lymph//ABS  Mono//ABS  EOS/ABS   24-AUG-2017 NOT APPLICABLE  70 // 3.4 20 // 1.0 6 // 0.3 4 // 0.2    BMP:                 Na  Cl  BUN  Glu   24-AUG-2017 144  107  9  95                              K  CO2  Cr  Ca                              3.9  26  0.74  (L) 8.3     CMP:                 AST  ALT  AlkPhos  Bili  Albumin   24-AUG-2017 (H) 57  51  46  (H) 1.4  (L) 3.5          CT HEAD OR BRAIN WO CON  IMPRESSION: No acute intracranial abnormalities.            ASSESSMENT/HOSPITAL  COURSE/PLAN:  - Alcoholism with current withdrawal(stable) - proceeded with further inpatient care. Proceeded with alcohol withdrawal protocol per CIWA scale with Ativan PO/IV.   - Mild dehydration(improving) - proceeded with IVF for hydration until after PO intake is re-established and appropriate. Monitored lytes/minerals prn and replaced/corrected prn.  - Alcohol withdrawal seizure - monitored. No Tx changes needed at this time.  - Thrombocytopenia - monitored with prn labs. No Tx changes at this time.  - Elevated LFTs, likley due to alcoholism - monitored with prn labs. Attempted to avoid hepatotoxic Rx use.    Other/General:  - GI prophylaxis: none.  - DVT prophylaxis: SCDs.      - Code status: FULL.    Discussed plan of care with RN and the patient. discharged home in stable condition on 08/24/17.        Tayler Ferrer MD            Electronically Signed On 08/24/2017 18:34  __________________________________________________   TAYLER WEST

## 2025-06-28 NOTE — PROGRESS NOTES
Occupational Therapy  Occupational Therapy Daily Treatment Note  Facility/Department: [address] ONC/MED SURG   Patient Name: Albertina Manning        MRN: [id number]    : 1955    Date of Service: 2025    No chief complaint on file.    Past Medical History:  has a past medical history of Alcohol dependence in remission (HCC), Anxiety, Arthritis, CAD (coronary artery disease), Cancer (HCC), Chronic bronchitis (HCC), Cognitive communication deficit, Diverticulosis, Esophageal varices without bleeding (HCC), Insomnia, Major depressive disorder, Osteoporosis, Schizoaffective disorder (HCC), and Tremor.  Past Surgical History:  has a past surgical history that includes Tubal ligation; back surgery; US BREAST BIOPSY W LOC DEVICE 1ST LESION RIGHT (Right, 2023); US PLACE BREAST LOC DEVICE 1ST LESION RIGHT (Right, 2023); Breast biopsy (Right, 2023); Axillary Surgery (Right, 2023); and Gastrostomy tube placement (2025).    Discharge Recommendations  Discharge Recommendations: Patient would benefit from continued therapy after discharge       Assessment  Performance deficits / Impairments: Decreased functional mobility ;Decreased ADL status;Decreased strength;Decreased fine motor control;Decreased balance;Decreased coordination;Decreased endurance;Decreased cognition;Decreased safe awareness;Decreased high-level IADLs  Assessment: Pt limited by above deficits impacting ADL completion, ADL transfers and safe functional mobility to maximize pts potential and quality of life, continue with skilled OT during acute hospital stay and post discharge to decrease caregiver burden and enable pt to return home at prior level of function.  Prognosis: Fair  Decision Making: High Complexity  REQUIRES OT FOLLOW-UP: Yes  Activity Tolerance  Activity Tolerance: Patient Tolerated treatment well;Patient limited by fatigue  Safety Devices  Type of Devices: Call light within reach;All fall risk precautions in

## 2025-06-28 NOTE — PROGRESS NOTES
Comprehensive Nutrition Assessment    Type and Reason for Visit:  Reassess    Nutrition Recommendations/Plan:   Start Standard w/o Fiber (Osmolite 1.2) at 15 mL/hr and increase 15 mL/hr q 4 hours to a goal rate of 50 mL/hr to provide 1440 kcal, 67 gm protein, 984 mL free water. With flushes 30 mL q 4 hours = 1164 mL water/day.  Continue TPN at 60 mL/h (100 mL IV lipids MWF) will provide 1022 kcal/d and 72 g protein/d. If pt tolerates TF recommend start to wean TPN.   Continue current diet per SLP recommendation.   Continue frozen oral nutrition supplement (Magic Cup) BID.  Will monitor labs, weights, intake, TF, TPN, GI status, and plan of care.     Malnutrition Assessment:  Malnutrition Status:  Insufficient data (06/19/25 1531)    Context:  Acute Illness     Findings of the 6 clinical characteristics of malnutrition:  Energy Intake:  50% or less of estimated energy requirements for 5 or more days (x past few weeks)  Weight Loss:  Mild weight loss (22% x past 15-16 months)     Body Fat Loss:  Unable to assess     Muscle Mass Loss:  Unable to assess    Fluid Accumulation:  No fluid accumulation     Strength:  Not Performed    Nutrition Assessment:    TPN/IL to continue. PEG placed yesterday GI review of PEG site today, okayed to start TF - TF order already placed, not yet started at writing of this note. Pt had MBSS today diet downgraded to pureed w/ mildly thick liquids - 0% intakes recorded. Recommend continue current TPN due to lipids only running MWF - therefore no lipids today. If pt tolerates TF recommend start to wean TPN tomorrow. RD will continue to monitor and adjust per protocol.    Nutrition Related Findings:    Trace/non-pitting edema BLE. LBM 6/24 - hypoactive bowel sounds. Labs/meds reviewed. Wound Type: Pressure Injury, Stage II       Current Nutrition Intake & Therapies:    Average Meal Intake: 0%  Average Supplements Intake: Unable to assess  PN-Adult Premix 5/15 - Standard Electrolytes -

## 2025-06-28 NOTE — PROGRESS NOTES
[facility]   Gastroenterology Progress Note    Albertina Manning is a 70 y.o. female patient.  Hospitalization Day:14      Chief consult reason:   PEG placement    Subjective:  Patient seen and examined.  Patient had endoscopy yesterday with PEG tube placement.  PEG site clean dry and intact, no signs of bleeding drainage odor.   Bumper snug-adjusted slightly-now moves freely.  Binder on in place for diversion      VITALS:  BP (!) 148/77   Pulse 97   Temp 98 °F (36.7 °C) (Axillary)   Resp 12   Ht 1.448 m (4' 9\")   Wt 51.3 kg (113 lb)   SpO2 95%   BMI 24.45 kg/m²   TEMPERATURE:  Current - Temp: 98 °F (36.7 °C); Max - Temp  Av.1 °F (36.7 °C)  Min: 97.5 °F (36.4 °C)  Max: 99.5 °F (37.5 °C)    Physical Assessment:  General appearance:  alert, cooperative and no distress  Mental Status: LIT  Lungs:  clear to auscultation bilaterally, normal effort  Heart:  regular rate and rhythm, no murmur  Abdomen:  soft, nontender, nondistended, normal bowel sounds, no masses, hepatomegaly, splenomegaly  Extremities:  no edema, redness, tenderness in the calves  Skin:  no gross lesions, rashes, induration    Data Review:    Labs and Imaging:       CBC:  Recent Labs     25  0910   WBC 7.6   HGB 10.3*   .3*   RDW 15.0*   PLT See Reflexed IPF Result       ANEMIA STUDIES:  Recent Labs     25  0910   TIBC 258       BMP:  Recent Labs     25  1020 25  0910    139   K 4.2 4.7    107   CO2 19* 16*   BUN 26* 27*   CREATININE 0.6 0.7   GLUCOSE 108* 93   CALCIUM 8.3* 8.5*       LFTS:  Recent Labs     25  1020   ALKPHOS 703*   ALT 76*   AST 29   BILITOT 0.3   BILIDIR <0.1       Other pertinent labs:      Gastroenterology impression and plan:    Malnutrition  Poor oral intake  S/p PEG tube placement 2025-bumper adjusted, now moves freely, no signs of infection, no bleeding, no drainage, no odor.  Binder on in place for diversion      Plan:    Routine PEG care  Flush  tube with 30 mL of water every 8 hours, before and after each medication administration etc.  Aspiration precautions  Okay to start tube feeds  Okay to use tube for meds,free water flushes etc.  Continue with binder  Monitor for signs of infection and active bleeding  No further recommendations at this time GI will sign off      This plan was formulated in collaboration with Dr. Soila MD    Thank you for allowing me to participate in the care of your patient.  Please feel free to contact me with any questions or concerns.     [facility] Gastroenterology   [facility] Southisabelle Buck, APRN - CNP   [phone]  6/28/2025  1:09 PM    Estimated time of  mins reviewing chart, assessing patient and formulating plan of care    This note was created with the assistance of a speech-recognition program.  Although the intention is to generate a document that actually reflects the content of the visit, no guarantees can be provided that every mistake has been identified and corrected by editing.     Attested:    I have discussed the care of Albertina Manning and I have examined the patient myselft and taken ros and hpi , including pertinent history and exam findings,  with the author of this note . I have reviewed the key elements of all parts of the encounter with the nurse practitioner/resident.  I agree with the assessment, plan and orders as documented by the above health care provider with the addendum made as necessary    More than 50% of the time was spent taking care of this patient in addition to the nurse practitioner time.  That also included history taking follow-up physical examination and review of system.    Electronically signed by Ariela Cm MD

## 2025-06-28 NOTE — PROCEDURES
INSTRUMENTAL SWALLOW REPORT  MODIFIED BARIUM SWALLOW    NAME: Albertina Manning   : 1955  MRN: [id number]       Date of Eval: 2025        Radiologist: Adolfo     Referring Diagnosis(es):      Past Medical History:  has a past medical history of Alcohol dependence in remission (HCC), Anxiety, Arthritis, CAD (coronary artery disease), Cancer (HCC), Chronic bronchitis (HCC), Cognitive communication deficit, Diverticulosis, Esophageal varices without bleeding (HCC), Insomnia, Major depressive disorder, Osteoporosis, Schizoaffective disorder (HCC), and Tremor.  Past Surgical History:  has a past surgical history that includes Tubal ligation; back surgery; US BREAST BIOPSY W LOC DEVICE 1ST LESION RIGHT (Right, 2023); US PLACE BREAST LOC DEVICE 1ST LESION RIGHT (Right, 2023); Breast biopsy (Right, 2023); Axillary Surgery (Right, 2023); and Gastrostomy tube placement (2025).          Type of Study: Initial MBS       Recent CXR/CT of Chest: Date   More confluent appearance of right upper lobe opacities and increasing left  basilar opacities.    Patient Complaints/Reason for Referral:  Albertina Manning was referred for a MBS to assess the efficiency of his/her swallow function, assess for aspiration, and to make recommendations regarding safe dietary consistencies, effective compensatory strategies, and safe eating environment.       Onset of problem:    Albertina Manning is a 70 y.o. Non- / non  female with past medical history of dementia, atherosclerosis of native coronary artery of native heart, CKD stage IIIb, tobacco use, depression, chronic obstructive bronchitis, schizoaffective disorder, tremor, and secondary malignant neoplasm of bone who presents with No chief complaint on file.   and is admitted to the hospital for the management of Pneumonia due to organism.     70-year-old female patient presented to [facility] emergency department originally earlier today and

## 2025-06-28 NOTE — PROGRESS NOTES
[facility]  Office: [phone]  Robert Monreal, DO, Rocky Cobb, DO, Harshal Saldana DO, Wei Block, DO, Dipika Saleem MD, Angelica Connelly MD, Navjot Hutchinson MD, Olga Lidia Woodson MD,  Nacho Huertas MD, Edmundo Mariano MD, Orestes Garnica MD,  Elie Reynolds DO, Curtis Martins MD, Joseph Crowe MD, Kurt Monreal DO, Regla Hill MD,  Romel Duarte DO, Nelsy Hermosillo MD, Evelina Cortez MD, Matthew Naik MD,  Keaton Sanchez MD, Yanet Bentely MD, Russ Doan MD, Mayur Osorio MD, Camilo Lewis MD, Mary Grace Silva MD, Vijay Meza, DO, Cuca Suero MD, Dedrick Walden DO, Hayder Suarez MD, Elie Reaves MD, Mohsin Reza, MD, Rai Ott MD, Shirley Waterhouse, CNP,  Maxine Badillo, CNP, Vijay Qureshi, CNP,  Dena Tolliver, ELIDIA, Shayy Cordero CNP, Geno Carrillo, CNP, Mei Zazueta, CNP, Abiola Santiago, CNP, Dana Cordero, PA-C, Ariana Mike, CNP, Greta Byrne, CNP,  Kristi Miller, CNP, Janet Buitrago, CNP, Yazan Banuelos, PA-C, Shelly Zamarripa, PA-C, Raysa Mcgregor, CNP,        Mayda Robertson, CNS, Jahaira Moon, CNP, Aline Larson, CNP         [facility]   IN-PATIENT SERVICE   [facility]    Progress Note    6/28/2025    8:11 AM    Name:   Albertina Manning  MRN:     [id number]     Acct:      [id number]   Room:   [address] [place] [address] Day:  14  Admit Date:  6/14/2025  7:18 PM    PCP:   Rocky Tobias Sr., DO  Code Status:  Full Code    Subjective:     C/C: AMS   Interval History Status: improved.     Patient seen and examined at bedside this morning. No acute events overnight. Patient has abdominal pain from PEG tube site. Medications adjusted. Denies any CP, SOB, HA, fever or chills     Brief History:     70-year-old female with past medical history of dementia, CAD, CKD stage IIIb, tobacco use, depression, schizoaffective disorder who initially presented to [place] emergency department from her nursing facility and subsequently transferred to [facility]  furosemide  20 mg IntraVENous BID    mirtazapine  7.5 mg Oral Nightly    DULoxetine  20 mg Oral Daily    nicotine  1 patch TransDERmal Daily    atorvastatin  10 mg Oral Daily    [Held by provider] donepezil  5 mg Oral Nightly    sodium chloride flush  5-40 mL IntraVENous 2 times per day    guaiFENesin  600 mg Oral BID    heparin (porcine)  5,000 Units SubCUTAneous 3 times per day    aspirin  81 mg Oral Daily     Continuous Infusions:    PN-Adult Premix 5/15 - Standard Electrolytes - Central Line 60 mL/hr at 25 1852    dextrose Stopped (06/15/25 0514)    sodium chloride       PRN Meds: oxyCODONE **OR** oxyCODONE, ondansetron, dextrose, sodium phosphate 15 mmol in sodium chloride 0.9 % 250 mL IVPB, magnesium sulfate, sodium chloride flush, LORazepam, glucose, dextrose bolus **OR** dextrose bolus, glucagon (rDNA), dextrose, sodium chloride flush, sodium chloride, acetaminophen **OR** acetaminophen, albuterol, benzonatate, sennosides-docusate sodium    Data:     Past Medical History:   has a past medical history of Alcohol dependence in remission (HCC), Anxiety, Arthritis, CAD (coronary artery disease), Cancer (HCC), Chronic bronchitis (HCC), Cognitive communication deficit, Diverticulosis, Esophageal varices without bleeding (HCC), Insomnia, Major depressive disorder, Osteoporosis, Schizoaffective disorder (HCC), and Tremor.    Social History:   reports that she has been smoking cigarettes. She has never used smokeless tobacco. She reports that she does not currently use alcohol. She reports that she does not use drugs.     Family History: History reviewed. No pertinent family history.    Vitals:  /61   Pulse 88   Temp 98.8 °F (37.1 °C) (Axillary)   Resp 11   Ht 1.448 m (4' 9\")   Wt 51.3 kg (113 lb)   SpO2 95%   BMI 24.45 kg/m²   Temp (24hrs), Av.1 °F (36.7 °C), Min:97.5 °F (36.4 °C), Max:99.5 °F (37.5 °C)    Recent Labs     25  1547 25  2351 25  0414   POCGLU

## 2025-06-29 LAB
GLUCOSE BLD-MCNC: 122 MG/DL (ref 65–105)
GLUCOSE BLD-MCNC: 129 MG/DL (ref 65–105)
GLUCOSE BLD-MCNC: 142 MG/DL (ref 65–105)
GLUCOSE BLD-MCNC: 143 MG/DL (ref 65–105)
GLUCOSE BLD-MCNC: 97 MG/DL (ref 65–105)

## 2025-06-29 PROCEDURE — 2060000000 HC ICU INTERMEDIATE R&B

## 2025-06-29 PROCEDURE — 97110 THERAPEUTIC EXERCISES: CPT

## 2025-06-29 PROCEDURE — 6370000000 HC RX 637 (ALT 250 FOR IP): Performed by: STUDENT IN AN ORGANIZED HEALTH CARE EDUCATION/TRAINING PROGRAM

## 2025-06-29 PROCEDURE — 6360000002 HC RX W HCPCS: Performed by: STUDENT IN AN ORGANIZED HEALTH CARE EDUCATION/TRAINING PROGRAM

## 2025-06-29 PROCEDURE — 99232 SBSQ HOSP IP/OBS MODERATE 35: CPT | Performed by: STUDENT IN AN ORGANIZED HEALTH CARE EDUCATION/TRAINING PROGRAM

## 2025-06-29 PROCEDURE — 2500000003 HC RX 250 WO HCPCS: Performed by: STUDENT IN AN ORGANIZED HEALTH CARE EDUCATION/TRAINING PROGRAM

## 2025-06-29 PROCEDURE — 82947 ASSAY GLUCOSE BLOOD QUANT: CPT

## 2025-06-29 PROCEDURE — 97530 THERAPEUTIC ACTIVITIES: CPT

## 2025-06-29 RX ADMIN — HEPARIN SODIUM 5000 UNITS: 5000 INJECTION INTRAVENOUS; SUBCUTANEOUS at 21:33

## 2025-06-29 RX ADMIN — OXYCODONE 10 MG: 5 TABLET ORAL at 02:49

## 2025-06-29 RX ADMIN — GUAIFENESIN 600 MG: 600 TABLET, MULTILAYER, EXTENDED RELEASE ORAL at 20:16

## 2025-06-29 RX ADMIN — SODIUM CHLORIDE, PRESERVATIVE FREE 40 MG: 5 INJECTION INTRAVENOUS at 20:16

## 2025-06-29 RX ADMIN — DULOXETINE 20 MG: 20 CAPSULE, DELAYED RELEASE ORAL at 08:53

## 2025-06-29 RX ADMIN — OXYCODONE 5 MG: 5 TABLET ORAL at 08:52

## 2025-06-29 RX ADMIN — ATORVASTATIN CALCIUM 10 MG: 10 TABLET, FILM COATED ORAL at 08:53

## 2025-06-29 RX ADMIN — HEPARIN SODIUM 5000 UNITS: 5000 INJECTION INTRAVENOUS; SUBCUTANEOUS at 06:22

## 2025-06-29 RX ADMIN — SODIUM CHLORIDE, PRESERVATIVE FREE 10 ML: 5 INJECTION INTRAVENOUS at 08:53

## 2025-06-29 RX ADMIN — ONDANSETRON 4 MG: 4 TABLET, ORALLY DISINTEGRATING ORAL at 08:53

## 2025-06-29 RX ADMIN — HEPARIN SODIUM 5000 UNITS: 5000 INJECTION INTRAVENOUS; SUBCUTANEOUS at 16:19

## 2025-06-29 RX ADMIN — SODIUM CHLORIDE, PRESERVATIVE FREE 40 MG: 5 INJECTION INTRAVENOUS at 08:53

## 2025-06-29 RX ADMIN — ASPIRIN 81 MG: 81 TABLET, COATED ORAL at 08:53

## 2025-06-29 RX ADMIN — MIRTAZAPINE 7.5 MG: 15 TABLET, FILM COATED ORAL at 20:16

## 2025-06-29 RX ADMIN — GUAIFENESIN 600 MG: 600 TABLET, MULTILAYER, EXTENDED RELEASE ORAL at 08:53

## 2025-06-29 RX ADMIN — OXYCODONE 10 MG: 5 TABLET ORAL at 20:19

## 2025-06-29 ASSESSMENT — PAIN SCALES - GENERAL
PAINLEVEL_OUTOF10: 0
PAINLEVEL_OUTOF10: 7
PAINLEVEL_OUTOF10: 0
PAINLEVEL_OUTOF10: 8
PAINLEVEL_OUTOF10: 7
PAINLEVEL_OUTOF10: 8

## 2025-06-29 ASSESSMENT — PAIN DESCRIPTION - ORIENTATION
ORIENTATION: MID
ORIENTATION: MID

## 2025-06-29 ASSESSMENT — PAIN DESCRIPTION - LOCATION
LOCATION: ABDOMEN

## 2025-06-29 ASSESSMENT — PAIN - FUNCTIONAL ASSESSMENT: PAIN_FUNCTIONAL_ASSESSMENT: PREVENTS OR INTERFERES SOME ACTIVE ACTIVITIES AND ADLS

## 2025-06-29 ASSESSMENT — PAIN DESCRIPTION - DESCRIPTORS
DESCRIPTORS: SORE
DESCRIPTORS: SORE

## 2025-06-29 NOTE — PROGRESS NOTES
[facility]  Office: [phone]  Robert Monreal, DO, Rocky Cobb, DO, Harshal Saldana DO, Wei Block, DO, Dipika Saleem MD, Angelica Connelly MD, Navjot Hutchinson MD, Olga Lidia Wood[place] MD,  Nacho Huertas MD, Edmundo Mariano MD, Orestes Garnica MD,  Elie Reynolds DO, Curtis Martins MD, Joseph Crowe MD, Kurt Monreal DO, Regla Hill MD,  Romel Duarte DO, Nelsy Hermosillo MD, Evelina Cortez MD, Matthew Naik MD,  Keaton Sanchez MD, Yanet Bentley MD, Russ Doan MD, Mayur Osorio MD, Camilo Lewis MD, Mary Grace Silva MD, Vijay Meza, DO, Cuca Suero MD, Dedrick Walden DO, Hayder Suarez MD, Elie Reaves MD, Mohsin Reza, MD, Rai Ott MD, Shirley Waterhouse, CNP,  Maxine Badillo, CNP, Vijay Qureshi, CNP,  Dena Tolliver, ELIDIA, Shayy Cordero CNP, Geno Carrillo, CNP, Mei Zazueta, CNP, Abiola Santiago, CNP, Dana Cordero, PA-C, Ariana Mike, CNP, Greta Byrne, CNP,  Kristi Miller, CNP, Janet Buitrago, CNP, Yazan Banuelos, PA-C, Shelly Zamarripa, PA-C, Raysa Mcgregor, CNP,        Mayda Robertson, CNS, Jahaira Moon, CNP, Aline Larson, CNP         [facility]   IN-PATIENT SERVICE   [facility]    Progress Note    6/29/2025    7:37 AM    Name:   Albertina Manning  MRN:     [id number]     Acct:      [id number]   Room:   [address] [place] [address] Day:  15  Admit Date:  6/14/2025  7:18 PM    PCP:   Rocky Tobias Sr., DO  Code Status:  Full Code    Subjective:     C/C: AMS   Interval History Status: improved.     Patient seen and examined at bedside this morning. No acute events overnight. Patient resting in bed side chair. Pain is better controlled today. Denies any CP, SOB, fever or chills     Brief History:     70-year-old female with past medical history of dementia, CAD, CKD stage IIIb, tobacco use, depression, schizoaffective disorder who initially presented to [place] emergency department from her nursing facility and subsequently transferred to [facility]  for further management of pneumonia, altered mental status, and elevated troponins. Patient with altered mental status with an inability to communicate. Workup in the [place] ED was remarkable for both pneumonia and elevated troponins chest x-ray did show pneumonia of the right upper lobe.  She was started on vancomycin and cefepime.  Initial lactate 5.6, white count of 7.1.  Patient was altered and was given Ativan 1 mg injections in the ED at [place].  Her ammonia level was unremarkable at 42.  Her CT of the head in [place] was negative for acute changes.  She also had elevated troponins  EKG showed no ST changes. Due to an inability to perform cath procedure patient was transferred to [facility] for further cardiac workup and evaluation. She was evaluated by cardiology who felt elevated troponins were related to FREDERICK and did not recommend further intervention.  Neurology and psychiatry were consulted for altered mental status.  MRI brain was performed that showed no acute changes.  Psychiatry made medication changes and patient's mental status improved.  She was started on TPN due to poor oral intake.  Palliative care was consulted for goals of care discussion in regards to her nutrition.    Review of Systems:     Constitutional:  negative for chills, fevers, sweats  Respiratory:  negative for cough, dyspnea on exertion, shortness of breath, wheezing  Cardiovascular:  negative for chest pain, chest pressure/discomfort, lower extremity edema, palpitations  Gastrointestinal:  positive for abdominal pain at PEG tube site. Denies any constipation, diarrhea, nausea, vomiting  Neurological:  negative for dizziness, headache    Medications:     Allergies:    Allergies   Allergen Reactions    Risperdal [Risperidone] Other (See Comments)     Patient cannot remember       Current Meds:   Scheduled Meds:    pantoprazole (PROTONIX) 40 mg in sodium chloride (PF) 0.9 % 10 mL injection  40 mg IntraVENous Q12H    mirtazapine  7.5

## 2025-06-29 NOTE — PROGRESS NOTES
Comprehensive Nutrition Assessment    Type and Reason for Visit:  Reassess    Nutrition Recommendations/Plan:   Continue TF to goal of 50 mL/h.  Will monitor labs, weights, TF, intakes, GI status, and plan of care.     Malnutrition Assessment:  Malnutrition Status:  Insufficient data (06/19/25 1531)    Context:  Acute Illness     Findings of the 6 clinical characteristics of malnutrition:  Energy Intake:  50% or less of estimated energy requirements for 5 or more days (x past few weeks)  Weight Loss:  Mild weight loss (22% x past 15-16 months)     Body Fat Loss:  Unable to assess     Muscle Mass Loss:  Unable to assess    Fluid Accumulation:  No fluid accumulation     Strength:  Not Performed    Nutrition Assessment:    TF was started yesterday. This morning TF was at goal of 50 mL/h, when visiting unit RN stated pt had residule of 280 mL so stopped TF. Discussed restart TF at 35 mL/h advance back to goal of 50 mL/h Q4. MD discontinued TPN this afternoon TF running at 35 mL/h. PO intakes remain 0-25%.    Nutrition Related Findings:    Trace edema BLE. LBM 6/24 -  hypoactive bowel sounds. Labs reviewed. Meds: remeron. Wound Type: Pressure Injury, Stage II       Current Nutrition Intake & Therapies:    Average Meal Intake: 0%  Average Supplements Intake: Unable to assess  ADULT ORAL NUTRITION SUPPLEMENT; Breakfast, Lunch, Dinner; Frozen Oral Supplement  ADULT ORAL NUTRITION SUPPLEMENT; Breakfast, Dinner; Standard High Calorie/High Protein Oral Supplement  ADULT TUBE FEEDING; PEG; Standard without Fiber; Continuous; 15; Yes; 15; Q 4 hours; 50; 30; Q 4 hours  ADULT DIET; Dysphagia - Pureed; Mildly Thick (Nectar)    Anthropometric Measures:  Height: 144.8 cm (4' 9.01\")  Ideal Body Weight (IBW): 85 lbs (39 kg)    Admission Body Weight: 49.3 kg (108 lb 11 oz)  Current Body Weight: 51.3 kg (113 lb 1.5 oz), 133.1 % IBW. Weight Source: Bed scale  Current BMI (kg/m2): 24.5  Usual Body Weight: 67.2 kg (148 lb 3 oz) (3/7/24

## 2025-06-29 NOTE — PROGRESS NOTES
Physical Therapy  Facility/Department: [address] ONC/MED SURG   Physical Therapy Daily Treatment Note    Patient Name: Albertina Manning        MRN: [id number]    : 1955    Date of Service: 2025    No chief complaint on file.    Past Medical History:  has a past medical history of Alcohol dependence in remission (HCC), Anxiety, Arthritis, CAD (coronary artery disease), Cancer (HCC), Chronic bronchitis (HCC), Cognitive communication deficit, Diverticulosis, Esophageal varices without bleeding (HCC), Insomnia, Major depressive disorder, Osteoporosis, Schizoaffective disorder (HCC), and Tremor.  Past Surgical History:  has a past surgical history that includes Tubal ligation; back surgery; US BREAST BIOPSY W LOC DEVICE 1ST LESION RIGHT (Right, 2023); US PLACE BREAST LOC DEVICE 1ST LESION RIGHT (Right, 2023); Breast biopsy (Right, 2023); Axillary Surgery (Right, 2023); and Gastrostomy tube placement (2025).    Discharge Recommendations  Discharge Recommendations: Patient would benefit from continued therapy after discharge  PT Equipment Recommendations  Equipment Needed: No  Other: Pt owns rollator, but pt requires extensive assistance to ambulate at this time.    Assessment  Body Structures, Functions, Activity Limitations Requiring Skilled Therapeutic Intervention: Decreased ADL status;Decreased body mechanics;Decreased strength;Decreased high-level IADLs;Decreased balance;Decreased endurance;Decreased coordination;Decreased posture;Decreased safe awareness;Decreased functional mobility   Assessment: Pt had a decline since previouse session and required maxA for all mobility.  Pt transferred from EOB>recliner ~3 ft RW maxA; RN present to assist with safety as needed.  Pt having stomach pain and nausea w/o emesis, but able to redirect pt.  Pt on 2L NC t/o session with noted desat to 85% while sitting at EOB required ~3 min seated rest period to recover to >90%; RN present in room.   SPO2 >90% with transfer to recliner on 2L NC.  Pt limited by pain and cognition.  Pt is currently unsafe to return to prior living arrangements. Pt would benefit from continued acute PT to address deficits.  Therapy Prognosis: Fair  Activity Tolerance  Activity Tolerance: Patient limited by pain;Treatment limited secondary to decreased cognition  Safety Devices  Type of Devices: Call light within reach;All fall risk precautions in place;Nurse notified;Patient at risk for falls;Gait belt;All sushant prominences offloaded;Left in chair;Chair alarm in place;Heels elevated for pressure relief  Restraints  Restraints Initially in Place: No    AM-PAC  AM-[facility] Basic Mobility - Inpatient   How much help is needed turning from your back to your side while in a flat bed without using bedrails?: A Lot  How much help is needed moving from lying on your back to sitting on the side of a flat bed without using bedrails?: A Lot  How much help is needed moving to and from a bed to a chair?: A Lot  How much help is needed standing up from a chair using your arms?: A Lot  How much help is needed walking in hospital room?: Total  How much help is needed climbing 3-5 steps with a railing?: Total  AM-PAC Inpatient Mobility Raw Score : 10  AM-PAC Inpatient T-Scale Score : 32.29  Mobility Inpatient CMS 0-100% Score: 76.75  Mobility Inpatient CMS G-Code Modifier : CL    Restrictions/Precautions  Restrictions/Precautions  Restrictions/Precautions: Seizure;Isolation;Modified Diet  Activity Level: Up as Tolerated  Required Braces or Orthoses?: Yes  Required Braces or Orthoses  Other: Abdominal Binder  Position Activity Restriction  Other Position/Activity Restrictions: PICC L UE. PEG 06/27/2025. Pureed; Mildly Thick (nectar).     Subjective  General  Patient assessed for rehabilitation services?: Yes  Response To Previous Treatment: Patient unable to report, no changes reported from family or staff (d/t cognition)  Family/Caregiver Present:

## 2025-06-29 NOTE — PLAN OF CARE
Problem: Seizure Precautions  Goal: Remains free of injury related to seizures activity  6/29/2025 1647 by Lin Kilpatrick RN  Outcome: Progressing  6/29/2025 0608 by Marcia Lindsey RN  Outcome: Progressing     Problem: Respiratory - Adult  Goal: Achieves optimal ventilation and oxygenation  6/29/2025 1647 by Lin Kilpatrick RN  Outcome: Progressing  6/29/2025 0608 by Marcia Lindsey RN  Outcome: Progressing     Problem: Neurosensory - Adult  Goal: Achieves stable or improved neurological status  6/29/2025 1647 by Lin Kilpatrick RN  Outcome: Progressing  6/29/2025 0608 by Marcia Lindsey RN  Outcome: Progressing  Goal: Absence of seizures  6/29/2025 1647 by Lin Kilpatrick RN  Outcome: Progressing  6/29/2025 0608 by Marcia Lindsey RN  Outcome: Progressing     Problem: Cardiovascular - Adult  Goal: Maintains optimal cardiac output and hemodynamic stability  6/29/2025 1647 by Lin Kilpatrick RN  Outcome: Progressing  6/29/2025 0608 by Marcia Lindsey RN  Outcome: Progressing     Problem: Skin/Tissue Integrity - Adult  Goal: Skin integrity remains intact  Description: 1.  Monitor for areas of redness and/or skin breakdown  2.  Assess vascular access sites hourly  3.  Every 4-6 hours minimum:  Change oxygen saturation probe site  4.  Every 4-6 hours:  If on nasal continuous positive airway pressure, respiratory therapy assess nares and determine need for appliance change or resting period  6/29/2025 1647 by Lin Kilpatrick RN  Outcome: Progressing  6/29/2025 0608 by Marcia Lindsey RN  Outcome: Progressing  Goal: Incisions, wounds, or drain sites healing without S/S of infection  6/29/2025 1647 by Lin Kilpatrick RN  Outcome: Progressing  6/29/2025 0608 by Marcia Lindsey RN  Outcome: Progressing     Problem: Musculoskeletal - Adult  Goal: Return mobility to safest level of function  6/29/2025 1647 by Lin Kilpatrick RN  Outcome: Progressing  6/29/2025 0608 by Marcia Lindsey RN  Outcome: Progressing     Problem:

## 2025-06-29 NOTE — PLAN OF CARE
Problem: Seizure Precautions  Goal: Remains free of injury related to seizures activity  6/29/2025 0608 by Marcia Lindsey RN  Outcome: Progressing  6/28/2025 1638 by Lin Kilpatrick RN  Outcome: Progressing     Problem: Respiratory - Adult  Goal: Achieves optimal ventilation and oxygenation  6/29/2025 0608 by Marcia Lindsey RN  Outcome: Progressing  6/28/2025 1638 by Lin Kilpatrick RN  Outcome: Progressing     Problem: Neurosensory - Adult  Goal: Achieves stable or improved neurological status  6/29/2025 0608 by Marcia Lindsey RN  Outcome: Progressing  6/28/2025 1638 by Lin Kilpatrick RN  Outcome: Progressing  Goal: Absence of seizures  6/29/2025 0608 by Marcia Lindsey RN  Outcome: Progressing  6/28/2025 1638 by Lin Kilpatrick RN  Outcome: Progressing     Problem: Cardiovascular - Adult  Goal: Maintains optimal cardiac output and hemodynamic stability  6/29/2025 0608 by Marcia Lindsey RN  Outcome: Progressing  6/28/2025 1638 by Lin Kilpatrick RN  Outcome: Progressing     Problem: Skin/Tissue Integrity - Adult  Goal: Skin integrity remains intact  Description: 1.  Monitor for areas of redness and/or skin breakdown  2.  Assess vascular access sites hourly  3.  Every 4-6 hours minimum:  Change oxygen saturation probe site  4.  Every 4-6 hours:  If on nasal continuous positive airway pressure, respiratory therapy assess nares and determine need for appliance change or resting period  6/29/2025 0608 by Marcia Lindsey RN  Outcome: Progressing  6/28/2025 1638 by Lin Kilpatrick RN  Outcome: Progressing  Goal: Incisions, wounds, or drain sites healing without S/S of infection  6/29/2025 0608 by Marcia Lindsey RN  Outcome: Progressing  6/28/2025 1638 by Lin Kilpatrick RN  Outcome: Progressing     Problem: Musculoskeletal - Adult  Goal: Return mobility to safest level of function  6/29/2025 0608 by Marcia Lindsey RN  Outcome: Progressing  6/28/2025 1638 by Lin Kilpatrick RN  Outcome: Progressing     Problem:  Infection - Adult  Goal: Absence of infection at discharge  6/29/2025 0608 by Marcia Lindsey RN  Outcome: Progressing  6/28/2025 1638 by Lin Kilpatrick RN  Outcome: Progressing     Problem: Metabolic/Fluid and Electrolytes - Adult  Goal: Electrolytes maintained within normal limits  6/29/2025 0608 by Marcia Lindsey RN  Outcome: Progressing  6/28/2025 1638 by Lin Kilpatrick RN  Outcome: Progressing  Goal: Hemodynamic stability and optimal renal function maintained  6/28/2025 1638 by Lin Kilpatrick RN  Outcome: Progressing  Goal: Glucose maintained within prescribed range  6/28/2025 1638 by Lin Kilpatrick RN  Outcome: Progressing     Problem: Skin/Tissue Integrity  Goal: Skin integrity remains intact  Description: 1.  Monitor for areas of redness and/or skin breakdown  2.  Assess vascular access sites hourly  3.  Every 4-6 hours minimum:  Change oxygen saturation probe site  4.  Every 4-6 hours:  If on nasal continuous positive airway pressure, respiratory therapy assess nares and determine need for appliance change or resting period  6/29/2025 0608 by Marcia Lindsey RN  Outcome: Progressing  6/28/2025 1638 by Lin Kilpatrick RN  Outcome: Progressing     Problem: ABCDS Injury Assessment  Goal: Absence of physical injury  6/29/2025 0608 by Marcia Lindsey RN  Outcome: Progressing  6/28/2025 1638 by Lin Kilpatrick RN  Outcome: Progressing     Problem: Discharge Planning  Goal: Discharge to home or other facility with appropriate resources  6/29/2025 0608 by Marcia Lindsey RN  Outcome: Progressing  6/28/2025 1638 by Lin Kilpatrick RN  Outcome: Progressing     Problem: Nutrition Deficit:  Goal: Optimize nutritional status  6/28/2025 1638 by Lin Kilpatrick RN  Outcome: Progressing     Problem: Pain  Goal: Verbalizes/displays adequate comfort level or baseline comfort level  6/28/2025 1638 by Lin Kilpatrick RN  Outcome: Progressing

## 2025-06-30 LAB
ANA SER QL IA: NEGATIVE
ANION GAP SERPL CALCULATED.3IONS-SCNC: 11 MMOL/L (ref 9–16)
BASOPHILS # BLD: 0.09 K/UL (ref 0–0.2)
BASOPHILS NFR BLD: 1 % (ref 0–2)
BUN SERPL-MCNC: 29 MG/DL (ref 8–23)
CALCIUM SERPL-MCNC: 8.3 MG/DL (ref 8.6–10.4)
CHLORIDE SERPL-SCNC: 103 MMOL/L (ref 98–107)
CO2 SERPL-SCNC: 23 MMOL/L (ref 20–31)
CREAT SERPL-MCNC: 0.7 MG/DL (ref 0.6–0.9)
DSDNA IGG SER QL IA: <0.5 IU/ML
EBV VCA IGM SER-ACNC: 80 U/ML
EOSINOPHIL # BLD: 0.27 K/UL (ref 0–0.44)
EOSINOPHILS RELATIVE PERCENT: 3 % (ref 1–4)
ERYTHROCYTE [DISTWIDTH] IN BLOOD BY AUTOMATED COUNT: 14 % (ref 11.8–14.4)
GFR, ESTIMATED: >90 ML/MIN/1.73M2
GLUCOSE BLD-MCNC: 107 MG/DL (ref 65–105)
GLUCOSE BLD-MCNC: 107 MG/DL (ref 65–105)
GLUCOSE BLD-MCNC: 110 MG/DL (ref 65–105)
GLUCOSE BLD-MCNC: 113 MG/DL (ref 65–105)
GLUCOSE BLD-MCNC: 123 MG/DL (ref 65–105)
GLUCOSE BLD-MCNC: 98 MG/DL (ref 65–105)
GLUCOSE SERPL-MCNC: 115 MG/DL (ref 74–99)
HCT VFR BLD AUTO: 28 % (ref 36.3–47.1)
HGB BLD-MCNC: 9 G/DL (ref 11.9–15.1)
IMM GRANULOCYTES # BLD AUTO: 0.18 K/UL (ref 0–0.3)
IMM GRANULOCYTES NFR BLD: 2 %
LYMPHOCYTES NFR BLD: 1.18 K/UL (ref 1.1–3.7)
LYMPHOCYTES RELATIVE PERCENT: 13 % (ref 24–43)
MCH RBC QN AUTO: 37.8 PG (ref 25.2–33.5)
MCHC RBC AUTO-ENTMCNC: 32.1 G/DL (ref 28.4–34.8)
MCV RBC AUTO: 117.6 FL (ref 82.6–102.9)
MITOCHONDRIA M2 IGG SER-ACNC: <0.5 U/ML (ref 0–4)
MONOCYTES NFR BLD: 1.09 K/UL (ref 0.1–1.2)
MONOCYTES NFR BLD: 12 % (ref 3–12)
MORPHOLOGY: ABNORMAL
MORPHOLOGY: ABNORMAL
NEUTROPHILS NFR BLD: 69 % (ref 36–65)
NEUTS SEG NFR BLD: 6.29 K/UL (ref 1.5–8.1)
NRBC BLD-RTO: 0 PER 100 WBC
NUCLEAR IGG SER IA-RTO: <0.1 U/ML
PLATELET # BLD AUTO: 342 K/UL (ref 138–453)
PMV BLD AUTO: 10.9 FL (ref 8.1–13.5)
POTASSIUM SERPL-SCNC: 4.6 MMOL/L (ref 3.7–5.3)
RBC # BLD AUTO: 2.38 M/UL (ref 3.95–5.11)
SODIUM SERPL-SCNC: 137 MMOL/L (ref 136–145)
WBC OTHER # BLD: 9.1 K/UL (ref 3.5–11.3)

## 2025-06-30 PROCEDURE — 80048 BASIC METABOLIC PNL TOTAL CA: CPT

## 2025-06-30 PROCEDURE — 6360000002 HC RX W HCPCS: Performed by: STUDENT IN AN ORGANIZED HEALTH CARE EDUCATION/TRAINING PROGRAM

## 2025-06-30 PROCEDURE — 82947 ASSAY GLUCOSE BLOOD QUANT: CPT

## 2025-06-30 PROCEDURE — 2500000003 HC RX 250 WO HCPCS: Performed by: STUDENT IN AN ORGANIZED HEALTH CARE EDUCATION/TRAINING PROGRAM

## 2025-06-30 PROCEDURE — 99232 SBSQ HOSP IP/OBS MODERATE 35: CPT | Performed by: STUDENT IN AN ORGANIZED HEALTH CARE EDUCATION/TRAINING PROGRAM

## 2025-06-30 PROCEDURE — 92526 ORAL FUNCTION THERAPY: CPT

## 2025-06-30 PROCEDURE — 6370000000 HC RX 637 (ALT 250 FOR IP): Performed by: STUDENT IN AN ORGANIZED HEALTH CARE EDUCATION/TRAINING PROGRAM

## 2025-06-30 PROCEDURE — 85025 COMPLETE CBC W/AUTO DIFF WBC: CPT

## 2025-06-30 PROCEDURE — 97110 THERAPEUTIC EXERCISES: CPT

## 2025-06-30 PROCEDURE — 97530 THERAPEUTIC ACTIVITIES: CPT

## 2025-06-30 PROCEDURE — 97535 SELF CARE MNGMENT TRAINING: CPT

## 2025-06-30 PROCEDURE — 36415 COLL VENOUS BLD VENIPUNCTURE: CPT

## 2025-06-30 PROCEDURE — 2060000000 HC ICU INTERMEDIATE R&B

## 2025-06-30 RX ORDER — MIRTAZAPINE 7.5 MG/1
7.5 TABLET, FILM COATED ORAL NIGHTLY
Qty: 30 TABLET | Refills: 3 | Status: SHIPPED | OUTPATIENT
Start: 2025-06-30

## 2025-06-30 RX ORDER — ASPIRIN 81 MG/1
81 TABLET ORAL DAILY
Qty: 30 TABLET | Refills: 3 | Status: SHIPPED | OUTPATIENT
Start: 2025-07-01

## 2025-06-30 RX ADMIN — HEPARIN SODIUM 5000 UNITS: 5000 INJECTION INTRAVENOUS; SUBCUTANEOUS at 06:30

## 2025-06-30 RX ADMIN — DULOXETINE 20 MG: 20 CAPSULE, DELAYED RELEASE ORAL at 09:48

## 2025-06-30 RX ADMIN — MIRTAZAPINE 7.5 MG: 15 TABLET, FILM COATED ORAL at 21:13

## 2025-06-30 RX ADMIN — SODIUM CHLORIDE, PRESERVATIVE FREE 10 ML: 5 INJECTION INTRAVENOUS at 21:24

## 2025-06-30 RX ADMIN — GUAIFENESIN 600 MG: 600 TABLET, MULTILAYER, EXTENDED RELEASE ORAL at 09:48

## 2025-06-30 RX ADMIN — ACETAMINOPHEN 650 MG: 325 TABLET ORAL at 09:48

## 2025-06-30 RX ADMIN — ACETAMINOPHEN 650 MG: 325 TABLET ORAL at 17:24

## 2025-06-30 RX ADMIN — ATORVASTATIN CALCIUM 10 MG: 10 TABLET, FILM COATED ORAL at 09:47

## 2025-06-30 RX ADMIN — SODIUM CHLORIDE, PRESERVATIVE FREE 40 MG: 5 INJECTION INTRAVENOUS at 21:13

## 2025-06-30 RX ADMIN — SODIUM CHLORIDE, PRESERVATIVE FREE 40 MG: 5 INJECTION INTRAVENOUS at 10:23

## 2025-06-30 RX ADMIN — SODIUM CHLORIDE, PRESERVATIVE FREE 10 ML: 5 INJECTION INTRAVENOUS at 09:00

## 2025-06-30 RX ADMIN — HEPARIN SODIUM 5000 UNITS: 5000 INJECTION INTRAVENOUS; SUBCUTANEOUS at 21:13

## 2025-06-30 RX ADMIN — GUAIFENESIN 600 MG: 600 TABLET, MULTILAYER, EXTENDED RELEASE ORAL at 21:13

## 2025-06-30 RX ADMIN — HEPARIN SODIUM 5000 UNITS: 5000 INJECTION INTRAVENOUS; SUBCUTANEOUS at 13:44

## 2025-06-30 RX ADMIN — ASPIRIN 81 MG: 81 TABLET, COATED ORAL at 09:48

## 2025-06-30 ASSESSMENT — PAIN SCALES - GENERAL: PAINLEVEL_OUTOF10: 6

## 2025-06-30 ASSESSMENT — PAIN DESCRIPTION - ORIENTATION: ORIENTATION: MID

## 2025-06-30 ASSESSMENT — PAIN DESCRIPTION - DESCRIPTORS: DESCRIPTORS: ACHING

## 2025-06-30 ASSESSMENT — PAIN DESCRIPTION - LOCATION: LOCATION: ABDOMEN

## 2025-06-30 NOTE — PLAN OF CARE
Problem: Seizure Precautions  Goal: Remains free of injury related to seizures activity  6/30/2025 1824 by Breann Kaufman RN  Outcome: Progressing  6/30/2025 0449 by Marcia Lindsey RN  Outcome: Progressing

## 2025-06-30 NOTE — PLAN OF CARE
Problem: Metabolic/Fluid and Electrolytes - Adult  Goal: Electrolytes maintained within normal limits  6/29/2025 1647 by Lin Kilpatrick RN  Outcome: Progressing  Goal: Hemodynamic stability and optimal renal function maintained  6/29/2025 1647 by Lin Kilpatrick RN  Outcome: Progressing  Goal: Glucose maintained within prescribed range  6/29/2025 1647 by Lin Kilpatrick RN  Outcome: Progressing     Problem: Safety - Adult  Goal: Free from fall injury  6/30/2025 0449 by Marcia Lindsey RN  Outcome: Progressing  6/29/2025 1647 by Lin Kilpatrick RN  Outcome: Progressing     Problem: Skin/Tissue Integrity  Goal: Skin integrity remains intact  Description: 1.  Monitor for areas of redness and/or skin breakdown  2.  Assess vascular access sites hourly  3.  Every 4-6 hours minimum:  Change oxygen saturation probe site  4.  Every 4-6 hours:  If on nasal continuous positive airway pressure, respiratory therapy assess nares and determine need for appliance change or resting period  6/30/2025 0449 by Marcia Lindsye RN  Outcome: Progressing  6/29/2025 1647 by Lin Kilpatrick RN  Outcome: Progressing     Problem: ABCDS Injury Assessment  Goal: Absence of physical injury  6/29/2025 1647 by Lin Kilpatrick RN  Outcome: Progressing     Problem: Discharge Planning  Goal: Discharge to home or other facility with appropriate resources  6/30/2025 0449 by Marcia Lindsey RN  Outcome: Progressing  6/29/2025 1647 by Lin Kilpatrick RN  Outcome: Progressing     Problem: Nutrition Deficit:  Goal: Optimize nutritional status  Recent Flowsheet Documentation  Taken 6/29/2025 1702 by Tasha Mcdowell RD  Nutrient intake appropriate for improving, restoring, or maintaining nutritional needs:   Recommend, monitor, and adjust tube feedings and TPN/PPN based on assessed needs   Monitor oral intake, labs, and treatment plans  6/29/2025 1647 by Lin Kilpatrick RN  Outcome: Progressing     Problem: Pain  Goal: Verbalizes/displays adequate comfort  level or baseline comfort level  6/29/2025 1647 by Lin Kilpatrick, RN  Outcome: Progressing

## 2025-06-30 NOTE — DISCHARGE INSTR - COC
Continuity of Care Form    Patient Name: Albertina Manning   :  1955  MRN:  [id number]    Admit date:  2025  Discharge date:  7/3/2025    Code Status Order: Full Code   Advance Directives:    Date/Time Healthcare Directive Type of Healthcare Directive Copy in Chart Healthcare Agent Appointed Healthcare Agent's Name Healthcare Agent's Phone Number    25 1232 No, patient does not have an advance directive for healthcare treatment  --  Yes, copy in chart  Legal Guardian  Anjelica Mcguire--pt's daughter/legal guardian  [phone]     25 0703 No, patient does not have an advance directive for healthcare treatment  --  --  --  --  --             Admitting Physician:  Dedrick Walden DO  PCP: Rocky Tobias Sr., DO    Discharging Nurse: Rolan Phelps BSN, RN  Discharging Hospital Unit/Room#: 0450/0450-01  Discharging Unit Phone Number: [phone]    Emergency Contact:   Extended Emergency Contact Information  Primary Emergency Contact: Anjelica Mcguire  Mobile Phone: [phone]  Relation: Legal Guardian  Secondary Emergency Contact: Satinder Lozoya  Relation: None    Past Surgical History:  Past Surgical History:   Procedure Laterality Date    AXILLARY SURGERY Right 2023    AXILLARY LYMPH SENTINEL NODE BIOPSY DISSECTION performed by Jacquelyn Oden DO at [facility] OR    BACK SURGERY      BREAST BIOPSY Right 2023    NEEDLE DIRECTED (1030) RIGHT BREAST LUMPECTOMY, SENTINEL LYMPH NODE BIOPSY  (11) performed by Jacquelyn Oden DO at [facility] OR    GASTROSTOMY TUBE PLACEMENT  2025    EGD    TUBAL LIGATION      UPPER GASTROINTESTINAL ENDOSCOPY N/A 2025    ESOPHAGOGASTRODUODENOSCOPY PERCUTANEOUS ENDOSCOPIC GASTROSTOMY TUBE INSERTION performed by Jacob Daniels MD at [facility] ENDOSCOPY    US BREAST BIOPSY W LOC DEVICE 1ST LESION RIGHT Right 2023    US BREAST BIOPSY W LOC DEVICE 1ST LESION RIGHT 2023 [facility] ULTRASOUND    US PLACE BREAST LOC DEVICE 1ST LESION RIGHT Right 2023     US GUIDED NEEDLE LOC OF RIGHT BREAST 8/8/2023 [facility] ULTRASOUND       Immunization History:     There is no immunization history on file for this patient.    Active Problems:  Patient Active Problem List   Diagnosis Code    Elevated liver function tests R79.89    Abnormal weight loss R63.4    Atherosclerosis of native coronary artery of native heart without angina pectoris I25.10    Malignant neoplasm of central portion of right breast in female, estrogen receptor positive (HCC) C50.111, Z17.0    Brief psychotic disorder (HCC) F23    Hyperglycemia R73.9    Insomnia G47.00    Major depressive disorder with single episode, in partial remission F32.4    Muscle weakness (generalized) M62.81    Nicotine dependence F17.200    Obstructive chronic bronchitis without exacerbation (HCC) J44.89    Other specified anxiety disorders F41.8    Pedal edema R60.0    Right shoulder pain M25.511    Schizoaffective disorder, chronic condition (HCC) F25.9    Secondary malignant neoplasm of bone (HCC) C79.51    Tobacco dependence due to cigarettes F17.210    Tremor R25.1    Osteopenia of multiple sites M85.89    Metastasis to bone (HCC) C79.51    FREDERICK (acute kidney injury) N17.9    Chemotherapy adverse reaction T45.1X5A    Macrocytic anemia D53.9    Low back pain M54.50    Pneumonia due to organism J18.9    Elevated troponin R79.89    Hypernatremia E87.0    Hypermagnesemia E83.41    Acute kidney injury superimposed on stage 3b chronic kidney disease (HCC) N17.9, N18.32    Dementia (HCC) F03.90    AMS (altered mental status) R41.82    Chest pain R07.9    NSTEMI (non-ST elevated myocardial infarction) ([facility]) I21.4    Prolonged Q-T interval on ECG R94.31    Dehydration E86.0    History of schizoaffective disorder Z86.59    History of breast cancer Z85.3    Hypokalemia E87.6    Acute metabolic encephalopathy G93.41    Polypharmacy Z79.899    Encounter for palliative care Z51.5    Goals of care, counseling/discussion Z71.89    Dysphagia R13.10

## 2025-06-30 NOTE — PROGRESS NOTES
Occupational Therapy  Occupational Therapy Daily Treatment Note  Facility/Department: [address] ONC/MED SURG   Patient Name: Albertina Manning        MRN: [id number]    : 1955    Date of Service: 2025      Past Medical History:  has a past medical history of Alcohol dependence in remission (HCC), Anxiety, Arthritis, CAD (coronary artery disease), Cancer (HCC), Chronic bronchitis (HCC), Cognitive communication deficit, Diverticulosis, Esophageal varices without bleeding (HCC), Insomnia, Major depressive disorder, Osteoporosis, Schizoaffective disorder (HCC), and Tremor.  Past Surgical History:  has a past surgical history that includes Tubal ligation; back surgery; US BREAST BIOPSY W LOC DEVICE 1ST LESION RIGHT (Right, 2023); US PLACE BREAST LOC DEVICE 1ST LESION RIGHT (Right, 2023); Breast biopsy (Right, 2023); Axillary Surgery (Right, 2023); Gastrostomy tube placement (2025); and Upper gastrointestinal endoscopy (N/A, 2025).    Discharge Recommendations  Discharge Recommendations: Patient would benefit from continued therapy after discharge       Assessment  Performance deficits / Impairments: Decreased functional mobility ;Decreased ADL status;Decreased strength;Decreased fine motor control;Decreased balance;Decreased coordination;Decreased endurance;Decreased cognition;Decreased safe awareness;Decreased high-level IADLs  Assessment: Pt limited by above deficits impacting pts functional mobility/ADL performance. Pt is expected to require skilled OT services to increase safety and promote increased independence t/o ADL/IADLs and functional mobility tasks.  Prognosis: Fair  Activity Tolerance  Activity Tolerance: Treatment limited secondary to agitation  Safety Devices  Type of Devices: Call light within reach;All fall risk precautions in place;Nurse notified;Patient at risk for falls;Gait belt;Left in chair;Chair alarm in place;Heels elevated for pressure  Moderate assistance;Increased time to complete  Functional Mobility Skilled Clinical Factors: Pt MOD A x2 w/ RW for short distance of mobility from EOB>chair, max encouragement required to participate.       Exercises  OT Exercises  A/AROM Exercises: Pt completed BUE exercises to improve pts overall strength for ADL participation and occupational performance. Pt completed 10 reps each of elbow/shoulder flexion/extension and shoulder add/abd.    Patient Education  Patient Education  Education Given To: Patient  Education Provided: Role of Therapy;Energy Conservation;Home Exercise Program;Transfer Training;Mobility Training  Education Provided Comments: OT role, ADLs, transfer safety, DME use, activity promotion. Poor return.  Education Method: Verbal;Demonstration  Barriers to Learning: Cognition  Education Outcome: Continued education needed    Goals  Short Term Goals  Time Frame for Short Term Goals: By d/c patient will:  Short Term Goal 1: complete static/dynamic standing for 10+ minutes MOD I with DME/AE PRN  Short Term Goal 2: demo safe functional transfers MOD I with LRAD.  Short Term Goal 3: complete UB ADLs MOD I with DME/AE PRN.  Short Term Goal 4: complere LB ADLS CGA with DME/AE PRN.  Short Term Goal 5: demo safety during functional activities throughout entire session with <2 vc's.  Short Term Goal 6: engage in B FMC/coordination activities 5/7 times with >1 mistake.    Plan  Occupational Therapy Plan  Times Per Week: 3-5x/wk  Current Treatment Recommendations: Balance training, Functional mobility training, Endurance training, Patient/Caregiver education & training, Equipment evaluation, education, & procurement, Safety education & training, Pain management, Self-Care / ADL, Coordination training, Cognitive/Perceptual training, Strengthening, Cognitive reorientation    Minutes  OT Individual Minutes  Time In: 1359  Time Out: 1432  Minutes: 33  Time Code Minutes   Timed Code Treatment Minutes: 23

## 2025-06-30 NOTE — ANESTHESIA POSTPROCEDURE EVALUATION
Department of Anesthesiology  Postprocedure Note    Patient: Albertina Manning  MRN: [id number]  YOB: 1955  Date of evaluation: 6/30/2025    Procedure Summary       Date: 06/27/25 Room / Location: [place] [address] / [facility]    Anesthesia Start: 1336 Anesthesia Stop: 1428    Procedure: ESOPHAGOGASTRODUODENOSCOPY PERCUTANEOUS ENDOSCOPIC GASTROSTOMY TUBE INSERTION Diagnosis:       Dysphagia, unspecified type      (Dysphagia, unspecified type [R13.10])    Surgeons: Jacob Daniels MD Responsible Provider: Nixon Duff MD    Anesthesia Type: MAC, TIVA, general ASA Status: 3            Anesthesia Type: No value filed.    Azeb Phase I: Azeb Score: 9    Azeb Phase II:      Anesthesia Post Evaluation    Patient location during evaluation: PACU  Patient participation: complete - patient participated  Level of consciousness: awake and alert  Airway patency: patent  Nausea & Vomiting: no nausea and no vomiting  Cardiovascular status: hemodynamically stable  Respiratory status: acceptable  Hydration status: euvolemic  Pain management: adequate    No notable events documented.

## 2025-06-30 NOTE — CARE COORDINATION
Case Management   Daily Progress Note       Patient Name: Albertina Manning                   YOB: 1955  Diagnosis: Pneumonia [J18.9]  Pneumonia due to organism [J18.9]                       GMLOS: 4.6 days  Length of Stay: 16  days    Anticipated Discharge Date: Two or more days before discharge    Readmission Risk (Low < 19, Mod (19-27), High > 27): Readmission Risk Score: 18.6        Current Transitional Plan    [] Home Independently    [] Home with HC    [x] Skilled Nursing Facility    [] Acute Rehabilitation    [] Long Term Acute Care (LTAC)    [] Other:     Plan for the Stay (Medical Management) :    Plan is [facility] pending per facility, PEG with TF- not at goal       Workflow Continuation (Additional Notes) :    1532: CM sent updated clinicals via Careport to [facility] per facilities request for auth. Auth remains pending at this time.     Mima Aguilera  June 30, 2025

## 2025-06-30 NOTE — PROGRESS NOTES
Speech Language Pathology  [facility]    Dysphagia Treatment Note    Date: 6/30/2025  Patient’s Name: Albertina Manning  MRN: [id number]  Diagnosis:   Patient Active Problem List   Diagnosis Code    Elevated liver function tests R79.89    Abnormal weight loss R63.4    Atherosclerosis of native coronary artery of native heart without angina pectoris I25.10    Malignant neoplasm of central portion of right breast in female, estrogen receptor positive (HCC) C50.111, Z17.0    Brief psychotic disorder (HCC) F23    Hyperglycemia R73.9    Insomnia G47.00    Major depressive disorder with single episode, in partial remission F32.4    Muscle weakness (generalized) M62.81    Nicotine dependence F17.200    Obstructive chronic bronchitis without exacerbation (HCC) J44.89    Other specified anxiety disorders F41.8    Pedal edema R60.0    Right shoulder pain M25.511    Schizoaffective disorder, chronic condition (HCC) F25.9    Secondary malignant neoplasm of bone (HCC) C79.51    Tobacco dependence due to cigarettes F17.210    Tremor R25.1    Osteopenia of multiple sites M85.89    Metastasis to bone (HCC) C79.51    FREDERICK (acute kidney injury) N17.9    Chemotherapy adverse reaction T45.1X5A    Macrocytic anemia D53.9    Low back pain M54.50    Pneumonia due to organism J18.9    Elevated troponin R79.89    Hypernatremia E87.0    Hypermagnesemia E83.41    Acute kidney injury superimposed on stage 3b chronic kidney disease (HCC) N17.9, N18.32    Dementia (HCC) F03.90    AMS (altered mental status) R41.82    Chest pain R07.9    NSTEMI (non-ST elevated myocardial infarction) (HCC) I21.4    Prolonged Q-T interval on ECG R94.31    Dehydration E86.0    History of schizoaffective disorder Z86.59    History of breast cancer Z85.3    Hypokalemia E87.6    Acute metabolic encephalopathy G93.41    Polypharmacy Z79.899    Encounter for palliative care Z51.5    Goals of care, counseling/discussion Z71.89    Dysphagia R13.10    S/P  percutaneous endoscopic gastrostomy (PEG) tube placement (formerly [facility]) Z93.1    Hiatal hernia K44.9       Pain: 0/10    Dysphagia Treatment  Treatment time:0248-1206    Subjective: [x] Alert [x] Cooperative     [] Confused     [] Agitated    [] Lethargic    Objective/Assessment:    Pt. Seen for O/M treatment program for dysphagia. Pt currently on Dysphagia Pureed (Dysphagia I) (IDDS1 Level 4) and Mildly Thick (Nectar) (IDDSI Level 2) per MBSS on 6/28. Pt with PEG for supplemental nutrition.   Reviewed results and recommendations with pt, refusing PO intake at this time. Pt. Completed O/M exercises X 5 X 1 sets with max cues. Education provided and exercises left at bedside.      Plan:  [x] Continue ST services    [] Discharge from ST:        Discharge recommendations: []  Further therapy recommended at discharge.The patient should be able to tolerate at least 3 hours of therapy per day over 5 days or 15 hours over 7 days. [x] Further therapy recommended at discharge.   [] No therapy recommended at discharge.         Treatment completed by: Lisa Vergara M.A., CCC-SLP

## 2025-06-30 NOTE — PROGRESS NOTES
[facility]  Office: [phone]  Robert Monreal, DO, Rocky Cobb, DO, Harshal Saldana DO, Wei Block, DO, Dipika Saleem MD, Angelica Connelly MD, Navjot Hutchinson MD, Olga Lidia Woodson MD,  Nacho Huertas MD, Edmundo Mariano MD, Orestes Garnica MD,  Elie Reynolds DO, Curtis Martins MD, Joseph Crowe MD, Kurt Monreal DO, Regla Hill MD,  Romel Duarte DO, Nelsy Hermosillo MD, Evelina Cortez MD, Matthew Naik MD,  Keaton Sanchez MD, Yanet Bentley MD, Russ Doan MD, Mayur Osorio MD, Camilo Lewis MD, Mary Grace Silva MD, Vijay Meza, DO, Cuca Suero MD, Dedrick Walden DO, Hayder Suarez MD, Elie Reaves MD, Mohsin Reza, MD, Rai Ott MD, Shirley Waterhouse, CNP,  Maxine Badillo, CNP, Vijay Qureshi, CNP,  Dena Tolliver, ELIDIA, Shayy Cordero CNP, Geno Carrillo, CNP, Mei Zazueta, CNP, Abiola Santiago, CNP, Dana Cordero, PA-C, Ariana Mike, CNP, Greta Byrne, CNP,  Kristi Miller, CNP, Janet Buitrago, CNP, Yazan Banuelos, PA-C, Shelly Zamarripa, PA-C, Raysa Mcgregor, CNP,        Mayda Robertson, CNS, Jahaira Moon, CNP, Aline Larson, CNP         [facility]   IN-PATIENT SERVICE   [facility]    Progress Note    6/30/2025    11:06 AM    Name:   Albertina Manning  MRN:     [id number]     Acct:      [id number]   Room:   [address] [place] [address] Day:  16  Admit Date:  6/14/2025  7:18 PM    PCP:   Rocky Tobias Sr., DO  Code Status:  Full Code    Subjective:     C/C: AMS   Interval History Status: improved.     Patient seen examined bedside this morning.  No grams overnight.  Resting comfortably.  Continues to have minimal pain from PEG tube site.  Denies any chest pain, shortness of breath, lightheadedness, dizziness, fever or chills.  Tolerating tube feeds.  Dietitian following.  Awaiting placement    Brief History:     70-year-old female with PMHx of dementia, CAD, CKD stage IIIb, tobacco use, depression, and schizoaffective disorder who initially presented

## 2025-06-30 NOTE — PROGRESS NOTES
Physical Therapy  Facility/Department: [address] ONC/MED SURG   Physical Therapy Daily Treatment Note    Patient Name: Albertina Manning        MRN: [id number]    : 1955    Date of Service: 2025    No chief complaint on file.    Past Medical History:  has a past medical history of Alcohol dependence in remission (HCC), Anxiety, Arthritis, CAD (coronary artery disease), Cancer (HCC), Chronic bronchitis (HCC), Cognitive communication deficit, Diverticulosis, Esophageal varices without bleeding (HCC), Insomnia, Major depressive disorder, Osteoporosis, Schizoaffective disorder (HCC), and Tremor.  Past Surgical History:  has a past surgical history that includes Tubal ligation; back surgery; US BREAST BIOPSY W LOC DEVICE 1ST LESION RIGHT (Right, 2023); US PLACE BREAST LOC DEVICE 1ST LESION RIGHT (Right, 2023); Breast biopsy (Right, 2023); Axillary Surgery (Right, 2023); Gastrostomy tube placement (2025); and Upper gastrointestinal endoscopy (N/A, 2025).    Discharge Recommendations  Discharge Recommendations: Patient would benefit from continued therapy after discharge  PT Equipment Recommendations  Equipment Needed: No   Other: Pt owns rollator, but uncertain d/t pt's cognition.  Pt transferring with a RW at this time with extensive assistance.    Assessment  Body Structures, Functions, Activity Limitations Requiring Skilled Therapeutic Intervention: Decreased ADL status;Decreased body mechanics;Decreased strength;Decreased high-level IADLs;Decreased balance;Decreased endurance;Decreased coordination;Decreased posture;Decreased safe awareness;Decreased functional mobility   Assessment: Pt agitated and required max encouragement to participate today. Pt required maxAx2 for bed mobility via log rolling.  Pt completed sit<>stand transfers modAx2 and transferred from EOB >recliner ~3 ft RW modAx2. Pt limited by agitation and cognition. Pt is currently unsafe to return to prior living  RW)  Comment: Assessed from EOB to RW with B UE positioned on RW to ease transition.  Pt required modAx2 to maintain static standing at RW.    Ambulation  Comments: Further ambulation distance deferred as pt was agitated.  More Ambulation?: No    Stairs/Curb  Stairs?: No    Balance   Balance  Posture: Fair  Sitting - Static: Fair  Sitting - Dynamic: Fair  Standing - Static: Poor  Standing - Dynamic: Poor  Comments: Assessed standing balance maxAx2 and sitting at EOB CGA    Exercise  PT Exercises  A/AROM Exercises: B LEs: Seated LE exercise program: Long Arc Quads, hip abduction/adduction, heel/toe raises, and marches. Reps:  x 10 reps    Patient Education  Patient Education  Education Given To: Patient  Education Provided: Role of Therapy;Orientation;Equipment;Precautions;Mobility Training;Fall Prevention Strategies  Education Provided Comments: importance of mobility. Log rolling.  Verbal and tactile cues given for safety with all mobility.  Education Method: Verbal  Barriers to Learning: Cognition;Readiness to Learn  Education Outcome: Continued education needed    Plan  Physical Therapy Plan  General Plan:  (5-6x)  Current Treatment Recommendations: Strengthening, Balance training, Functional mobility training, Transfer training, ADL/Self-care training, Endurance training, Equipment evaluation, education, & procurement, Patient/Caregiver education & training, Safety education & training, Home exercise program, Therapeutic activities, Neuromuscular re-education, Stair training, Gait training    Goals  Short Term Goals  Time Frame for Short Term Goals: 14 visits  Short Term Goal 1: Complete transfers with RW and mod I  Short Term Goal 2: Complete 300 ft of gait with RW and mod I  Short Term Goal 3: Complete 5 steps with one handrail and mod I  Short Term Goal 4: Participate in 30 minutes of therapy to promote endurance  Short Term Goal 5: Complete bed mobility with HOB flat independently    Minutes  PT Individual

## 2025-07-01 LAB
ANION GAP SERPL CALCULATED.3IONS-SCNC: 11 MMOL/L (ref 9–16)
BASOPHILS # BLD: 0.13 K/UL (ref 0–0.2)
BASOPHILS NFR BLD: 2 % (ref 0–2)
BUN SERPL-MCNC: 24 MG/DL (ref 8–23)
CALCIUM SERPL-MCNC: 8 MG/DL (ref 8.6–10.4)
CHLORIDE SERPL-SCNC: 101 MMOL/L (ref 98–107)
CO2 SERPL-SCNC: 24 MMOL/L (ref 20–31)
CREAT SERPL-MCNC: 0.7 MG/DL (ref 0.6–0.9)
EOSINOPHIL # BLD: 0.27 K/UL (ref 0–0.44)
EOSINOPHILS RELATIVE PERCENT: 4 % (ref 1–4)
ERYTHROCYTE [DISTWIDTH] IN BLOOD BY AUTOMATED COUNT: 13.9 % (ref 11.8–14.4)
GFR, ESTIMATED: >90 ML/MIN/1.73M2
GLUCOSE BLD-MCNC: 104 MG/DL (ref 65–105)
GLUCOSE BLD-MCNC: 104 MG/DL (ref 65–105)
GLUCOSE BLD-MCNC: 105 MG/DL (ref 65–105)
GLUCOSE BLD-MCNC: 120 MG/DL (ref 65–105)
GLUCOSE BLD-MCNC: 134 MG/DL (ref 65–105)
GLUCOSE BLD-MCNC: 89 MG/DL (ref 65–105)
GLUCOSE SERPL-MCNC: 118 MG/DL (ref 74–99)
HCT VFR BLD AUTO: 27.9 % (ref 36.3–47.1)
HGB BLD-MCNC: 9 G/DL (ref 11.9–15.1)
HSV1+2 IGM SER QL IA: 0.46
IMM GRANULOCYTES # BLD AUTO: 0.13 K/UL (ref 0–0.3)
IMM GRANULOCYTES NFR BLD: 2 %
LYMPHOCYTES NFR BLD: 1.07 K/UL (ref 1.1–3.7)
LYMPHOCYTES RELATIVE PERCENT: 16 % (ref 24–43)
MCH RBC QN AUTO: 37.2 PG (ref 25.2–33.5)
MCHC RBC AUTO-ENTMCNC: 32.3 G/DL (ref 28.4–34.8)
MCV RBC AUTO: 115.3 FL (ref 82.6–102.9)
MONOCYTES NFR BLD: 0.87 K/UL (ref 0.1–1.2)
MONOCYTES NFR BLD: 13 % (ref 3–12)
MORPHOLOGY: ABNORMAL
NEUTROPHILS NFR BLD: 63 % (ref 36–65)
NEUTS SEG NFR BLD: 4.23 K/UL (ref 1.5–8.1)
NRBC BLD-RTO: 0.3 PER 100 WBC
PLATELET # BLD AUTO: 347 K/UL (ref 138–453)
PMV BLD AUTO: 11.2 FL (ref 8.1–13.5)
POTASSIUM SERPL-SCNC: 4.7 MMOL/L (ref 3.7–5.3)
RBC # BLD AUTO: 2.42 M/UL (ref 3.95–5.11)
SODIUM SERPL-SCNC: 136 MMOL/L (ref 136–145)
WBC OTHER # BLD: 6.7 K/UL (ref 3.5–11.3)

## 2025-07-01 PROCEDURE — 2060000000 HC ICU INTERMEDIATE R&B

## 2025-07-01 PROCEDURE — 2500000003 HC RX 250 WO HCPCS: Performed by: STUDENT IN AN ORGANIZED HEALTH CARE EDUCATION/TRAINING PROGRAM

## 2025-07-01 PROCEDURE — 85025 COMPLETE CBC W/AUTO DIFF WBC: CPT

## 2025-07-01 PROCEDURE — 82947 ASSAY GLUCOSE BLOOD QUANT: CPT

## 2025-07-01 PROCEDURE — 6370000000 HC RX 637 (ALT 250 FOR IP): Performed by: STUDENT IN AN ORGANIZED HEALTH CARE EDUCATION/TRAINING PROGRAM

## 2025-07-01 PROCEDURE — 99232 SBSQ HOSP IP/OBS MODERATE 35: CPT | Performed by: STUDENT IN AN ORGANIZED HEALTH CARE EDUCATION/TRAINING PROGRAM

## 2025-07-01 PROCEDURE — 80048 BASIC METABOLIC PNL TOTAL CA: CPT

## 2025-07-01 PROCEDURE — 97110 THERAPEUTIC EXERCISES: CPT

## 2025-07-01 PROCEDURE — 36415 COLL VENOUS BLD VENIPUNCTURE: CPT

## 2025-07-01 PROCEDURE — 97530 THERAPEUTIC ACTIVITIES: CPT

## 2025-07-01 PROCEDURE — 6360000002 HC RX W HCPCS: Performed by: STUDENT IN AN ORGANIZED HEALTH CARE EDUCATION/TRAINING PROGRAM

## 2025-07-01 PROCEDURE — 92526 ORAL FUNCTION THERAPY: CPT

## 2025-07-01 RX ADMIN — SODIUM CHLORIDE, PRESERVATIVE FREE 40 MG: 5 INJECTION INTRAVENOUS at 09:55

## 2025-07-01 RX ADMIN — SODIUM CHLORIDE, PRESERVATIVE FREE 10 ML: 5 INJECTION INTRAVENOUS at 20:52

## 2025-07-01 RX ADMIN — DULOXETINE 20 MG: 20 CAPSULE, DELAYED RELEASE ORAL at 10:00

## 2025-07-01 RX ADMIN — SODIUM CHLORIDE, PRESERVATIVE FREE 10 ML: 5 INJECTION INTRAVENOUS at 10:00

## 2025-07-01 RX ADMIN — ASPIRIN 81 MG: 81 TABLET, COATED ORAL at 09:55

## 2025-07-01 RX ADMIN — GUAIFENESIN 600 MG: 600 TABLET, MULTILAYER, EXTENDED RELEASE ORAL at 20:51

## 2025-07-01 RX ADMIN — OXYCODONE 5 MG: 5 TABLET ORAL at 20:51

## 2025-07-01 RX ADMIN — GUAIFENESIN 600 MG: 600 TABLET, MULTILAYER, EXTENDED RELEASE ORAL at 09:55

## 2025-07-01 RX ADMIN — HEPARIN SODIUM 5000 UNITS: 5000 INJECTION INTRAVENOUS; SUBCUTANEOUS at 06:13

## 2025-07-01 RX ADMIN — ONDANSETRON 4 MG: 4 TABLET, ORALLY DISINTEGRATING ORAL at 20:51

## 2025-07-01 RX ADMIN — SODIUM CHLORIDE, PRESERVATIVE FREE 40 MG: 5 INJECTION INTRAVENOUS at 20:52

## 2025-07-01 RX ADMIN — ATORVASTATIN CALCIUM 10 MG: 10 TABLET, FILM COATED ORAL at 09:55

## 2025-07-01 RX ADMIN — ONDANSETRON 4 MG: 4 TABLET, ORALLY DISINTEGRATING ORAL at 04:44

## 2025-07-01 RX ADMIN — HEPARIN SODIUM 5000 UNITS: 5000 INJECTION INTRAVENOUS; SUBCUTANEOUS at 20:51

## 2025-07-01 RX ADMIN — OXYCODONE 10 MG: 5 TABLET ORAL at 00:58

## 2025-07-01 RX ADMIN — MIRTAZAPINE 7.5 MG: 15 TABLET, FILM COATED ORAL at 20:52

## 2025-07-01 ASSESSMENT — PAIN SCALES - GENERAL
PAINLEVEL_OUTOF10: 7
PAINLEVEL_OUTOF10: 6
PAINLEVEL_OUTOF10: 5
PAINLEVEL_OUTOF10: 4
PAINLEVEL_OUTOF10: 5
PAINLEVEL_OUTOF10: 10

## 2025-07-01 ASSESSMENT — PAIN DESCRIPTION - LOCATION
LOCATION: ABDOMEN

## 2025-07-01 ASSESSMENT — PAIN DESCRIPTION - ORIENTATION
ORIENTATION: MID
ORIENTATION: MID

## 2025-07-01 ASSESSMENT — PAIN DESCRIPTION - PAIN TYPE: TYPE: SURGICAL PAIN

## 2025-07-01 ASSESSMENT — PAIN DESCRIPTION - DESCRIPTORS
DESCRIPTORS: ACHING

## 2025-07-01 NOTE — PROGRESS NOTES
Physical Therapy  Facility/Department: [address] ONC/MED SURG   Physical Therapy Daily Treatment Note    Patient Name: Albertina Manning        MRN: [id number]    : 1955    Date of Service: 2025    No chief complaint on file.    Past Medical History:  has a past medical history of Alcohol dependence in remission (HCC), Anxiety, Arthritis, CAD (coronary artery disease), Cancer (HCC), Chronic bronchitis (HCC), Cognitive communication deficit, Diverticulosis, Esophageal varices without bleeding (HCC), Insomnia, Major depressive disorder, Osteoporosis, Schizoaffective disorder (HCC), and Tremor.  Past Surgical History:  has a past surgical history that includes Tubal ligation; back surgery; US BREAST BIOPSY W LOC DEVICE 1ST LESION RIGHT (Right, 2023); US PLACE BREAST LOC DEVICE 1ST LESION RIGHT (Right, 2023); Breast biopsy (Right, 2023); Axillary Surgery (Right, 2023); Gastrostomy tube placement (2025); and Upper gastrointestinal endoscopy (N/A, 2025).    Discharge Recommendations  Discharge Recommendations: Patient would benefit from continued therapy after discharge  PT Equipment Recommendations  Equipment Needed: Yes  Mobility Devices: Walker  Walker: Rolling  Other: Pt owns rollator, but pt requires extensive assistance to ambulate at this time.    Assessment  Body Structures, Functions, Activity Limitations Requiring Skilled Therapeutic Intervention: Decreased ADL status;Decreased body mechanics;Decreased strength;Decreased high-level IADLs;Decreased balance;Decreased endurance;Decreased coordination;Decreased posture;Decreased safe awareness;Decreased functional mobility   Assessment: Pt presenting with mobility deficits requiring min Afor upine->sit transfer. Pt able to complete x 2 sit<>Stands with RW. Pt is most limited by weakness and endurance deficits. Pt would benefit from therapy following d/c to address deficits and facilitate a safe return to home. pt is currently

## 2025-07-01 NOTE — PROGRESS NOTES
Speech Language Pathology  [facility]    Dysphagia Treatment Note    Date: 7/1/2025  Patient’s Name: Albertina Manning  MRN: [id number]    Patient Active Problem List   Diagnosis Code    Elevated liver function tests R79.89    Abnormal weight loss R63.4    Atherosclerosis of native coronary artery of native heart without angina pectoris I25.10    Malignant neoplasm of central portion of right breast in female, estrogen receptor positive (HCC) C50.111, Z17.0    Brief psychotic disorder (HCC) F23    Hyperglycemia R73.9    Insomnia G47.00    Major depressive disorder with single episode, in partial remission F32.4    Muscle weakness (generalized) M62.81    Nicotine dependence F17.200    Obstructive chronic bronchitis without exacerbation (HCC) J44.89    Other specified anxiety disorders F41.8    Pedal edema R60.0    Right shoulder pain M25.511    Schizoaffective disorder, chronic condition (HCC) F25.9    Secondary malignant neoplasm of bone (HCC) C79.51    Tobacco dependence due to cigarettes F17.210    Tremor R25.1    Osteopenia of multiple sites M85.89    Metastasis to bone (HCC) C79.51    FREDERICK (acute kidney injury) N17.9    Chemotherapy adverse reaction T45.1X5A    Macrocytic anemia D53.9    Low back pain M54.50    Pneumonia due to organism J18.9    Elevated troponin R79.89    Hypernatremia E87.0    Hypermagnesemia E83.41    Acute kidney injury superimposed on stage 3b chronic kidney disease (HCC) N17.9, N18.32    Dementia (HCC) F03.90    AMS (altered mental status) R41.82    Chest pain R07.9    NSTEMI (non-ST elevated myocardial infarction) (HCC) I21.4    Prolonged Q-T interval on ECG R94.31    Dehydration E86.0    History of schizoaffective disorder Z86.59    History of breast cancer Z85.3    Hypokalemia E87.6    Acute metabolic encephalopathy G93.41    Polypharmacy Z79.899    Encounter for palliative care Z51.5    Goals of care, counseling/discussion Z71.89    Dysphagia R13.10    S/P percutaneous

## 2025-07-01 NOTE — PLAN OF CARE
Problem: Seizure Precautions  Goal: Remains free of injury related to seizures activity  7/1/2025 0455 by Marcia Lindsey RN  Outcome: Progressing  6/30/2025 1824 by Breann Kaufman RN  Outcome: Progressing     Problem: Respiratory - Adult  Goal: Achieves optimal ventilation and oxygenation  7/1/2025 0455 by Marcia Lindsey RN  Outcome: Progressing  6/30/2025 1824 by Breann Kaufman RN  Outcome: Progressing     Problem: Neurosensory - Adult  Goal: Achieves stable or improved neurological status  7/1/2025 0455 by Marcia Lindsey RN  Outcome: Progressing  6/30/2025 1824 by Breann Kaufman RN  Outcome: Progressing  Goal: Absence of seizures  7/1/2025 0455 by Marcia Lindsey RN  Outcome: Progressing  6/30/2025 1824 by Breann Kaufman RN  Outcome: Progressing     Problem: Cardiovascular - Adult  Goal: Maintains optimal cardiac output and hemodynamic stability  7/1/2025 0455 by Marcia Lindsey RN  Outcome: Progressing  6/30/2025 1824 by Breann Kaufman RN  Outcome: Progressing     Problem: Skin/Tissue Integrity - Adult  Goal: Skin integrity remains intact  Description: 1.  Monitor for areas of redness and/or skin breakdown  2.  Assess vascular access sites hourly  3.  Every 4-6 hours minimum:  Change oxygen saturation probe site  4.  Every 4-6 hours:  If on nasal continuous positive airway pressure, respiratory therapy assess nares and determine need for appliance change or resting period  7/1/2025 0455 by Marcia Lindsey RN  Outcome: Progressing  6/30/2025 1824 by Breann Kaufman RN  Outcome: Progressing  Flowsheets (Taken 6/30/2025 0800)  Skin Integrity Remains Intact: Monitor for areas of redness and/or skin breakdown  Goal: Incisions, wounds, or drain sites healing without S/S of infection  7/1/2025 0455 by Marcia Lindsey RN  Outcome: Progressing  6/30/2025 1824 by Breann Kaufman RN  Outcome: Progressing     Problem: Musculoskeletal - Adult  Goal: Return mobility to safest level of

## 2025-07-01 NOTE — PLAN OF CARE
Goals of care discussion previously with patients daughter Anjelica, who is patients legal Guardian. Anjelica requesting continuing full interventions. Patient has undergone PEG placement. Case management working on placement. No further palliative interventions at this time. Palliative to sign off. Should additional needs arise please reach out.     Electronically signed by CHERYL Spencer CNP on 7/1/2025 at 10:21 AM

## 2025-07-01 NOTE — PLAN OF CARE
Problem: Seizure Precautions  Goal: Remains free of injury related to seizures activity  7/1/2025 1719 by Savannah Henriquez RN  Outcome: Progressing  7/1/2025 0455 by Marcia Lindsey RN  Outcome: Progressing     Problem: Respiratory - Adult  Goal: Achieves optimal ventilation and oxygenation  7/1/2025 1719 by Savannah Henriquez RN  Outcome: Progressing  7/1/2025 0455 by Marcia Lindsey RN  Outcome: Progressing     Problem: Safety - Adult  Goal: Free from fall injury  7/1/2025 1719 by Savannah Henriquez RN  Outcome: Progressing  7/1/2025 0455 by Marcia Lindsey RN  Outcome: Progressing     Problem: Skin/Tissue Integrity  Goal: Skin integrity remains intact  Description: 1.  Monitor for areas of redness and/or skin breakdown  2.  Assess vascular access sites hourly  3.  Every 4-6 hours minimum:  Change oxygen saturation probe site  4.  Every 4-6 hours:  If on nasal continuous positive airway pressure, respiratory therapy assess nares and determine need for appliance change or resting period  7/1/2025 1719 by Savannah Henriquez RN  Outcome: Progressing  7/1/2025 0455 by Marcia Lindsey RN  Outcome: Progressing     Problem: ABCDS Injury Assessment  Goal: Absence of physical injury  Outcome: Progressing     Problem: Neurosensory - Adult  Goal: Achieves stable or improved neurological status  7/1/2025 1719 by Savannah Henriquez RN  Outcome: Progressing  7/1/2025 0455 by Marcia Lindsey RN  Outcome: Progressing  Goal: Absence of seizures  7/1/2025 1719 by Savannah Henriquez RN  Outcome: Progressing  7/1/2025 0455 by Marcia Lindsey RN  Outcome: Progressing     Problem: Cardiovascular - Adult  Goal: Maintains optimal cardiac output and hemodynamic stability  7/1/2025 1719 by Savannah Henriquez RN  Outcome: Progressing  7/1/2025 0455 by Marcia Lindsey RN  Outcome: Progressing     Problem: Skin/Tissue Integrity - Adult  Goal: Skin integrity remains intact  Description: 1.  Monitor for areas of redness and/or skin

## 2025-07-01 NOTE — PROGRESS NOTES
[facility]  Office: [phone]  Robert Monreal, DO, Rocky Cobb, DO, Harshal Saldana DO, Wei Block, DO, Dipika Saleem MD, Angelica Connelly MD, Navjot Hutchinson MD, Olga Lidia Woodson MD,  Nacho Huertas MD, Edmundo Mariano MD, Orestes Garnica MD,  Elie Reynolds DO, Curtis Martins MD, Joseph Crowe MD, Kurt Monreal DO, Regla Hill MD,  Romel Duarte DO, Nelsy Hermosillo MD, Evelina Cortez MD, Matthew Naik MD,  Keaton Sanchez MD, Yanet Bentley MD, Russ Doan MD, Mayur Osorio MD, Camilo Lewis MD, Mary Grace Silva MD, Vijay Meza, DO, Cuca Suero MD, Dedrick Walden DO, Hayder Suarez MD, Elie Reaves MD, Mohsin Reza, MD, Rai Ott MD, Shirley Waterhouse, CNP,  Maxine Badillo, CNP, Vijay Qureshi, CNP,  Dena Tolliver, ELIDIA, Shayy Cordero CNP, Geno Carrillo, CNP, Mei Zazueta, CNP, Abiola Santiago, CNP, Dana Cordero, PA-C, Ariana Mike, CNP, Greta Byrne, CNP,  Kristi Miller, CNP, Janet Buitrago, CNP, Yazan Banuelos, PA-C, Shelly Zamarripa, PA-C, Raysa Mcgregor, CNP,        Mayda Robertson, CNS, Jahaira Moon, CNP, Aline Larson, CNP         [facility]   IN-PATIENT SERVICE   [facility]    Progress Note    7/1/2025    3:48 PM    Name:   Albertina Manning  MRN:     [id number]     Acct:      [id number]   Room:   [address] [place] [address] Day:  17  Admit Date:  6/14/2025  7:18 PM    PCP:   Rocky Tobias Sr., DO  Code Status:  Full Code    Subjective:     C/C: AMS   Interval History Status: improved.     Patient seen and examined, no acute issues overnight.  Vitals within normal range, lab work unremarkable.   working on placement.    Brief History:     70-year-old female with PMHx of dementia, CAD, CKD stage IIIb, tobacco use, depression, and schizoaffective disorder who initially presented to [place] ED from her nursing facility and subsequently transferred to [facility] for further management of pneumonia, altered mental status, and

## 2025-07-02 LAB
ANION GAP SERPL CALCULATED.3IONS-SCNC: 13 MMOL/L (ref 9–16)
BASOPHILS # BLD: 0.18 K/UL (ref 0–0.2)
BASOPHILS NFR BLD: 2 % (ref 0–2)
BUN SERPL-MCNC: 25 MG/DL (ref 8–23)
CALCIUM SERPL-MCNC: 7.8 MG/DL (ref 8.6–10.4)
CHLORIDE SERPL-SCNC: 103 MMOL/L (ref 98–107)
CO2 SERPL-SCNC: 20 MMOL/L (ref 20–31)
CREAT SERPL-MCNC: 0.7 MG/DL (ref 0.6–0.9)
EOSINOPHIL # BLD: 0.26 K/UL (ref 0–0.44)
EOSINOPHILS RELATIVE PERCENT: 3 % (ref 1–4)
ERYTHROCYTE [DISTWIDTH] IN BLOOD BY AUTOMATED COUNT: 14.1 % (ref 11.8–14.4)
GFR, ESTIMATED: >90 ML/MIN/1.73M2
GLUCOSE BLD-MCNC: 104 MG/DL (ref 65–105)
GLUCOSE BLD-MCNC: 105 MG/DL (ref 65–105)
GLUCOSE BLD-MCNC: 110 MG/DL (ref 65–105)
GLUCOSE BLD-MCNC: 120 MG/DL (ref 65–105)
GLUCOSE BLD-MCNC: 125 MG/DL (ref 65–105)
GLUCOSE BLD-MCNC: 128 MG/DL (ref 65–105)
GLUCOSE SERPL-MCNC: 130 MG/DL (ref 74–99)
HCT VFR BLD AUTO: 28.4 % (ref 36.3–47.1)
HGB BLD-MCNC: 9.1 G/DL (ref 11.9–15.1)
IMM GRANULOCYTES # BLD AUTO: 0.18 K/UL (ref 0–0.3)
IMM GRANULOCYTES NFR BLD: 2 %
LYMPHOCYTES NFR BLD: 1.23 K/UL (ref 1.1–3.7)
LYMPHOCYTES RELATIVE PERCENT: 14 % (ref 24–43)
MCH RBC QN AUTO: 36.8 PG (ref 25.2–33.5)
MCHC RBC AUTO-ENTMCNC: 32 G/DL (ref 28.4–34.8)
MCV RBC AUTO: 115 FL (ref 82.6–102.9)
MONOCYTES NFR BLD: 1.14 K/UL (ref 0.1–1.2)
MONOCYTES NFR BLD: 13 % (ref 3–12)
MORPHOLOGY: ABNORMAL
MORPHOLOGY: ABNORMAL
NEUTROPHILS NFR BLD: 66 % (ref 36–65)
NEUTS SEG NFR BLD: 5.81 K/UL (ref 1.5–8.1)
NRBC BLD-RTO: 0.3 PER 100 WBC
PLATELET # BLD AUTO: 357 K/UL (ref 138–453)
PMV BLD AUTO: 10.5 FL (ref 8.1–13.5)
POTASSIUM SERPL-SCNC: 4.5 MMOL/L (ref 3.7–5.3)
RBC # BLD AUTO: 2.47 M/UL (ref 3.95–5.11)
SODIUM SERPL-SCNC: 136 MMOL/L (ref 136–145)
WBC OTHER # BLD: 8.8 K/UL (ref 3.5–11.3)

## 2025-07-02 PROCEDURE — 2060000000 HC ICU INTERMEDIATE R&B

## 2025-07-02 PROCEDURE — 2500000003 HC RX 250 WO HCPCS: Performed by: STUDENT IN AN ORGANIZED HEALTH CARE EDUCATION/TRAINING PROGRAM

## 2025-07-02 PROCEDURE — 36415 COLL VENOUS BLD VENIPUNCTURE: CPT

## 2025-07-02 PROCEDURE — 99232 SBSQ HOSP IP/OBS MODERATE 35: CPT | Performed by: STUDENT IN AN ORGANIZED HEALTH CARE EDUCATION/TRAINING PROGRAM

## 2025-07-02 PROCEDURE — 6370000000 HC RX 637 (ALT 250 FOR IP): Performed by: STUDENT IN AN ORGANIZED HEALTH CARE EDUCATION/TRAINING PROGRAM

## 2025-07-02 PROCEDURE — 82947 ASSAY GLUCOSE BLOOD QUANT: CPT

## 2025-07-02 PROCEDURE — 97530 THERAPEUTIC ACTIVITIES: CPT

## 2025-07-02 PROCEDURE — 6360000002 HC RX W HCPCS: Performed by: STUDENT IN AN ORGANIZED HEALTH CARE EDUCATION/TRAINING PROGRAM

## 2025-07-02 PROCEDURE — 85025 COMPLETE CBC W/AUTO DIFF WBC: CPT

## 2025-07-02 PROCEDURE — 80048 BASIC METABOLIC PNL TOTAL CA: CPT

## 2025-07-02 RX ADMIN — GUAIFENESIN 600 MG: 600 TABLET, MULTILAYER, EXTENDED RELEASE ORAL at 09:50

## 2025-07-02 RX ADMIN — DULOXETINE 20 MG: 20 CAPSULE, DELAYED RELEASE ORAL at 09:48

## 2025-07-02 RX ADMIN — HEPARIN SODIUM 5000 UNITS: 5000 INJECTION INTRAVENOUS; SUBCUTANEOUS at 20:55

## 2025-07-02 RX ADMIN — SODIUM CHLORIDE, PRESERVATIVE FREE 40 MG: 5 INJECTION INTRAVENOUS at 20:55

## 2025-07-02 RX ADMIN — SODIUM CHLORIDE, PRESERVATIVE FREE 10 ML: 5 INJECTION INTRAVENOUS at 09:49

## 2025-07-02 RX ADMIN — ASPIRIN 81 MG: 81 TABLET, COATED ORAL at 09:48

## 2025-07-02 RX ADMIN — ACETAMINOPHEN 650 MG: 325 TABLET ORAL at 14:05

## 2025-07-02 RX ADMIN — SODIUM CHLORIDE, PRESERVATIVE FREE 40 MG: 5 INJECTION INTRAVENOUS at 09:48

## 2025-07-02 RX ADMIN — OXYCODONE 10 MG: 5 TABLET ORAL at 01:02

## 2025-07-02 RX ADMIN — SODIUM CHLORIDE, PRESERVATIVE FREE 10 ML: 5 INJECTION INTRAVENOUS at 20:56

## 2025-07-02 RX ADMIN — GUAIFENESIN 600 MG: 600 TABLET, MULTILAYER, EXTENDED RELEASE ORAL at 20:55

## 2025-07-02 RX ADMIN — ATORVASTATIN CALCIUM 10 MG: 10 TABLET, FILM COATED ORAL at 09:48

## 2025-07-02 RX ADMIN — HEPARIN SODIUM 5000 UNITS: 5000 INJECTION INTRAVENOUS; SUBCUTANEOUS at 06:36

## 2025-07-02 RX ADMIN — MIRTAZAPINE 7.5 MG: 15 TABLET, FILM COATED ORAL at 20:55

## 2025-07-02 RX ADMIN — HEPARIN SODIUM 5000 UNITS: 5000 INJECTION INTRAVENOUS; SUBCUTANEOUS at 14:04

## 2025-07-02 ASSESSMENT — PAIN DESCRIPTION - LOCATION
LOCATION: ABDOMEN
LOCATION: HEAD

## 2025-07-02 ASSESSMENT — PAIN DESCRIPTION - DESCRIPTORS
DESCRIPTORS: ACHING;SORE
DESCRIPTORS: ACHING

## 2025-07-02 ASSESSMENT — PAIN SCALES - GENERAL
PAINLEVEL_OUTOF10: 4
PAINLEVEL_OUTOF10: 5
PAINLEVEL_OUTOF10: 7
PAINLEVEL_OUTOF10: 3

## 2025-07-02 NOTE — PLAN OF CARE
Problem: Seizure Precautions  Goal: Remains free of injury related to seizures activity  Outcome: Progressing     Problem: Respiratory - Adult  Goal: Achieves optimal ventilation and oxygenation  Outcome: Progressing  Flowsheets (Taken 7/2/2025 1007)  Achieves optimal ventilation and oxygenation: Assess for changes in respiratory status     Problem: Neurosensory - Adult  Goal: Achieves stable or improved neurological status  Outcome: Progressing  Flowsheets (Taken 7/2/2025 1007)  Achieves stable or improved neurological status: Assess for and report changes in neurological status  Goal: Absence of seizures  Outcome: Progressing  Flowsheets (Taken 7/2/2025 1007)  Absence of seizures: Monitor for seizure activity.  If seizure occurs, document type and location of movements and any associated apnea     Problem: Cardiovascular - Adult  Goal: Maintains optimal cardiac output and hemodynamic stability  Outcome: Progressing     Problem: Skin/Tissue Integrity - Adult  Goal: Skin integrity remains intact  Description: 1.  Monitor for areas of redness and/or skin breakdown  2.  Assess vascular access sites hourly  3.  Every 4-6 hours minimum:  Change oxygen saturation probe site  4.  Every 4-6 hours:  If on nasal continuous positive airway pressure, respiratory therapy assess nares and determine need for appliance change or resting period  Outcome: Progressing  Goal: Incisions, wounds, or drain sites healing without S/S of infection  Outcome: Progressing     Problem: Musculoskeletal - Adult  Goal: Return mobility to safest level of function  Outcome: Progressing     Problem: Infection - Adult  Goal: Absence of infection at discharge  Outcome: Progressing     Problem: Metabolic/Fluid and Electrolytes - Adult  Goal: Electrolytes maintained within normal limits  Outcome: Progressing  Goal: Hemodynamic stability and optimal renal function maintained  Outcome: Progressing  Goal: Glucose maintained within prescribed

## 2025-07-02 NOTE — PROGRESS NOTES
Comprehensive Nutrition Assessment    Type and Reason for Visit:  Reassess    Nutrition Recommendations/Plan:   Discontinue Ensure ONS  Continue current diet and Magic cup TID  Continue current TF regimen     Malnutrition Assessment:  Malnutrition Status:  Insufficient data (06/19/25 1531)    Context:  Acute Illness     Findings of the 6 clinical characteristics of malnutrition:  Energy Intake:  50% or less of estimated energy requirements for 5 or more days (x past few weeks)  Weight Loss:  Mild weight loss (22% x past 15-16 months)     Body Fat Loss:  Unable to assess     Muscle Mass Loss:  Unable to assess    Fluid Accumulation:  No fluid accumulation     Strength:  Not Performed    Nutrition Assessment:    Re-assess. Pt sleeping at visit. Tolerating Osmolite 1.2 TF formula at 50 mL/hr continuous per RN. Had some abdominal pain though has resolved and continues to refuse meals and snacks per nursing, estimating 0-25% of meals. Also has Ensure and Magic cup order, though per nursing has not received magic cup from dietary. Attempt call kitchen x2 though no answer. 2 unopened Ensure at bedside, will d/c as pt normally not drinking this admission. LBM 7/1.    Nutrition Related Findings:    Meds/labs reviewed. Glu 120-125 Wound Type: Pressure Injury, Stage II       Current Nutrition Intake & Therapies:    Average Meal Intake: 0%, 1-25%  Average Supplements Intake: 0%  ADULT ORAL NUTRITION SUPPLEMENT; Breakfast, Lunch, Dinner; Frozen Oral Supplement  ADULT ORAL NUTRITION SUPPLEMENT; Breakfast, Dinner; Standard High Calorie/High Protein Oral Supplement  ADULT TUBE FEEDING; PEG; Standard without Fiber; Continuous; 15; Yes; 15; Q 4 hours; 50; 30; Q 4 hours  ADULT DIET; Dysphagia - Pureed; Mildly Thick (Nectar)  Current Tube Feeding (TF) Orders:  Feeding Route: PEG  Formula: Standard without Fiber  Schedule: Continuous  Feeding Regimen: 50 ml/hr  Additives/Modulars: None  Water Flushes: 30 mL q 4 hours  Current TF  Nutrition     Raina Patel, ASHA  Contact: [phone]

## 2025-07-02 NOTE — PROGRESS NOTES
[phone]   MPV 10.9 11.2 10.5       Chemistry:  Recent Labs     06/30/25  0841 07/01/25  0642 07/02/25  0702    136 136   K 4.6 4.7 4.5    101 103   CO2 23 24 20   GLUCOSE 115* 118* 130*   BUN 29* 24* 25*   CREATININE 0.7 0.7 0.7   ANIONGAP 11 11 13   LABGLOM >90 >90 >90   CALCIUM 8.3* 8.0* 7.8*     Recent Labs     07/01/25  1151 07/01/25  1708 07/01/25 2001 07/02/25  0104 07/02/25  0505 07/02/25  1135   POCGLU 104 120* 134* 128* 125* 120*     ABG:  Lab Results   Component Value Date/Time    FIO2 INFORMATION NOT PROVIDED 06/14/2025 08:10 PM     Lab Results   Component Value Date/Time    SPECIAL R FORARM 6ML AN 06/14/2025 11:58 AM     Lab Results   Component Value Date/Time    CULTURE NO GROWTH 5 DAYS 06/14/2025 11:58 AM       Radiology:  MRI BRAIN W WO CONTRAST  Result Date: 6/17/2025  1. No evidence of acute infarction, acute intracranial hemorrhage, mass effect or midline shift. 2. Moderate to marked diffuse chronic microvascular ischemic/aging changes in the supratentorial white matter. 3. Mild cortical atrophy changes over the convexities of both cerebral hemispheres. Mild-to-moderate central atrophy. 4. No evidence of abnormal enhancement in the brain, meninges or in the bony structures. 5. Bilateral diffuse mastoiditis. 6. Paranasal sinus disease.       Physical Examination:        General appearance: Ill-appearing, left brachial PICC  Mental Status: Could not be assessed  Lungs:  clear to auscultation bilaterally, normal effort  Heart:  regular rate and rhythm, no murmur  Abdomen:  soft, tender over surgical site, nondistended, normal bowel sounds, no masses, hepatomegaly, splenomegaly, PEG tube  Extremities:  no edema, redness, tenderness in the calves  Skin:  no gross lesions, rashes, induration    Assessment:        Hospital Problems           Last Modified POA    * (Principal) Pneumonia due to organism 6/14/2025 Yes    Chest pain 6/15/2025 Yes    NSTEMI (non-ST elevated myocardial

## 2025-07-02 NOTE — PROGRESS NOTES
Facility/Department: [address] ONC/MED SURG   Physical Therapy Daily Treatment Note    Patient Name: Albertina Manning        MRN: [id number]    : 1955    Date of Service: 2025    Albertina Manning is a 70 y.o. Non- / non  female with past medical history of dementia, atherosclerosis of native coronary artery of native heart, CKD stage IIIb, tobacco use, depression, chronic obstructive bronchitis, schizoaffective disorder, tremor, and secondary malignant neoplasm of bone who presents with No chief complaint on file. and is admitted to the hospital for the management of Pneumonia due to organism. PEG placement 25    Past Medical History:  has a past medical history of Alcohol dependence in remission (HCC), Anxiety, Arthritis, CAD (coronary artery disease), Cancer (HCC), Chronic bronchitis (HCC), Cognitive communication deficit, Diverticulosis, Esophageal varices without bleeding (HCC), Insomnia, Major depressive disorder, Osteoporosis, Schizoaffective disorder (HCC), and Tremor.  Past Surgical History:  has a past surgical history that includes Tubal ligation; back surgery; US BREAST BIOPSY W LOC DEVICE 1ST LESION RIGHT (Right, 2023); US PLACE BREAST LOC DEVICE 1ST LESION RIGHT (Right, 2023); Breast biopsy (Right, 2023); Axillary Surgery (Right, 2023); Gastrostomy tube placement (2025); and Upper gastrointestinal endoscopy (N/A, 2025).    Discharge Recommendations  Discharge Recommendations: Patient would benefit from continued therapy after discharge  PT Equipment Recommendations  Equipment Needed: Yes  Mobility Devices: Walker  Walker: Rolling  Other: Pt owns rollator, but pt requires extensive assistance to ambulate at this time.    Assessment  Pt sleepy but agreeable to participate with PT--very slow to move.  Bed mobility min A+1, transfers min A+1, declined walking but agreeable to take steps to her R with min A+1, no device, to move closer to the HOB.

## 2025-07-02 NOTE — PROGRESS NOTES
Pt dtr called gave update, she asked about transfer to SNF, this nurse did not hear of a transfer as of this time.

## 2025-07-02 NOTE — PLAN OF CARE
Problem: Seizure Precautions  Goal: Remains free of injury related to seizures activity  7/1/2025 2248 by Denia Marin RN  Outcome: Progressing  Flowsheets (Taken 6/23/2025 0730 by Molly Byrne RN)  Remains free of injury related to seizure activity:   Maintain airway, patient safety  and administer oxygen as ordered   Monitor patient for seizure activity, document and report duration and description of seizure to Licensed Independent Practitioner   If seizure occurs, turn patient to side and suction secretions as needed   Reorient patient post seizure  7/1/2025 1719 by Savannah Henriquez RN  Outcome: Progressing     Problem: Respiratory - Adult  Goal: Achieves optimal ventilation and oxygenation  7/1/2025 1719 by Savannah Henriquez RN  Outcome: Progressing     Problem: Safety - Adult  Goal: Free from fall injury  7/1/2025 2248 by Denia Marin RN  Outcome: Progressing  Flowsheets (Taken 6/19/2025 2056 by Carolyn Del Cid, RN)  Free From Fall Injury:   Instruct family/caregiver on patient safety   Based on caregiver fall risk screen, instruct family/caregiver to ask for assistance with transferring infant if caregiver noted to have fall risk factors  7/1/2025 1719 by Savannah Henriquez RN  Outcome: Progressing     Problem: Skin/Tissue Integrity  Goal: Skin integrity remains intact  Description: 1.  Monitor for areas of redness and/or skin breakdown  2.  Assess vascular access sites hourly  3.  Every 4-6 hours minimum:  Change oxygen saturation probe site  4.  Every 4-6 hours:  If on nasal continuous positive airway pressure, respiratory therapy assess nares and determine need for appliance change or resting period  7/1/2025 1719 by Savannah Henriquez RN  Outcome: Progressing     Problem: ABCDS Injury Assessment  Goal: Absence of physical injury  7/1/2025 1719 by Savannah Henriquez RN  Outcome: Progressing     Problem: Neurosensory - Adult  Goal: Achieves stable or improved neurological  Pain  Goal: Verbalizes/displays adequate comfort level or baseline comfort level  7/1/2025 2248 by Denia Marin, RN  Outcome: Progressing  Flowsheets (Taken 7/1/2025 2248)  Verbalizes/displays adequate comfort level or baseline comfort level:   Encourage patient to monitor pain and request assistance   Assess pain using appropriate pain scale   Administer analgesics based on type and severity of pain and evaluate response   Implement non-pharmacological measures as appropriate and evaluate response  7/1/2025 1719 by Savannah Henriquez, RN  Outcome: Progressing

## 2025-07-02 NOTE — PROGRESS NOTES
Occupational Therapy    [facility]  Occupational Therapy Not Seen Note    DATE: 2025    NAME: Albertina Manning  MRN: [id number]   : 1955      Patient not seen this date for Occupational Therapy due to:    Patient Declined: Pt declined this date d/t too tired currently. Pt educated on benefits of OOB activity with Pt stating at this time too tired to complete. Will continue to follow.    Next Scheduled Treatment: 7/3/25    Electronically signed by JAMSHID IRAHETA on 2025 at 3:28 PM

## 2025-07-02 NOTE — CARE COORDINATION
Patient has authorization for [place].     3:15 PM Attempted to send P/S to Dr Lewis to make aware of authorization and if patient is ready for discharge. P/S went to [facility].

## 2025-07-03 VITALS
DIASTOLIC BLOOD PRESSURE: 57 MMHG | SYSTOLIC BLOOD PRESSURE: 98 MMHG | RESPIRATION RATE: 18 BRPM | OXYGEN SATURATION: 93 % | WEIGHT: 104.5 LBS | TEMPERATURE: 98.4 F | HEIGHT: 57 IN | BODY MASS INDEX: 22.54 KG/M2 | HEART RATE: 98 BPM

## 2025-07-03 DIAGNOSIS — Z17.0 MALIGNANT NEOPLASM OF CENTRAL PORTION OF RIGHT BREAST IN FEMALE, ESTROGEN RECEPTOR POSITIVE (HCC): ICD-10-CM

## 2025-07-03 DIAGNOSIS — Z17.0 MALIGNANT NEOPLASM OF RIGHT BREAST IN FEMALE, ESTROGEN RECEPTOR POSITIVE, UNSPECIFIED SITE OF BREAST (HCC): ICD-10-CM

## 2025-07-03 DIAGNOSIS — C79.51 SECONDARY MALIGNANT NEOPLASM OF BONE (HCC): ICD-10-CM

## 2025-07-03 DIAGNOSIS — C50.911 MALIGNANT NEOPLASM OF RIGHT BREAST IN FEMALE, ESTROGEN RECEPTOR POSITIVE, UNSPECIFIED SITE OF BREAST (HCC): ICD-10-CM

## 2025-07-03 DIAGNOSIS — C50.111 MALIGNANT NEOPLASM OF CENTRAL PORTION OF RIGHT BREAST IN FEMALE, ESTROGEN RECEPTOR POSITIVE (HCC): ICD-10-CM

## 2025-07-03 LAB
ANION GAP SERPL CALCULATED.3IONS-SCNC: 14 MMOL/L (ref 9–16)
BASOPHILS # BLD: 0.09 K/UL (ref 0–0.2)
BASOPHILS NFR BLD: 1 % (ref 0–2)
BUN SERPL-MCNC: 22 MG/DL (ref 8–23)
CALCIUM SERPL-MCNC: 7.7 MG/DL (ref 8.6–10.4)
CHLORIDE SERPL-SCNC: 100 MMOL/L (ref 98–107)
CO2 SERPL-SCNC: 21 MMOL/L (ref 20–31)
CREAT SERPL-MCNC: 0.6 MG/DL (ref 0.6–0.9)
EOSINOPHIL # BLD: 0.27 K/UL (ref 0–0.4)
EOSINOPHILS RELATIVE PERCENT: 3 % (ref 1–4)
ERYTHROCYTE [DISTWIDTH] IN BLOOD BY AUTOMATED COUNT: 14 % (ref 11.8–14.4)
GFR, ESTIMATED: >90 ML/MIN/1.73M2
GLUCOSE BLD-MCNC: 101 MG/DL (ref 65–105)
GLUCOSE BLD-MCNC: 108 MG/DL (ref 65–105)
GLUCOSE BLD-MCNC: 99 MG/DL (ref 65–105)
GLUCOSE SERPL-MCNC: 105 MG/DL (ref 74–99)
HCT VFR BLD AUTO: 28.6 % (ref 36.3–47.1)
HGB BLD-MCNC: 9.4 G/DL (ref 11.9–15.1)
IMM GRANULOCYTES # BLD AUTO: 0.27 K/UL (ref 0–0.3)
IMM GRANULOCYTES NFR BLD: 3 %
LYMPHOCYTES NFR BLD: 1.51 K/UL (ref 1–4.8)
LYMPHOCYTES RELATIVE PERCENT: 17 % (ref 24–44)
MCH RBC QN AUTO: 37.5 PG (ref 25.2–33.5)
MCHC RBC AUTO-ENTMCNC: 32.9 G/DL (ref 28.4–34.8)
MCV RBC AUTO: 113.9 FL (ref 82.6–102.9)
MONOCYTES NFR BLD: 0.89 K/UL (ref 0.1–0.8)
MONOCYTES NFR BLD: 10 % (ref 1–7)
MORPHOLOGY: ABNORMAL
MORPHOLOGY: ABNORMAL
NEUTROPHILS NFR BLD: 66 % (ref 36–66)
NEUTS SEG NFR BLD: 5.87 K/UL (ref 1.8–7.7)
NRBC BLD-RTO: 0.4 PER 100 WBC
PLATELET # BLD AUTO: 368 K/UL (ref 138–453)
PMV BLD AUTO: 10.4 FL (ref 8.1–13.5)
POTASSIUM SERPL-SCNC: 4.4 MMOL/L (ref 3.7–5.3)
RBC # BLD AUTO: 2.51 M/UL (ref 3.95–5.11)
SODIUM SERPL-SCNC: 135 MMOL/L (ref 136–145)
WBC OTHER # BLD: 8.9 K/UL (ref 3.5–11.3)

## 2025-07-03 PROCEDURE — 85025 COMPLETE CBC W/AUTO DIFF WBC: CPT

## 2025-07-03 PROCEDURE — 2500000003 HC RX 250 WO HCPCS: Performed by: STUDENT IN AN ORGANIZED HEALTH CARE EDUCATION/TRAINING PROGRAM

## 2025-07-03 PROCEDURE — 99239 HOSP IP/OBS DSCHRG MGMT >30: CPT | Performed by: STUDENT IN AN ORGANIZED HEALTH CARE EDUCATION/TRAINING PROGRAM

## 2025-07-03 PROCEDURE — 6370000000 HC RX 637 (ALT 250 FOR IP): Performed by: STUDENT IN AN ORGANIZED HEALTH CARE EDUCATION/TRAINING PROGRAM

## 2025-07-03 PROCEDURE — 94761 N-INVAS EAR/PLS OXIMETRY MLT: CPT

## 2025-07-03 PROCEDURE — 6360000002 HC RX W HCPCS: Performed by: STUDENT IN AN ORGANIZED HEALTH CARE EDUCATION/TRAINING PROGRAM

## 2025-07-03 PROCEDURE — 36415 COLL VENOUS BLD VENIPUNCTURE: CPT

## 2025-07-03 PROCEDURE — 80048 BASIC METABOLIC PNL TOTAL CA: CPT

## 2025-07-03 PROCEDURE — 82947 ASSAY GLUCOSE BLOOD QUANT: CPT

## 2025-07-03 RX ORDER — NICOTINE 21 MG/24HR
1 PATCH, TRANSDERMAL 24 HOURS TRANSDERMAL DAILY
Qty: 30 PATCH | Refills: 3 | Status: SHIPPED | OUTPATIENT
Start: 2025-07-04

## 2025-07-03 RX ORDER — SENNA AND DOCUSATE SODIUM 50; 8.6 MG/1; MG/1
2 TABLET, FILM COATED ORAL DAILY PRN
Qty: 60 TABLET | Refills: 0 | Status: SHIPPED | OUTPATIENT
Start: 2025-07-03 | End: 2025-08-02

## 2025-07-03 RX ADMIN — SODIUM CHLORIDE, PRESERVATIVE FREE 40 MG: 5 INJECTION INTRAVENOUS at 09:32

## 2025-07-03 RX ADMIN — GUAIFENESIN 600 MG: 600 TABLET, MULTILAYER, EXTENDED RELEASE ORAL at 09:32

## 2025-07-03 RX ADMIN — SODIUM CHLORIDE, PRESERVATIVE FREE 10 ML: 5 INJECTION INTRAVENOUS at 09:32

## 2025-07-03 RX ADMIN — HEPARIN SODIUM 5000 UNITS: 5000 INJECTION INTRAVENOUS; SUBCUTANEOUS at 05:41

## 2025-07-03 RX ADMIN — DULOXETINE 20 MG: 20 CAPSULE, DELAYED RELEASE ORAL at 09:32

## 2025-07-03 RX ADMIN — HEPARIN SODIUM 5000 UNITS: 5000 INJECTION INTRAVENOUS; SUBCUTANEOUS at 14:05

## 2025-07-03 RX ADMIN — ASPIRIN 81 MG: 81 TABLET, COATED ORAL at 09:32

## 2025-07-03 RX ADMIN — ATORVASTATIN CALCIUM 10 MG: 10 TABLET, FILM COATED ORAL at 09:32

## 2025-07-03 RX ADMIN — OXYCODONE 10 MG: 5 TABLET ORAL at 05:41

## 2025-07-03 ASSESSMENT — PAIN SCALES - GENERAL
PAINLEVEL_OUTOF10: 6
PAINLEVEL_OUTOF10: 8

## 2025-07-03 ASSESSMENT — PAIN DESCRIPTION - LOCATION: LOCATION: ABDOMEN

## 2025-07-03 ASSESSMENT — PAIN DESCRIPTION - DESCRIPTORS: DESCRIPTORS: ACHING;DISCOMFORT

## 2025-07-03 NOTE — PLAN OF CARE
Problem: Seizure Precautions  Goal: Remains free of injury related to seizures activity  Outcome: Progressing  Flowsheets (Taken 6/23/2025 0730 by Molly Byrne, RN)  Remains free of injury related to seizure activity:   Maintain airway, patient safety  and administer oxygen as ordered   Monitor patient for seizure activity, document and report duration and description of seizure to Licensed Independent Practitioner   If seizure occurs, turn patient to side and suction secretions as needed   Reorient patient post seizure     Problem: Safety - Adult  Goal: Free from fall injury  Outcome: Progressing  Flowsheets (Taken 6/19/2025 2056 by Carolyn Del Cid, RN)  Free From Fall Injury:   Instruct family/caregiver on patient safety   Based on caregiver fall risk screen, instruct family/caregiver to ask for assistance with transferring infant if caregiver noted to have fall risk factors     Problem: Discharge Planning  Goal: Discharge to home or other facility with appropriate resources  Outcome: Progressing  Flowsheets (Taken 6/23/2025 0830 by Molly Byrne, RN)  Discharge to home or other facility with appropriate resources:   Identify barriers to discharge with patient and caregiver   Arrange for needed discharge resources and transportation as appropriate   Identify discharge learning needs (meds, wound care, etc)     Problem: Nutrition Deficit:  Goal: Optimize nutritional status  7/2/2025 1549 by Raina Patel, ASHA  Outcome: Progressing  Flowsheets  Taken 7/2/2025 1549  Nutrient intake appropriate for improving, restoring, or maintaining nutritional needs:   Monitor oral intake, labs, and treatment plans   Recommend, monitor, and adjust tube feedings and TPN/PPN based on assessed needs  Taken 7/2/2025 1535  Nutrient intake appropriate for improving, restoring, or maintaining nutritional needs:   Monitor oral intake, labs, and treatment plans   Recommend, monitor, and adjust tube feedings and TPN/PPN based on  assessed needs     Problem: Pain  Goal: Verbalizes/displays adequate comfort level or baseline comfort level  Outcome: Progressing  Flowsheets (Taken 7/1/2025 5814)  Verbalizes/displays adequate comfort level or baseline comfort level:   Encourage patient to monitor pain and request assistance   Assess pain using appropriate pain scale   Administer analgesics based on type and severity of pain and evaluate response   Implement non-pharmacological measures as appropriate and evaluate response

## 2025-07-03 NOTE — DISCHARGE SUMMARY
[facility]  Office: [phone]  Robert Monreal DO, Rocky Cobb DO, Harshal Saldana DO, Wei Block DO, Dipika Saleem MD, Angelica Connelly MD, Navjot Hutchinson MD, Olga Lidia Woodson MD,  Nacho Huertas MD, Edmundo Mariano MD, Kervin Linares DO, Orestes Garnica MD,  Elie Reynolds DO, Curtis Martins MD, Joseph Crowe MD, Kurt Monreal DO, Regla Hill MD,  Romel Duarte DO, Kathia Benavidez MD, Nelsy Hermosillo MD, Evelina Cortez MD, Matthew Naik MD,  Keaton Sanchez MD, Yanet Bentley MD, Russ Doan MD, Norberto Mcdaniels DO, Дмитрий Pereira MD,  Hayder Suarez MD, Shirley Waterhouse, CNP,  Maxine Badillo, CNP, Raysa Mcgregor, CNP, Vijay Qureshi, CNP,  Dena Tolliver, DNP, Shayy Cordero, CNP, Geno Carrillo, CNP, Jahaira Moon, CNP, Mei Zazueta, CNP, Abiola Santiago, CNP, Richi George PA-C, Mayda Robertson, CNS, Christiana Miranda, CNP, Aline Larson, CNP         [facility]   IN-PATIENT SERVICE   [facility]    Discharge Summary     Patient ID: Albertina Manning  :  1955   MRN: [id number]     ACCOUNT:  [id number]   Patient's PCP: Rocky Tobias Sr., DO  Admit Date: 2025   Discharge Date: 7/3/2025  Length of Stay: 19  Code Status:  Full Code  Admitting Physician: No admitting provider for patient encounter.  Discharge Physician: Camilo Lewis MD     Active Discharge Diagnoses:     Hospital Problem Lists:  Principal Problem:    Pneumonia due to organism  Active Problems:    Chest pain    NSTEMI (non-ST elevated myocardial infarction) (HCC)    Prolonged Q-T interval on ECG    Atherosclerosis of native coronary artery of native heart without angina pectoris    Elevated troponin    Hypernatremia    Hypermagnesemia    Acute kidney injury superimposed on stage 3b chronic kidney disease (HCC)    Dementia (HCC)    AMS (altered mental status)    Dehydration    History of schizoaffective disorder    History of breast cancer    Hypokalemia    Acute metabolic  200 MG/5ML Liqd     ibuprofen 400 MG tablet  Commonly known as: ADVIL;MOTRIN     letrozole 2.5 MG tablet  Commonly known as: FEMARA  TAKE 1 TABLET BY MOUTH DAILY     LIDOCAINE (LIDODERM) 5% PATCH AND REMOVAL     magnesium oxide 400 (240 Mg) MG tablet  Commonly known as: MAG-OX     Melatonin 3 MG Caps     OLANZapine 2.5 MG tablet  Commonly known as: ZYPREXA     ondansetron 4 MG tablet  Commonly known as: ZOFRAN  Take 1 tablet by mouth every 8 hours as needed for Nausea or Vomiting     potassium chloride 20 MEQ/15ML (10%) oral solution     propranolol 40 MG immediate release tablet  Commonly known as: INDERAL     sulfacetamide 10 % ophthalmic solution  Commonly known as: BLEPH-10     traZODone 50 MG tablet  Commonly known as: DESYREL            STOP taking these medications      LORazepam 1 MG tablet  Commonly known as: ATIVAN     sulfamethoxazole-trimethoprim 800-160 MG per tablet  Commonly known as: BACTRIM DS;SEPTRA DS               Where to Get Your Medications        These medications were sent to [facility] [address] [place] [address] Filipe HINDS [phone] - F [phone] [address] [facility] [place] [address]      Phone: [phone]   Abemaciclib 150 MG Tabs       [facility] -  [phone] - F [phone]  [address] [place] [address]      Phone: [phone]   aspirin 81 MG EC tablet  mirtazapine 7.5 MG tablet  nicotine 14 MG/24HR  sennosides-docusate sodium 8.6-50 MG tablet         Time spent on discharge is 32 mins in patient examination, evaluation, counseling as well as medication reconciliation, prescriptions for required medications, discharge plan and follow up.    Electronically signed by   Camilo Lewis MD  7/3/2025  11:50 AM      Thank you Rocky Yeung P Sr., DO for the opportunity to be involved in this patient's care.

## 2025-07-03 NOTE — CARE COORDINATION
Have auth for patient to go to [place]. P/S to Dr Lewis for discharge order if patient is ready.    2:00 PM Referral faxed to [facility] for stretcher transport.    Call from [facility] that have transportation arranged w/ Kash for 4:15 PM.

## 2025-07-03 NOTE — PLAN OF CARE
Problem: Seizure Precautions  Goal: Remains free of injury related to seizures activity  7/3/2025 1508 by Rolan Phelps, RN  Outcome: Adequate for Discharge  7/3/2025 0326 by Denia Marin, RN  Outcome: Progressing  Flowsheets (Taken 6/23/2025 0730 by Molly Byrne, RN)  Remains free of injury related to seizure activity:   Maintain airway, patient safety  and administer oxygen as ordered   Monitor patient for seizure activity, document and report duration and description of seizure to Licensed Independent Practitioner   If seizure occurs, turn patient to side and suction secretions as needed   Reorient patient post seizure     Problem: Respiratory - Adult  Goal: Achieves optimal ventilation and oxygenation  Outcome: Adequate for Discharge     Problem: Neurosensory - Adult  Goal: Achieves stable or improved neurological status  Outcome: Adequate for Discharge  Goal: Absence of seizures  Outcome: Adequate for Discharge     Problem: Skin/Tissue Integrity - Adult  Goal: Skin integrity remains intact  Description: 1.  Monitor for areas of redness and/or skin breakdown  2.  Assess vascular access sites hourly  3.  Every 4-6 hours minimum:  Change oxygen saturation probe site  4.  Every 4-6 hours:  If on nasal continuous positive airway pressure, respiratory therapy assess nares and determine need for appliance change or resting period  Outcome: Adequate for Discharge  Goal: Incisions, wounds, or drain sites healing without S/S of infection  Outcome: Adequate for Discharge     Problem: Musculoskeletal - Adult  Goal: Return mobility to safest level of function  Outcome: Adequate for Discharge     Problem: Infection - Adult  Goal: Absence of infection at discharge  Outcome: Adequate for Discharge     Problem: Metabolic/Fluid and Electrolytes - Adult  Goal: Electrolytes maintained within normal limits  Outcome: Adequate for Discharge  Goal: Hemodynamic stability and optimal renal function maintained  Outcome:  Adequate for Discharge  Goal: Glucose maintained within prescribed range  Outcome: Adequate for Discharge     Problem: Skin/Tissue Integrity  Goal: Skin integrity remains intact  Description: 1.  Monitor for areas of redness and/or skin breakdown  2.  Assess vascular access sites hourly  3.  Every 4-6 hours minimum:  Change oxygen saturation probe site  4.  Every 4-6 hours:  If on nasal continuous positive airway pressure, respiratory therapy assess nares and determine need for appliance change or resting period  Outcome: Adequate for Discharge     Problem: Discharge Planning  Goal: Discharge to home or other facility with appropriate resources  7/3/2025 1508 by Rolan Phelps RN  Outcome: Adequate for Discharge  7/3/2025 0326 by Denia Marin RN  Outcome: Progressing  Flowsheets (Taken 6/23/2025 0830 by Molly Byrne, RN)  Discharge to home or other facility with appropriate resources:   Identify barriers to discharge with patient and caregiver   Arrange for needed discharge resources and transportation as appropriate   Identify discharge learning needs (meds, wound care, etc)     Problem: Nutrition Deficit:  Goal: Optimize nutritional status  Outcome: Adequate for Discharge     Problem: Pain  Goal: Verbalizes/displays adequate comfort level or baseline comfort level  7/3/2025 1508 by Rolan Phelps, RN  Outcome: Adequate for Discharge  7/3/2025 0326 by Denia Marin RN  Outcome: Progressing  Flowsheets (Taken 7/1/2025 4540)  Verbalizes/displays adequate comfort level or baseline comfort level:   Encourage patient to monitor pain and request assistance   Assess pain using appropriate pain scale   Administer analgesics based on type and severity of pain and evaluate response   Implement non-pharmacological measures as appropriate and evaluate response

## 2025-07-10 ENCOUNTER — TELEPHONE (OUTPATIENT)
Dept: ONCOLOGY | Age: 70
End: 2025-07-10

## 2025-07-10 NOTE — TELEPHONE ENCOUNTER
Anjelica the daughter of Albertina called in and stated Mom needed to cancel her apt. Anjelica had an emergency. She stated that if we rescheduled it she could have transportation set up and ;et Maday know from [facility] cause that's where she resides now . Rescheduled for 7/14 900. They will confirm tomorrow if can make it with transportation. Patient daughter called and stated that Albertina has 2 months worth of her Verzinio and it is there at the facility for her and has been given to her while she is there.

## 2025-07-14 PROBLEM — R79.89 ELEVATED TROPONIN: Status: RESOLVED | Noted: 2025-06-14 | Resolved: 2025-07-14

## 2025-07-15 PROBLEM — E86.0 DEHYDRATION: Status: RESOLVED | Noted: 2025-06-15 | Resolved: 2025-07-15

## 2025-07-21 ENCOUNTER — HOSPITAL ENCOUNTER (INPATIENT)
Age: 70
LOS: 3 days | Discharge: HOSPICE | DRG: 640 | End: 2025-07-24
Attending: EMERGENCY MEDICINE | Admitting: STUDENT IN AN ORGANIZED HEALTH CARE EDUCATION/TRAINING PROGRAM
Payer: COMMERCIAL

## 2025-07-21 ENCOUNTER — APPOINTMENT (OUTPATIENT)
Dept: GENERAL RADIOLOGY | Age: 70
DRG: 640 | End: 2025-07-21
Payer: COMMERCIAL

## 2025-07-21 ENCOUNTER — APPOINTMENT (OUTPATIENT)
Dept: CT IMAGING | Age: 70
DRG: 640 | End: 2025-07-21
Payer: COMMERCIAL

## 2025-07-21 ENCOUNTER — OFFICE VISIT (OUTPATIENT)
Dept: ONCOLOGY | Age: 70
End: 2025-07-21
Payer: COMMERCIAL

## 2025-07-21 VITALS
HEART RATE: 69 BPM | RESPIRATION RATE: 18 BRPM | DIASTOLIC BLOOD PRESSURE: 102 MMHG | WEIGHT: 106 LBS | SYSTOLIC BLOOD PRESSURE: 123 MMHG | HEIGHT: 57 IN | TEMPERATURE: 97.9 F | BODY MASS INDEX: 22.87 KG/M2

## 2025-07-21 DIAGNOSIS — E87.5 HYPERKALEMIA: Primary | ICD-10-CM

## 2025-07-21 DIAGNOSIS — C79.51 METASTASIS TO BONE (HCC): ICD-10-CM

## 2025-07-21 DIAGNOSIS — M85.89 OSTEOPENIA OF MULTIPLE SITES: ICD-10-CM

## 2025-07-21 DIAGNOSIS — R41.82 ALTERED MENTAL STATUS, UNSPECIFIED ALTERED MENTAL STATUS TYPE: ICD-10-CM

## 2025-07-21 DIAGNOSIS — C50.111 MALIGNANT NEOPLASM OF CENTRAL PORTION OF RIGHT BREAST IN FEMALE, ESTROGEN RECEPTOR POSITIVE (HCC): Primary | ICD-10-CM

## 2025-07-21 DIAGNOSIS — Z17.0 MALIGNANT NEOPLASM OF CENTRAL PORTION OF RIGHT BREAST IN FEMALE, ESTROGEN RECEPTOR POSITIVE (HCC): Primary | ICD-10-CM

## 2025-07-21 DIAGNOSIS — R41.0 CONFUSION: ICD-10-CM

## 2025-07-21 LAB
ALBUMIN SERPL-MCNC: 3.3 G/DL (ref 3.5–5.2)
ALBUMIN/GLOB SERPL: 1.2 {RATIO} (ref 1–2.5)
ALP SERPL-CCNC: 443 U/L (ref 35–104)
ALT SERPL-CCNC: 51 U/L (ref 10–35)
ANION GAP SERPL CALCULATED.3IONS-SCNC: 13 MMOL/L (ref 9–16)
ANION GAP SERPL CALCULATED.3IONS-SCNC: 14 MMOL/L (ref 9–16)
AST SERPL-CCNC: 23 U/L (ref 10–35)
BASOPHILS # BLD: 0 K/UL (ref 0–0.2)
BASOPHILS NFR BLD: 0 % (ref 0–2)
BILIRUB SERPL-MCNC: 0.2 MG/DL (ref 0–1.2)
BILIRUB UR QL STRIP: NEGATIVE
BUN SERPL-MCNC: 37 MG/DL (ref 8–23)
BUN SERPL-MCNC: 42 MG/DL (ref 8–23)
BUN/CREAT SERPL: 26 (ref 9–20)
BUN/CREAT SERPL: 28 (ref 9–20)
CALCIUM SERPL-MCNC: 7.6 MG/DL (ref 8.6–10.4)
CALCIUM SERPL-MCNC: 7.9 MG/DL (ref 8.6–10.4)
CHLORIDE SERPL-SCNC: 108 MMOL/L (ref 98–107)
CHLORIDE SERPL-SCNC: 113 MMOL/L (ref 98–107)
CLARITY UR: CLEAR
CO2 SERPL-SCNC: 16 MMOL/L (ref 20–31)
CO2 SERPL-SCNC: 20 MMOL/L (ref 20–31)
COLOR UR: YELLOW
CREAT SERPL-MCNC: 1.4 MG/DL (ref 0.5–0.9)
CREAT SERPL-MCNC: 1.5 MG/DL (ref 0.5–0.9)
CRP SERPL HS-MCNC: 16.8 MG/L (ref 0–5)
EOSINOPHIL # BLD: 0 K/UL (ref 0–0.44)
EOSINOPHILS RELATIVE PERCENT: 0 % (ref 1–4)
EPI CELLS #/AREA URNS HPF: ABNORMAL /HPF (ref 0–25)
ERYTHROCYTE [DISTWIDTH] IN BLOOD BY AUTOMATED COUNT: 14.3 % (ref 11.8–14.4)
GFR, ESTIMATED: 37 ML/MIN/1.73M2
GFR, ESTIMATED: 42 ML/MIN/1.73M2
GLUCOSE BLD-MCNC: 122 MG/DL (ref 74–100)
GLUCOSE BLD-MCNC: 70 MG/DL (ref 74–100)
GLUCOSE BLD-MCNC: 98 MG/DL (ref 74–100)
GLUCOSE SERPL-MCNC: 80 MG/DL (ref 74–99)
GLUCOSE SERPL-MCNC: 91 MG/DL (ref 74–99)
GLUCOSE UR STRIP-MCNC: ABNORMAL MG/DL
HCT VFR BLD AUTO: 28.7 % (ref 36.3–47.1)
HGB BLD-MCNC: 9 G/DL (ref 11.9–15.1)
HGB UR QL STRIP.AUTO: NEGATIVE
IMM GRANULOCYTES # BLD AUTO: 0 K/UL (ref 0–0.3)
IMM GRANULOCYTES NFR BLD: 0 %
INR PPP: 0.9
KETONES UR STRIP-MCNC: NEGATIVE MG/DL
LEUKOCYTE ESTERASE UR QL STRIP: NEGATIVE
LYMPHOCYTES NFR BLD: 1.3 K/UL (ref 1.1–3.7)
LYMPHOCYTES RELATIVE PERCENT: 13 % (ref 24–43)
MCH RBC QN AUTO: 36.3 PG (ref 25.2–33.5)
MCHC RBC AUTO-ENTMCNC: 31.4 G/DL (ref 28.4–34.8)
MCV RBC AUTO: 115.7 FL (ref 82.6–102.9)
MONOCYTES NFR BLD: 0.4 K/UL (ref 0.1–1.2)
MONOCYTES NFR BLD: 4 % (ref 3–12)
MORPHOLOGY: ABNORMAL
MUCOUS THREADS URNS QL MICRO: ABNORMAL
NEUTROPHILS NFR BLD: 83 % (ref 36–65)
NEUTS SEG NFR BLD: 8.3 K/UL (ref 1.5–8.1)
NITRITE UR QL STRIP: NEGATIVE
NRBC BLD-RTO: 0 PER 100 WBC
PH UR STRIP: 6 [PH] (ref 5–9)
PLATELET # BLD AUTO: 274 K/UL (ref 138–453)
PMV BLD AUTO: 9.9 FL (ref 8.1–13.5)
POTASSIUM SERPL-SCNC: 4.5 MMOL/L (ref 3.7–5.3)
POTASSIUM SERPL-SCNC: 6.5 MMOL/L (ref 3.7–5.3)
PROT SERPL-MCNC: 6.1 G/DL (ref 6.6–8.7)
PROT UR STRIP-MCNC: NEGATIVE MG/DL
PROTHROMBIN TIME: 12.9 SEC (ref 12–15)
RBC # BLD AUTO: 2.48 M/UL (ref 3.95–5.11)
RBC #/AREA URNS HPF: ABNORMAL /HPF (ref 0–2)
SODIUM SERPL-SCNC: 141 MMOL/L (ref 136–145)
SODIUM SERPL-SCNC: 143 MMOL/L (ref 136–145)
SP GR UR STRIP: <1.005 (ref 1.01–1.02)
T4 FREE SERPL-MCNC: 1.1 NG/DL (ref 0.9–1.7)
TROPONIN I SERPL HS-MCNC: 28 NG/L (ref 0–14)
TSH SERPL DL<=0.05 MIU/L-ACNC: 1.9 UIU/ML (ref 0.27–4.2)
UROBILINOGEN UR STRIP-ACNC: NORMAL EU/DL (ref 0–1)
WBC #/AREA URNS HPF: ABNORMAL /HPF (ref 0–5)
WBC OTHER # BLD: 10 K/UL (ref 3.5–11.3)

## 2025-07-21 PROCEDURE — 87086 URINE CULTURE/COLONY COUNT: CPT

## 2025-07-21 PROCEDURE — 84443 ASSAY THYROID STIM HORMONE: CPT

## 2025-07-21 PROCEDURE — 82746 ASSAY OF FOLIC ACID SERUM: CPT

## 2025-07-21 PROCEDURE — 2580000003 HC RX 258: Performed by: EMERGENCY MEDICINE

## 2025-07-21 PROCEDURE — 87186 SC STD MICRODIL/AGAR DIL: CPT

## 2025-07-21 PROCEDURE — 87077 CULTURE AEROBIC IDENTIFY: CPT

## 2025-07-21 PROCEDURE — 2500000003 HC RX 250 WO HCPCS: Performed by: EMERGENCY MEDICINE

## 2025-07-21 PROCEDURE — 93005 ELECTROCARDIOGRAM TRACING: CPT | Performed by: EMERGENCY MEDICINE

## 2025-07-21 PROCEDURE — 82947 ASSAY GLUCOSE BLOOD QUANT: CPT

## 2025-07-21 PROCEDURE — 6370000000 HC RX 637 (ALT 250 FOR IP): Performed by: EMERGENCY MEDICINE

## 2025-07-21 PROCEDURE — 94664 DEMO&/EVAL PT USE INHALER: CPT

## 2025-07-21 PROCEDURE — 70450 CT HEAD/BRAIN W/O DYE: CPT

## 2025-07-21 PROCEDURE — 87205 SMEAR GRAM STAIN: CPT

## 2025-07-21 PROCEDURE — 84484 ASSAY OF TROPONIN QUANT: CPT

## 2025-07-21 PROCEDURE — 82607 VITAMIN B-12: CPT

## 2025-07-21 PROCEDURE — 82306 VITAMIN D 25 HYDROXY: CPT

## 2025-07-21 PROCEDURE — 96375 TX/PRO/DX INJ NEW DRUG ADDON: CPT

## 2025-07-21 PROCEDURE — 84439 ASSAY OF FREE THYROXINE: CPT

## 2025-07-21 PROCEDURE — 36415 COLL VENOUS BLD VENIPUNCTURE: CPT

## 2025-07-21 PROCEDURE — 80048 BASIC METABOLIC PNL TOTAL CA: CPT

## 2025-07-21 PROCEDURE — 87075 CULTR BACTERIA EXCEPT BLOOD: CPT

## 2025-07-21 PROCEDURE — 99285 EMERGENCY DEPT VISIT HI MDM: CPT

## 2025-07-21 PROCEDURE — 94761 N-INVAS EAR/PLS OXIMETRY MLT: CPT

## 2025-07-21 PROCEDURE — 2580000003 HC RX 258: Performed by: STUDENT IN AN ORGANIZED HEALTH CARE EDUCATION/TRAINING PROGRAM

## 2025-07-21 PROCEDURE — 1126F AMNT PAIN NOTED NONE PRSNT: CPT | Performed by: INTERNAL MEDICINE

## 2025-07-21 PROCEDURE — 99215 OFFICE O/P EST HI 40 MIN: CPT | Performed by: INTERNAL MEDICINE

## 2025-07-21 PROCEDURE — 94150 VITAL CAPACITY TEST: CPT

## 2025-07-21 PROCEDURE — 1159F MED LIST DOCD IN RCRD: CPT | Performed by: INTERNAL MEDICINE

## 2025-07-21 PROCEDURE — 87070 CULTURE OTHR SPECIMN AEROBIC: CPT

## 2025-07-21 PROCEDURE — 86140 C-REACTIVE PROTEIN: CPT

## 2025-07-21 PROCEDURE — 87040 BLOOD CULTURE FOR BACTERIA: CPT

## 2025-07-21 PROCEDURE — 81001 URINALYSIS AUTO W/SCOPE: CPT

## 2025-07-21 PROCEDURE — 6360000002 HC RX W HCPCS: Performed by: EMERGENCY MEDICINE

## 2025-07-21 PROCEDURE — 80053 COMPREHEN METABOLIC PANEL: CPT

## 2025-07-21 PROCEDURE — 6360000002 HC RX W HCPCS: Performed by: STUDENT IN AN ORGANIZED HEALTH CARE EDUCATION/TRAINING PROGRAM

## 2025-07-21 PROCEDURE — 74176 CT ABD & PELVIS W/O CONTRAST: CPT

## 2025-07-21 PROCEDURE — 1123F ACP DISCUSS/DSCN MKR DOCD: CPT | Performed by: INTERNAL MEDICINE

## 2025-07-21 PROCEDURE — 96374 THER/PROPH/DIAG INJ IV PUSH: CPT

## 2025-07-21 PROCEDURE — 1200000000 HC SEMI PRIVATE

## 2025-07-21 PROCEDURE — 85610 PROTHROMBIN TIME: CPT

## 2025-07-21 PROCEDURE — 84145 PROCALCITONIN (PCT): CPT

## 2025-07-21 PROCEDURE — 71045 X-RAY EXAM CHEST 1 VIEW: CPT

## 2025-07-21 PROCEDURE — 85025 COMPLETE CBC W/AUTO DIFF WBC: CPT

## 2025-07-21 PROCEDURE — 51702 INSERT TEMP BLADDER CATH: CPT

## 2025-07-21 PROCEDURE — 0202U NFCT DS 22 TRGT SARS-COV-2: CPT

## 2025-07-21 RX ORDER — SODIUM CHLORIDE 0.9 % (FLUSH) 0.9 %
10 SYRINGE (ML) INJECTION PRN
Status: DISCONTINUED | OUTPATIENT
Start: 2025-07-21 | End: 2025-07-24 | Stop reason: HOSPADM

## 2025-07-21 RX ORDER — 0.9 % SODIUM CHLORIDE 0.9 %
1000 INTRAVENOUS SOLUTION INTRAVENOUS ONCE
Status: COMPLETED | OUTPATIENT
Start: 2025-07-21 | End: 2025-07-21

## 2025-07-21 RX ORDER — ACETAMINOPHEN 325 MG/1
650 TABLET ORAL EVERY 6 HOURS PRN
Status: DISCONTINUED | OUTPATIENT
Start: 2025-07-21 | End: 2025-07-23

## 2025-07-21 RX ORDER — DEXTROSE MONOHYDRATE 25 G/50ML
25 INJECTION, SOLUTION INTRAVENOUS ONCE
Status: COMPLETED | OUTPATIENT
Start: 2025-07-21 | End: 2025-07-21

## 2025-07-21 RX ORDER — DONEPEZIL HYDROCHLORIDE 5 MG/1
5 TABLET, FILM COATED ORAL NIGHTLY
Status: DISCONTINUED | OUTPATIENT
Start: 2025-07-21 | End: 2025-07-23

## 2025-07-21 RX ORDER — IBUPROFEN 400 MG/1
400 TABLET, FILM COATED ORAL EVERY 6 HOURS PRN
Status: DISCONTINUED | OUTPATIENT
Start: 2025-07-21 | End: 2025-07-23

## 2025-07-21 RX ORDER — LIDOCAINE 4 G/G
1 PATCH TOPICAL DAILY
Status: DISCONTINUED | OUTPATIENT
Start: 2025-07-21 | End: 2025-07-24 | Stop reason: HOSPADM

## 2025-07-21 RX ORDER — SENNA AND DOCUSATE SODIUM 50; 8.6 MG/1; MG/1
2 TABLET, FILM COATED ORAL DAILY PRN
Status: DISCONTINUED | OUTPATIENT
Start: 2025-07-21 | End: 2025-07-23

## 2025-07-21 RX ORDER — PROPRANOLOL HYDROCHLORIDE 10 MG/1
40 TABLET ORAL 2 TIMES DAILY
Status: DISCONTINUED | OUTPATIENT
Start: 2025-07-21 | End: 2025-07-23

## 2025-07-21 RX ORDER — DEXAMETHASONE 0.5 MG/1
2 TABLET ORAL
Status: DISCONTINUED | OUTPATIENT
Start: 2025-07-22 | End: 2025-07-23

## 2025-07-21 RX ORDER — GLUCAGON 1 MG/ML
1 KIT INJECTION PRN
Status: DISCONTINUED | OUTPATIENT
Start: 2025-07-21 | End: 2025-07-23

## 2025-07-21 RX ORDER — ACETAMINOPHEN 650 MG/1
650 SUPPOSITORY RECTAL EVERY 6 HOURS PRN
Status: DISCONTINUED | OUTPATIENT
Start: 2025-07-21 | End: 2025-07-23

## 2025-07-21 RX ORDER — DEXTROSE MONOHYDRATE 100 MG/ML
INJECTION, SOLUTION INTRAVENOUS CONTINUOUS PRN
Status: DISCONTINUED | OUTPATIENT
Start: 2025-07-21 | End: 2025-07-23

## 2025-07-21 RX ORDER — CALCIUM GLUCONATE 20 MG/ML
1000 INJECTION, SOLUTION INTRAVENOUS ONCE
Status: COMPLETED | OUTPATIENT
Start: 2025-07-21 | End: 2025-07-21

## 2025-07-21 RX ORDER — ONDANSETRON 2 MG/ML
4 INJECTION INTRAMUSCULAR; INTRAVENOUS EVERY 6 HOURS PRN
Status: DISCONTINUED | OUTPATIENT
Start: 2025-07-21 | End: 2025-07-24 | Stop reason: HOSPADM

## 2025-07-21 RX ORDER — POLYETHYLENE GLYCOL 3350 17 G/17G
17 POWDER, FOR SOLUTION ORAL DAILY PRN
Status: DISCONTINUED | OUTPATIENT
Start: 2025-07-21 | End: 2025-07-23

## 2025-07-21 RX ORDER — GUAIFENESIN 600 MG/1
600 TABLET, EXTENDED RELEASE ORAL 2 TIMES DAILY
Status: DISCONTINUED | OUTPATIENT
Start: 2025-07-22 | End: 2025-07-23

## 2025-07-21 RX ORDER — SODIUM CHLORIDE 9 MG/ML
INJECTION, SOLUTION INTRAVENOUS CONTINUOUS
Status: CANCELLED | OUTPATIENT
Start: 2025-07-21 | End: 2025-07-22

## 2025-07-21 RX ORDER — LORAZEPAM 2 MG/ML
0.5 INJECTION INTRAMUSCULAR EVERY 6 HOURS PRN
Status: DISCONTINUED | OUTPATIENT
Start: 2025-07-21 | End: 2025-07-24

## 2025-07-21 RX ORDER — HALOPERIDOL 5 MG/ML
1 INJECTION INTRAMUSCULAR ONCE
Status: COMPLETED | OUTPATIENT
Start: 2025-07-21 | End: 2025-07-21

## 2025-07-21 RX ORDER — ATORVASTATIN CALCIUM 10 MG/1
10 TABLET, FILM COATED ORAL DAILY
Status: DISCONTINUED | OUTPATIENT
Start: 2025-07-22 | End: 2025-07-23

## 2025-07-21 RX ORDER — SODIUM CHLORIDE 9 MG/ML
INJECTION, SOLUTION INTRAVENOUS PRN
Status: DISCONTINUED | OUTPATIENT
Start: 2025-07-21 | End: 2025-07-23

## 2025-07-21 RX ORDER — ACETAMINOPHEN 325 MG/1
650 TABLET ORAL EVERY 6 HOURS PRN
Status: DISCONTINUED | OUTPATIENT
Start: 2025-07-21 | End: 2025-07-22 | Stop reason: SDUPTHER

## 2025-07-21 RX ORDER — TRAZODONE HYDROCHLORIDE 50 MG/1
50 TABLET ORAL NIGHTLY PRN
Status: DISCONTINUED | OUTPATIENT
Start: 2025-07-21 | End: 2025-07-23

## 2025-07-21 RX ORDER — LETROZOLE 2.5 MG/1
2.5 TABLET, FILM COATED ORAL DAILY
Status: DISCONTINUED | OUTPATIENT
Start: 2025-07-22 | End: 2025-07-23

## 2025-07-21 RX ORDER — NICOTINE 21 MG/24HR
1 PATCH, TRANSDERMAL 24 HOURS TRANSDERMAL DAILY
Status: DISCONTINUED | OUTPATIENT
Start: 2025-07-22 | End: 2025-07-24 | Stop reason: HOSPADM

## 2025-07-21 RX ORDER — AMMONIUM LACTATE 12 G/100G
1 LOTION TOPICAL PRN
Status: DISCONTINUED | OUTPATIENT
Start: 2025-07-21 | End: 2025-07-23

## 2025-07-21 RX ORDER — ENOXAPARIN SODIUM 100 MG/ML
30 INJECTION SUBCUTANEOUS DAILY
Status: DISCONTINUED | OUTPATIENT
Start: 2025-07-21 | End: 2025-07-23

## 2025-07-21 RX ORDER — DEXTROSE MONOHYDRATE AND SODIUM CHLORIDE 5; .45 G/100ML; G/100ML
INJECTION, SOLUTION INTRAVENOUS CONTINUOUS
Status: DISCONTINUED | OUTPATIENT
Start: 2025-07-21 | End: 2025-07-23

## 2025-07-21 RX ORDER — ALBUTEROL SULFATE 0.83 MG/ML
2.5 SOLUTION RESPIRATORY (INHALATION) ONCE
Status: COMPLETED | OUTPATIENT
Start: 2025-07-21 | End: 2025-07-21

## 2025-07-21 RX ORDER — SODIUM CHLORIDE 0.9 % (FLUSH) 0.9 %
10 SYRINGE (ML) INJECTION EVERY 12 HOURS SCHEDULED
Status: DISCONTINUED | OUTPATIENT
Start: 2025-07-21 | End: 2025-07-24 | Stop reason: HOSPADM

## 2025-07-21 RX ORDER — ONDANSETRON 4 MG/1
4 TABLET, ORALLY DISINTEGRATING ORAL EVERY 8 HOURS PRN
Status: DISCONTINUED | OUTPATIENT
Start: 2025-07-21 | End: 2025-07-24 | Stop reason: HOSPADM

## 2025-07-21 RX ORDER — OLANZAPINE 2.5 MG/1
2.5 TABLET, FILM COATED ORAL 2 TIMES DAILY
Status: DISCONTINUED | OUTPATIENT
Start: 2025-07-21 | End: 2025-07-23

## 2025-07-21 RX ORDER — SULFACETAMIDE SODIUM 100 MG/ML
1 SOLUTION/ DROPS OPHTHALMIC 4 TIMES DAILY
Status: DISCONTINUED | OUTPATIENT
Start: 2025-07-21 | End: 2025-07-23

## 2025-07-21 RX ORDER — DULOXETIN HYDROCHLORIDE 30 MG/1
30 CAPSULE, DELAYED RELEASE ORAL DAILY
Status: DISCONTINUED | OUTPATIENT
Start: 2025-07-22 | End: 2025-07-23

## 2025-07-21 RX ADMIN — CALCIUM GLUCONATE 1000 MG: 20 INJECTION, SOLUTION INTRAVENOUS at 16:38

## 2025-07-21 RX ADMIN — DEXTROSE AND SODIUM CHLORIDE: 5; .45 INJECTION, SOLUTION INTRAVENOUS at 19:33

## 2025-07-21 RX ADMIN — SODIUM CHLORIDE 1000 ML: 9 INJECTION, SOLUTION INTRAVENOUS at 15:45

## 2025-07-21 RX ADMIN — ALBUTEROL SULFATE 2.5 MG: 2.5 SOLUTION RESPIRATORY (INHALATION) at 18:04

## 2025-07-21 RX ADMIN — SODIUM ZIRCONIUM CYCLOSILICATE 10 G: 10 POWDER, FOR SUSPENSION ORAL at 16:39

## 2025-07-21 RX ADMIN — DEXTROSE MONOHYDRATE 25 G: 25 INJECTION, SOLUTION INTRAVENOUS at 16:48

## 2025-07-21 RX ADMIN — INSULIN HUMAN 10 UNITS: 100 INJECTION, SOLUTION PARENTERAL at 16:51

## 2025-07-21 RX ADMIN — HALOPERIDOL LACTATE 1 MG: 5 INJECTION, SOLUTION INTRAMUSCULAR at 22:27

## 2025-07-21 ASSESSMENT — PAIN - FUNCTIONAL ASSESSMENT: PAIN_FUNCTIONAL_ASSESSMENT: 0-10

## 2025-07-21 ASSESSMENT — PAIN SCALES - GENERAL
PAINLEVEL_OUTOF10: 6
PAINLEVEL_OUTOF10: 0

## 2025-07-21 ASSESSMENT — PAIN DESCRIPTION - ORIENTATION: ORIENTATION: LOWER

## 2025-07-21 ASSESSMENT — LIFESTYLE VARIABLES
HOW MANY STANDARD DRINKS CONTAINING ALCOHOL DO YOU HAVE ON A TYPICAL DAY: PATIENT DOES NOT DRINK
HOW OFTEN DO YOU HAVE A DRINK CONTAINING ALCOHOL: NEVER

## 2025-07-21 ASSESSMENT — PAIN DESCRIPTION - LOCATION: LOCATION: ABDOMEN

## 2025-07-21 NOTE — PROGRESS NOTES
lesion and right breast ultrasound did show solid mass at 12:00 6 cm from the nipple 1.5 x 1.19 cm biopsy: From strongly ER positive [place] 5% HER2/sue negative invasive ductal carcinoma grade 1  Biopsy of the area showed metastatic mammary cancer ER positive.  CT scan did not show widespread metastatic disease.    on aromatase inhibitors and subsequently had a right lumpectomy with sentinel lymph node biopsy 1 out of 3 lymph nodes positive for malignancy  PET scan was done and she had vertebroplasty L1 no activity however in any place  MRI of the back did show pathology fracture at L1  Mammogram on January 24 no evidence of recurrence  No repeated labs and last creatinine up to 1.2          INTERIM HISTORY  Patient was not seen for 6 months  She had significant clinical course since I saw her last time and she missed few appointments  Declining performance status currently on a wheelchair  Worsening Confusion  According to nursing home records still on Verzenio and letrozole  Recently discharged from the hospital for pneumonia and confusion and high creatinine  Patient evaluated by cardiology who felt elevated troponins were related to FREDERICK and no further intervention recommended. Neurology and psychiatry were consulted for altered mental status. MRI brain was performed that showed no acute changes. Psychiatry made medication changes and patient's mental status improved. She was started on TPN due to poor oral intake. Palliative care was consulted for goals of care discussion in regards to her nutrition. GI consulted for PEG tube which was placed on 6/27. Patient started on tube feeds..           Past Surgical History:   Procedure Laterality Date    AXILLARY SURGERY Right 08/08/2023    AXILLARY LYMPH SENTINEL NODE BIOPSY DISSECTION performed by Jacquelyn Oden DO at [facility] OR    BACK SURGERY      BREAST BIOPSY Right 08/08/2023    NEEDLE DIRECTED (1030) RIGHT BREAST LUMPECTOMY, SENTINEL LYMPH NODE BIOPSY  (11)

## 2025-07-21 NOTE — ED PROVIDER NOTES
sulfacetamide (BLEPH-10) 10 % ophthalmic solution Place 1 drop into both eyes 4 times dailyHistorical Med      ondansetron (ZOFRAN) 4 MG tablet Take 1 tablet by mouth every 8 hours as needed for Nausea or Vomiting, Disp-60 tablet, R-1Normal      polyvinyl alcohol (ARTIFICIAL TEARS) 1.4 % ophthalmic solution 1 drop as neededHistorical Med      acetaminophen (TYLENOL) 325 MG tablet Take 2 tablets by mouth every 6 hours as needed for PainHistorical Med      LIDOCAINE, LIDODERM, 5% PATCH AND REMOVAL Apply 1 patch topically dailyHistorical Med      ibuprofen (ADVIL;MOTRIN) 400 MG tablet Take 1 tablet by mouth every 6 hours as needed for PainHistorical Med             ALLERGIES     Risperdal [risperidone]    FAMILY HISTORY     History reviewed. No pertinent family history.       SOCIAL HISTORY       Social History     Socioeconomic History    Marital status: Single     Spouse name: None    Number of children: None    Years of education: None    Highest education level: None   Tobacco Use    Smoking status: Every Day     Current packs/day: 0.50     Types: Cigarettes    Smokeless tobacco: Never   Vaping Use    Vaping status: Never Used   Substance and Sexual Activity    Alcohol use: Not Currently     Comment: 2 beers daily    Drug use: Never   Social History Narrative    ** Merged History Encounter **          Social Drivers of Health     Financial Resource Strain: Patient Unable To Answer (5/29/2025)    Received from [facility]    Overall Financial Resource Strain (CARDIA)     Difficulty of Paying Living Expenses: Patient unable to answer   Food Insecurity: No Food Insecurity (7/21/2025)    Hunger Vital Sign     Worried About Running Out of Food in the Last Year: Never true     Ran Out of Food in the Last Year: Never true   Transportation Needs: No Transportation Needs (7/21/2025)    PRAPARE - Transportation     Lack of Transportation (Medical): No     Lack of Transportation (Non-Medical): No   Physical

## 2025-07-21 NOTE — H&P
7.9* 7.6*   ANIONGAP 13 14   CO2 20 16*   BILITOT 0.2  --    ALKPHOS 443*  --    AST 23  --    ALT 51*  --         UA:   Lab Results   Component Value Date    COLORU Yellow 07/21/2025    WBCUA 0 TO 2 07/21/2025    RBCUA None 07/21/2025    LEUKOCYTESUR NEGATIVE 07/21/2025    NITRU NEGATIVE 07/21/2025    GLUCOSEU 2+ (A) 07/21/2025    KETUA NEGATIVE 07/21/2025    PROTEINU NEGATIVE 07/21/2025    HGBUR NEGATIVE 07/21/2025    CASTUA NOT REPORTED 02/07/2022    BACTERIA 1+ (A) 06/14/2025    YEAST PRESENCE NOTED (A) 06/14/2025       Lactic Acid:   Lab Results   Component Value Date/Time    LACTA 3.8 06/14/2025 01:54 PM    LACTA 5.6 06/14/2025 11:58 AM        D-Dimer:  No results found for: \"DDIMER\"    PT/INR:  Lab Results   Component Value Date/Time    PROTIME 12.9 07/21/2025 03:31 PM    INR 0.9 07/21/2025 03:31 PM       High Sensitivity Troponin:  Recent Labs     07/21/25  1531   TROPHS 28*       Pro-BNP:  No results found for: \"PROBNP\"    ABGs:   Lab Results   Component Value Date/Time    FIO2 INFORMATION NOT PROVIDED 06/14/2025 08:10 PM         Imaging/Diagonstics:  CT ABDOMEN PELVIS WO CONTRAST Additional Contrast? None  Result Date: 7/21/2025  EXAM: CT ABDOMEN AND PELVIS WITHOUT CONTRAST 07/21/2025 05:18:31 PM TECHNIQUE: CT of the abdomen and pelvis was performed without the administration of intravenous contrast. Multiplanar reformatted images are provided for review. Automated exposure control, iterative reconstruction, and/or weight based adjustment of the mA/kV was utilized to reduce the radiation dose to as low as reasonably achievable. COMPARISON: CT Abdomen Pelvis 06/26/2025; CT Chest Abdomen Pelvis 02/07/2022. CLINICAL HISTORY: Left sided abdominal pain. FINDINGS: LOWER CHEST: Moderate-sized sliding-type hiatal hernia is similar to prior. LIVER: The liver is unremarkable. GALLBLADDER AND BILE DUCTS: Gallbladder is unremarkable. No biliary ductal dilatation. SPLEEN: No acute abnormality. PANCREAS: No acute

## 2025-07-22 PROBLEM — T85.848A PAIN AROUND PEG TUBE SITE: Status: ACTIVE | Noted: 2025-07-22

## 2025-07-22 PROBLEM — E44.0 MODERATE MALNUTRITION: Status: ACTIVE | Noted: 2025-07-22

## 2025-07-22 LAB
25(OH)D3 SERPL-MCNC: <6 NG/ML (ref 30–100)
ALBUMIN SERPL-MCNC: 2.9 G/DL (ref 3.5–5.2)
ALBUMIN/GLOB SERPL: 1.1 {RATIO} (ref 1–2.5)
ALP SERPL-CCNC: 357 U/L (ref 35–104)
ALT SERPL-CCNC: 40 U/L (ref 10–35)
ANION GAP SERPL CALCULATED.3IONS-SCNC: 15 MMOL/L (ref 9–16)
AST SERPL-CCNC: 19 U/L (ref 10–35)
B PARAP IS1001 DNA NPH QL NAA+NON-PROBE: NOT DETECTED
B PERT DNA SPEC QL NAA+PROBE: NOT DETECTED
BASOPHILS # BLD: 0 K/UL (ref 0–0.2)
BASOPHILS NFR BLD: 0 % (ref 0–2)
BILIRUB SERPL-MCNC: 0.3 MG/DL (ref 0–1.2)
BUN SERPL-MCNC: 26 MG/DL (ref 8–23)
BUN/CREAT SERPL: 20 (ref 9–20)
C PNEUM DNA NPH QL NAA+NON-PROBE: NOT DETECTED
CALCIUM SERPL-MCNC: 7.7 MG/DL (ref 8.6–10.4)
CHLORIDE SERPL-SCNC: 112 MMOL/L (ref 98–107)
CO2 SERPL-SCNC: 16 MMOL/L (ref 20–31)
CREAT SERPL-MCNC: 1.3 MG/DL (ref 0.5–0.9)
CRP SERPL HS-MCNC: 15.9 MG/L (ref 0–5)
EKG ATRIAL RATE: 48 BPM
EKG ATRIAL RATE: 72 BPM
EKG P AXIS: 53 DEGREES
EKG P AXIS: 71 DEGREES
EKG P-R INTERVAL: 140 MS
EKG P-R INTERVAL: 150 MS
EKG Q-T INTERVAL: 462 MS
EKG Q-T INTERVAL: 534 MS
EKG QRS DURATION: 86 MS
EKG QRS DURATION: 92 MS
EKG QTC CALCULATION (BAZETT): 477 MS
EKG QTC CALCULATION (BAZETT): 505 MS
EKG R AXIS: 0 DEGREES
EKG R AXIS: 4 DEGREES
EKG T AXIS: 48 DEGREES
EKG T AXIS: 52 DEGREES
EKG VENTRICULAR RATE: 48 BPM
EKG VENTRICULAR RATE: 72 BPM
EOSINOPHIL # BLD: 0.09 K/UL (ref 0–0.44)
EOSINOPHILS RELATIVE PERCENT: 1 % (ref 1–4)
ERYTHROCYTE [DISTWIDTH] IN BLOOD BY AUTOMATED COUNT: 14.3 % (ref 11.8–14.4)
FLUAV RNA NPH QL NAA+NON-PROBE: NOT DETECTED
FLUBV RNA NPH QL NAA+NON-PROBE: NOT DETECTED
FOLATE SERPL-MCNC: 16 NG/ML (ref 4.8–24.2)
GFR, ESTIMATED: 46 ML/MIN/1.73M2
GLUCOSE BLD-MCNC: 101 MG/DL (ref 74–100)
GLUCOSE BLD-MCNC: 111 MG/DL (ref 74–100)
GLUCOSE BLD-MCNC: 121 MG/DL (ref 74–100)
GLUCOSE BLD-MCNC: 121 MG/DL (ref 74–100)
GLUCOSE BLD-MCNC: 124 MG/DL (ref 74–100)
GLUCOSE BLD-MCNC: 58 MG/DL (ref 74–100)
GLUCOSE BLD-MCNC: 67 MG/DL (ref 74–100)
GLUCOSE BLD-MCNC: 72 MG/DL (ref 74–100)
GLUCOSE BLD-MCNC: 80 MG/DL (ref 74–100)
GLUCOSE BLD-MCNC: 90 MG/DL (ref 74–100)
GLUCOSE SERPL-MCNC: 61 MG/DL (ref 74–99)
HADV DNA NPH QL NAA+NON-PROBE: NOT DETECTED
HCOV 229E RNA NPH QL NAA+NON-PROBE: NOT DETECTED
HCOV HKU1 RNA NPH QL NAA+NON-PROBE: NOT DETECTED
HCOV NL63 RNA NPH QL NAA+NON-PROBE: NOT DETECTED
HCOV OC43 RNA NPH QL NAA+NON-PROBE: NOT DETECTED
HCT VFR BLD AUTO: 25.6 % (ref 36.3–47.1)
HGB BLD-MCNC: 8.2 G/DL (ref 11.9–15.1)
HMPV RNA NPH QL NAA+NON-PROBE: NOT DETECTED
HPIV1 RNA NPH QL NAA+NON-PROBE: NOT DETECTED
HPIV2 RNA NPH QL NAA+NON-PROBE: NOT DETECTED
HPIV3 RNA NPH QL NAA+NON-PROBE: NOT DETECTED
HPIV4 RNA NPH QL NAA+NON-PROBE: NOT DETECTED
IMM GRANULOCYTES # BLD AUTO: 0 K/UL (ref 0–0.3)
IMM GRANULOCYTES NFR BLD: 0 %
LYMPHOCYTES NFR BLD: 3.33 K/UL (ref 1.1–3.7)
LYMPHOCYTES RELATIVE PERCENT: 37 % (ref 24–43)
M PNEUMO DNA NPH QL NAA+NON-PROBE: NOT DETECTED
MCH RBC QN AUTO: 37.1 PG (ref 25.2–33.5)
MCHC RBC AUTO-ENTMCNC: 32 G/DL (ref 28.4–34.8)
MCV RBC AUTO: 115.8 FL (ref 82.6–102.9)
MONOCYTES NFR BLD: 0.27 K/UL (ref 0.1–1.2)
MONOCYTES NFR BLD: 3 % (ref 3–12)
MORPHOLOGY: ABNORMAL
NEUTROPHILS NFR BLD: 59 % (ref 36–65)
NEUTS SEG NFR BLD: 5.31 K/UL (ref 1.5–8.1)
NRBC BLD-RTO: 0 PER 100 WBC
PLATELET # BLD AUTO: ABNORMAL K/UL (ref 138–453)
PLATELET, FLUORESCENCE: 191 K/UL (ref 138–453)
PLATELETS.RETICULATED NFR BLD AUTO: 1.4 % (ref 1.1–10.3)
POTASSIUM SERPL-SCNC: 4.8 MMOL/L (ref 3.7–5.3)
PROCALCITONIN SERPL-MCNC: 0.14 NG/ML (ref 0–0.09)
PROCALCITONIN SERPL-MCNC: 0.49 NG/ML (ref 0–0.09)
PROT SERPL-MCNC: 5.5 G/DL (ref 6.6–8.7)
RBC # BLD AUTO: 2.21 M/UL (ref 3.95–5.11)
RSV RNA NPH QL NAA+NON-PROBE: NOT DETECTED
RV+EV RNA NPH QL NAA+NON-PROBE: NOT DETECTED
SARS-COV-2 RNA NPH QL NAA+NON-PROBE: NOT DETECTED
SODIUM SERPL-SCNC: 143 MMOL/L (ref 136–145)
SPECIMEN DESCRIPTION: NORMAL
VIT B12 SERPL-MCNC: 522 PG/ML (ref 232–1245)
WBC OTHER # BLD: 9 K/UL (ref 3.5–11.3)

## 2025-07-22 PROCEDURE — 36415 COLL VENOUS BLD VENIPUNCTURE: CPT

## 2025-07-22 PROCEDURE — 1200000000 HC SEMI PRIVATE

## 2025-07-22 PROCEDURE — 82947 ASSAY GLUCOSE BLOOD QUANT: CPT

## 2025-07-22 PROCEDURE — 2580000003 HC RX 258: Performed by: STUDENT IN AN ORGANIZED HEALTH CARE EDUCATION/TRAINING PROGRAM

## 2025-07-22 PROCEDURE — 6370000000 HC RX 637 (ALT 250 FOR IP): Performed by: STUDENT IN AN ORGANIZED HEALTH CARE EDUCATION/TRAINING PROGRAM

## 2025-07-22 PROCEDURE — 93010 ELECTROCARDIOGRAM REPORT: CPT | Performed by: INTERNAL MEDICINE

## 2025-07-22 PROCEDURE — 2580000003 HC RX 258: Performed by: NURSE PRACTITIONER

## 2025-07-22 PROCEDURE — 2500000003 HC RX 250 WO HCPCS

## 2025-07-22 PROCEDURE — 84145 PROCALCITONIN (PCT): CPT

## 2025-07-22 PROCEDURE — 99231 SBSQ HOSP IP/OBS SF/LOW 25: CPT | Performed by: SURGERY

## 2025-07-22 PROCEDURE — 94761 N-INVAS EAR/PLS OXIMETRY MLT: CPT

## 2025-07-22 PROCEDURE — 86140 C-REACTIVE PROTEIN: CPT

## 2025-07-22 PROCEDURE — 80053 COMPREHEN METABOLIC PANEL: CPT

## 2025-07-22 PROCEDURE — 6360000002 HC RX W HCPCS: Performed by: NURSE PRACTITIONER

## 2025-07-22 PROCEDURE — 85025 COMPLETE CBC W/AUTO DIFF WBC: CPT

## 2025-07-22 PROCEDURE — 6360000002 HC RX W HCPCS: Performed by: STUDENT IN AN ORGANIZED HEALTH CARE EDUCATION/TRAINING PROGRAM

## 2025-07-22 PROCEDURE — 2500000003 HC RX 250 WO HCPCS: Performed by: STUDENT IN AN ORGANIZED HEALTH CARE EDUCATION/TRAINING PROGRAM

## 2025-07-22 RX ORDER — HALOPERIDOL 5 MG/ML
1 INJECTION INTRAMUSCULAR EVERY 6 HOURS PRN
Status: DISCONTINUED | OUTPATIENT
Start: 2025-07-22 | End: 2025-07-22

## 2025-07-22 RX ORDER — ZIPRASIDONE MESYLATE 20 MG/ML
10 INJECTION, POWDER, LYOPHILIZED, FOR SOLUTION INTRAMUSCULAR ONCE
Status: COMPLETED | OUTPATIENT
Start: 2025-07-22 | End: 2025-07-22

## 2025-07-22 RX ORDER — HALOPERIDOL 5 MG/ML
1 INJECTION INTRAMUSCULAR ONCE
Status: COMPLETED | OUTPATIENT
Start: 2025-07-22 | End: 2025-07-22

## 2025-07-22 RX ORDER — WATER 10 ML/10ML
INJECTION INTRAMUSCULAR; INTRAVENOUS; SUBCUTANEOUS
Status: COMPLETED
Start: 2025-07-22 | End: 2025-07-22

## 2025-07-22 RX ORDER — LORAZEPAM 2 MG/ML
0.75 INJECTION INTRAMUSCULAR ONCE
Status: DISCONTINUED | OUTPATIENT
Start: 2025-07-22 | End: 2025-07-24 | Stop reason: HOSPADM

## 2025-07-22 RX ADMIN — HALOPERIDOL LACTATE 1 MG: 5 INJECTION, SOLUTION INTRAMUSCULAR at 07:57

## 2025-07-22 RX ADMIN — GLUCAGON 1 MG: 1 INJECTION, POWDER, LYOPHILIZED, FOR SOLUTION INTRAMUSCULAR; INTRAVENOUS at 04:03

## 2025-07-22 RX ADMIN — DEXTROSE MONOHYDRATE 125 ML: 100 INJECTION, SOLUTION INTRAVENOUS at 20:01

## 2025-07-22 RX ADMIN — HALOPERIDOL LACTATE 1 MG: 5 INJECTION, SOLUTION INTRAMUSCULAR at 13:17

## 2025-07-22 RX ADMIN — DEXTROSE AND SODIUM CHLORIDE: 5; .45 INJECTION, SOLUTION INTRAVENOUS at 08:02

## 2025-07-22 RX ADMIN — WATER 1000 MG: 1 INJECTION INTRAMUSCULAR; INTRAVENOUS; SUBCUTANEOUS at 00:22

## 2025-07-22 RX ADMIN — DOXYCYCLINE 100 MG: 100 INJECTION, POWDER, LYOPHILIZED, FOR SOLUTION INTRAVENOUS at 18:36

## 2025-07-22 RX ADMIN — LORAZEPAM 0.5 MG: 2 INJECTION INTRAMUSCULAR; INTRAVENOUS at 02:38

## 2025-07-22 RX ADMIN — DEXTROSE MONOHYDRATE 125 ML: 100 INJECTION, SOLUTION INTRAVENOUS at 08:14

## 2025-07-22 RX ADMIN — WATER 1000 MG: 1 INJECTION INTRAMUSCULAR; INTRAVENOUS; SUBCUTANEOUS at 21:15

## 2025-07-22 RX ADMIN — DEXTROSE MONOHYDRATE 125 ML: 100 INJECTION, SOLUTION INTRAVENOUS at 14:19

## 2025-07-22 RX ADMIN — ZIPRASIDONE MESYLATE 10 MG: 20 INJECTION, POWDER, LYOPHILIZED, FOR SOLUTION INTRAMUSCULAR at 14:59

## 2025-07-22 RX ADMIN — WATER: 1 INJECTION INTRAMUSCULAR; INTRAVENOUS; SUBCUTANEOUS at 04:19

## 2025-07-22 NOTE — CONSULTS
Patient still has an open draining wound from her PEG placement and removal.     She has skin irritation form the drainage.     Normally I would suggest just covering it with and absorptive dressing, but currently she is pretty hostile to the staff, so just try to keep her skin clean.  If it gets worse she may need to be restrained so wound and skin care can be perform.  Currently this is a minor problem which hopefully not progress.

## 2025-07-22 NOTE — PLAN OF CARE
Problem: Pain  Goal: Verbalizes/displays adequate comfort level or baseline comfort level  Outcome: Progressing  Flowsheets (Taken 7/22/2025 0217)  Verbalizes/displays adequate comfort level or baseline comfort level:   Encourage patient to monitor pain and request assistance   Assess pain using appropriate pain scale     Problem: Safety - Adult  Goal: Free from fall injury  Outcome: Progressing  Flowsheets (Taken 7/22/2025 0217)  Free From Fall Injury: Instruct family/caregiver on patient safety

## 2025-07-23 LAB
ALBUMIN SERPL-MCNC: 2.8 G/DL (ref 3.5–5.2)
ALBUMIN/GLOB SERPL: 1.2 {RATIO} (ref 1–2.5)
ALP SERPL-CCNC: 305 U/L (ref 35–104)
ALT SERPL-CCNC: 34 U/L (ref 10–35)
ANION GAP SERPL CALCULATED.3IONS-SCNC: 14 MMOL/L (ref 9–16)
AST SERPL-CCNC: 24 U/L (ref 10–35)
BASOPHILS # BLD: 0.08 K/UL (ref 0–0.2)
BASOPHILS NFR BLD: 1 % (ref 0–2)
BILIRUB SERPL-MCNC: 0.3 MG/DL (ref 0–1.2)
BUN SERPL-MCNC: 12 MG/DL (ref 8–23)
BUN/CREAT SERPL: 12 (ref 9–20)
CALCIUM SERPL-MCNC: 7.2 MG/DL (ref 8.6–10.4)
CHLORIDE SERPL-SCNC: 107 MMOL/L (ref 98–107)
CO2 SERPL-SCNC: 17 MMOL/L (ref 20–31)
CREAT SERPL-MCNC: 1 MG/DL (ref 0.5–0.9)
CRP SERPL HS-MCNC: 53.7 MG/L (ref 0–5)
EOSINOPHIL # BLD: 0.24 K/UL (ref 0–0.44)
EOSINOPHILS RELATIVE PERCENT: 3 % (ref 1–4)
ERYTHROCYTE [DISTWIDTH] IN BLOOD BY AUTOMATED COUNT: 14 % (ref 11.8–14.4)
GFR, ESTIMATED: 59 ML/MIN/1.73M2
GLUCOSE BLD-MCNC: 103 MG/DL (ref 74–100)
GLUCOSE BLD-MCNC: 69 MG/DL (ref 74–100)
GLUCOSE BLD-MCNC: 84 MG/DL (ref 74–100)
GLUCOSE BLD-MCNC: 85 MG/DL (ref 74–100)
GLUCOSE BLD-MCNC: 91 MG/DL (ref 74–100)
GLUCOSE SERPL-MCNC: 99 MG/DL (ref 74–99)
HCT VFR BLD AUTO: 25.7 % (ref 36.3–47.1)
HGB BLD-MCNC: 8.3 G/DL (ref 11.9–15.1)
IMM GRANULOCYTES # BLD AUTO: 0.07 K/UL (ref 0–0.3)
IMM GRANULOCYTES NFR BLD: 1 %
LYMPHOCYTES NFR BLD: 2.11 K/UL (ref 1.1–3.7)
LYMPHOCYTES RELATIVE PERCENT: 26 % (ref 24–43)
MCH RBC QN AUTO: 37.1 PG (ref 25.2–33.5)
MCHC RBC AUTO-ENTMCNC: 32.3 G/DL (ref 28.4–34.8)
MCV RBC AUTO: 114.7 FL (ref 82.6–102.9)
MICROORGANISM SPEC CULT: NO GROWTH
MONOCYTES NFR BLD: 0.36 K/UL (ref 0.1–1.2)
MONOCYTES NFR BLD: 4 % (ref 3–12)
NEUTROPHILS NFR BLD: 65 % (ref 36–65)
NEUTS SEG NFR BLD: 5.29 K/UL (ref 1.5–8.1)
NRBC BLD-RTO: 0 PER 100 WBC
PLATELET # BLD AUTO: 181 K/UL (ref 138–453)
PMV BLD AUTO: 9.9 FL (ref 8.1–13.5)
POTASSIUM SERPL-SCNC: 4 MMOL/L (ref 3.7–5.3)
PROCALCITONIN SERPL-MCNC: 1.57 NG/ML (ref 0–0.09)
PROT SERPL-MCNC: 5.2 G/DL (ref 6.6–8.7)
RBC # BLD AUTO: 2.24 M/UL (ref 3.95–5.11)
SERVICE CMNT-IMP: NORMAL
SODIUM SERPL-SCNC: 138 MMOL/L (ref 136–145)
SPECIMEN DESCRIPTION: NORMAL
WBC OTHER # BLD: 8.2 K/UL (ref 3.5–11.3)

## 2025-07-23 PROCEDURE — 80053 COMPREHEN METABOLIC PANEL: CPT

## 2025-07-23 PROCEDURE — 6360000002 HC RX W HCPCS: Performed by: STUDENT IN AN ORGANIZED HEALTH CARE EDUCATION/TRAINING PROGRAM

## 2025-07-23 PROCEDURE — 2580000003 HC RX 258: Performed by: NURSE PRACTITIONER

## 2025-07-23 PROCEDURE — 6360000002 HC RX W HCPCS: Performed by: NURSE PRACTITIONER

## 2025-07-23 PROCEDURE — 36415 COLL VENOUS BLD VENIPUNCTURE: CPT

## 2025-07-23 PROCEDURE — 84145 PROCALCITONIN (PCT): CPT

## 2025-07-23 PROCEDURE — 86140 C-REACTIVE PROTEIN: CPT

## 2025-07-23 PROCEDURE — 85025 COMPLETE CBC W/AUTO DIFF WBC: CPT

## 2025-07-23 PROCEDURE — 94761 N-INVAS EAR/PLS OXIMETRY MLT: CPT

## 2025-07-23 PROCEDURE — 2580000003 HC RX 258: Performed by: STUDENT IN AN ORGANIZED HEALTH CARE EDUCATION/TRAINING PROGRAM

## 2025-07-23 PROCEDURE — 1200000000 HC SEMI PRIVATE

## 2025-07-23 RX ORDER — LORAZEPAM 2 MG/ML
1 INJECTION INTRAMUSCULAR EVERY 4 HOURS PRN
Status: DISCONTINUED | OUTPATIENT
Start: 2025-07-23 | End: 2025-07-24 | Stop reason: HOSPADM

## 2025-07-23 RX ORDER — MORPHINE SULFATE 4 MG/ML
4 INJECTION, SOLUTION INTRAMUSCULAR; INTRAVENOUS EVERY 4 HOURS PRN
Status: DISCONTINUED | OUTPATIENT
Start: 2025-07-23 | End: 2025-07-24 | Stop reason: HOSPADM

## 2025-07-23 RX ORDER — KETOROLAC TROMETHAMINE 15 MG/ML
15 INJECTION, SOLUTION INTRAMUSCULAR; INTRAVENOUS ONCE
Status: COMPLETED | OUTPATIENT
Start: 2025-07-23 | End: 2025-07-23

## 2025-07-23 RX ADMIN — LORAZEPAM 0.5 MG: 2 INJECTION INTRAMUSCULAR; INTRAVENOUS at 07:30

## 2025-07-23 RX ADMIN — DEXTROSE AND SODIUM CHLORIDE: 5; .45 INJECTION, SOLUTION INTRAVENOUS at 15:45

## 2025-07-23 RX ADMIN — DOXYCYCLINE 100 MG: 100 INJECTION, POWDER, LYOPHILIZED, FOR SOLUTION INTRAVENOUS at 05:43

## 2025-07-23 RX ADMIN — DEXTROSE AND SODIUM CHLORIDE: 5; .45 INJECTION, SOLUTION INTRAVENOUS at 01:22

## 2025-07-23 RX ADMIN — DEXTROSE MONOHYDRATE 125 ML: 100 INJECTION, SOLUTION INTRAVENOUS at 16:24

## 2025-07-23 RX ADMIN — KETOROLAC TROMETHAMINE 15 MG: 15 INJECTION, SOLUTION INTRAMUSCULAR; INTRAVENOUS at 06:44

## 2025-07-23 ASSESSMENT — PAIN SCALES - PAIN ASSESSMENT IN ADVANCED DEMENTIA (PAINAD)
CONSOLABILITY: NO NEED TO CONSOLE
TOTALSCORE: 0
BREATHING: NORMAL
FACIALEXPRESSION: SMILING OR INEXPRESSIVE
BODYLANGUAGE: RELAXED

## 2025-07-23 NOTE — CONSULTS
Palliative Care Inpatient    Patient: Albertina Manning  Room: 0315/0315-01    Reason For Consult   Goals of care evaluation  Distress management  Guidance and support  Facilitate communications  Assistance in coordinating care    Code Status: DNR-CC      Impression: Albertina Manning is a 70 y.o. year old female  has a past medical history of Alcohol dependence in remission (HCC), Anxiety, Arthritis, CAD (coronary artery disease), Cancer (HCC), Chronic bronchitis (HCC), Cognitive communication deficit, Diverticulosis, Esophageal varices without bleeding (HCC), Insomnia, Major depressive disorder, Osteoporosis, Schizoaffective disorder (HCC), and Tremor..  Currently hospitalized for the management of hyperkalemia.  The Palliative Care Team is following to assist with goals of care.    Vital Signs  /60   Pulse 82   Temp 99.9 °F (37.7 °C) (Temporal)   Resp 22   Ht 1.448 m (4' 9\")   SpO2 95%   BMI 22.94 kg/m²     Patient Active Problem List   Diagnosis    Elevated liver function tests    Abnormal weight loss    Atherosclerosis of native coronary artery of native heart without angina pectoris    Malignant neoplasm of central portion of right breast in female, estrogen receptor positive (HCC)    Brief psychotic disorder (HCC)    Hyperglycemia    Insomnia    Major depressive disorder with single episode, in partial remission    Muscle weakness (generalized)    Nicotine dependence    Obstructive chronic bronchitis without exacerbation (HCC)    Other specified anxiety disorders    Pedal edema    Right shoulder pain    Schizoaffective disorder, chronic condition (HCC)    Secondary malignant neoplasm of bone (HCC)    Tobacco dependence due to cigarettes    Tremor    Osteopenia of multiple sites    Metastasis to bone (HCC)    FREDERICK (acute kidney injury)    Chemotherapy adverse reaction    Macrocytic anemia    Low back pain    Pneumonia due to organism    Hypernatremia    Hypermagnesemia    Acute kidney injury

## 2025-07-23 NOTE — CARE COORDINATION
Referral sent to [facility] per family request.  They chose this from the hospice list.    TEDDY Holloway

## 2025-07-23 NOTE — ACP (ADVANCE CARE PLANNING)
Advance Care Planning     Palliative Team Advance Care Planning (ACP) Conversation    Date of Conversation: 07/23/25    Individuals present for the conversation: Legal Guardian Anjelica Mcguire     ACP documents on file prior to discussion:  -Legal Guardianship Document      Healthcare Decision Maker:    Primary Decision Maker: Anjelica Mcguire - Legal Guardian, Legal Guardian - [phone]     Conversation Summary:  changes code status to DNR-CC and chooses hospice care    Resuscitation Status:   Code Status: DNR-CC     Documentation Completed:  -Portable DNR    I spent 60 minutes with the patient and/or surrogate decision maker discussing the patient's wishes and goals.      Tameka Junior, RN

## 2025-07-24 ENCOUNTER — APPOINTMENT (OUTPATIENT)
Dept: GENERAL RADIOLOGY | Age: 70
DRG: 640 | End: 2025-07-24
Payer: COMMERCIAL

## 2025-07-24 VITALS
HEART RATE: 95 BPM | OXYGEN SATURATION: 96 % | TEMPERATURE: 99.4 F | SYSTOLIC BLOOD PRESSURE: 105 MMHG | RESPIRATION RATE: 18 BRPM | BODY MASS INDEX: 22.88 KG/M2 | WEIGHT: 106.04 LBS | DIASTOLIC BLOOD PRESSURE: 57 MMHG | HEIGHT: 57 IN

## 2025-07-24 PROCEDURE — 71045 X-RAY EXAM CHEST 1 VIEW: CPT

## 2025-07-24 PROCEDURE — 6370000000 HC RX 637 (ALT 250 FOR IP): Performed by: STUDENT IN AN ORGANIZED HEALTH CARE EDUCATION/TRAINING PROGRAM

## 2025-07-24 PROCEDURE — 2500000003 HC RX 250 WO HCPCS: Performed by: STUDENT IN AN ORGANIZED HEALTH CARE EDUCATION/TRAINING PROGRAM

## 2025-07-24 RX ORDER — AZITHROMYCIN 250 MG/1
TABLET, FILM COATED ORAL
Qty: 6 TABLET | Refills: 0 | Status: SHIPPED | OUTPATIENT
Start: 2025-07-24 | End: 2025-08-03

## 2025-07-24 RX ADMIN — SODIUM CHLORIDE, PRESERVATIVE FREE 10 ML: 5 INJECTION INTRAVENOUS at 10:53

## 2025-07-24 ASSESSMENT — PAIN SCALES - PAIN ASSESSMENT IN ADVANCED DEMENTIA (PAINAD)
BREATHING: NORMAL
FACIALEXPRESSION: SMILING OR INEXPRESSIVE
BODYLANGUAGE: RELAXED
CONSOLABILITY: NO NEED TO CONSOLE
TOTALSCORE: 0

## 2025-07-24 NOTE — PLAN OF CARE
Problem: Discharge Planning  Goal: Discharge to home or other facility with appropriate resources  7/24/2025 1552 by Sheila Sales RN  Outcome: Adequate for Discharge     Problem: Pain  Goal: Verbalizes/displays adequate comfort level or baseline comfort level  7/24/2025 1552 by Sheila Sales RN  Outcome: Adequate for Discharge     Problem: Safety - Adult  Goal: Free from fall injury  7/24/2025 1552 by Sheila Sales RN  Outcome: Adequate for Discharge     Problem: Skin/Tissue Integrity  Goal: Skin integrity remains intact  Description: 1.  Monitor for areas of redness and/or skin breakdown  2.  Assess vascular access sites hourly  3.  Every 4-6 hours minimum:  Change oxygen saturation probe site  4.  Every 4-6 hours:  If on nasal continuous positive airway pressure, respiratory therapy assess nares and determine need for appliance change or resting period  7/24/2025 1552 by Sheila Sales RN  Outcome: Adequate for Discharge     Problem: Nutrition Deficit:  Goal: Optimize nutritional status  7/24/2025 1552 by Sheila Sales RN  Outcome: Adequate for Discharge

## 2025-07-24 NOTE — CARE COORDINATION
Patient is discharge back to [facility] today with [place] hosp[facility] care.  The guardian/daughter aware of discharge.  She will go by ambulance @ 2:30 this afternoon. The nursing home aware of the discharge.  TEDDY Holloway

## 2025-07-24 NOTE — PLAN OF CARE
Problem: Discharge Planning  Goal: Discharge to home or other facility with appropriate resources  Outcome: Progressing     Problem: Pain  Goal: Verbalizes/displays adequate comfort level or baseline comfort level  Outcome: Progressing     Problem: Safety - Adult  Goal: Free from fall injury  Outcome: Progressing     Problem: Skin/Tissue Integrity  Goal: Skin integrity remains intact  Description: 1.  Monitor for areas of redness and/or skin breakdown  2.  Assess vascular access sites hourly  3.  Every 4-6 hours minimum:  Change oxygen saturation probe site  4.  Every 4-6 hours:  If on nasal continuous positive airway pressure, respiratory therapy assess nares and determine need for appliance change or resting period  Outcome: Progressing     Problem: Nutrition Deficit:  Goal: Optimize nutritional status  Outcome: Progressing

## 2025-07-24 NOTE — DISCHARGE INSTR - COC
Continuity of Care Form    Patient Name: Albertina Manning   :  1955  MRN:  [id number]    Admit date:  2025  Discharge date:  25    Code Status Order: DNR-CC   Advance Directives:     Admitting Physician:  Kirill Springer MD  PCP: Rocky Tobias Sr., DO    Discharging Nurse: Sheila Sales RN  Discharging Hospital Unit/Room#: 0315/0315-01  Discharging Unit Phone Number: [phone]-    Emergency Contact:   Extended Emergency Contact Information  Primary Emergency Contact: Anjelica Mcguire  Home Phone: [phone]  Relation: Legal Guardian  Secondary Emergency Contact: Satinder Lozoya  Home Phone: [phone]  Relation: Child    Past Surgical History:  Past Surgical History:   Procedure Laterality Date    AXILLARY SURGERY Right 2023    AXILLARY LYMPH SENTINEL NODE BIOPSY DISSECTION performed by Jacquelyn Oden DO at [facility] OR    BACK SURGERY      BREAST BIOPSY Right 2023    NEEDLE DIRECTED (1030) RIGHT BREAST LUMPECTOMY, SENTINEL LYMPH NODE BIOPSY  (11) performed by Jacquelyn Oden DO at [facility] OR    GASTROSTOMY TUBE PLACEMENT  2025    EGD    TUBAL LIGATION      UPPER GASTROINTESTINAL ENDOSCOPY N/A 2025    ESOPHAGOGASTRODUODENOSCOPY PERCUTANEOUS ENDOSCOPIC GASTROSTOMY TUBE INSERTION performed by Jacob Daniels MD at [facility] ENDOSCOPY    US BREAST BIOPSY W LOC DEVICE 1ST LESION RIGHT Right 2023    US BREAST BIOPSY W LOC DEVICE 1ST LESION RIGHT 2023 [facility] ULTRASOUND    US PLACE BREAST LOC DEVICE 1ST LESION RIGHT Right 2023    US GUIDED NEEDLE LOC OF RIGHT BREAST 2023 [facility] ULTRASOUND       Immunization History:     There is no immunization history on file for this patient.    Active Problems:  Patient Active Problem List   Diagnosis Code    Elevated liver function tests R79.89    Abnormal weight loss R63.4    Atherosclerosis of native coronary artery of native heart without angina pectoris I25.10    Malignant neoplasm of central portion of right breast in female,

## 2025-07-24 NOTE — DISCHARGE SUMMARY
Discharge Summary    Albertina Manning  :  1955  MRN:  [id number]    Admit date:  2025      Discharge date: 2025     Admitting Physician:  Kirill Springer MD    Discharge Diagnoses:    Principal Problem:    Hyperkalemia  Active Problems:    Malignant neoplasm of central portion of right breast in female, estrogen receptor positive (HCC)    Brief psychotic disorder (HCC)    Obstructive chronic bronchitis without exacerbation (HCC)    Other specified anxiety disorders    Schizoaffective disorder, chronic condition (HCC)    Secondary malignant neoplasm of bone (HCC)    Metastasis to bone (HCC)    Acute kidney injury superimposed on stage 3b chronic kidney disease (HCC)    Dementia (HCC)    Moderate malnutrition    Pain around PEG tube site  Resolved Problems:    * No resolved hospital problems. *      Hospital Course:   Albertina Manning is a 70 y.o. female admitted with change in mental status compared to baseline.  She resides at [facility] and was brought in accompanied by her daughter with change in mental status.  She has a history of dementia and has terminal cancer.  Her symptoms started about a week prior to arrival.  She had a new cough but no fever or chills.  X-ray in the ER was concerning for right upper lobe pneumonia and she was started on antibiotics.  She was found to have hyperkalemia and FREDERICK she was hydrated and potassium was corrected kidney function is back to baseline of 1.0.  She had a feeding tube because she had stopped eating, this was removed a few weeks ago because family thought she no longer needed it as she was eating.  She has mild erythema around the stoma site.  This was cultured shows yeast.  General surgery was on consult and recommend just to keep her skin as clean as possible, covering with absorptive dressing.  Throughout her hospitalization she was agitated requiring medications for the sedation palliative care spoke with the daughter and son and about goals of care and they

## 2025-07-24 NOTE — PLAN OF CARE
Problem: Discharge Planning  Goal: Discharge to home or other facility with appropriate resources  7/24/2025 0715 by Sheila Sales RN  Outcome: Progressing     Problem: Pain  Goal: Verbalizes/displays adequate comfort level or baseline comfort level  7/24/2025 0715 by Sheila Sales RN  Outcome: Progressing     Problem: Safety - Adult  Goal: Free from fall injury  7/24/2025 0715 by Sheila Sales, RN  Outcome: Progressing     Problem: Skin/Tissue Integrity  Goal: Skin integrity remains intact  Description: 1.  Monitor for areas of redness and/or skin breakdown  2.  Assess vascular access sites hourly  3.  Every 4-6 hours minimum:  Change oxygen saturation probe site  4.  Every 4-6 hours:  If on nasal continuous positive airway pressure, respiratory therapy assess nares and determine need for appliance change or resting period  7/24/2025 0715 by Sheila Sales, RN  Outcome: Progressing     Problem: Nutrition Deficit:  Goal: Optimize nutritional status  7/24/2025 0715 by Sheila Sales, RN  Outcome: Progressing

## 2025-07-24 NOTE — PROGRESS NOTES
Spoke with daughter/legal guardian, Anjelica Mcguire, regarding Patient discharge planning this a.m. via telephone conversation.  Patient is a 70 year old single, white female admitted with a diagnosis of Hyperkalemia.  Secondary diagnosis of Anxiety, depression and Schizophrenia noted.  Patient is from [facility] in [place] where she has resided for 1 week.  Daughter reports that she wishes for Patient to return to [facility] upon discharge.    Patient uses a walker and relies on a wheelchair for longer distances.  Relies on staff of [facility] forher personal care, transportation and assistance with her medications.  Follows with Dr. Tobias as PCP.  Has [employer] and [place] Medicaid as her insurance at this point.    Dicharge plan is to return to [facility].  Daughter reports that she herself is employed at [facility] and she wants Patient to be close to her.  Patient remains a 'Full Code' status and is noted to have medical directives in place.  LSW to follow and assist with return placement as appropriate.    Merlyn Simmons, TEDDY  7/22/2025    
  [address] , [place], [place], [address]    Attestation    Patient: Albertina Manning  Date of Admission: 7/21/2025  2:58 PM  Hospital Day # 3  Date of Evaluation: 7/24/2025    I personally evaluated and examined the patient face-to-face in conjunction with the PA/NP and agree with the management and dispostition of the patient.  Please see the PA/NP's note for full details.  My key findings are:     SUBJECTIVE:    Patient seen for follow up of Hyperkalemia.  She is doing about the same as prior but was noted to bemore calm with less agitation this AM. Consultation with the patient/family had been conducted with plans for Hospice care.     OBJECTIVE:    Vitals:   Temp: 99.4 °F (37.4 °C)  BP: (!) 105/57  Respirations: 18  Pulse: 95  SpO2: 96 %     Weight  Wt Readings from Last 3 Encounters:   07/23/25 48.1 kg (106 lb 0.7 oz)   07/21/25 48.1 kg (106 lb)   07/03/25 47.4 kg (104 lb 8 oz)     Body mass index is 22.95 kg/m².    24HR INTAKE/OUTPUT:      Intake/Output Summary (Last 24 hours) at 7/24/2025 1928  Last data filed at 7/24/2025 1400  Gross per 24 hour   Intake --   Output 1150 ml   Net -1150 ml     -----------------------------------------------------------------  Exam:  GEN:    Awake, alert .   EYES:  EOMI, pupils equal   NECK: Supple. No lymphadenopathy.  No carotid bruit  CVS:    regular rate and rhythm, no audible murmur  PULM:  CTA, no wheezes, rales or rhonchi, no acute respiratory distress  ABD:    Bowels sounds normal.  Abdomen is soft.  No distention.  no tenderness to palpation. Mid abdominal erythema noted with scant discharge.  EXT:   no edema bilaterally .  No calf tenderness.   NEURO: Moves all extremities.  Motor and sensory are grossly intact  SKIN:  No rashes.  No skin lesions.      DATA:  Complete Blood Count:   Recent Labs     07/22/25  0703 07/23/25  0557   WBC 9.0 8.2   RBC 2.21* 2.24*   HGB 8.2* 8.3*   HCT 25.6* 25.7*   .8* 114.7*   RDW 14.3 14.0   PLT See 
  Physician Progress Note      PATIENT:               BIJAN SPANGLER  CSN #:                  [id number]  :                       1955  ADMIT DATE:       2025 2:58 PM  DISCH DATE:  RESPONDING  PROVIDER #:        Kirill Springer MD          QUERY TEXT:    A mental status change is documented in the medical record notes.  Please   specify the underlying cause:    The clinical indicators include:  - ED:\"Pt is from a nursing facility, pt has hx of dementia, pt has been   increasingly confused, hallucinating, and slurred speech. Pt was complaining   last week of her left flank pain.\"  - HP:\"Hyperkalemia\"  and  \"Altered Mental Status  Pt is from a nursing facility, pt has hx of dementia, pt has been increasingly   confused, hallucinating, and slurred speech. Pt was complaining last week of   her left flank pain.  She was noted to be different than her baseline where she is typically able to   take care of ADLs even though she does have a prior history of dementia.  She   indicates that symptoms probably started abruptly about 1 week prior to   presentation today.  She also had spent some time per prior records at [facility].  She did have a PEG placed that had recently been removed because   she was tolerating her p.o. intake well.\"  - RN-\"Pt setting bed alarm off at this time. Writer entered room, pt   climbing out of bed with IV in hand pulling at it. Pt also had isaacs stretched   across the bed. Pt states that she will not answer any questions that writer   asks. States that she needs someone to go check the stove for her food. States   \"you guys didn't feed me\". Snacks offered at this time, pt denied all   options. Pt now cussing at staff, kicking at staff, and swinging arms around.   Dr Springer perfect-served at this time for medication to help calm pt down. See   MAR. Care ongoing.\"  -TELESITTER Initiated  - RN-\"Writer entered room with ANNA Davila. Pt begins to yell at staff   stating \"you 
Attempted to call report for patient, receiving nurse will call back, direct phone number given.  
[facility]  Inpatient/Observation/Outpatient Rehabilitation    Date: 2025  Patient Name: Albertina Manning       [x] Inpatient Acute/Observation       []  Outpatient  : 1955         [x] Evaluation held by RN/Provider/Physical Therapist due to:    [] High Heart Rate   [] High Blood Pressure   [] Orthopedic Consult   [] Hgb < 7   [x] Other: RN reports pt is not appropriate for OT eval this date due to easily agitated / combative.     Therapist/Assistant will attempt to see this patient at our earliest opportunity on 2025.       Shira Chamberlain, OT Date: 2025   
Bryanna called for report for patient, detailed report given, all questions answered, paperwork sent with patient to facility.  
[facility]  Inpatient/Observation/Outpatient Rehabilitation    Date: 2025  Patient Name: Albertina Manning       [x] Inpatient Acute/Observation       []  Outpatient  : 1955       [] Pt refused/declined therapy at this time due to:           [] Pt cancelled due to:  [] No Reason Given   [] Sick/ill   [] Other:      [x] Evaluation held by RN/Provider/Physical Therapist due to:    [] High Heart Rate   [] High Blood Pressure   [] Orthopedic Consult   [] Hgb < 7   [x] Other: Per nursing hold patient at this time due to being combative     [] Pt ordered brace per physician request:  [] Proper fit will be completed and education for wearing/skin checks    [] Pt does not require skilled services due to:      Therapist/Assistant will attempt to see this patient, at our earliest opportunity.       Radha Swan, PT, DPT  Date: 2025   
[facility]  SPEECH THERAPY  No Visit Note    [] ICU    [x] Acute   Patient: Albertina Manning  Room: 0315/0315-01      Albertina Manning not seen on 7/22/2025 at 9:05 AM due to patient not being appropriate for bedside swallow evaluation due to agitation and altered mental status. ST will re-attempt at a later time this date.          Signature: Lisa Oro M.S. CCC-SLP        
Comprehensive Nutrition Assessment    Type and Reason for Visit:  Initial    Nutrition Recommendations/Plan:   Follow up with SLP recommendations  Monitor glucose, weight, PO potential.     Malnutrition Assessment:  Malnutrition Status:  Moderate malnutrition (07/22/25 0755)    Context:  Acute Illness     Findings of the 6 clinical characteristics of malnutrition:  Energy Intake:  Unable to assess  Weight Loss:  Mild weight loss     Body Fat Loss:  Mild body fat loss Orbital, Fat Overlying Ribs, Buccal region   Muscle Mass Loss:  Mild muscle mass loss Temples (temporalis), Clavicles (pectoralis & deltoids), Calf (gastrocnemius)  Fluid Accumulation:  No fluid accumulation     Strength:  Not Performed    Nutrition Assessment:    Moderate acute malnutrition r/t inadequate nutrient intakes, AEB unintentional weight losses with loss of lean body mass and fat stores. Recent PEG placement and removal after PO had improved at a nursing facility (no information from facility available). Unable to communicate with patient currently and is quite active in bed (fidgety and impulsive appearing). A SLP consult placed this AM with concerns of PO safety. Will evaluate ability to supplement once PO safety evaluated. Hypoglycemia this AM with glucagon provided (also on D5 1/2 NS, on a steroid as well as zyprexa). On Jardiance for heart failure.  Improving bun/creatinine with fluid provision. Ca++ does correct to 8.58 for lower albumin of 2.9.  History of low vitamin D noted (2020) and would not recommend any interventions until known PO safety.    Nutrition Related Findings:    active b/s. no edema. + flatus. Wound Type: None       Current Nutrition Intake & Therapies:    Average Meal Intake: Unable to assess (no PO records)  Average Supplements Intake: None Ordered  ADULT DIET; Regular; Low Potassium (Less than 3000 mg/day)    Anthropometric Measures:  Height: 144.8 cm (4' 9\")  Ideal Body Weight (IBW): 85 lbs (39 kg)  
[facility]  SPEECH THERAPY  No Visit Note    [] ICU    [x] Acute   Patient: Albertina Manning  Room: 0315/0315-01      Albertina Manning not seen on 7/22/2025 at 3:14 PM due to patient continuing to be aggressive and agitated. ST will re-attempt evaluation next date. .          Signature: Lisa Oro M.S. CCC-SLP        
Daughter Silvano Mcguire called and informed of the discharge, a brief daily care report and that patients wheelchair was being held here.  
Dr Racheal cueto served at this time regarding blood sugar of 72. Dr woody gave the OK to give glucagon IM at this time. Care ongoing.  
[facility] [place] [facility]  Inpatient/Observation/Outpatient Rehabilitation    Date: 2025  Patient Name: Albertina Manning       [x] Inpatient Acute/Observation       []  Outpatient  : 1955       [] Pt refused/declined therapy at this time due to:           [] Pt cancelled due to:  [] No Reason Given   [] Sick/ill   [] Other:      [x] Evaluation held by RN/Provider/Physical Therapist due to:    [] High Heart Rate   [] High Blood Pressure   [] Orthopedic Consult   [] Hgb < 7   [x] Other: RN reports pt is sleeping and continues to be inappropriate for OT eval at this time.     [] Pt ordered brace per physician request:  [] Proper fit will be completed and education for wearing/skin checks    [] Pt does not require skilled services due to:      Therapist/Assistant will attempt to see this patient, at our earliest opportunity.       Shira Chamberlain, OT Date: 2025   
I call [facility] again as they have not returned my call.  I leave a message for them to call me about returning the patient to them.  Jaki Junior RN  [facility] lachelle Cedeño   Palliative Care Nurse Coordinator  7/24/2025 11:23 AM    
I call [facility] and speak with Caridad.  I update her that the patient has not had a 1:1 sitter from noon yesterday, was more awake today and has eaten.  I give them my phone number to call and they are to return my call.  Jaki Junior RN  [facility] lachelle Cedeño   Palliative Care Nurse Coordinator  7/24/2025 11:22 AM    
I call and speak with her daughter Anjelica concerning her mom.  I tell her that she has been restless and agitated with behaviors of trying to bite her cords and refusing to eat even though she is stating she is hungry.  I tell her that we have been unable to get her to take her medications as well.  I ask if she can come in so we can come up with a plan of care for her.  She tells me that she will get ready and will be in today.  I update Deana RAO and her nurse Dom of my conversation.  Jaki Junior RN  [facility] Iza   Palliative Care Nurse Coordinator  7/23/2025 10:04 AM    
[facility]  Inpatient/Observation/Outpatient Rehabilitation    Date: 2025  Patient Name: Albertina Manning       [x] Inpatient Acute/Observation   : 1955       [x] Evaluation held by RN/Provider/Physical Therapist due to:    [] High Heart Rate   [] High Blood Pressure   [] Orthopedic Consult   [] Hgb < 7   [x] Other:  Patient combative and not appropriate at this time.       Therapist/Assistant will attempt to see this patient, at our earliest opportunity.       Jaswinder Meza, PT , DPT, OCS, Cert. DN  Date: 2025    
Message left on daughter/guardian's voicemail requesting return phone call.     Morenita Ferrell RN  [facility]   Palliative Care Nurse Coordinator  7/22/2025 2:47 PM    
Patient admitted to room 315 at 2030 via stretcher. Report received from ER. Vital signs, head to toe assessments, and weight/height completed at this time, see flowsheet for details. Patients pain is 0 at this time. Care of plan reviewed and continued. Patient denies any further needs at this point.    
Patient continues to yell out, be verbally and physically aggressive towards staff. Pulling on IV's and catheter. Patient attempting to eat telemetry wires. Cardiac monitor switched to back. Patient screaming she is hungry but refusing to eat stating that this RN poisoned her food and drinks. All redirection and de-escalation techniques unsuccessful. Updated Primary RN   
Patient has been and continues to be verbally and physically aggressive towards herself and staff. Unable to redirect or calm patient.   
Patient resting quietly, cooperative with am assessment and vitals, answers questions when asked, call light within reach and instructed on use, bed alarm on, siderails padded. Whiteboard updated.  
Patient transferred to transport stretcher for discharge to [facility], report given to transport personnel face to face, paperwork given to them also. Dumont remains in place, IV discontinued, dressing intact,   
Patient vitals and assessment completed, see flowsheet. Patient refused to answer any orientation questions, breathing normal. Pt remains agitated and yelling at staff calling everyone, \"bitches\" and whores\". The patient continuously yells \"I'm hungry\", although she would not accept any of the multiple options of food I offered her. The patient was pulling at IV as well. The patient remains in 1:1 supervision, call light within reach, bed alarm on, will continue to monitor.     
Patient vitals and assessment completed, see flowsheet. Patient unable to answer any orientation questions, breathing normal. Pt is agitated and attempting to climb out of bed, as well as pulling at isaacs catheter. Pt has call light within reach, bed alarm on, telesitter in use, will continue to monitor.     
Patient's blood sugar was 67 but she will not take anything orally at this time. PRN dextrose given, see MAR.  
Patient's blood sugar was rechecked. See results review.  
[facility]  SPEECH THERAPY  No Visit Note    [] ICU    [x] Acute   Patient: Albertina Manning  Room: 0315/0315-01      Albertina Manning not seen on 7/23/2025 at 12:13 PM due to patient sleeping. ST not to wake up pt at this time per nursing.          Signature: Radha Carroll M.A., CCC-SLP        
Progress Note    SUBJECTIVE:    Patient seen for f/u of Hyperkalemia.  She has history of dementia and altered mental status compared to her baseline.  Has been very agitated over the last 2 days.  Not sleeping well per nursing staff and fighting.  Will  discuss with daughter treatment goals and plan of care. Alejandra-psych vs back to [facility] if she is able to get her to eat and take her medications    ROS:   Constitutional: + agitation, negative  for fevers, and negative for chills.  Respiratory: negative for shortness of breath, negative for cough, and negative for wheezing  Cardiovascular: negative for chest pain, and negative for palpitations  Gastrointestinal: negative for abdominal pain, negative for nausea,negative for vomiting, negative for diarrhea, and negative for constipation     All other systems were reviewed with the patient and are negative unless otherwise stated in HPI      OBJECTIVE:      Vitals:   Vitals:    07/23/25 0900   BP: 105/60   Pulse: 82   Resp:    Temp:    SpO2:          Height: 144.8 cm (4' 9\")     Weight  Wt Readings from Last 3 Encounters:   07/21/25 48.1 kg (106 lb)   07/03/25 47.4 kg (104 lb 8 oz)   06/14/25 51.7 kg (114 lb)     Body mass index is 22.94 kg/m².    24HR INTAKE/OUTPUT:      Intake/Output Summary (Last 24 hours) at 7/23/2025 1115  Last data filed at 7/22/2025 2300  Gross per 24 hour   Intake 1163.93 ml   Output 800 ml   Net 363.93 ml     -----------------------------------------------------------------  Exam:    GEN:    Awake, alert to person only.  Very agitated and fighting with staff.   EYES:  EOMI, pupils equal   NECK: Supple. No lymphadenopathy.  No carotid bruit  CVS:    regular rate and rhythm, no audible murmur  PULM:  CTA, no wheezes, rales or rhonchi, no acute respiratory distress  ABD:    Bowels sounds normal.  Abdomen is soft.  No distention.  no tenderness to palpation.  Mild excoriated area around the PEG site with minimal purulence.  EXT:   no edema 
Pt resting quietly with eyes open at this time. Pt denies any pain. Care ongoing.  
Pt setting bed alarm off at this time. Writer entered room, pt climbing out of bed with IV in hand pulling at it. Pt also had isaacs stretched across the bed. Pt states that she will not answer any questions that writer asks. States that she needs someone to go check the stove for her food. States \"you guys didn't feed me\". Snacks offered at this time, pt denied all options. Pt now cussing at staff, kicking at staff, and swinging arms around. Dr Racheal cueto-served at this time for medication to help calm pt down. See MAR. Care ongoing.  
[facility]  Inpatient/Observation/Outpatient Rehabilitation    Date: 2025  Patient Name: Albertina Manning       [x] Inpatient Acute/Observation       []  Outpatient  : 1955       [] Pt refused/declined therapy at this time due to:           [] Pt cancelled due to:  [] No Reason Given   [] Sick/ill   [] Other:      [x] Evaluation held by RN/Provider/Physical Therapist due to:    [] High Heart Rate   [] High Blood Pressure   [] Orthopedic Consult   [] Hgb < 7   [x] Other: Pt agitation    [] Pt ordered brace per physician request:  [] Proper fit will be completed and education for wearing/skin checks    [] Pt does not require skilled services due to:      Therapist/Assistant will attempt to see this patient, at our earliest opportunity.       Daisy Gonzalez, PT, DPT Date: 2025   
[facility]  SPEECH THERAPY  No Visit Note    [] ICU    [x] Acute   Patient: Albertina Manning  Room: 0315/0315-01      Albertina Manning not seen on 7/22/2025 at 12:43 PM due to patient unable to participate in bedside swallow evaluation. RN States patient has been requesting to eat, but then will not eat. ST presented patient with mashed potatoes via spoon and patient was unable to accept bolus from spoon. Patient's overall mentation appears to be highly variable. ST will re-attempt evaluation at a later time this date.           Signature: Lisa Oro M.S. CCC-SLP        
[facility]  SPEECH THERAPY  No Visit Note    [] ICU    [x] Acute   Patient: Albertina Manning  Room: 0315/0315-01      Albertina Manning not seen on 7/23/2025 at 7:49 AM due to patient resting and caregiver requesting patient not be awoken at this time. ST will re-attempt at a later time.           Signature: Lisa Oro M.S. CCC-SLP        
Savannah HUNG from [facility] calls and tells me that patient can return today to the facility today.  I update NP,  and her nurse.  Jaki Junior RN  [facility] lachelle Cedeño   Palliative Care Nurse Coordinator  7/24/2025 12:14 PM    
[facility]  Inpatient/Observation/Outpatient Rehabilitation    Date: 2025  Patient Name: Albertina Manning       [x] Inpatient Acute/Observation       []  Outpatient  : 1955     [x] Evaluation held by RN/Provider/Physical Therapist due to:    [] High Heart Rate   [] High Blood Pressure   [] Orthopedic Consult   [] Hgb < 7   [x] Other: Patient continues to be combative and not appropriate at this time.     Therapist/Assistant will attempt to see this patient, at our earliest opportunity.       EMANUEL Garibay, OTR/L  Date: 2025    
Tele sitter calling out that pt is pulling at IV and attempting to get out of bed at this time. Writer entered room with ANNA Davila. Pt begins to yell at staff stating \"you took my fucking cigarettes you liars!\" Writer and other staff attempt to calm pt down and explain she is at the hospital and this is a no smoking facility and pt did not bring them with her. Pt continues to yell at staff and states she is \"going to beat our assess if we do not give them to her\". See MAR for medication given at this time. Care ongoing.  
Telesitter initiated at this time. Care ongoing.  
The patient is unable to take morning medications due to altered mental status.   
The patient was noted to be biting herself trying to get some of the wires or tubing off of herself, as well as continuing to bite and kick the nurse at the bedside. JALEN Leblanc made aware, patient is in bed, 1:1 supervision remains.   
This Rn spoke to Deana RAO, updated that patient continues restless and agitated, verbally and physically aggressive. One time order for haldol 1mg IM obtained. Per Deana PRN haldol to be given and re-evaluate in 45 min she will consider a higher dose of ativan per pharmacy recommedation  
Vitals and assessment were completed at this time to the best of writer's ability. Patient has been very verbally aggressive and does get physical with staff when she attempts to pull on her lines. She is very restless and anxious in bed and rolls often side to side in bed independently. Patient did take a small sip of apple juice. Patient's blood sugar will be rechecked before midnight.   Call light and bedside table remain within reach.  remains at bedside.     
Writer and assisting RN were attempting to bladder scan the patient and the patient continued to hit and scream and bite us. The patient remains 1:1 supervision, will continue to monitor.   
0755)  Malnutrition Status: Moderate malnutrition (07/22/25 0755)    I agree with the dietitian's malnutrition assessment.    Medical Nutrition Therapy: continue current nutrition therapy    Electronically signed by CHERYL JUAREZ CNP on 7/22/25 at 1:30 PM EDT     Hospital Prophylaxis:   DVT: Lovenox   Stress Ulcer:       Disposition:  Shared decision making: Discussion was had with daughter on day of admission.  No family present this a.m.  Social determinants of health that may impact management: none  Code status: Full Code   Disposition: Discharge plan is pending    [facility] Advanced Care Planning documentation:  [x] I have confirmed that the patient's Advance Care Plan is present, Code Status is documented, or surrogate decision maker is listed in the patient's medical record  [If \"yes\", STOP HERE]     [] The patient's Advance Care Plan is NOT present because:    []  I confirmed today that the patient does not wish or was not able to name a   surrogate decision maker or provide and advance care plan.    [] Hospice care is currently being provided or has been provided within the   calendar year.    []  I did NOT confirm today the presence of an Advance Care Plan or surrogate   decision maker documented within the patient's medical record.   [DOES NOT SATISFY [facility] PERFORMANCE]    CHERYL JUAREZ CNP , CHERYL, NP-C  Hospitalist Medicine        7/22/2025, 1:35 PM

## 2025-07-25 LAB
MICROORGANISM SPEC CULT: ABNORMAL
MICROORGANISM/AGENT SPEC: ABNORMAL
SPECIMEN DESCRIPTION: ABNORMAL

## 2025-07-26 LAB
MICROORGANISM SPEC CULT: NORMAL
MICROORGANISM SPEC CULT: NORMAL
SERVICE CMNT-IMP: NORMAL
SERVICE CMNT-IMP: NORMAL
SPECIMEN DESCRIPTION: NORMAL
SPECIMEN DESCRIPTION: NORMAL

## 2025-07-31 ENCOUNTER — TELEPHONE (OUTPATIENT)
Dept: ONCOLOGY | Age: 70
End: 2025-07-31

## 2025-07-31 NOTE — TELEPHONE ENCOUNTER
Spoke with Yeny at [facility].  Yeny Stated PET scan scheduled in [place] on 8/7/25.  Reports faxed to [facility].

## 2025-08-08 ENCOUNTER — TELEPHONE (OUTPATIENT)
Dept: ONCOLOGY | Age: 70
End: 2025-08-08

## 2025-08-12 ENCOUNTER — TELEPHONE (OUTPATIENT)
Dept: ONCOLOGY | Age: 70
End: 2025-08-12

## 2025-08-14 ENCOUNTER — TELEPHONE (OUTPATIENT)
Dept: ONCOLOGY | Age: 70
End: 2025-08-14

## 2025-08-21 ENCOUNTER — TELEPHONE (OUTPATIENT)
Dept: ONCOLOGY | Age: 70
End: 2025-08-21

## (undated) DEVICE — CLIP LIG M TI 6 SIL HNDL FOR OPN AND ENDOSCP SGL APPL

## (undated) DEVICE — GLOVE SURG SZ 65 CRM LTX FREE POLYISOPRENE POLYMER BEAD ANTI

## (undated) DEVICE — SUTURE VCRL SZ 3-0 L36IN ABSRB UD L36MM CT-1 1/2 CIR J944H

## (undated) DEVICE — MARKER,SKIN,WI/RULER AND LABELS: Brand: MEDLINE

## (undated) DEVICE — SPONGE LAP W18XL18IN WHT COT 4 PLY FLD STRUNG RADPQ DISP ST 2 PER PACK

## (undated) DEVICE — PROVE COVER: Brand: UNBRANDED

## (undated) DEVICE — CONTAINER,SPECIMEN,OR STERILE,4OZ: Brand: MEDLINE

## (undated) DEVICE — BLADE,CARBON-STEEL,15,STRL,DISPOSABLE,TB: Brand: MEDLINE

## (undated) DEVICE — LIQUIBAND RAPID ADHESIVE 36/CS 0.8ML: Brand: MEDLINE

## (undated) DEVICE — DRAPE,REIN 53X77,STERILE: Brand: MEDLINE

## (undated) DEVICE — YANKAUER,FLEXIBLE HANDLE,REGLR CAPACITY: Brand: MEDLINE INDUSTRIES, INC.

## (undated) DEVICE — BLANKET WRM W40.2XL55.9IN IORT LO BODY + MISTRAL AIR

## (undated) DEVICE — SUTURE VCRL SZ 3-0 L54IN ABSRB UD LIGAPAK REEL POLYGLACTIN J285G

## (undated) DEVICE — BINDER ABD SM M W9IN FOR 30 45IN 3 PNL E CNTCT CLSR POSTOP

## (undated) DEVICE — DRESSING TRNSPAR W4XL4.8IN W/ N ADH PD SURESITE-123 PLUSPD

## (undated) DEVICE — SUTURE VCRL SZ 4-0 L27IN ABSRB UD L26MM SH 1/2 CIR J415H

## (undated) DEVICE — SURGICAL PROCEDURE KIT BASIN MAJ SET UP

## (undated) DEVICE — SUTURE PERMAHAND SZ 3-0 L30IN NONABSORBABLE BLK SH L26MM K832H

## (undated) DEVICE — GARMENT,MEDLINE,DVT,INT,CALF,MED, GEN2: Brand: MEDLINE

## (undated) DEVICE — SUTURE PERMAHAND SZ 0 L30IN NONABSORBABLE BLK L26MM SH 1/2 K834H

## (undated) DEVICE — SUTURE VCRL SZ 3-0 L27IN ABSRB UD L26MM SH 1/2 CIR J416H

## (undated) DEVICE — NEPTUNE E-SEP 165MM SUCTION SLEEVE: Brand: NEPTUNE E-SEP

## (undated) DEVICE — SUTURE PROL SZ 3-0 L30IN NONABSORBABLE BLU L26MM SH 1/2 CIR 8832H

## (undated) DEVICE — BINDER ABD UNISX 3 PNL E PREM CNTCT CLSR L XL 46INX62INX9IN

## (undated) DEVICE — SUTURE MCRYL SZ 5-0 L18IN ABSRB UD PC-3 L16MM 3/8 CIR Y844G

## (undated) DEVICE — GAUZE,SPONGE,FLUFF,6"X6.75",STRL,5/TRAY: Brand: MEDLINE

## (undated) DEVICE — BLADE ES ELASTOMERIC COAT INSUL DURABLE BEND UPTO 90DEG

## (undated) DEVICE — SYRINGE, LUER LOCK, 10ML: Brand: MEDLINE

## (undated) DEVICE — SKIN PREP TRAY W/CHG: Brand: MEDLINE INDUSTRIES, INC.

## (undated) DEVICE — SUTURE MCRYL SZ 4-0 L27IN ABSRB UD L19MM PS-2 1/2 CIR PRIM Y426H

## (undated) DEVICE — MIC* SAFETY PERCUTANEOUS ENDOSCOPIC GASTROSTOMY PEG KIT - 20 FR - PULL: Brand: MIC PEG TUBE

## (undated) DEVICE — NEEDLE HYPO 25GA L1.5IN BLU POLYPR HUB S STL REG BVL STR